# Patient Record
Sex: FEMALE | Race: WHITE | NOT HISPANIC OR LATINO | Employment: FULL TIME | URBAN - METROPOLITAN AREA
[De-identification: names, ages, dates, MRNs, and addresses within clinical notes are randomized per-mention and may not be internally consistent; named-entity substitution may affect disease eponyms.]

---

## 2017-01-20 RX ORDER — TOPIRAMATE 25 MG/1
TABLET ORAL
Qty: 90 TABLET | Refills: 0 | Status: SHIPPED | OUTPATIENT
Start: 2017-01-20 | End: 2017-02-16 | Stop reason: SDUPTHER

## 2017-02-03 RX ORDER — SERTRALINE HYDROCHLORIDE 100 MG/1
TABLET, FILM COATED ORAL
Qty: 30 TABLET | Refills: 0 | Status: SHIPPED | OUTPATIENT
Start: 2017-02-03 | End: 2017-04-13 | Stop reason: SDUPTHER

## 2017-02-10 ENCOUNTER — OFFICE VISIT (OUTPATIENT)
Dept: FAMILY MEDICINE CLINIC | Facility: CLINIC | Age: 32
End: 2017-02-10

## 2017-02-10 VITALS
RESPIRATION RATE: 16 BRPM | SYSTOLIC BLOOD PRESSURE: 120 MMHG | OXYGEN SATURATION: 98 % | WEIGHT: 160 LBS | TEMPERATURE: 97.7 F | HEART RATE: 66 BPM | HEIGHT: 65 IN | BODY MASS INDEX: 26.66 KG/M2 | DIASTOLIC BLOOD PRESSURE: 68 MMHG

## 2017-02-10 DIAGNOSIS — N92.6 IRREGULAR MENSES: Primary | ICD-10-CM

## 2017-02-10 PROCEDURE — 99213 OFFICE O/P EST LOW 20 MIN: CPT | Performed by: PHYSICIAN ASSISTANT

## 2017-02-10 NOTE — PROGRESS NOTES
Subjective   JANN Wharton is a 31 y.o. female.     History of Present Illness   JANN Wharton 31 y.o. female who presents today for irreg menses  she has a history of   Patient Active Problem List   Diagnosis   • Migraine   • Asthma   • Anxiety   • Insomnia   .    I have her on continuous OCP and after irreg spotting 2  Mos, she knew to stop OCP cycle and restart.  This would usually fix it.  Still spotting brown blood every day for last 3 weeks.  She is taking Rx.  I will plan to do labs with thyroid and prolactin levels.    If normal labs, plan to try changing to different OCP like Lo Ovral and cycle her.  I know this can effect her migraines.    If I change her OCP and still spotting with few cycles, will refer to GYN to work up and ultrasound     Lumpy area right breast and no pain  No change in size and there few months.  No change with cycle;  Mom had breast cancer age 59.    The following portions of the patient's history were reviewed and updated as appropriate: allergies, current medications, past family history, past medical history, past social history, past surgical history and problem list.    Review of Systems   Constitutional: Negative for activity change, appetite change and unexpected weight change.   HENT: Negative for nosebleeds and trouble swallowing.    Eyes: Negative for pain and visual disturbance.   Respiratory: Negative for chest tightness, shortness of breath and wheezing.    Cardiovascular: Negative for chest pain and palpitations.   Gastrointestinal: Negative for abdominal pain and blood in stool.   Endocrine: Negative.    Genitourinary: Negative for difficulty urinating and hematuria.   Musculoskeletal: Negative for joint swelling.   Skin: Negative for color change and rash.   Allergic/Immunologic: Negative.    Neurological: Negative for syncope and speech difficulty.   Hematological: Negative for adenopathy.   Psychiatric/Behavioral: Negative for agitation and confusion.   All  other systems reviewed and are negative.      Objective   Physical Exam   Constitutional: She is oriented to person, place, and time. She appears well-developed and well-nourished. No distress.   HENT:   Head: Normocephalic and atraumatic.   Eyes: Conjunctivae and EOM are normal. Pupils are equal, round, and reactive to light. Right eye exhibits no discharge. Left eye exhibits no discharge. No scleral icterus.   Neck: Normal range of motion. Neck supple. No tracheal deviation present. No thyromegaly present.   Cardiovascular: Normal rate, regular rhythm, normal heart sounds, intact distal pulses and normal pulses.  Exam reveals no gallop.    No murmur heard.  Pulmonary/Chest: Effort normal and breath sounds normal. No respiratory distress. She has no wheezes. She has no rales. Right breast exhibits skin change. Right breast exhibits no inverted nipple, no mass and no nipple discharge. Left breast exhibits no inverted nipple, no mass, no nipple discharge and no skin change.   RUQ is fleshy mole;  About 1mm; see on skin and just under skin; not fixed; not on breast tissue   Musculoskeletal: Normal range of motion.   Lymphadenopathy:     She has no cervical adenopathy.     She has no axillary adenopathy.   Neurological: She is alert and oriented to person, place, and time. She exhibits normal muscle tone. Coordination normal.   Skin: Skin is warm. No rash noted. No erythema. No pallor.   Psychiatric: She has a normal mood and affect. Her behavior is normal. Judgment and thought content normal.   Nursing note and vitals reviewed.      Assessment/Plan   Problems Addressed this Visit     None      Visit Diagnoses     Irregular menses    -  Primary    Relevant Orders    T4, Free    TSH    Prolactin    Comprehensive Metabolic Panel    CBC & Differential

## 2017-02-10 NOTE — PATIENT INSTRUCTIONS
I will order labs to check thyroid and pituitary     If labs normal and continue spotting, will need to change pills to different kind and do planned monthly periods    If I do this and continue spotting, will refer GYN for work up and probable ultrasound

## 2017-02-16 RX ORDER — TOPIRAMATE 25 MG/1
TABLET ORAL
Qty: 90 TABLET | Refills: 1 | Status: SHIPPED | OUTPATIENT
Start: 2017-02-16 | End: 2017-04-13 | Stop reason: SDUPTHER

## 2017-02-17 LAB
ALBUMIN SERPL-MCNC: 4.3 G/DL (ref 3.5–5.2)
ALBUMIN/GLOB SERPL: 1.8 G/DL
ALP SERPL-CCNC: 69 U/L (ref 39–117)
ALT SERPL-CCNC: 9 U/L (ref 1–33)
AST SERPL-CCNC: 9 U/L (ref 1–32)
BASOPHILS # BLD AUTO: 0.01 10*3/MM3 (ref 0–0.2)
BASOPHILS NFR BLD AUTO: 0.1 % (ref 0–1.5)
BILIRUB SERPL-MCNC: 0.5 MG/DL (ref 0.1–1.2)
BUN SERPL-MCNC: 9 MG/DL (ref 6–20)
BUN/CREAT SERPL: 11.1 (ref 7–25)
CALCIUM SERPL-MCNC: 9.6 MG/DL (ref 8.6–10.5)
CHLORIDE SERPL-SCNC: 103 MMOL/L (ref 98–107)
CO2 SERPL-SCNC: 22 MMOL/L (ref 22–29)
CREAT SERPL-MCNC: 0.81 MG/DL (ref 0.57–1)
EOSINOPHIL # BLD AUTO: 0.04 10*3/MM3 (ref 0–0.7)
EOSINOPHIL NFR BLD AUTO: 0.4 % (ref 0.3–6.2)
ERYTHROCYTE [DISTWIDTH] IN BLOOD BY AUTOMATED COUNT: 13.6 % (ref 11.7–13)
GLOBULIN SER CALC-MCNC: 2.4 GM/DL
GLUCOSE SERPL-MCNC: 79 MG/DL (ref 65–99)
HCT VFR BLD AUTO: 40.2 % (ref 35.6–45.5)
HGB BLD-MCNC: 13 G/DL (ref 11.9–15.5)
IMM GRANULOCYTES # BLD: 0 10*3/MM3 (ref 0–0.03)
IMM GRANULOCYTES NFR BLD: 0 % (ref 0–0.5)
LYMPHOCYTES # BLD AUTO: 3.49 10*3/MM3 (ref 0.9–4.8)
LYMPHOCYTES NFR BLD AUTO: 38.4 % (ref 19.6–45.3)
MCH RBC QN AUTO: 30 PG (ref 26.9–32)
MCHC RBC AUTO-ENTMCNC: 32.3 G/DL (ref 32.4–36.3)
MCV RBC AUTO: 92.8 FL (ref 80.5–98.2)
MONOCYTES # BLD AUTO: 0.45 10*3/MM3 (ref 0.2–1.2)
MONOCYTES NFR BLD AUTO: 4.9 % (ref 5–12)
NEUTROPHILS # BLD AUTO: 5.11 10*3/MM3 (ref 1.9–8.1)
NEUTROPHILS NFR BLD AUTO: 56.2 % (ref 42.7–76)
PLATELET # BLD AUTO: 275 10*3/MM3 (ref 140–500)
POTASSIUM SERPL-SCNC: 4 MMOL/L (ref 3.5–5.2)
PROLACTIN SERPL-MCNC: 15.3 NG/ML (ref 4.8–23.3)
PROT SERPL-MCNC: 6.7 G/DL (ref 6–8.5)
RBC # BLD AUTO: 4.33 10*6/MM3 (ref 3.9–5.2)
SODIUM SERPL-SCNC: 142 MMOL/L (ref 136–145)
T4 FREE SERPL-MCNC: 1.08 NG/DL (ref 0.93–1.7)
TSH SERPL DL<=0.005 MIU/L-ACNC: 0.92 MIU/ML (ref 0.27–4.2)
WBC # BLD AUTO: 9.1 10*3/MM3 (ref 4.5–10.7)

## 2017-04-14 RX ORDER — SERTRALINE HYDROCHLORIDE 100 MG/1
TABLET, FILM COATED ORAL
Qty: 30 TABLET | Refills: 0 | Status: SHIPPED | OUTPATIENT
Start: 2017-04-14 | End: 2017-05-16 | Stop reason: SDUPTHER

## 2017-04-14 RX ORDER — TOPIRAMATE 25 MG/1
TABLET ORAL
Qty: 90 TABLET | Refills: 0 | Status: SHIPPED | OUTPATIENT
Start: 2017-04-14 | End: 2017-05-16 | Stop reason: SDUPTHER

## 2017-05-17 RX ORDER — SERTRALINE HYDROCHLORIDE 100 MG/1
TABLET, FILM COATED ORAL
Qty: 30 TABLET | Refills: 0 | Status: SHIPPED | OUTPATIENT
Start: 2017-05-17 | End: 2017-06-17 | Stop reason: SDUPTHER

## 2017-05-17 RX ORDER — TOPIRAMATE 25 MG/1
TABLET ORAL
Qty: 90 TABLET | Refills: 0 | Status: SHIPPED | OUTPATIENT
Start: 2017-05-17 | End: 2017-06-17 | Stop reason: SDUPTHER

## 2017-06-18 RX ORDER — RIZATRIPTAN BENZOATE 10 MG/1
TABLET ORAL
Qty: 12 TABLET | Refills: 0 | Status: SHIPPED | OUTPATIENT
Start: 2017-06-18 | End: 2019-04-05 | Stop reason: HOSPADM

## 2017-06-18 RX ORDER — TOPIRAMATE 25 MG/1
TABLET ORAL
Qty: 90 TABLET | Refills: 0 | Status: SHIPPED | OUTPATIENT
Start: 2017-06-18 | End: 2017-07-29 | Stop reason: SDUPTHER

## 2017-06-18 RX ORDER — SERTRALINE HYDROCHLORIDE 100 MG/1
TABLET, FILM COATED ORAL
Qty: 30 TABLET | Refills: 0 | Status: SHIPPED | OUTPATIENT
Start: 2017-06-18 | End: 2017-07-29 | Stop reason: SDUPTHER

## 2017-07-29 RX ORDER — TOPIRAMATE 25 MG/1
TABLET ORAL
Qty: 90 TABLET | Refills: 0 | Status: SHIPPED | OUTPATIENT
Start: 2017-07-29 | End: 2017-08-02

## 2017-07-29 RX ORDER — SERTRALINE HYDROCHLORIDE 100 MG/1
TABLET, FILM COATED ORAL
Qty: 30 TABLET | Refills: 0 | Status: SHIPPED | OUTPATIENT
Start: 2017-07-29 | End: 2017-08-02 | Stop reason: SDUPTHER

## 2017-08-02 ENCOUNTER — OFFICE VISIT (OUTPATIENT)
Dept: FAMILY MEDICINE CLINIC | Facility: CLINIC | Age: 32
End: 2017-08-02

## 2017-08-02 VITALS
SYSTOLIC BLOOD PRESSURE: 110 MMHG | OXYGEN SATURATION: 97 % | RESPIRATION RATE: 16 BRPM | WEIGHT: 165 LBS | BODY MASS INDEX: 27.49 KG/M2 | DIASTOLIC BLOOD PRESSURE: 70 MMHG | HEART RATE: 69 BPM | TEMPERATURE: 98.3 F | HEIGHT: 65 IN

## 2017-08-02 DIAGNOSIS — G43.109 MIGRAINE WITH AURA AND WITHOUT STATUS MIGRAINOSUS, NOT INTRACTABLE: ICD-10-CM

## 2017-08-02 DIAGNOSIS — R79.89 ELEVATED SERUM CREATININE: ICD-10-CM

## 2017-08-02 DIAGNOSIS — F41.9 ANXIETY: ICD-10-CM

## 2017-08-02 DIAGNOSIS — R00.2 HEART PALPITATIONS: Primary | ICD-10-CM

## 2017-08-02 LAB
ALBUMIN SERPL-MCNC: 4.5 G/DL (ref 3.5–5.2)
ALBUMIN/GLOB SERPL: 1.6 G/DL
ALP SERPL-CCNC: 80 U/L (ref 39–117)
ALT SERPL-CCNC: 11 U/L (ref 1–33)
AST SERPL-CCNC: 13 U/L (ref 1–32)
BASOPHILS # BLD AUTO: 0.01 10*3/MM3 (ref 0–0.2)
BASOPHILS NFR BLD AUTO: 0.2 % (ref 0–1.5)
BILIRUB SERPL-MCNC: 0.7 MG/DL (ref 0.1–1.2)
BUN SERPL-MCNC: 13 MG/DL (ref 6–20)
BUN/CREAT SERPL: 12.5 (ref 7–25)
CALCIUM SERPL-MCNC: 9.7 MG/DL (ref 8.6–10.5)
CHLORIDE SERPL-SCNC: 106 MMOL/L (ref 98–107)
CO2 SERPL-SCNC: 22.4 MMOL/L (ref 22–29)
CREAT SERPL-MCNC: 1.04 MG/DL (ref 0.57–1)
EOSINOPHIL # BLD AUTO: 0.08 10*3/MM3 (ref 0–0.7)
EOSINOPHIL NFR BLD AUTO: 1.2 % (ref 0.3–6.2)
ERYTHROCYTE [DISTWIDTH] IN BLOOD BY AUTOMATED COUNT: 13.3 % (ref 11.7–13)
GLOBULIN SER CALC-MCNC: 2.8 GM/DL
GLUCOSE SERPL-MCNC: 88 MG/DL (ref 65–99)
HCT VFR BLD AUTO: 46.5 % (ref 35.6–45.5)
HGB BLD-MCNC: 14.5 G/DL (ref 11.9–15.5)
IMM GRANULOCYTES # BLD: 0 10*3/MM3 (ref 0–0.03)
IMM GRANULOCYTES NFR BLD: 0 % (ref 0–0.5)
LYMPHOCYTES # BLD AUTO: 2.46 10*3/MM3 (ref 0.9–4.8)
LYMPHOCYTES NFR BLD AUTO: 37.8 % (ref 19.6–45.3)
MCH RBC QN AUTO: 29.7 PG (ref 26.9–32)
MCHC RBC AUTO-ENTMCNC: 31.2 G/DL (ref 32.4–36.3)
MCV RBC AUTO: 95.3 FL (ref 80.5–98.2)
MONOCYTES # BLD AUTO: 0.34 10*3/MM3 (ref 0.2–1.2)
MONOCYTES NFR BLD AUTO: 5.2 % (ref 5–12)
NEUTROPHILS # BLD AUTO: 3.62 10*3/MM3 (ref 1.9–8.1)
NEUTROPHILS NFR BLD AUTO: 55.6 % (ref 42.7–76)
PLATELET # BLD AUTO: 278 10*3/MM3 (ref 140–500)
POTASSIUM SERPL-SCNC: 4.2 MMOL/L (ref 3.5–5.2)
PROT SERPL-MCNC: 7.3 G/DL (ref 6–8.5)
RBC # BLD AUTO: 4.88 10*6/MM3 (ref 3.9–5.2)
SODIUM SERPL-SCNC: 140 MMOL/L (ref 136–145)
T4 FREE SERPL-MCNC: 1.17 NG/DL (ref 0.93–1.7)
TSH SERPL DL<=0.005 MIU/L-ACNC: 1.27 MIU/ML (ref 0.27–4.2)
WBC # BLD AUTO: 6.51 10*3/MM3 (ref 4.5–10.7)

## 2017-08-02 PROCEDURE — 99214 OFFICE O/P EST MOD 30 MIN: CPT | Performed by: PHYSICIAN ASSISTANT

## 2017-08-02 RX ORDER — SERTRALINE HYDROCHLORIDE 100 MG/1
100 TABLET, FILM COATED ORAL DAILY
Qty: 30 TABLET | Refills: 5 | Status: SHIPPED | OUTPATIENT
Start: 2017-08-02 | End: 2018-10-01 | Stop reason: SDUPTHER

## 2017-08-02 RX ORDER — TOPIRAMATE 50 MG/1
50 TABLET, FILM COATED ORAL 2 TIMES DAILY
Qty: 60 TABLET | Refills: 5 | Status: SHIPPED | OUTPATIENT
Start: 2017-08-02 | End: 2018-02-09 | Stop reason: SDUPTHER

## 2017-08-02 NOTE — PROGRESS NOTES
Subjective   JANN Wharton is a 31 y.o. female.     History of Present Illness   Patient complains of a fluttering sensation, palpitations, shortness of breath, a skipping sensation and lightheadedness. The symptoms are mild, occur most days for last few months more at night and last seconds per episode. They tend to occur while at rest and at HS and happen every few minutes and is one skip then normal and repeats about 15 times.  Does not wake her up. NO snoring.  Rare to happen during day;  No episode during exertion noted.. Cardiac risk factors include: none. Aggravating factors are none:  Alleviating factors: not noted and just goes away. Associated symptoms: palpitations and slow heart beat. Patient denies: syncope.      Dad had HTN  DM GM (M)      JANN Wharton female 31 y.o. who presents today for follow up of Anxiety.  She reports medication is working well, patient desires to continue on Rx, and needs refill. Onset of symptoms was approximately several years ago.  She denies current suicidal and homicidal ideation. Risk factors are family history of anxiety and or depression and lifestyle of multiple roles.  Previous treatment includes current Rx.  She complains of the following medication side effects: none.  The patient has had no previous counseling..    JANN Wharton 31 y.o. female who presents today for migraines with aura and is on suppressive therapy.  She just started having break through migraines.   Will raise dose of topamax. Has Maxalt.  she has a history of   Patient Active Problem List   Diagnosis   • Migraine   • Asthma   • Anxiety   • Insomnia   .      I will order Holter and echo and get appt cardio  Avoid caffeine       The following portions of the patient's history were reviewed and updated as appropriate: allergies, current medications, past family history, past medical history, past social history, past surgical history and problem list.    Review of Systems    Constitutional: Negative for activity change, appetite change and unexpected weight change.   HENT: Negative for nosebleeds and trouble swallowing.    Eyes: Negative for pain and visual disturbance.   Respiratory: Positive for shortness of breath. Negative for chest tightness and wheezing.    Cardiovascular: Positive for palpitations. Negative for chest pain.   Gastrointestinal: Negative for abdominal pain and blood in stool.   Endocrine: Negative.    Genitourinary: Negative for difficulty urinating and hematuria.   Musculoskeletal: Negative for joint swelling.   Skin: Negative for color change and rash.   Allergic/Immunologic: Negative.    Neurological: Positive for dizziness and light-headedness. Negative for syncope and speech difficulty.   Hematological: Negative for adenopathy.   Psychiatric/Behavioral: Negative for agitation and confusion.   All other systems reviewed and are negative.      Objective   Physical Exam   Constitutional: She is oriented to person, place, and time. She appears well-developed and well-nourished. No distress.   HENT:   Head: Normocephalic and atraumatic.   Eyes: Conjunctivae and EOM are normal. Pupils are equal, round, and reactive to light. Right eye exhibits no discharge. Left eye exhibits no discharge. No scleral icterus.   Neck: Normal range of motion. Neck supple. No tracheal deviation present. No thyromegaly present.   Cardiovascular: Normal rate, regular rhythm, normal heart sounds, intact distal pulses and normal pulses.  Exam reveals no gallop.    No murmur heard.  Pulmonary/Chest: Effort normal and breath sounds normal. No respiratory distress. She has no wheezes. She has no rales.   Musculoskeletal: Normal range of motion.   Neurological: She is alert and oriented to person, place, and time. She exhibits normal muscle tone. Coordination normal.   Skin: Skin is warm. No rash noted. No erythema. No pallor.   Psychiatric: She has a normal mood and affect. Her behavior is  normal. Judgment and thought content normal.   Nursing note and vitals reviewed.      Assessment/Plan   Problems Addressed this Visit        Cardiovascular and Mediastinum    Migraine    Relevant Medications    topiramate (TOPAMAX) 50 MG tablet    sertraline (ZOLOFT) 100 MG tablet    Other Relevant Orders    Comprehensive Metabolic Panel    T4, Free    TSH    CBC & Differential    Adult Transthoracic Echo Complete    Holter Monitor - 24 Hour       Other    Anxiety    Relevant Orders    Comprehensive Metabolic Panel    T4, Free    TSH    CBC & Differential    Adult Transthoracic Echo Complete    Holter Monitor - 24 Hour      Other Visit Diagnoses     Heart palpitations    -  Primary    Relevant Orders    Comprehensive Metabolic Panel    T4, Free    TSH    CBC & Differential    Adult Transthoracic Echo Complete    Holter Monitor - 24 Hour

## 2017-08-02 NOTE — PATIENT INSTRUCTIONS
Getting labs  Cardiac testing and consult  Raise dose Topamax and email if intolerant;  Watch renal stones  ER if worse  Avoid caffeine

## 2017-08-10 ENCOUNTER — HOSPITAL ENCOUNTER (OUTPATIENT)
Dept: CARDIOLOGY | Facility: HOSPITAL | Age: 32
Discharge: HOME | End: 2017-08-10
Admitting: PHYSICIAN ASSISTANT

## 2017-08-10 VITALS
BODY MASS INDEX: 27.49 KG/M2 | DIASTOLIC BLOOD PRESSURE: 60 MMHG | WEIGHT: 165 LBS | HEIGHT: 65 IN | SYSTOLIC BLOOD PRESSURE: 106 MMHG | HEART RATE: 72 BPM

## 2017-08-10 DIAGNOSIS — G43.109 MIGRAINE WITH AURA AND WITHOUT STATUS MIGRAINOSUS, NOT INTRACTABLE: ICD-10-CM

## 2017-08-10 DIAGNOSIS — R00.2 HEART PALPITATIONS: ICD-10-CM

## 2017-08-10 DIAGNOSIS — F41.9 ANXIETY: ICD-10-CM

## 2017-08-10 LAB
ASCENDING AORTA: 2.3 CM
BH CV ECHO MEAS - ACS: 1.9 CM
BH CV ECHO MEAS - AO MAX PG (FULL): 2 MMHG
BH CV ECHO MEAS - AO MAX PG: 4.5 MMHG
BH CV ECHO MEAS - AO MEAN PG (FULL): 1.4 MMHG
BH CV ECHO MEAS - AO MEAN PG: 2.9 MMHG
BH CV ECHO MEAS - AO ROOT AREA (BSA CORRECTED): 1.7
BH CV ECHO MEAS - AO ROOT AREA: 7.9 CM^2
BH CV ECHO MEAS - AO ROOT DIAM: 3.2 CM
BH CV ECHO MEAS - AO V2 MAX: 106.2 CM/SEC
BH CV ECHO MEAS - AO V2 MEAN: 81.9 CM/SEC
BH CV ECHO MEAS - AO V2 VTI: 21.9 CM
BH CV ECHO MEAS - AVA(I,A): 2.2 CM^2
BH CV ECHO MEAS - AVA(I,D): 2.2 CM^2
BH CV ECHO MEAS - AVA(V,A): 2.3 CM^2
BH CV ECHO MEAS - AVA(V,D): 2.3 CM^2
BH CV ECHO MEAS - BSA(HAYCOCK): 1.9 M^2
BH CV ECHO MEAS - BSA: 1.8 M^2
BH CV ECHO MEAS - BZI_BMI: 27.5 KILOGRAMS/M^2
BH CV ECHO MEAS - BZI_METRIC_HEIGHT: 165.1 CM
BH CV ECHO MEAS - BZI_METRIC_WEIGHT: 74.8 KG
BH CV ECHO MEAS - CONTRAST EF (2CH): 64.8 ML/M^2
BH CV ECHO MEAS - CONTRAST EF 4CH: 61.9 ML/M^2
BH CV ECHO MEAS - EDV(MOD-SP2): 88 ML
BH CV ECHO MEAS - EDV(MOD-SP4): 84 ML
BH CV ECHO MEAS - EDV(TEICH): 101.9 ML
BH CV ECHO MEAS - EF(CUBED): 66.4 %
BH CV ECHO MEAS - EF(MOD-SP2): 64.8 %
BH CV ECHO MEAS - EF(MOD-SP4): 61.9 %
BH CV ECHO MEAS - EF(TEICH): 57.9 %
BH CV ECHO MEAS - ESV(MOD-SP2): 31 ML
BH CV ECHO MEAS - ESV(MOD-SP4): 32 ML
BH CV ECHO MEAS - ESV(TEICH): 42.9 ML
BH CV ECHO MEAS - FS: 30.5 %
BH CV ECHO MEAS - IVS/LVPW: 1
BH CV ECHO MEAS - IVSD: 0.9 CM
BH CV ECHO MEAS - LAT PEAK E' VEL: 5 CM/SEC
BH CV ECHO MEAS - LV DIASTOLIC VOL/BSA (35-75): 46.1 ML/M^2
BH CV ECHO MEAS - LV MASS(C)D: 138.5 GRAMS
BH CV ECHO MEAS - LV MASS(C)DI: 76 GRAMS/M^2
BH CV ECHO MEAS - LV MAX PG: 2.5 MMHG
BH CV ECHO MEAS - LV MEAN PG: 1.5 MMHG
BH CV ECHO MEAS - LV SYSTOLIC VOL/BSA (12-30): 17.6 ML/M^2
BH CV ECHO MEAS - LV V1 MAX: 78.6 CM/SEC
BH CV ECHO MEAS - LV V1 MEAN: 57.9 CM/SEC
BH CV ECHO MEAS - LV V1 VTI: 15.7 CM
BH CV ECHO MEAS - LVIDD: 4.7 CM
BH CV ECHO MEAS - LVIDS: 3.3 CM
BH CV ECHO MEAS - LVLD AP2: 7 CM
BH CV ECHO MEAS - LVLD AP4: 7.3 CM
BH CV ECHO MEAS - LVLS AP2: 6.1 CM
BH CV ECHO MEAS - LVLS AP4: 6.4 CM
BH CV ECHO MEAS - LVOT AREA (M): 3.1 CM^2
BH CV ECHO MEAS - LVOT AREA: 3.1 CM^2
BH CV ECHO MEAS - LVOT DIAM: 2 CM
BH CV ECHO MEAS - LVPWD: 0.87 CM
BH CV ECHO MEAS - MED PEAK E' VEL: 6 CM/SEC
BH CV ECHO MEAS - MV A DUR: 0.1 SEC
BH CV ECHO MEAS - MV A MAX VEL: 56.3 CM/SEC
BH CV ECHO MEAS - MV DEC SLOPE: 367.4 CM/SEC^2
BH CV ECHO MEAS - MV DEC TIME: 0.17 SEC
BH CV ECHO MEAS - MV E MAX VEL: 61.6 CM/SEC
BH CV ECHO MEAS - MV E/A: 1.1
BH CV ECHO MEAS - MV MAX PG: 2.3 MMHG
BH CV ECHO MEAS - MV MEAN PG: 0.92 MMHG
BH CV ECHO MEAS - MV P1/2T MAX VEL: 62.1 CM/SEC
BH CV ECHO MEAS - MV P1/2T: 49.5 MSEC
BH CV ECHO MEAS - MV V2 MAX: 76.6 CM/SEC
BH CV ECHO MEAS - MV V2 MEAN: 44.1 CM/SEC
BH CV ECHO MEAS - MV V2 VTI: 24.1 CM
BH CV ECHO MEAS - MVA P1/2T LCG: 3.5 CM^2
BH CV ECHO MEAS - MVA(P1/2T): 4.4 CM^2
BH CV ECHO MEAS - MVA(VTI): 2 CM^2
BH CV ECHO MEAS - PA ACC TIME: 0.18 SEC
BH CV ECHO MEAS - PA MAX PG (FULL): 0.8 MMHG
BH CV ECHO MEAS - PA MAX PG: 2.1 MMHG
BH CV ECHO MEAS - PA PR(ACCEL): -1.8 MMHG
BH CV ECHO MEAS - PA V2 MAX: 71.9 CM/SEC
BH CV ECHO MEAS - PULM A REVS DUR: 0.1 SEC
BH CV ECHO MEAS - PULM A REVS VEL: 16 CM/SEC
BH CV ECHO MEAS - PULM DIAS VEL: 38.3 CM/SEC
BH CV ECHO MEAS - PULM S/D: 1.3
BH CV ECHO MEAS - PULM SYS VEL: 48.5 CM/SEC
BH CV ECHO MEAS - PVA(V,A): 2.4 CM^2
BH CV ECHO MEAS - PVA(V,D): 2.4 CM^2
BH CV ECHO MEAS - QP/QS: 0.82
BH CV ECHO MEAS - RAP SYSTOLE: 3 MMHG
BH CV ECHO MEAS - RV MAX PG: 1.3 MMHG
BH CV ECHO MEAS - RV MEAN PG: 0.67 MMHG
BH CV ECHO MEAS - RV V1 MAX: 56.3 CM/SEC
BH CV ECHO MEAS - RV V1 MEAN: 38.3 CM/SEC
BH CV ECHO MEAS - RV V1 VTI: 12.8 CM
BH CV ECHO MEAS - RVOT AREA: 3.1 CM^2
BH CV ECHO MEAS - RVOT DIAM: 2 CM
BH CV ECHO MEAS - RVSP: 21.9 MMHG
BH CV ECHO MEAS - SI(AO): 95.4 ML/M^2
BH CV ECHO MEAS - SI(CUBED): 37.6 ML/M^2
BH CV ECHO MEAS - SI(LVOT): 26.6 ML/M^2
BH CV ECHO MEAS - SI(MOD-SP2): 31.3 ML/M^2
BH CV ECHO MEAS - SI(MOD-SP4): 28.5 ML/M^2
BH CV ECHO MEAS - SI(TEICH): 32.4 ML/M^2
BH CV ECHO MEAS - SUP REN AO DIAM: 1.7 CM
BH CV ECHO MEAS - SV(AO): 174 ML
BH CV ECHO MEAS - SV(CUBED): 68.6 ML
BH CV ECHO MEAS - SV(LVOT): 48.5 ML
BH CV ECHO MEAS - SV(MOD-SP2): 57 ML
BH CV ECHO MEAS - SV(MOD-SP4): 52 ML
BH CV ECHO MEAS - SV(RVOT): 39.7 ML
BH CV ECHO MEAS - SV(TEICH): 59.1 ML
BH CV ECHO MEAS - TAPSE (>1.6): 2.1 CM2
BH CV ECHO MEAS - TR MAX VEL: 217.2 CM/SEC
BH CV XLRA - RV BASE: 2.6 CM
BH CV XLRA - TDI S': 13 CM/SEC
E/E' RATIO: 5.5
LEFT ATRIUM VOLUME INDEX: 23 ML/M2
LV EF 2D ECHO EST: 62 %
SINUS: 2.7 CM
STJ: 2.4 CM

## 2017-08-10 PROCEDURE — 93306 TTE W/DOPPLER COMPLETE: CPT | Performed by: INTERNAL MEDICINE

## 2017-08-10 PROCEDURE — 93306 TTE W/DOPPLER COMPLETE: CPT

## 2017-08-25 RX ORDER — LEVONORGESTREL AND ETHINYL ESTRADIOL 0.15-0.03
KIT ORAL
Qty: 28 TABLET | Refills: 12 | Status: SHIPPED | OUTPATIENT
Start: 2017-08-25 | End: 2018-10-08

## 2017-09-07 ENCOUNTER — OFFICE VISIT (OUTPATIENT)
Dept: CARDIOLOGY | Facility: CLINIC | Age: 32
End: 2017-09-07

## 2017-09-07 VITALS
SYSTOLIC BLOOD PRESSURE: 100 MMHG | WEIGHT: 170 LBS | BODY MASS INDEX: 28.32 KG/M2 | HEIGHT: 65 IN | HEART RATE: 61 BPM | DIASTOLIC BLOOD PRESSURE: 64 MMHG

## 2017-09-07 DIAGNOSIS — R00.2 PALPITATION: Primary | ICD-10-CM

## 2017-09-07 PROCEDURE — 93000 ELECTROCARDIOGRAM COMPLETE: CPT | Performed by: INTERNAL MEDICINE

## 2017-09-07 PROCEDURE — 99203 OFFICE O/P NEW LOW 30 MIN: CPT | Performed by: INTERNAL MEDICINE

## 2017-09-07 NOTE — PROGRESS NOTES
Subjective:     Encounter Date:09/07/2017      Patient ID: JANN Wharton is a 32 y.o. female.    Chief Complaint:  Palpitations    This is a recurrent problem. The current episode started more than 1 month ago. The problem occurs intermittently. The problem has been waxing and waning. Associated symptoms include malaise/fatigue. Pertinent negatives include no anxiety, chest pain, coughing, nausea, near-syncope, numbness, shortness of breath or syncope.       32-year-old female who presents today for evaluation of her palpitations.  She actually describes classically PVCs.  Usually occurs when she rests refer she goes to sleep.  Patient started worked up including an echocardiogram as well as a monitor.  Her monitor showed occasional sinus tachycardia and some rare PVCs.  She was seen today for discussion of her studies.  Her biggest complaint however is fatigue.  She said she can premature sleep at all times she doesn't generally she gets enough sleep.    Review of Systems   Constitution: Positive for malaise/fatigue.   Cardiovascular: Positive for palpitations. Negative for chest pain, near-syncope and syncope.   Respiratory: Negative for cough and shortness of breath.    Gastrointestinal: Negative for nausea.   Neurological: Negative for numbness.   Psychiatric/Behavioral: The patient is not nervous/anxious.          ECG 12 Lead  Date/Time: 9/7/2017 4:07 PM  Performed by: LEONILA CLARKE  Authorized by: LEONILA CLARKE   Comparison: compared with previous ECG from 5/7/2007  Similar to previous ECG  Rhythm: sinus rhythm  Clinical impression: normal ECG               Objective:     Physical Exam   Constitutional: She is oriented to person, place, and time. She appears well-developed.   HENT:   Head: Normocephalic.   Eyes: Conjunctivae are normal.   Neck: Normal range of motion.   Cardiovascular: Normal rate, regular rhythm and normal heart sounds.    Pulmonary/Chest: Breath sounds normal.   Abdominal:  Soft. Bowel sounds are normal.   Musculoskeletal: Normal range of motion. She exhibits no edema.   Neurological: She is alert and oriented to person, place, and time.   Skin: Skin is warm and dry.   Psychiatric: She has a normal mood and affect. Her behavior is normal.   Vitals reviewed.      Lab Review:       Assessment:         No diagnosis found.       Plan:       1.  Palpitations.  Pretty classic PVCs.  I did review things to anticipatein the future.  Again tolerability is most appropriate approach.  I told her that sometimes and put people on beta blockers however it does give people tendency to gain weight which she did not want to do.  However this point clearly it is not appropriate but I was talking about future issues should they pick back up.  2.  Fatigue one concern would be the PVCs associated with sleep apnea.  I did ask her quite a bit questions and she really did not have symptoms consistent with sleep apnea.  Not all patients with sleep apnea snore and it may be something to consider because it sounds like she doesn't sleep very well.  I will defer that to your judgment.  I would see her on an as-needed basis

## 2017-12-29 LAB
BUN SERPL-MCNC: 11 MG/DL (ref 6–20)
BUN/CREAT SERPL: 11 (ref 9–23)
CALCIUM SERPL-MCNC: 9.6 MG/DL (ref 8.7–10.2)
CHLORIDE SERPL-SCNC: 104 MMOL/L (ref 96–106)
CO2 SERPL-SCNC: 24 MMOL/L (ref 18–29)
CREAT SERPL-MCNC: 0.99 MG/DL (ref 0.57–1)
GLUCOSE SERPL-MCNC: 65 MG/DL (ref 65–99)
POTASSIUM SERPL-SCNC: 4.1 MMOL/L (ref 3.5–5.2)
SODIUM SERPL-SCNC: 142 MMOL/L (ref 134–144)

## 2018-02-09 RX ORDER — TOPIRAMATE 50 MG/1
TABLET, FILM COATED ORAL
Qty: 60 TABLET | Refills: 0 | Status: SHIPPED | OUTPATIENT
Start: 2018-02-09 | End: 2018-10-08

## 2018-10-01 RX ORDER — SERTRALINE HYDROCHLORIDE 100 MG/1
100 TABLET, FILM COATED ORAL DAILY
Qty: 30 TABLET | Refills: 0 | Status: SHIPPED | OUTPATIENT
Start: 2018-10-01 | End: 2018-10-08 | Stop reason: SDUPTHER

## 2018-10-08 ENCOUNTER — OFFICE VISIT (OUTPATIENT)
Dept: FAMILY MEDICINE CLINIC | Facility: CLINIC | Age: 33
End: 2018-10-08

## 2018-10-08 VITALS
HEART RATE: 81 BPM | SYSTOLIC BLOOD PRESSURE: 110 MMHG | DIASTOLIC BLOOD PRESSURE: 72 MMHG | RESPIRATION RATE: 16 BRPM | WEIGHT: 180 LBS | TEMPERATURE: 98.1 F | BODY MASS INDEX: 29.99 KG/M2 | OXYGEN SATURATION: 98 % | HEIGHT: 65 IN

## 2018-10-08 DIAGNOSIS — M79.672 LEFT FOOT PAIN: ICD-10-CM

## 2018-10-08 DIAGNOSIS — S93.402S SPRAIN OF LEFT ANKLE, UNSPECIFIED LIGAMENT, SEQUELA: Primary | ICD-10-CM

## 2018-10-08 DIAGNOSIS — S93.602S SPRAIN OF LEFT FOOT, SEQUELA: ICD-10-CM

## 2018-10-08 DIAGNOSIS — F41.9 ANXIETY: ICD-10-CM

## 2018-10-08 PROCEDURE — 99213 OFFICE O/P EST LOW 20 MIN: CPT | Performed by: PHYSICIAN ASSISTANT

## 2018-10-08 RX ORDER — SERTRALINE HYDROCHLORIDE 100 MG/1
100 TABLET, FILM COATED ORAL DAILY
Qty: 90 TABLET | Refills: 3 | Status: SHIPPED | OUTPATIENT
Start: 2018-10-08 | End: 2019-04-05 | Stop reason: HOSPADM

## 2018-10-08 NOTE — PROGRESS NOTES
Subjective   Sara Hoang is a 33 y.o. female.     History of Present Illness   Sara Hoang 33 y.o. female presents today for Emergency Room follow up.  she was treated ankle sprain 9-26-18.  She stepped down on edge brian and rolled ankle  .  I reviewed all of the labs and diagnostic testing and noted:  Went to [facility] and see Xray is neg and she is in boot.  This is still edema and bruising; pain is improving.  Had past hx fx right foot and did not show right away.  I will need her to see ortho!!!!  The patient's medications were not changed:  Current outpatient and discharge medications have been reconciled for the patient.  Reviewed by: Lashae Hercules PA-C    she does not have a follow up appointment with a specialist:     Sara Hoang female 33 y.o. who presents today for follow up of Anxiety.  She reports medication is working well, patient desires to continue on Rx, and needs refill. Onset of symptoms was approximately several years ago.  She denies current suicidal and homicidal ideation. Risk factors are lifestyle of multiple roles.  Previous treatment includes current Rx.  She complains of the following medication side effects: none.  The patient has had no previous counseling..    She is off Maxalt and Topamax and trying to get pregnant; may need to lower dose Zoloft if +    The following portions of the patient's history were reviewed and updated as appropriate: allergies, current medications, past family history, past medical history, past social history, past surgical history and problem list.    Review of Systems   Constitutional: Negative for activity change, appetite change and unexpected weight change.   HENT: Negative for nosebleeds and trouble swallowing.    Eyes: Negative for pain and visual disturbance.   Respiratory: Negative for chest tightness, shortness of breath and wheezing.    Cardiovascular: Negative for chest pain and palpitations.   Gastrointestinal: Negative for abdominal pain  and blood in stool.   Endocrine: Negative.    Genitourinary: Negative for difficulty urinating and hematuria.   Musculoskeletal: Positive for arthralgias and gait problem. Negative for joint swelling.   Skin: Negative for color change and rash.   Allergic/Immunologic: Negative.    Neurological: Negative for syncope and speech difficulty.   Hematological: Negative for adenopathy.   Psychiatric/Behavioral: Negative for agitation and confusion.   All other systems reviewed and are negative.      Objective   Physical Exam   Constitutional: She is oriented to person, place, and time. She appears well-developed and well-nourished. No distress.   HENT:   Head: Normocephalic and atraumatic.   Eyes: Pupils are equal, round, and reactive to light. EOM are normal. Right eye exhibits no discharge. Left eye exhibits no discharge. No scleral icterus.   Neck: Normal range of motion. Neck supple.   Pulmonary/Chest: Effort normal.   Musculoskeletal: Normal range of motion. She exhibits edema and tenderness.   Bruise and edema left foot anterior medial ankle and lateral malleolus not tender on bone; ROM pain and limited; metatarsals bruised and edema ;sore to touch over dorsal 2-3 ;  Pain with ROM and pain to bear weight    Neurological: She is alert and oriented to person, place, and time. She exhibits normal muscle tone.   Skin: Skin is warm and dry. She is not diaphoretic.   Psychiatric: She has a normal mood and affect. Her behavior is normal. Judgment and thought content normal.   Nursing note and vitals reviewed.      Assessment/Plan   Sara PENN was seen today for ankle injury.    Diagnoses and all orders for this visit:    Sprain of left ankle, unspecified ligament, sequela  -     Ambulatory Referral to Orthopedic Surgery    Anxiety    Sprain of left foot, sequela  -     Ambulatory Referral to Orthopedic Surgery    Left foot pain  Comments:  concern about stress fx  Orders:  -     Ambulatory Referral to Orthopedic  Surgery    Other orders  -     sertraline (ZOLOFT) 100 MG tablet; Take 1 tablet by mouth Daily. For anxiety               Answers for HPI/ROS submitted by the patient on 10/6/2018   Lower extremity pain  Incident occurred: more than 1 week ago  Incident location: other  Injury mechanism: an eversion injury  Pain location: left ankle, left foot  Pain quality: stabbing  Pain - numeric: 3/10  Pain course: improving  tingling: No  inability to bear weight: Yes  loss of motion: Yes  loss of sensation: No  muscle weakness: Yes  Foreign body present: no foreign bodies

## 2019-03-05 LAB
EXTERNAL HEPATITIS B SURFACE ANTIGEN: NEGATIVE
EXTERNAL HEPATITIS C AB: NEGATIVE
EXTERNAL RUBELLA QUALITATIVE: NORMAL
EXTERNAL SYPHILIS RPR SCREEN: NORMAL
HIV1 P24 AG SERPL QL IA: NORMAL

## 2019-03-17 ENCOUNTER — APPOINTMENT (OUTPATIENT)
Dept: ULTRASOUND IMAGING | Facility: HOSPITAL | Age: 34
End: 2019-03-17

## 2019-03-17 ENCOUNTER — HOSPITAL ENCOUNTER (OUTPATIENT)
Facility: HOSPITAL | Age: 34
Setting detail: OBSERVATION
Discharge: HOME | End: 2019-03-18
Attending: EMERGENCY MEDICINE | Admitting: OBSTETRICS & GYNECOLOGY

## 2019-03-17 DIAGNOSIS — O46.8X1 SUBCHORIONIC HEMORRHAGE OF PLACENTA IN FIRST TRIMESTER, SINGLE OR UNSPECIFIED FETUS: Primary | ICD-10-CM

## 2019-03-17 DIAGNOSIS — O21.0 HYPEREMESIS ARISING DURING PREGNANCY: ICD-10-CM

## 2019-03-17 DIAGNOSIS — Z3A.09 9 WEEKS GESTATION OF PREGNANCY: ICD-10-CM

## 2019-03-17 DIAGNOSIS — O41.8X10 SUBCHORIONIC HEMORRHAGE OF PLACENTA IN FIRST TRIMESTER, SINGLE OR UNSPECIFIED FETUS: Primary | ICD-10-CM

## 2019-03-17 DIAGNOSIS — E86.0 DEHYDRATION: ICD-10-CM

## 2019-03-17 LAB
ABO GROUP BLD: NORMAL
ALBUMIN SERPL-MCNC: 4.3 G/DL (ref 3.5–5.2)
ALBUMIN/GLOB SERPL: 1.5 G/DL
ALP SERPL-CCNC: 91 U/L (ref 39–117)
ALT SERPL W P-5'-P-CCNC: 11 U/L (ref 1–33)
ANION GAP SERPL CALCULATED.3IONS-SCNC: 14.2 MMOL/L
AST SERPL-CCNC: 14 U/L (ref 1–32)
BACTERIA UR QL AUTO: ABNORMAL /HPF
BASOPHILS # BLD AUTO: 0.02 10*3/MM3 (ref 0–0.2)
BASOPHILS NFR BLD AUTO: 0.1 % (ref 0–1.5)
BILIRUB SERPL-MCNC: 0.6 MG/DL (ref 0.1–1.2)
BILIRUB UR QL STRIP: NEGATIVE
BLD GP AB SCN SERPL QL: POSITIVE
BUN BLD-MCNC: 6 MG/DL (ref 6–20)
BUN/CREAT SERPL: 9.2 (ref 7–25)
CALCIUM SPEC-SCNC: 9.6 MG/DL (ref 8.6–10.5)
CHLORIDE SERPL-SCNC: 102 MMOL/L (ref 98–107)
CLARITY UR: ABNORMAL
CO2 SERPL-SCNC: 22.8 MMOL/L (ref 22–29)
COLOR UR: YELLOW
CREAT BLD-MCNC: 0.65 MG/DL (ref 0.57–1)
DEPRECATED RDW RBC AUTO: 42.5 FL (ref 37–54)
EOSINOPHIL # BLD AUTO: 0.02 10*3/MM3 (ref 0–0.4)
EOSINOPHIL NFR BLD AUTO: 0.1 % (ref 0.3–6.2)
ERYTHROCYTE [DISTWIDTH] IN BLOOD BY AUTOMATED COUNT: 13 % (ref 12.3–15.4)
GFR SERPL CREATININE-BSD FRML MDRD: 106 ML/MIN/1.73
GLOBULIN UR ELPH-MCNC: 2.9 GM/DL
GLUCOSE BLD-MCNC: 97 MG/DL (ref 65–99)
GLUCOSE UR STRIP-MCNC: NEGATIVE MG/DL
HCG INTACT+B SERPL-ACNC: NORMAL MIU/ML
HCT VFR BLD AUTO: 39.6 % (ref 34–46.6)
HGB BLD-MCNC: 13.2 G/DL (ref 12–15.9)
HGB UR QL STRIP.AUTO: ABNORMAL
HYALINE CASTS UR QL AUTO: ABNORMAL /LPF
IMM GRANULOCYTES # BLD AUTO: 0.06 10*3/MM3 (ref 0–0.05)
IMM GRANULOCYTES NFR BLD AUTO: 0.4 % (ref 0–0.5)
KETONES UR QL STRIP: ABNORMAL
LEUKOCYTE ESTERASE UR QL STRIP.AUTO: ABNORMAL
LYMPHOCYTES # BLD AUTO: 1.94 10*3/MM3 (ref 0.7–3.1)
LYMPHOCYTES NFR BLD AUTO: 14.4 % (ref 19.6–45.3)
MCH RBC QN AUTO: 29.8 PG (ref 26.6–33)
MCHC RBC AUTO-ENTMCNC: 33.3 G/DL (ref 31.5–35.7)
MCV RBC AUTO: 89.4 FL (ref 79–97)
MONOCYTES # BLD AUTO: 0.63 10*3/MM3 (ref 0.1–0.9)
MONOCYTES NFR BLD AUTO: 4.7 % (ref 5–12)
NEUTROPHILS # BLD AUTO: 10.83 10*3/MM3 (ref 1.4–7)
NEUTROPHILS NFR BLD AUTO: 80.3 % (ref 42.7–76)
NITRITE UR QL STRIP: NEGATIVE
NRBC BLD AUTO-RTO: 0 /100 WBC (ref 0–0)
PH UR STRIP.AUTO: 6.5 [PH] (ref 5–8)
PLATELET # BLD AUTO: 267 10*3/MM3 (ref 140–450)
PMV BLD AUTO: 9.9 FL (ref 6–12)
POTASSIUM BLD-SCNC: 3.5 MMOL/L (ref 3.5–5.2)
PROT SERPL-MCNC: 7.2 G/DL (ref 6–8.5)
PROT UR QL STRIP: ABNORMAL
RBC # BLD AUTO: 4.43 10*6/MM3 (ref 3.77–5.28)
RBC # UR: ABNORMAL /HPF
REF LAB TEST METHOD: ABNORMAL
RH BLD: NEGATIVE
SODIUM BLD-SCNC: 139 MMOL/L (ref 136–145)
SP GR UR STRIP: 1.03 (ref 1–1.03)
SQUAMOUS #/AREA URNS HPF: ABNORMAL /HPF
T&S EXPIRATION DATE: NORMAL
UROBILINOGEN UR QL STRIP: ABNORMAL
WBC NRBC COR # BLD: 13.5 10*3/MM3 (ref 3.4–10.8)
WBC UR QL AUTO: ABNORMAL /HPF

## 2019-03-17 PROCEDURE — 96365 THER/PROPH/DIAG IV INF INIT: CPT

## 2019-03-17 PROCEDURE — 81001 URINALYSIS AUTO W/SCOPE: CPT | Performed by: PHYSICIAN ASSISTANT

## 2019-03-17 PROCEDURE — 86900 BLOOD TYPING SEROLOGIC ABO: CPT | Performed by: PHYSICIAN ASSISTANT

## 2019-03-17 PROCEDURE — 93976 VASCULAR STUDY: CPT

## 2019-03-17 PROCEDURE — 25010000002 PROMETHAZINE PER 50 MG: Performed by: PHYSICIAN ASSISTANT

## 2019-03-17 PROCEDURE — 86901 BLOOD TYPING SEROLOGIC RH(D): CPT | Performed by: PHYSICIAN ASSISTANT

## 2019-03-17 PROCEDURE — 84702 CHORIONIC GONADOTROPIN TEST: CPT | Performed by: PHYSICIAN ASSISTANT

## 2019-03-17 PROCEDURE — 85025 COMPLETE CBC W/AUTO DIFF WBC: CPT | Performed by: PHYSICIAN ASSISTANT

## 2019-03-17 PROCEDURE — 86850 RBC ANTIBODY SCREEN: CPT | Performed by: PHYSICIAN ASSISTANT

## 2019-03-17 PROCEDURE — 86870 RBC ANTIBODY IDENTIFICATION: CPT | Performed by: PHYSICIAN ASSISTANT

## 2019-03-17 PROCEDURE — 76817 TRANSVAGINAL US OBSTETRIC: CPT

## 2019-03-17 PROCEDURE — 96375 TX/PRO/DX INJ NEW DRUG ADDON: CPT

## 2019-03-17 PROCEDURE — 25010000002 CEFTRIAXONE PER 250 MG: Performed by: PHYSICIAN ASSISTANT

## 2019-03-17 PROCEDURE — G0378 HOSPITAL OBSERVATION PER HR: HCPCS

## 2019-03-17 PROCEDURE — 99284 EMERGENCY DEPT VISIT MOD MDM: CPT

## 2019-03-17 PROCEDURE — 80053 COMPREHEN METABOLIC PANEL: CPT | Performed by: PHYSICIAN ASSISTANT

## 2019-03-17 PROCEDURE — 76815 OB US LIMITED FETUS(S): CPT

## 2019-03-17 RX ORDER — NITROFURANTOIN 25; 75 MG/1; MG/1
100 CAPSULE ORAL 2 TIMES DAILY
COMMUNITY
End: 2019-04-05 | Stop reason: HOSPADM

## 2019-03-17 RX ORDER — CEFTRIAXONE SODIUM 1 G/50ML
1 INJECTION, SOLUTION INTRAVENOUS ONCE
Status: COMPLETED | OUTPATIENT
Start: 2019-03-17 | End: 2019-03-18

## 2019-03-17 RX ORDER — PROMETHAZINE HYDROCHLORIDE 25 MG/ML
12.5 INJECTION, SOLUTION INTRAMUSCULAR; INTRAVENOUS ONCE
Status: COMPLETED | OUTPATIENT
Start: 2019-03-17 | End: 2019-03-17

## 2019-03-17 RX ORDER — SODIUM CHLORIDE 0.9 % (FLUSH) 0.9 %
10 SYRINGE (ML) INJECTION AS NEEDED
Status: DISCONTINUED | OUTPATIENT
Start: 2019-03-17 | End: 2019-03-19 | Stop reason: HOSPADM

## 2019-03-17 RX ORDER — PRENATAL VIT NO.126/IRON/FOLIC 28MG-0.8MG
TABLET ORAL DAILY
COMMUNITY
End: 2020-01-20

## 2019-03-17 RX ADMIN — CEFTRIAXONE SODIUM 1 G: 1 INJECTION, SOLUTION INTRAVENOUS at 23:54

## 2019-03-17 RX ADMIN — PROMETHAZINE HYDROCHLORIDE 12.5 MG: 25 INJECTION INTRAMUSCULAR; INTRAVENOUS at 23:40

## 2019-03-17 RX ADMIN — SODIUM CHLORIDE 1000 ML: 9 INJECTION, SOLUTION INTRAVENOUS at 23:30

## 2019-03-18 VITALS
RESPIRATION RATE: 18 BRPM | TEMPERATURE: 98.7 F | HEIGHT: 65 IN | HEART RATE: 87 BPM | SYSTOLIC BLOOD PRESSURE: 111 MMHG | BODY MASS INDEX: 30.82 KG/M2 | OXYGEN SATURATION: 99 % | DIASTOLIC BLOOD PRESSURE: 76 MMHG | WEIGHT: 185 LBS

## 2019-03-18 PROBLEM — O21.0 HYPEREMESIS GRAVIDARUM: Status: ACTIVE | Noted: 2019-03-18

## 2019-03-18 PROBLEM — O26.891 RH NEGATIVE STATUS DURING PREGNANCY IN FIRST TRIMESTER: Status: ACTIVE | Noted: 2019-03-18

## 2019-03-18 PROBLEM — Z67.91 RH NEGATIVE STATUS DURING PREGNANCY IN FIRST TRIMESTER: Status: ACTIVE | Noted: 2019-03-18

## 2019-03-18 PROBLEM — Z34.01 ENCOUNTER FOR SUPERVISION OF LOW-RISK FIRST PREGNANCY IN FIRST TRIMESTER: Status: ACTIVE | Noted: 2019-03-18

## 2019-03-18 PROBLEM — Z79.811 USE OF LETROZOLE (FEMARA): Status: ACTIVE | Noted: 2019-03-18

## 2019-03-18 LAB
BACTERIA UR QL AUTO: NORMAL /HPF
BILIRUB UR QL STRIP: NEGATIVE
CLARITY UR: CLEAR
COLOR UR: YELLOW
GLUCOSE UR STRIP-MCNC: NEGATIVE MG/DL
HGB UR QL STRIP.AUTO: ABNORMAL
HYALINE CASTS UR QL AUTO: NORMAL /LPF
KETONES UR QL STRIP: NEGATIVE
LEUKOCYTE ESTERASE UR QL STRIP.AUTO: ABNORMAL
NITRITE UR QL STRIP: NEGATIVE
PH UR STRIP.AUTO: 7 [PH] (ref 5–8)
PROT UR QL STRIP: NEGATIVE
RBC # UR: NORMAL /HPF
REF LAB TEST METHOD: NORMAL
RESIDUAL RHIG DETECTED: NORMAL
SP GR UR STRIP: 1.01 (ref 1–1.03)
SQUAMOUS #/AREA URNS HPF: NORMAL /HPF
UROBILINOGEN UR QL STRIP: ABNORMAL
WBC UR QL AUTO: NORMAL /HPF

## 2019-03-18 PROCEDURE — 99221 1ST HOSP IP/OBS SF/LOW 40: CPT | Performed by: OBSTETRICS & GYNECOLOGY

## 2019-03-18 PROCEDURE — 25010000002 PROMETHAZINE PER 50 MG: Performed by: OBSTETRICS & GYNECOLOGY

## 2019-03-18 PROCEDURE — G0378 HOSPITAL OBSERVATION PER HR: HCPCS

## 2019-03-18 PROCEDURE — 25010000002 ONDANSETRON PER 1 MG: Performed by: PHYSICIAN ASSISTANT

## 2019-03-18 PROCEDURE — 25010000002 ONDANSETRON PER 1 MG: Performed by: OBSTETRICS & GYNECOLOGY

## 2019-03-18 PROCEDURE — 96375 TX/PRO/DX INJ NEW DRUG ADDON: CPT

## 2019-03-18 PROCEDURE — 25010000002 METOCLOPRAMIDE PER 10 MG: Performed by: PHYSICIAN ASSISTANT

## 2019-03-18 PROCEDURE — 96376 TX/PRO/DX INJ SAME DRUG ADON: CPT

## 2019-03-18 PROCEDURE — 81001 URINALYSIS AUTO W/SCOPE: CPT | Performed by: OBSTETRICS & GYNECOLOGY

## 2019-03-18 PROCEDURE — 96361 HYDRATE IV INFUSION ADD-ON: CPT

## 2019-03-18 RX ORDER — ONDANSETRON 2 MG/ML
4 INJECTION INTRAMUSCULAR; INTRAVENOUS ONCE
Status: COMPLETED | OUTPATIENT
Start: 2019-03-18 | End: 2019-03-18

## 2019-03-18 RX ORDER — PROMETHAZINE HYDROCHLORIDE 25 MG/1
25 TABLET ORAL EVERY 6 HOURS PRN
Qty: 20 TABLET | Refills: 0 | Status: ON HOLD | OUTPATIENT
Start: 2019-03-18 | End: 2019-06-02 | Stop reason: SDUPTHER

## 2019-03-18 RX ORDER — FAMOTIDINE 20 MG/1
20 TABLET, FILM COATED ORAL 2 TIMES DAILY
Status: DISCONTINUED | OUTPATIENT
Start: 2019-03-18 | End: 2019-03-19 | Stop reason: HOSPADM

## 2019-03-18 RX ORDER — ONDANSETRON 2 MG/ML
4 INJECTION INTRAMUSCULAR; INTRAVENOUS EVERY 6 HOURS PRN
Status: DISCONTINUED | OUTPATIENT
Start: 2019-03-18 | End: 2019-03-18

## 2019-03-18 RX ORDER — ACETAMINOPHEN 500 MG
1000 TABLET ORAL ONCE
Status: COMPLETED | OUTPATIENT
Start: 2019-03-18 | End: 2019-03-18

## 2019-03-18 RX ORDER — DEXTROSE AND SODIUM CHLORIDE 5; .9 G/100ML; G/100ML
1000 INJECTION, SOLUTION INTRAVENOUS CONTINUOUS
Status: DISCONTINUED | OUTPATIENT
Start: 2019-03-18 | End: 2019-03-18

## 2019-03-18 RX ORDER — METOCLOPRAMIDE 10 MG/1
10 TABLET ORAL
Qty: 40 TABLET | Refills: 0 | Status: SHIPPED | OUTPATIENT
Start: 2019-03-19 | End: 2019-04-05 | Stop reason: HOSPADM

## 2019-03-18 RX ORDER — FAMOTIDINE 20 MG/1
20 TABLET, FILM COATED ORAL 2 TIMES DAILY
Qty: 60 TABLET | Refills: 1 | Status: SHIPPED | OUTPATIENT
Start: 2019-03-18 | End: 2019-04-24 | Stop reason: HOSPADM

## 2019-03-18 RX ORDER — SODIUM CHLORIDE, SODIUM LACTATE, POTASSIUM CHLORIDE, CALCIUM CHLORIDE 600; 310; 30; 20 MG/100ML; MG/100ML; MG/100ML; MG/100ML
125 INJECTION, SOLUTION INTRAVENOUS CONTINUOUS
Status: DISCONTINUED | OUTPATIENT
Start: 2019-03-18 | End: 2019-03-18

## 2019-03-18 RX ORDER — METOCLOPRAMIDE 10 MG/1
10 TABLET ORAL
Status: DISCONTINUED | OUTPATIENT
Start: 2019-03-18 | End: 2019-03-19 | Stop reason: HOSPADM

## 2019-03-18 RX ORDER — METOCLOPRAMIDE HYDROCHLORIDE 5 MG/ML
10 INJECTION INTRAMUSCULAR; INTRAVENOUS ONCE
Status: COMPLETED | OUTPATIENT
Start: 2019-03-18 | End: 2019-03-18

## 2019-03-18 RX ORDER — DOXYLAMINE SUCCINATE AND PYRIDOXINE HYDROCHLORIDE, DELAYED RELEASE TABLETS 10 MG/10 MG 10; 10 MG/1; MG/1
1 TABLET, DELAYED RELEASE ORAL 4 TIMES DAILY
COMMUNITY
End: 2019-04-18

## 2019-03-18 RX ORDER — PROMETHAZINE HYDROCHLORIDE 25 MG/ML
12.5 INJECTION, SOLUTION INTRAMUSCULAR; INTRAVENOUS EVERY 6 HOURS PRN
Status: DISCONTINUED | OUTPATIENT
Start: 2019-03-18 | End: 2019-03-18

## 2019-03-18 RX ADMIN — METOCLOPRAMIDE 10 MG: 10 TABLET ORAL at 17:13

## 2019-03-18 RX ADMIN — ONDANSETRON HYDROCHLORIDE 4 MG: 2 SOLUTION INTRAMUSCULAR; INTRAVENOUS at 09:19

## 2019-03-18 RX ADMIN — PROMETHAZINE HYDROCHLORIDE 12.5 MG: 25 INJECTION INTRAMUSCULAR; INTRAVENOUS at 05:04

## 2019-03-18 RX ADMIN — SODIUM CHLORIDE, POTASSIUM CHLORIDE, SODIUM LACTATE AND CALCIUM CHLORIDE 125 ML/HR: 600; 310; 30; 20 INJECTION, SOLUTION INTRAVENOUS at 05:01

## 2019-03-18 RX ADMIN — FAMOTIDINE 20 MG: 20 TABLET, FILM COATED ORAL at 20:22

## 2019-03-18 RX ADMIN — DEXTROSE AND SODIUM CHLORIDE 1000 ML/HR: 5; 900 INJECTION, SOLUTION INTRAVENOUS at 01:41

## 2019-03-18 RX ADMIN — FAMOTIDINE 20 MG: 20 TABLET, FILM COATED ORAL at 13:22

## 2019-03-18 RX ADMIN — ACETAMINOPHEN 1000 MG: 500 TABLET, FILM COATED ORAL at 20:22

## 2019-03-18 RX ADMIN — METOCLOPRAMIDE 10 MG: 5 INJECTION, SOLUTION INTRAMUSCULAR; INTRAVENOUS at 01:41

## 2019-03-18 RX ADMIN — ONDANSETRON HYDROCHLORIDE 4 MG: 2 SOLUTION INTRAMUSCULAR; INTRAVENOUS at 02:32

## 2019-03-18 NOTE — PLAN OF CARE
Problem: Patient Care Overview  Goal: Plan of Care Review  Outcome: Ongoing (interventions implemented as appropriate)   03/18/19 0523   Coping/Psychosocial   Plan of Care Reviewed With patient   Plan of Care Review   Progress no change   OTHER   Outcome Summary Pt admitted & oriented to antepartum unit. Medications as ordered.      Goal: Individualization and Mutuality  Outcome: Ongoing (interventions implemented as appropriate)   03/18/19 0523   Individualization   Patient Specific Goals (Include Timeframe) reduced n/v   Patient Specific Interventions PRN medications given as ordered. diet as freddy.      Goal: Discharge Needs Assessment  Outcome: Ongoing (interventions implemented as appropriate)   03/18/19 0523   Discharge Needs Assessment   Readmission Within the Last 30 Days previous discharge plan unsuccessful;other (see comments)  (previous visit to alternate hospital)   Concerns to be Addressed no discharge needs identified   Patient/Family Anticipates Transition to home with family   Patient/Family Anticipated Services at Transition none   Transportation Concerns car, none   Transportation Anticipated family or friend will provide   Anticipated Changes Related to Illness none   Equipment Needed After Discharge none   Offered/Gave Vendor List no   Disability   Equipment Currently Used at Home none     Goal: Interprofessional Rounds/Family Conf  Outcome: Ongoing (interventions implemented as appropriate)   03/18/19 0523   Interdisciplinary Rounds/Family Conf   Participants family;nursing;patient;pharmacy;physician       Problem: Prenatal Patient, Hospitalized (Adult,Obstetrics,Pediatric)  Goal: Signs and Symptoms of Listed Potential Problems Will be Absent, Minimized or Managed (Prenatal Patient, Hospitalized)  Outcome: Ongoing (interventions implemented as appropriate)   03/18/19 0523   Goal/Outcome Evaluation   Problems Assessed (Prenatal Patient) all   Problems Present (Prenatal Patient) nausea and vomiting        Problem: Hyperemesis Gravidarum (Adult,Obstetrics,Pediatric)  Goal: Signs and Symptoms of Listed Potential Problems Will be Absent, Minimized or Managed (Hyperemesis Gravidarum)  Outcome: Ongoing (interventions implemented as appropriate)   03/18/19 1399   Goal/Outcome Evaluation   Problems Assessed (Nausea/Vomiting/Hyperemesis in Pregnancy) all   Problems Present (Hyperemesis) nutritional deficiency/inadequate oral intake

## 2019-03-18 NOTE — ED PROVIDER NOTES
Pt is a 33 y.o. female who presents to the ED complaining of intermittent vaginal bleeding that started 4 days ago.  Pt stats she is 9 weeks pregnant ().  Recently started on Macrobid for UTI.  She has been vomiting and unable to keep down medications and fluids.  Seen at [place] [facility] two days ago for same.        On exam,  Constitutional: NAD  Cardiovascular: tachycardic  Pulmonary: CTAB  Abdomen: soft, nontender, good bowel sounds      Labs and imaging reviewed. Small MARCO on US, but otherwise normal IUP.    Plan: I agree with plan to consult OBGYN.       MD ATTESTATION NOTE    The ROOSEVELT and I have discussed this patient's history, physical exam, and treatment plan.  I have reviewed the documentation and personally had a face to face interaction with the patient. I affirm the documentation and agree with the treatment and plan.  The attached note describes my personal findings.      Documentation assistance provided by beto Pérez for Dr. Horowitz. Information recorded by the scribe was done at my direction and has been verified and validated by me.             Amanda Pérez  [id number]       Deniz Horowitz MD  [id number]

## 2019-03-18 NOTE — PLAN OF CARE
Problem: Patient Care Overview  Goal: Plan of Care Review  Outcome: Ongoing (interventions implemented as appropriate)   03/18/19 1749   Coping/Psychosocial   Plan of Care Reviewed With patient   Plan of Care Review   Progress improving     Goal: Individualization and Mutuality  Outcome: Ongoing (interventions implemented as appropriate)   03/18/19 0590   Individualization   Patient Specific Goals (Include Timeframe) reduced n/v   Patient Specific Interventions PRN medications given as ordered. diet as freddy.        Problem: Prenatal Patient, Hospitalized (Adult,Obstetrics,Pediatric)  Goal: Signs and Symptoms of Listed Potential Problems Will be Absent, Minimized or Managed (Prenatal Patient, Hospitalized)  Outcome: Ongoing (interventions implemented as appropriate)   03/18/19 1749   Goal/Outcome Evaluation   Problems Assessed (Prenatal Patient) all   Problems Present (Prenatal Patient) other (see comments)  (nausea- no vomiting today)       Problem: Hyperemesis Gravidarum (Adult,Obstetrics,Pediatric)  Goal: Signs and Symptoms of Listed Potential Problems Will be Absent, Minimized or Managed (Hyperemesis Gravidarum)  Outcome: Ongoing (interventions implemented as appropriate)   03/18/19 2779   Goal/Outcome Evaluation   Problems Assessed (Nausea/Vomiting/Hyperemesis in Pregnancy) all   Problems Present (Hyperemesis) nutritional deficiency/inadequate oral intake

## 2019-03-18 NOTE — H&P
[facility]  Obstetric History and Physical    Chief Complaint   Patient presents with   • Nausea   • Vomiting   • Vaginal Bleeding - Pregnant       Subjective     Patient is a 33 y.o. female  currently at 8w5d, who was admitted overnight from the ER with the complaint of persistent vomiting. For the past two weeks she has had some nausea but on Friday it became much worse and prior to presentation had at least 24 hours of recurrent vomiting and inability to hold anything down. She was started on diclegis when had initial OB appt but was not helping.    She also has been having small amount of painless vaginal bleeding. Was noted to have subchorionic hemorrhage on prior sonogram. Denies any cramping. She received rhogam at the ED on Friday.    She has had one prenatal visit. She had issues with anovulation and the pregnancy was conceived with letrozole. She also has a hx of endometriosis and had dx lapx with bx of endometrial lesions and bilateral chromopertubation. Was seen on Friday at [facility] and diagnosed with UTI and subchorionic hemorrhage.       Prenatal Information:           External Prenatal Results     Pregnancy Outside Results - Transcribed From Office Records - See Scanned Records For Details     Test Value Date Time    Hgb 13.2 g/dL 19    Hct 39.6 % 19    ABO B  19    Rh Negative  19    Antibody Screen Positive  19    Glucose Fasting GTT       Glucose Tolerance Test 1 hour       Glucose Tolerance Test 3 hour       Gonorrhea (discrete)       Chlamydia (discrete)       RPR       VDRL       Syphilis Antibody       Rubella       HBsAg       Herpes Simplex Virus PCR       Herpes Simplex VIrus Culture       HIV       Hep C RNA Quant PCR       Hep C Antibody       AFP       Group B Strep       GBS Susceptibility to Clindamycin       GBS Susceptibility to Erythromycin       Fetal Fibronectin       Genetic Testing, Maternal Blood              Drug Screening     Test Value Date Time    Urine Drug Screen       Amphetamine Screen       Barbiturate Screen       Benzodiazepine Screen       Methadone Screen       Phencyclidine Screen       Opiates Screen       THC Screen       Cocaine Screen       Propoxyphene Screen       Buprenorphine Screen       Methamphetamine Screen       Oxycodone Screen       Tricyclic Antidepressants Screen                     Past OB History:       Obstetric History       T0      L0     SAB0   TAB0   Ectopic0   Molar0   Multiple0   Live Births0       # Outcome Date GA Lbr Sarkis/2nd Weight Sex Delivery Anes PTL Lv   1 Current                   Past Medical History: Past Medical History:   Diagnosis Date   • Anxiety    • Asthma    • Benign hematuria     neg work up   • Dysmenorrhea    • Endometriosis    • Foot fracture    • Fracture of spine, lumbar, without spinal cord injury, closed (CMS/[facility]) 2014    L2 25% compression fx   • Infertility associated with anovulation    • Insomnia    • Migraine    • Palpitations    • Pelvic pain    • PVC (premature ventricular contraction)       Past Surgical History Past Surgical History:   Procedure Laterality Date   • COLPOSCOPY      neg   • CYSTOSCOPY     • DIAGNOSTIC LAPAROSCOPY  2018    and chromopertubation    • ENDOSCOPY  2012    DR Hills:  rings in lower esophagus. erosions in stomach   • TONSILLECTOMY        Family History: Family History   Problem Relation Age of Onset   • Breast cancer Mother    • Prostate cancer Father    • Migraines Father    • Hypertension Father    • Seizures Sister    • Heart disease Maternal Grandmother    • Diabetes Maternal Grandmother    • Colon cancer Maternal Grandfather    • Hypertension Paternal Grandfather       Social History:  reports that  has never smoked. she has never used smokeless tobacco.   reports that she does not drink alcohol.   reports that she does not use drugs.        General ROS: denies chest pain, shortness  of breath, denies lightheadedness, dizziness, does have current nausea but no vomiting. No constipation and had bowel movement that was normal yesterday. Denies headache, visual changes.     Objective       Vital Signs Range for the last 24 hours  Temperature: Temp:  [98.6 °F (37 °C)-99.4 °F (37.4 °C)] 98.6 °F (37 °C)   Temp Source: Temp src: Oral   BP: BP: (101-118)/(56-78) 102/56   Pulse: Heart Rate:  [] 76   Respirations: Resp:  [18] 18   SPO2: SpO2:  [97 %-99 %] 99 %   O2 Amount (l/min):     O2 Devices Device (Oxygen Therapy): room air   Weight: Weight:  [83.9 kg (185 lb)] 83.9 kg (185 lb)     Physical Examination: General appearance - alert, well appearing, and in no distress  Neck - supple, no significant adenopathy  Chest - clear to auscultation, no wheezes, rales or rhonchi, symmetric air entry  Heart - normal rate, regular rhythm, normal S1, S2, no murmurs, rubs, clicks or gallops  Abdomen - soft, nontender, nondistended, no masses or organomegaly  Neurological - alert, oriented, normal speech, no focal findings or movement disorder noted  Musculoskeletal - no joint tenderness, deformity or swelling  Extremities - no pedal edema noted  Skin - normal coloration and turgor, no rashes, no suspicious skin lesions noted    Assessment/Plan       Hyperemesis gravidarum    Encounter for supervision of low-risk first pregnancy in first trimester    Use of letrozole (Femara)    Rh negative status during pregnancy in first trimester        Assessment:  1.  Intrauterine pregnancy at 8w5d weeks gestation   2.  Hyperemesis gravidarum  3.  Pregnancy with ovulation induction with letrozole    Plan:  1. Switched to oral medications, will avoid zofran for now due to possible cardiac defect or cleft palate risk. Desires something nondrowsy during the day, will start reglan. Discussed possibility of extrapyramidal A/E. In the evening can take phenergan if needed. Will have her follow up with her primary OB  in  -2 weeks. Outside records reviewed, has had normal prenatal labs with primary OB.  2. Subchorionic hemorrhage on sonogram, has had some vaginal bleeding but no cramping. Discussed that could increase possibility of miscarriage but does not necessarily mean will miscarry. Is Rh negative and has residual Rh D on screen. Will not obtain KB or readminister rhogam as early gestation.    If tolerating PO today will plan to discharge with antiemetics, follow up with primary OB      Kendra Lofton MD  03/17/2019  6:03 PM

## 2019-03-18 NOTE — ED PROVIDER NOTES
" EMERGENCY DEPARTMENT ENCOUNTER    CHIEF COMPLAINT  Chief Complaint: Vaginal Bleeding   History given by: Patient   History limited by: none   Room Number: 3305/1  PMD: Lashae Hercules PA-C      HPI:  Pt is a 33 y.o. female at 9 weeks pregnant who presents complaining of intermittent vaginal bleeding for the past 4 days, with moderate \"spotting\" bleeding. Pt confirms decreased PO intake, nausea, and vomiting, but denies abd pain or vaginal discharge. Per pt, she was seen at [facility] ED 3 days ago for same, was dx with subchronic hemorrhage and UTI. Pt affirms she was diagnosed with UTI while at [place], and has not been able to take Macrobid today due to emesis.  Per pt, she has seen her OB Dr. Soria who affirms viable IUP. Pt states she has been taking Diclegis for management of symptoms, but has not been taking antiemetics.    Duration:  4 days   Onset: gradual   Timing: intermittent   Location: vagina   Radiation: none   Quality: bleeding   Intensity/Severity: mild   Progression: unchanged   Associated Symptoms: nausea, vomiting, decreased PO intake   Aggravating Factors: pregnancy   Alleviating Factors: none   Previous Episodes: Pt was seen at [facility] for same 3 days ago.   Treatment before arrival: Pt has been taking Macrobid and Diclegis, without improvement.     PAST MEDICAL HISTORY  Active Ambulatory Problems     Diagnosis Date Noted   • Migraine 11/27/2015   • Asthma 11/27/2015   • Anxiety 11/27/2015   • Insomnia 11/27/2015     Resolved Ambulatory Problems     Diagnosis Date Noted   • No Resolved Ambulatory Problems     Past Medical History:   Diagnosis Date   • Anxiety    • Asthma    • Benign hematuria 2010   • Fluttering sensation of heart    • Foot fracture    • Fracture of spine, lumbar, without spinal cord injury, closed (CMS/[facility]) 01/02/2014   • Insomnia    • Lightheadedness    • Migraine    • Palpitations    • SOB (shortness of breath)        PAST SURGICAL HISTORY  Past Surgical History: "   Procedure Laterality Date   • COLPOSCOPY  2009    neg   • CYSTOSCOPY     • ENDOSCOPY  12/17/2012    DR Hills:  rings in lower esophagus. erosions in stomach   • TONSILLECTOMY         FAMILY HISTORY  Family History   Problem Relation Age of Onset   • Breast cancer Mother    • Prostate cancer Father    • Migraines Father    • Hypertension Father    • Seizures Sister    • Heart disease Maternal Grandmother    • Diabetes Maternal Grandmother    • Colon cancer Maternal Grandfather    • Hypertension Paternal Grandfather        SOCIAL HISTORY  Social History     Socioeconomic History   • Marital status:      Spouse name: Not on file   • Number of children: Not on file   • Years of education: Not on file   • Highest education level: Not on file   Social Needs   • Financial resource strain: Not on file   • Food insecurity - worry: Not on file   • Food insecurity - inability: Not on file   • Transportation needs - medical: Not on file   • Transportation needs - non-medical: Not on file   Occupational History   • Not on file   Tobacco Use   • Smoking status: Never Smoker   • Smokeless tobacco: Never Used   Substance and Sexual Activity   • Alcohol use: No     Comment: caffeine use 1 12 oz can daily   • Drug use: No   • Sexual activity: Defer   Other Topics Concern   • Not on file   Social History Narrative   • Not on file       ALLERGIES  Biaxin [clarithromycin]; Ceclor [cefaclor]; Penicillins; Sulfa antibiotics; and Macrolides and ketolides    REVIEW OF SYSTEMS  Review of Systems   Constitutional: Positive for appetite change (decreased PO intake). Negative for fever.   HENT: Negative for sore throat.    Eyes: Negative.    Respiratory: Negative for cough and shortness of breath.    Cardiovascular: Negative for chest pain.   Gastrointestinal: Positive for nausea and vomiting. Negative for abdominal pain and diarrhea.   Genitourinary: Positive for vaginal bleeding. Negative for dysuria.   Musculoskeletal: Negative for  neck pain.   Skin: Negative for rash.   Neurological: Negative for weakness, numbness and headaches.   Hematological: Negative.    Psychiatric/Behavioral: Negative.    All other systems reviewed and are negative.      PHYSICAL EXAM  ED Triage Vitals   Temp Heart Rate Resp BP SpO2   03/17/19 2154 03/17/19 2154 03/17/19 2154 03/17/19 2218 03/17/19 2154   99.4 °F (37.4 °C) (!) 128 18 118/78 98 %      Temp src Heart Rate Source Patient Position BP Location FiO2 (%)   03/17/19 2154 -- -- -- --   Tympanic           Physical Exam   Constitutional: She is oriented to person, place, and time. No distress.   HENT:   Head: Normocephalic and atraumatic.   Eyes: EOM are normal. Pupils are equal, round, and reactive to light.   Neck: Normal range of motion. Neck supple.   Cardiovascular: Normal rate, regular rhythm and normal heart sounds.   Pulmonary/Chest: Effort normal and breath sounds normal. No respiratory distress.   Abdominal: Soft. There is no tenderness. There is no rebound, no guarding and no CVA tenderness.   Musculoskeletal: Normal range of motion. She exhibits no edema (no pedal).   Neurological: She is alert and oriented to person, place, and time. She has normal sensation and normal strength.   Skin: Skin is warm and dry. No rash noted.   Psychiatric: Mood and affect normal.   Nursing note and vitals reviewed.      LAB RESULTS  Lab Results (last 24 hours)     Procedure Component Value Units Date/Time    CBC & Differential [[id number]] Collected:  03/17/19 2222    Specimen:  Blood Updated:  03/17/19 2252    Narrative:       The following orders were created for panel order CBC & Differential.  Procedure                               Abnormality         Status                     ---------                               -----------         ------                     CBC Auto Differential[[id number]]        Abnormal            Final result                 Please view results for these tests on the individual orders.     Comprehensive Metabolic Panel [[id number]] Collected:  03/17/19 2222    Specimen:  Blood Updated:  03/17/19 2301     Glucose 97 mg/dL      BUN 6 mg/dL      Creatinine 0.65 mg/dL      Sodium 139 mmol/L      Potassium 3.5 mmol/L      Chloride 102 mmol/L      CO2 22.8 mmol/L      Calcium 9.6 mg/dL      Total Protein 7.2 g/dL      Albumin 4.30 g/dL      ALT (SGPT) 11 U/L      AST (SGOT) 14 U/L      Alkaline Phosphatase 91 U/L      Total Bilirubin 0.6 mg/dL      eGFR Non African Amer 106 mL/min/1.73      Globulin 2.9 gm/dL      A/G Ratio 1.5 g/dL      BUN/Creatinine Ratio 9.2     Anion Gap 14.2 mmol/L     Narrative:       GFR Normal >60  Chronic Kidney Disease <60  Kidney Failure <15    hCG, Quantitative, Pregnancy [[id number]] Collected:  03/17/19 2222    Specimen:  Blood Updated:  03/17/19 2346     HCG Quantitative 96,000.00 mIU/mL     Narrative:       HCG Ranges by Gestational Age    3 Weeks         5.4 -      72 mIU/mL  4 Weeks        10.2 -     708 mIU/mL  5 Weeks       217   -   8,245 mIU/mL  6 Weeks       152   -  32,177 mIU/mL  7 Weeks     4,059   - 153,767 mIU/mL  8 Weeks    31,366   - 149,094 mIU/mL  9 Weeks    59,109   - 135,901 mIU/mL  10 Weeks   44,186   - 170,409 mIU/mL  12 Weeks   27,107   - 201,615 mIU/mL  14 Weeks   24,302   -  93,646 mIU/mL  15 Weeks   12,540   -  69,747 mIU/mL  16 Weeks    8,904   -  55,332 mIU/mL  17 Weeks    8,240   -  51,793 mIU/mL  18 Weeks    9,649   -  55,271 mIU/mL    CBC Auto Differential [[id number]]  (Abnormal) Collected:  03/17/19 2222    Specimen:  Blood Updated:  03/17/19 2252     WBC 13.50 10*3/mm3      RBC 4.43 10*6/mm3      Hemoglobin 13.2 g/dL      Hematocrit 39.6 %      MCV 89.4 fL      MCH 29.8 pg      MCHC 33.3 g/dL      RDW 13.0 %      RDW-SD 42.5 fl      MPV 9.9 fL      Platelets 267 10*3/mm3      Neutrophil % 80.3 %      Lymphocyte % 14.4 %      Monocyte % 4.7 %      Eosinophil % 0.1 %      Basophil % 0.1 %      Immature Grans % 0.4 %      Neutrophils, Absolute  10.83 10*3/mm3      Lymphocytes, Absolute 1.94 10*3/mm3      Monocytes, Absolute 0.63 10*3/mm3      Eosinophils, Absolute 0.02 10*3/mm3      Basophils, Absolute 0.02 10*3/mm3      Immature Grans, Absolute 0.06 10*3/mm3      nRBC 0.0 /100 WBC     Urinalysis With Microscopic If Indicated (No Culture) - Urine, Clean Catch [[id number]]  (Abnormal) Collected:  03/17/19 2227    Specimen:  Urine, Clean Catch Updated:  03/17/19 2255     Color, UA Yellow     Appearance, UA Cloudy     pH, UA 6.5     Specific Gravity, UA 1.026     Glucose, UA Negative     Ketones, UA 40 mg/dL (2+)     Bilirubin, UA Negative     Blood, UA Large (3+)     Protein, UA 30 mg/dL (1+)     Leuk Esterase, UA Small (1+)     Nitrite, UA Negative     Urobilinogen, UA 1.0 E.U./dL    Urinalysis, Microscopic Only - Urine, Clean Catch [[id number]]  (Abnormal) Collected:  03/17/19 2227    Specimen:  Urine, Clean Catch Updated:  03/17/19 2306     RBC, UA 3-5 /HPF      WBC, UA 0-2 /HPF      Bacteria, UA Trace /HPF      Squamous Epithelial Cells, UA 7-12 /HPF      Hyaline Casts, UA None Seen /LPF      Methodology Manual Light Microscopy          I ordered the above labs and reviewed the results    RADIOLOGY  US Ob Limited 1 + Fetuses   Final Result   1. Single living IUP estimated at 8 weeks, 5 days gestation.   2. Small subchorionic hemorrhage       This report was finalized on 3/17/2019 11:10 PM by Nahun Naylor M.D.          US Ob Transvaginal    (Results Pending)   US Testicular or Ovarian Vascular Limited    (Results Pending)        I ordered the above noted radiological studies. Interpreted by radiologist. Reviewed by me in PACS.       PROCEDURES  Procedures      PROGRESS AND CONSULTS     2204: Labs and US Ob ordered for further evaluation.     2332: Discussed with pt the presence of subchronic hemorrhage and possible UTI seen on labs. Informed pt of plan for pelvic rest for outpatient OB follow up upon disposition. Discussed plan for antiemetic, abx, and  fluid treatment in ED prior to disposition..    2334: Phenergan ordered for antiemetics.     0057: Discussed pt's case with Dr. Horowitz, who, after bedside evaluation, agreed with plan for care. Dr. Horowitz states pt has not felt improved. Discussed plan for further medication in ED with discussion of pt's case with OB for probable plan for admission due to pt's second ED visit and lack of improvement.    0105: Reglan and dextrose 5% ordered for further medication of pt.     0106: Call placed to OB.     0133: Discussed pt's case with Dr. Soria (OB) who states their group does not come to [facility], recommended calling [facility] OB or transfer to [place].     0212: Call placed to  OB. Pt rechecked and updated on call with OB and plan for possible admission to our OB. If not, discussed plan for possible discharge with medications or possible transfer to [place].     0226: Discussed pt's case with Dr. Miguel (OB) who agreed to admit pt to observation for further treatment and overnight monitoring.     0238: Pt rechecked and resting comfortably. Discussed call with Dr. Miguel and plan for admission as prior discussed. Pt understands and agrees with plan, all questions addressed.       MEDICAL DECISION MAKING  Results were reviewed/discussed with the patient and they were also made aware of online access. Pt also made aware that some labs, such as cultures, will not be resulted during ER visit and follow up with PMD is necessary.     MDM  Number of Diagnoses or Management Options     Amount and/or Complexity of Data Reviewed  Clinical lab tests: reviewed and ordered (UA: bacteria - trace, leuk esterase - small, blood - 3+)  Tests in the radiology section of CPT®: ordered and reviewed (US  - single living IUP estimated at 8 weeks, 5 days, small subchorionic hemorrhage)  Discuss the patient with other providers: yes (Dr. Soria (OB)  Dr. Miguel (OB))           DIAGNOSIS  Final diagnoses:   Subchorionic hemorrhage of placenta in  first trimester, single or unspecified fetus   Hyperemesis arising during pregnancy   9 weeks gestation of pregnancy   Dehydration       DISPOSITION  ADMISSION    Discussed treatment plan and reason for admission with pt/family and admitting physician.  Pt/family voiced understanding of the plan for admission for further testing/treatment as needed.         Latest Documented Vital Signs:  As of 6:37 AM  BP- 104/65 HR- 66 Temp- 98.9 °F (37.2 °C) (Axillary) O2 sat- 99%    --  Documentation assistance provided by beto Webster for Luis Alfredo Moncada PA-C.  Information recorded by the scribe was done at my direction and has been verified and validated by me.            Radha Webster  03/18/19 4439       Luis Alfredo Moncada III, PA  03/18/19 4075

## 2019-03-18 NOTE — ED TRIAGE NOTES
Pt c/o n/v and is 9 weeks pregnant. Denies abd pain. Pt also c/o vaginal bleeding started a few days ago- was dx with subchorionic hemorrhage. OB-Dr. Soria. Pt is currently on Macrobid for UTI, has been unable to take it

## 2019-03-19 PROCEDURE — 96375 TX/PRO/DX INJ NEW DRUG ADDON: CPT

## 2019-03-19 NOTE — NURSING NOTE
Dr. Lofton notified per telephone of patient stating she does not feel nauseated after dinner. Patient feels comfortable to be discharged tonight. Patient did state that she felt left sided chest pain that radiated up to the back of her shoulder. Patient stated it lasted for 15 minutes and was non-tender. Patient states it hurt when she breathed in and out. Patient ambulated in room and sat up in chair. Patient states she feels no pain now for the past 30 minutes. Dr. Lofton states she will be discharging patient home from hospital soon.

## 2019-03-22 NOTE — DISCHARGE SUMMARY
Date of Discharge:  3/22/2019    Discharge Diagnosis:   Hyperemesis in first trimester    Problem List:  Active Hospital Problems    Diagnosis  POA   • Hyperemesis gravidarum [O21.0]  Yes   • Encounter for supervision of low-risk first pregnancy in first trimester [Z34.01]  Not Applicable   • Use of letrozole (Femara) [Z79.811]  Not Applicable   • Rh negative status during pregnancy in first trimester [O09.891, Z67.91]  Not Applicable      Resolved Hospital Problems   No resolved problems to display.       Presenting Problem/History of Present Illness  Dehydration [E86.0]  Hyperemesis arising during pregnancy [O21.0]  Hyperemesis gravidarum [O21.0]  9 weeks gestation of pregnancy [Z3A.09]  Subchorionic hemorrhage of placenta in first trimester, single or unspecified fetus [O41.8X10, O46.8X1]  Hyperemesis gravidarum [O21.0]    Hospital Course  Patient is a 33 y.o. female presented with persistent nausea and vomiting in early pregnancy.  She had been started on diclegis previously but it was not controlling her symptoms.  She been unable to keep down any food or drink over the weekend.  She received antiemetics in the ER and was admitted for further fluids and medication.  She had significant ketonuria on admission.  On the first day of admission, she was able to tolerate oral intake and also her ketones cleared.  She was discharged home on hospital day 1 with antiemetic regimen      Procedures Performed         Consults:   Consults     Date and Time Order Name Status Description    3/18/2019 0212 OB/GYN (on-call MD unless specified) Completed     3/18/2019 0108 OB/GYN (on-call MD unless specified) Completed           Pertinent Test Results:   Results from last 7 days   Lab Units 03/17/19  2222   SODIUM mmol/L 139   POTASSIUM mmol/L 3.5   CHLORIDE mmol/L 102   CO2 mmol/L 22.8   BUN mg/dL 6   CREATININE mg/dL 0.65   CALCIUM mg/dL 9.6   BILIRUBIN mg/dL 0.6   ALK PHOS U/L 91   ALT (SGPT) U/L 11   AST (SGOT) U/L 14  "  GLUCOSE mg/dL 97       Condition on Discharge:  stable    Vital Signs     /76 (BP Location: Right arm, Patient Position: Sitting)   Pulse 87   Temp 98.7 °F (37.1 °C) (Oral)   Resp 18   Ht 165.1 cm (65\")   Wt 83.9 kg (185 lb)   LMP 01/05/2019   SpO2 99%   BMI 30.79 kg/m²   Discharge Disposition  Home or Self Care    Discharge Medications     Discharge Medications      New Medications      Instructions Start Date   famotidine 20 MG tablet  Commonly known as:  PEPCID   20 mg, Oral, 2 Times Daily      metoclopramide 10 MG tablet  Commonly known as:  REGLAN   10 mg, Oral, 3 Times Daily Before Meals      promethazine 25 MG tablet  Commonly known as:  PHENERGAN   25 mg, Oral, Every 6 Hours PRN, Do not use at same time as reglan.         Continue These Medications      Instructions Start Date   doxylamine-pyridoxine 10-10 MG tablet delayed-release EC tablet  Commonly known as:  DICLEGIS   1 tablet, Oral, 4 Times Daily, 10mg in AM 10mg in afternoon 20mg at bedtime       MULTI VITAMIN DAILY PO   Oral      nitrofurantoin (macrocrystal-monohydrate) 100 MG capsule  Commonly known as:  MACROBID   100 mg, Oral, 2 Times Daily      prenatal (CLASSIC) vitamin 28-0.8 MG tablet tablet   Oral, Daily      PROBIOTIC DAILY PO   Oral      rizatriptan 10 MG tablet  Commonly known as:  MAXALT   TAKE 1 TABLET BY MOUTH AS NEEDED FOR HEADACHE, MAY REPEAT IN 2 HOURS AS NEEDED      sertraline 100 MG tablet  Commonly known as:  ZOLOFT   100 mg, Oral, Daily, For anxiety             Discharge Diet   Diet Instructions     Diet: Regular      Discharge Diet:  Regular    Try to follow a bland diet and eat smaller more frequent meals          Activity at Discharge  Activity Instructions     Activity as Tolerated            Follow-up Appointments  No future appointments.  Additional Instructions for the Follow-ups that You Need to Schedule     Call MD With Problems / Concerns   As directed      Return of persistent vomiting and unable to " tolerate oral intake  Heavy vaginal bleeding, passage of clots with uterine cramping    Order Comments:  Return of persistent vomiting and unable to tolerate oral intake Heavy vaginal bleeding, passage of clots with uterine cramping          Discharge Follow-up with Specialty: follow up with Dr. Soria in 1-2 weeks   As directed      Specialty:  follow up with Dr. Soria in 1-2 weeks               Test Results Pending at Discharge      Time: Discharge 10 min

## 2019-03-28 ENCOUNTER — HOSPITAL ENCOUNTER (INPATIENT)
Facility: HOSPITAL | Age: 34
LOS: 8 days | Discharge: HOME | End: 2019-04-05
Attending: EMERGENCY MEDICINE | Admitting: OBSTETRICS & GYNECOLOGY

## 2019-03-28 DIAGNOSIS — E87.6 HYPOKALEMIA: ICD-10-CM

## 2019-03-28 DIAGNOSIS — O21.0 HYPEREMESIS GRAVIDARUM: Primary | ICD-10-CM

## 2019-03-28 LAB
ALBUMIN SERPL-MCNC: 3.7 G/DL (ref 3.5–5.2)
ALBUMIN/GLOB SERPL: 1.3 G/DL
ALP SERPL-CCNC: 73 U/L (ref 39–117)
ALT SERPL W P-5'-P-CCNC: 11 U/L (ref 1–33)
ANION GAP SERPL CALCULATED.3IONS-SCNC: 13.6 MMOL/L
AST SERPL-CCNC: 18 U/L (ref 1–32)
BASOPHILS # BLD AUTO: 0.04 10*3/MM3 (ref 0–0.2)
BASOPHILS NFR BLD AUTO: 0.3 % (ref 0–1.5)
BILIRUB SERPL-MCNC: 0.5 MG/DL (ref 0.2–1.2)
BILIRUB UR QL STRIP: NEGATIVE
BUN BLD-MCNC: 3 MG/DL (ref 6–20)
BUN/CREAT SERPL: 6.4 (ref 7–25)
CALCIUM SPEC-SCNC: 9.2 MG/DL (ref 8.6–10.5)
CHLORIDE SERPL-SCNC: 103 MMOL/L (ref 98–107)
CLARITY UR: ABNORMAL
CO2 SERPL-SCNC: 20.4 MMOL/L (ref 22–29)
COLOR UR: YELLOW
CREAT BLD-MCNC: 0.47 MG/DL (ref 0.57–1)
DEPRECATED RDW RBC AUTO: 43.7 FL (ref 37–54)
EOSINOPHIL # BLD AUTO: 0 10*3/MM3 (ref 0–0.4)
EOSINOPHIL NFR BLD AUTO: 0 % (ref 0.3–6.2)
ERYTHROCYTE [DISTWIDTH] IN BLOOD BY AUTOMATED COUNT: 13.1 % (ref 12.3–15.4)
GFR SERPL CREATININE-BSD FRML MDRD: >150 ML/MIN/1.73
GLOBULIN UR ELPH-MCNC: 2.8 GM/DL
GLUCOSE BLD-MCNC: 107 MG/DL (ref 65–99)
GLUCOSE UR STRIP-MCNC: NEGATIVE MG/DL
HCT VFR BLD AUTO: 35.5 % (ref 34–46.6)
HGB BLD-MCNC: 11.4 G/DL (ref 12–15.9)
HGB UR QL STRIP.AUTO: NEGATIVE
IMM GRANULOCYTES # BLD AUTO: 0.09 10*3/MM3 (ref 0–0.05)
IMM GRANULOCYTES NFR BLD AUTO: 0.6 % (ref 0–0.5)
KETONES UR QL STRIP: ABNORMAL
LEUKOCYTE ESTERASE UR QL STRIP.AUTO: NEGATIVE
LYMPHOCYTES # BLD AUTO: 1.15 10*3/MM3 (ref 0.7–3.1)
LYMPHOCYTES NFR BLD AUTO: 7.8 % (ref 19.6–45.3)
MCH RBC QN AUTO: 29.5 PG (ref 26.6–33)
MCHC RBC AUTO-ENTMCNC: 32.1 G/DL (ref 31.5–35.7)
MCV RBC AUTO: 92 FL (ref 79–97)
MONOCYTES # BLD AUTO: 0.86 10*3/MM3 (ref 0.1–0.9)
MONOCYTES NFR BLD AUTO: 5.9 % (ref 5–12)
NEUTROPHILS # BLD AUTO: 12.56 10*3/MM3 (ref 1.4–7)
NEUTROPHILS NFR BLD AUTO: 85.4 % (ref 42.7–76)
NITRITE UR QL STRIP: NEGATIVE
NRBC BLD AUTO-RTO: 0 /100 WBC (ref 0–0)
PH UR STRIP.AUTO: >=9 [PH] (ref 5–8)
PLATELET # BLD AUTO: 243 10*3/MM3 (ref 140–450)
PMV BLD AUTO: 9.9 FL (ref 6–12)
POTASSIUM BLD-SCNC: 3.2 MMOL/L (ref 3.5–5.2)
PROT SERPL-MCNC: 6.5 G/DL (ref 6–8.5)
PROT UR QL STRIP: NEGATIVE
RBC # BLD AUTO: 3.86 10*6/MM3 (ref 3.77–5.28)
SODIUM BLD-SCNC: 137 MMOL/L (ref 136–145)
SP GR UR STRIP: 1.01 (ref 1–1.03)
UROBILINOGEN UR QL STRIP: ABNORMAL
WBC NRBC COR # BLD: 14.7 10*3/MM3 (ref 3.4–10.8)

## 2019-03-28 PROCEDURE — 25010000002 PROMETHAZINE PER 50 MG: Performed by: NURSE PRACTITIONER

## 2019-03-28 PROCEDURE — 25010000002 PROMETHAZINE PER 50 MG: Performed by: OBSTETRICS & GYNECOLOGY

## 2019-03-28 PROCEDURE — 25010000002 THIAMINE PER 100 MG: Performed by: OBSTETRICS & GYNECOLOGY

## 2019-03-28 PROCEDURE — G0378 HOSPITAL OBSERVATION PER HR: HCPCS

## 2019-03-28 PROCEDURE — 85025 COMPLETE CBC W/AUTO DIFF WBC: CPT | Performed by: NURSE PRACTITIONER

## 2019-03-28 PROCEDURE — 25010000002 POTASSIUM CHLORIDE PER 2 MEQ OF POTASSIUM: Performed by: OBSTETRICS & GYNECOLOGY

## 2019-03-28 PROCEDURE — 80053 COMPREHEN METABOLIC PANEL: CPT | Performed by: NURSE PRACTITIONER

## 2019-03-28 PROCEDURE — 25010000002 METOCLOPRAMIDE PER 10 MG: Performed by: OBSTETRICS & GYNECOLOGY

## 2019-03-28 PROCEDURE — 99284 EMERGENCY DEPT VISIT MOD MDM: CPT

## 2019-03-28 PROCEDURE — 25010000002 DIPHENHYDRAMINE PER 50 MG: Performed by: EMERGENCY MEDICINE

## 2019-03-28 PROCEDURE — 25010000002 METOCLOPRAMIDE PER 10 MG: Performed by: NURSE PRACTITIONER

## 2019-03-28 PROCEDURE — 25010000002 DIPHENHYDRAMINE PER 50 MG: Performed by: OBSTETRICS & GYNECOLOGY

## 2019-03-28 PROCEDURE — 81003 URINALYSIS AUTO W/O SCOPE: CPT | Performed by: NURSE PRACTITIONER

## 2019-03-28 RX ORDER — DIPHENHYDRAMINE HYDROCHLORIDE 50 MG/ML
25 INJECTION INTRAMUSCULAR; INTRAVENOUS ONCE
Status: COMPLETED | OUTPATIENT
Start: 2019-03-28 | End: 2019-03-28

## 2019-03-28 RX ORDER — METOCLOPRAMIDE HYDROCHLORIDE 5 MG/ML
10 INJECTION INTRAMUSCULAR; INTRAVENOUS
Status: DISCONTINUED | OUTPATIENT
Start: 2019-03-28 | End: 2019-04-05

## 2019-03-28 RX ORDER — DEXTROSE AND SODIUM CHLORIDE 5; .9 G/100ML; G/100ML
125 INJECTION, SOLUTION INTRAVENOUS ONCE
Status: COMPLETED | OUTPATIENT
Start: 2019-03-28 | End: 2019-03-28

## 2019-03-28 RX ORDER — PROMETHAZINE HYDROCHLORIDE 25 MG/ML
25 INJECTION, SOLUTION INTRAMUSCULAR; INTRAVENOUS EVERY 6 HOURS PRN
Status: DISCONTINUED | OUTPATIENT
Start: 2019-03-28 | End: 2019-04-05 | Stop reason: HOSPADM

## 2019-03-28 RX ORDER — SODIUM CHLORIDE 0.9 % (FLUSH) 0.9 %
10 SYRINGE (ML) INJECTION AS NEEDED
Status: DISCONTINUED | OUTPATIENT
Start: 2019-03-28 | End: 2019-04-05 | Stop reason: HOSPADM

## 2019-03-28 RX ORDER — SUCRALFATE ORAL 1 G/10ML
1 SUSPENSION ORAL
Status: DISCONTINUED | OUTPATIENT
Start: 2019-03-28 | End: 2019-04-05 | Stop reason: HOSPADM

## 2019-03-28 RX ORDER — DIPHENHYDRAMINE HYDROCHLORIDE 50 MG/ML
25 INJECTION INTRAMUSCULAR; INTRAVENOUS
Status: DISCONTINUED | OUTPATIENT
Start: 2019-03-28 | End: 2019-04-05

## 2019-03-28 RX ORDER — FAMOTIDINE 10 MG/ML
20 INJECTION, SOLUTION INTRAVENOUS EVERY 12 HOURS
Status: DISCONTINUED | OUTPATIENT
Start: 2019-03-28 | End: 2019-04-05

## 2019-03-28 RX ORDER — ACETAMINOPHEN 500 MG
1000 TABLET ORAL EVERY 6 HOURS PRN
COMMUNITY
End: 2019-04-24 | Stop reason: HOSPADM

## 2019-03-28 RX ORDER — METOCLOPRAMIDE HYDROCHLORIDE 5 MG/ML
10 INJECTION INTRAMUSCULAR; INTRAVENOUS ONCE
Status: COMPLETED | OUTPATIENT
Start: 2019-03-28 | End: 2019-03-28

## 2019-03-28 RX ORDER — PROMETHAZINE HYDROCHLORIDE 25 MG/ML
12.5 INJECTION, SOLUTION INTRAMUSCULAR; INTRAVENOUS ONCE
Status: COMPLETED | OUTPATIENT
Start: 2019-03-28 | End: 2019-03-28

## 2019-03-28 RX ADMIN — SODIUM CHLORIDE 1000 ML: 9 INJECTION, SOLUTION INTRAVENOUS at 11:39

## 2019-03-28 RX ADMIN — PROMETHAZINE HYDROCHLORIDE 12.5 MG: 25 INJECTION INTRAMUSCULAR; INTRAVENOUS at 11:40

## 2019-03-28 RX ADMIN — METOCLOPRAMIDE 10 MG: 5 INJECTION, SOLUTION INTRAMUSCULAR; INTRAVENOUS at 17:56

## 2019-03-28 RX ADMIN — THIAMINE HYDROCHLORIDE 125 ML/HR: 100 INJECTION, SOLUTION INTRAMUSCULAR; INTRAVENOUS at 21:00

## 2019-03-28 RX ADMIN — DIPHENHYDRAMINE HYDROCHLORIDE 25 MG: 50 INJECTION, SOLUTION INTRAMUSCULAR; INTRAVENOUS at 18:21

## 2019-03-28 RX ADMIN — PROMETHAZINE HYDROCHLORIDE 25 MG: 25 INJECTION INTRAMUSCULAR; INTRAVENOUS at 23:51

## 2019-03-28 RX ADMIN — FAMOTIDINE 20 MG: 10 INJECTION INTRAVENOUS at 15:21

## 2019-03-28 RX ADMIN — METOCLOPRAMIDE 10 MG: 5 INJECTION, SOLUTION INTRAMUSCULAR; INTRAVENOUS at 13:58

## 2019-03-28 RX ADMIN — DIPHENHYDRAMINE HYDROCHLORIDE 25 MG: 50 INJECTION, SOLUTION INTRAMUSCULAR; INTRAVENOUS at 21:51

## 2019-03-28 RX ADMIN — DIPHENHYDRAMINE HYDROCHLORIDE 25 MG: 50 INJECTION, SOLUTION INTRAMUSCULAR; INTRAVENOUS at 13:56

## 2019-03-28 RX ADMIN — DEXTROSE AND SODIUM CHLORIDE 125 ML/HR: 5; 900 INJECTION, SOLUTION INTRAVENOUS at 14:07

## 2019-03-28 RX ADMIN — METOCLOPRAMIDE 10 MG: 5 INJECTION, SOLUTION INTRAMUSCULAR; INTRAVENOUS at 21:50

## 2019-03-28 RX ADMIN — SUCRALFATE 1 G: 1 SUSPENSION ORAL at 21:50

## 2019-03-28 RX ADMIN — PROMETHAZINE HYDROCHLORIDE 25 MG: 25 INJECTION INTRAMUSCULAR; INTRAVENOUS at 17:56

## 2019-03-28 RX ADMIN — SUCRALFATE 1 G: 1 SUSPENSION ORAL at 17:56

## 2019-03-29 ENCOUNTER — APPOINTMENT (OUTPATIENT)
Dept: ULTRASOUND IMAGING | Facility: HOSPITAL | Age: 34
End: 2019-03-29

## 2019-03-29 LAB — TSH SERPL DL<=0.05 MIU/L-ACNC: 0.26 MIU/ML (ref 0.27–4.2)

## 2019-03-29 PROCEDURE — 99244 OFF/OP CNSLTJ NEW/EST MOD 40: CPT | Performed by: INTERNAL MEDICINE

## 2019-03-29 PROCEDURE — 25010000002 METOCLOPRAMIDE PER 10 MG: Performed by: OBSTETRICS & GYNECOLOGY

## 2019-03-29 PROCEDURE — 25010000002 ONDANSETRON PER 1 MG: Performed by: OBSTETRICS & GYNECOLOGY

## 2019-03-29 PROCEDURE — 25010000002 METHYLPREDNISOLONE PER 40 MG: Performed by: INTERNAL MEDICINE

## 2019-03-29 PROCEDURE — G0378 HOSPITAL OBSERVATION PER HR: HCPCS

## 2019-03-29 PROCEDURE — 84443 ASSAY THYROID STIM HORMONE: CPT | Performed by: OBSTETRICS & GYNECOLOGY

## 2019-03-29 PROCEDURE — 25010000002 PROMETHAZINE PER 50 MG: Performed by: OBSTETRICS & GYNECOLOGY

## 2019-03-29 PROCEDURE — 25010000002 DIPHENHYDRAMINE PER 50 MG: Performed by: OBSTETRICS & GYNECOLOGY

## 2019-03-29 PROCEDURE — 76705 ECHO EXAM OF ABDOMEN: CPT

## 2019-03-29 RX ORDER — ONDANSETRON 2 MG/ML
8 INJECTION INTRAMUSCULAR; INTRAVENOUS EVERY 6 HOURS PRN
Status: DISCONTINUED | OUTPATIENT
Start: 2019-03-29 | End: 2019-03-31

## 2019-03-29 RX ORDER — DEXTROSE AND SODIUM CHLORIDE 5; .9 G/100ML; G/100ML
125 INJECTION, SOLUTION INTRAVENOUS CONTINUOUS
Status: ACTIVE | OUTPATIENT
Start: 2019-03-29 | End: 2019-04-01

## 2019-03-29 RX ORDER — DEXTROSE AND SODIUM CHLORIDE 5; .9 G/100ML; G/100ML
125 INJECTION, SOLUTION INTRAVENOUS ONCE
Status: COMPLETED | OUTPATIENT
Start: 2019-03-29 | End: 2019-03-29

## 2019-03-29 RX ORDER — METHYLPREDNISOLONE SODIUM SUCCINATE 40 MG/ML
16 INJECTION, POWDER, LYOPHILIZED, FOR SOLUTION INTRAMUSCULAR; INTRAVENOUS EVERY 8 HOURS
Status: DISCONTINUED | OUTPATIENT
Start: 2019-03-29 | End: 2019-04-04

## 2019-03-29 RX ADMIN — METOCLOPRAMIDE 10 MG: 5 INJECTION, SOLUTION INTRAMUSCULAR; INTRAVENOUS at 09:57

## 2019-03-29 RX ADMIN — SERTRALINE 50 MG: 50 TABLET, FILM COATED ORAL at 02:18

## 2019-03-29 RX ADMIN — METHYLPREDNISOLONE SODIUM SUCCINATE 16 MG: 40 INJECTION, POWDER, LYOPHILIZED, FOR SOLUTION INTRAMUSCULAR; INTRAVENOUS at 18:41

## 2019-03-29 RX ADMIN — SODIUM CHLORIDE, PRESERVATIVE FREE 10 ML: 5 INJECTION INTRAVENOUS at 18:41

## 2019-03-29 RX ADMIN — FAMOTIDINE 20 MG: 10 INJECTION INTRAVENOUS at 02:18

## 2019-03-29 RX ADMIN — SODIUM CHLORIDE, PRESERVATIVE FREE 10 ML: 5 INJECTION INTRAVENOUS at 17:30

## 2019-03-29 RX ADMIN — SERTRALINE 50 MG: 50 TABLET, FILM COATED ORAL at 22:26

## 2019-03-29 RX ADMIN — PROMETHAZINE HYDROCHLORIDE 25 MG: 25 INJECTION INTRAMUSCULAR; INTRAVENOUS at 13:34

## 2019-03-29 RX ADMIN — FAMOTIDINE 20 MG: 10 INJECTION INTRAVENOUS at 22:27

## 2019-03-29 RX ADMIN — DIPHENHYDRAMINE HYDROCHLORIDE 25 MG: 50 INJECTION, SOLUTION INTRAMUSCULAR; INTRAVENOUS at 22:26

## 2019-03-29 RX ADMIN — DIPHENHYDRAMINE HYDROCHLORIDE 25 MG: 50 INJECTION, SOLUTION INTRAMUSCULAR; INTRAVENOUS at 17:27

## 2019-03-29 RX ADMIN — DEXTROSE AND SODIUM CHLORIDE 125 ML/HR: 5; 900 INJECTION, SOLUTION INTRAVENOUS at 05:01

## 2019-03-29 RX ADMIN — PROMETHAZINE HYDROCHLORIDE 25 MG: 25 INJECTION INTRAMUSCULAR; INTRAVENOUS at 06:16

## 2019-03-29 RX ADMIN — SUCRALFATE 1 G: 1 SUSPENSION ORAL at 16:20

## 2019-03-29 RX ADMIN — SODIUM CHLORIDE, PRESERVATIVE FREE 10 ML: 5 INJECTION INTRAVENOUS at 17:26

## 2019-03-29 RX ADMIN — METOCLOPRAMIDE 10 MG: 5 INJECTION, SOLUTION INTRAMUSCULAR; INTRAVENOUS at 22:27

## 2019-03-29 RX ADMIN — SUCRALFATE 1 G: 1 SUSPENSION ORAL at 22:27

## 2019-03-29 RX ADMIN — DIPHENHYDRAMINE HYDROCHLORIDE 25 MG: 50 INJECTION, SOLUTION INTRAMUSCULAR; INTRAVENOUS at 09:28

## 2019-03-29 RX ADMIN — METOCLOPRAMIDE 10 MG: 5 INJECTION, SOLUTION INTRAMUSCULAR; INTRAVENOUS at 17:30

## 2019-03-29 RX ADMIN — ONDANSETRON 8 MG: 2 INJECTION INTRAMUSCULAR; INTRAVENOUS at 15:59

## 2019-03-29 RX ADMIN — DEXTROSE AND SODIUM CHLORIDE 125 ML/HR: 5; 900 INJECTION, SOLUTION INTRAVENOUS at 13:47

## 2019-03-30 PROBLEM — Z34.90 PREGNANCY: Status: ACTIVE | Noted: 2019-03-30

## 2019-03-30 PROCEDURE — 02HV33Z INSERTION OF INFUSION DEVICE INTO SUPERIOR VENA CAVA, PERCUTANEOUS APPROACH: ICD-10-PCS | Performed by: OBSTETRICS & GYNECOLOGY

## 2019-03-30 PROCEDURE — G0378 HOSPITAL OBSERVATION PER HR: HCPCS

## 2019-03-30 PROCEDURE — 25010000002 PROMETHAZINE PER 50 MG: Performed by: OBSTETRICS & GYNECOLOGY

## 2019-03-30 PROCEDURE — 25010000002 ONDANSETRON PER 1 MG: Performed by: OBSTETRICS & GYNECOLOGY

## 2019-03-30 PROCEDURE — 25010000002 METOCLOPRAMIDE PER 10 MG: Performed by: OBSTETRICS & GYNECOLOGY

## 2019-03-30 PROCEDURE — C1751 CATH, INF, PER/CENT/MIDLINE: HCPCS

## 2019-03-30 PROCEDURE — 99232 SBSQ HOSP IP/OBS MODERATE 35: CPT | Performed by: INTERNAL MEDICINE

## 2019-03-30 PROCEDURE — 25010000002 DIPHENHYDRAMINE PER 50 MG: Performed by: OBSTETRICS & GYNECOLOGY

## 2019-03-30 PROCEDURE — 25010000002 METHYLPREDNISOLONE PER 40 MG: Performed by: INTERNAL MEDICINE

## 2019-03-30 RX ORDER — PROMETHAZINE HYDROCHLORIDE 25 MG/ML
25 INJECTION, SOLUTION INTRAMUSCULAR; INTRAVENOUS EVERY 6 HOURS PRN
Status: DISCONTINUED | OUTPATIENT
Start: 2019-03-30 | End: 2019-04-05 | Stop reason: HOSPADM

## 2019-03-30 RX ADMIN — ASCORBIC ACID, VITAMIN A PALMITATE, CHOLECALCIFEROL, THIAMINE HYDROCHLORIDE, RIBOFLAVIN-5 PHOSPHATE SODIUM, PYRIDOXINE HYDROCHLORIDE, NIACINAMIDE, DEXPANTHENOL, ALPHA-TOCOPHEROL ACETATE, VITAMIN K1, FOLIC ACID, BIOTIN, CYANOCOBALAMIN 125 ML/HR: 200; 3300; 200; 6; 3.6; 6; 40; 15; 10; 150; 600; 60; 5 INJECTION, SOLUTION INTRAVENOUS at 10:50

## 2019-03-30 RX ADMIN — FAMOTIDINE 20 MG: 10 INJECTION INTRAVENOUS at 20:59

## 2019-03-30 RX ADMIN — SUCRALFATE 1 G: 1 SUSPENSION ORAL at 06:49

## 2019-03-30 RX ADMIN — METHYLPREDNISOLONE SODIUM SUCCINATE 16 MG: 40 INJECTION, POWDER, LYOPHILIZED, FOR SOLUTION INTRAMUSCULAR; INTRAVENOUS at 02:58

## 2019-03-30 RX ADMIN — METOCLOPRAMIDE 10 MG: 5 INJECTION, SOLUTION INTRAMUSCULAR; INTRAVENOUS at 18:00

## 2019-03-30 RX ADMIN — PROMETHAZINE HYDROCHLORIDE 25 MG: 25 INJECTION INTRAMUSCULAR; INTRAVENOUS at 16:26

## 2019-03-30 RX ADMIN — ONDANSETRON 8 MG: 2 INJECTION INTRAMUSCULAR; INTRAVENOUS at 15:14

## 2019-03-30 RX ADMIN — DIPHENHYDRAMINE HYDROCHLORIDE 25 MG: 50 INJECTION, SOLUTION INTRAMUSCULAR; INTRAVENOUS at 11:38

## 2019-03-30 RX ADMIN — METOCLOPRAMIDE 10 MG: 5 INJECTION, SOLUTION INTRAMUSCULAR; INTRAVENOUS at 11:48

## 2019-03-30 RX ADMIN — SERTRALINE 50 MG: 50 TABLET, FILM COATED ORAL at 21:00

## 2019-03-30 RX ADMIN — METHYLPREDNISOLONE SODIUM SUCCINATE 16 MG: 40 INJECTION, POWDER, LYOPHILIZED, FOR SOLUTION INTRAMUSCULAR; INTRAVENOUS at 17:49

## 2019-03-30 RX ADMIN — METHYLPREDNISOLONE SODIUM SUCCINATE 16 MG: 40 INJECTION, POWDER, LYOPHILIZED, FOR SOLUTION INTRAMUSCULAR; INTRAVENOUS at 10:13

## 2019-03-30 RX ADMIN — DIPHENHYDRAMINE HYDROCHLORIDE 25 MG: 50 INJECTION, SOLUTION INTRAMUSCULAR; INTRAVENOUS at 06:50

## 2019-03-30 RX ADMIN — ONDANSETRON 8 MG: 2 INJECTION INTRAMUSCULAR; INTRAVENOUS at 09:00

## 2019-03-30 RX ADMIN — DEXTROSE AND SODIUM CHLORIDE 125 ML/HR: 5; 900 INJECTION, SOLUTION INTRAVENOUS at 17:42

## 2019-03-30 RX ADMIN — DEXTROSE AND SODIUM CHLORIDE 125 ML/HR: 5; 900 INJECTION, SOLUTION INTRAVENOUS at 02:14

## 2019-03-30 RX ADMIN — DIPHENHYDRAMINE HYDROCHLORIDE 25 MG: 50 INJECTION, SOLUTION INTRAMUSCULAR; INTRAVENOUS at 17:53

## 2019-03-30 RX ADMIN — FAMOTIDINE 20 MG: 10 INJECTION INTRAVENOUS at 08:00

## 2019-03-30 RX ADMIN — DIPHENHYDRAMINE HYDROCHLORIDE 25 MG: 50 INJECTION, SOLUTION INTRAMUSCULAR; INTRAVENOUS at 20:59

## 2019-03-30 RX ADMIN — PROMETHAZINE HYDROCHLORIDE 25 MG: 25 INJECTION INTRAMUSCULAR; INTRAVENOUS at 07:57

## 2019-03-30 RX ADMIN — SUCRALFATE 1 G: 1 SUSPENSION ORAL at 11:38

## 2019-03-30 RX ADMIN — METOCLOPRAMIDE 10 MG: 5 INJECTION, SOLUTION INTRAMUSCULAR; INTRAVENOUS at 06:50

## 2019-03-30 RX ADMIN — METOCLOPRAMIDE 10 MG: 5 INJECTION, SOLUTION INTRAMUSCULAR; INTRAVENOUS at 21:00

## 2019-03-31 PROCEDURE — 25010000002 DIPHENHYDRAMINE PER 50 MG: Performed by: OBSTETRICS & GYNECOLOGY

## 2019-03-31 PROCEDURE — 93010 ELECTROCARDIOGRAM REPORT: CPT | Performed by: INTERNAL MEDICINE

## 2019-03-31 PROCEDURE — 25010000002 METOCLOPRAMIDE PER 10 MG: Performed by: OBSTETRICS & GYNECOLOGY

## 2019-03-31 PROCEDURE — 25010000002 ONDANSETRON PER 1 MG: Performed by: OBSTETRICS & GYNECOLOGY

## 2019-03-31 PROCEDURE — 25010000002 METHYLPREDNISOLONE PER 40 MG: Performed by: INTERNAL MEDICINE

## 2019-03-31 PROCEDURE — 93005 ELECTROCARDIOGRAM TRACING: CPT | Performed by: OBSTETRICS & GYNECOLOGY

## 2019-03-31 PROCEDURE — 99232 SBSQ HOSP IP/OBS MODERATE 35: CPT | Performed by: INTERNAL MEDICINE

## 2019-03-31 PROCEDURE — 25010000002 PROMETHAZINE PER 50 MG: Performed by: OBSTETRICS & GYNECOLOGY

## 2019-03-31 PROCEDURE — G0378 HOSPITAL OBSERVATION PER HR: HCPCS

## 2019-03-31 RX ORDER — ONDANSETRON 2 MG/ML
8 INJECTION INTRAMUSCULAR; INTRAVENOUS EVERY 8 HOURS
Status: DISCONTINUED | OUTPATIENT
Start: 2019-03-31 | End: 2019-03-31 | Stop reason: ALTCHOICE

## 2019-03-31 RX ADMIN — DIPHENHYDRAMINE HYDROCHLORIDE 25 MG: 50 INJECTION, SOLUTION INTRAMUSCULAR; INTRAVENOUS at 21:00

## 2019-03-31 RX ADMIN — FAMOTIDINE 20 MG: 10 INJECTION INTRAVENOUS at 19:50

## 2019-03-31 RX ADMIN — SERTRALINE 50 MG: 50 TABLET, FILM COATED ORAL at 21:22

## 2019-03-31 RX ADMIN — SODIUM CHLORIDE, PRESERVATIVE FREE 10 ML: 5 INJECTION INTRAVENOUS at 11:24

## 2019-03-31 RX ADMIN — SODIUM CHLORIDE, PRESERVATIVE FREE 10 ML: 5 INJECTION INTRAVENOUS at 19:50

## 2019-03-31 RX ADMIN — SODIUM CHLORIDE, PRESERVATIVE FREE 10 ML: 5 INJECTION INTRAVENOUS at 11:29

## 2019-03-31 RX ADMIN — DIPHENHYDRAMINE HYDROCHLORIDE 25 MG: 50 INJECTION, SOLUTION INTRAMUSCULAR; INTRAVENOUS at 06:45

## 2019-03-31 RX ADMIN — SODIUM CHLORIDE, PRESERVATIVE FREE 10 ML: 5 INJECTION INTRAVENOUS at 10:06

## 2019-03-31 RX ADMIN — SODIUM CHLORIDE, PRESERVATIVE FREE 10 ML: 5 INJECTION INTRAVENOUS at 11:21

## 2019-03-31 RX ADMIN — SODIUM CHLORIDE, PRESERVATIVE FREE 10 ML: 5 INJECTION INTRAVENOUS at 17:26

## 2019-03-31 RX ADMIN — METOCLOPRAMIDE 10 MG: 5 INJECTION, SOLUTION INTRAMUSCULAR; INTRAVENOUS at 06:45

## 2019-03-31 RX ADMIN — SODIUM CHLORIDE, PRESERVATIVE FREE 10 ML: 5 INJECTION INTRAVENOUS at 12:53

## 2019-03-31 RX ADMIN — SODIUM CHLORIDE, PRESERVATIVE FREE 10 ML: 5 INJECTION INTRAVENOUS at 17:39

## 2019-03-31 RX ADMIN — DIPHENHYDRAMINE HYDROCHLORIDE 25 MG: 50 INJECTION, SOLUTION INTRAMUSCULAR; INTRAVENOUS at 11:22

## 2019-03-31 RX ADMIN — METHYLPREDNISOLONE SODIUM SUCCINATE 16 MG: 40 INJECTION, POWDER, LYOPHILIZED, FOR SOLUTION INTRAMUSCULAR; INTRAVENOUS at 10:13

## 2019-03-31 RX ADMIN — FAMOTIDINE 20 MG: 10 INJECTION INTRAVENOUS at 08:10

## 2019-03-31 RX ADMIN — METHYLPREDNISOLONE SODIUM SUCCINATE 16 MG: 40 INJECTION, POWDER, LYOPHILIZED, FOR SOLUTION INTRAMUSCULAR; INTRAVENOUS at 17:33

## 2019-03-31 RX ADMIN — SODIUM CHLORIDE, PRESERVATIVE FREE 10 ML: 5 INJECTION INTRAVENOUS at 10:17

## 2019-03-31 RX ADMIN — SODIUM CHLORIDE, PRESERVATIVE FREE 10 ML: 5 INJECTION INTRAVENOUS at 08:13

## 2019-03-31 RX ADMIN — SODIUM CHLORIDE, PRESERVATIVE FREE 10 ML: 5 INJECTION INTRAVENOUS at 08:09

## 2019-03-31 RX ADMIN — DIPHENHYDRAMINE HYDROCHLORIDE 25 MG: 50 INJECTION, SOLUTION INTRAMUSCULAR; INTRAVENOUS at 17:27

## 2019-03-31 RX ADMIN — PROMETHAZINE HYDROCHLORIDE 25 MG: 25 INJECTION INTRAMUSCULAR; INTRAVENOUS at 10:07

## 2019-03-31 RX ADMIN — SODIUM CHLORIDE, PRESERVATIVE FREE 10 ML: 5 INJECTION INTRAVENOUS at 17:31

## 2019-03-31 RX ADMIN — METOCLOPRAMIDE 10 MG: 5 INJECTION, SOLUTION INTRAMUSCULAR; INTRAVENOUS at 11:24

## 2019-03-31 RX ADMIN — SODIUM CHLORIDE, PRESERVATIVE FREE 10 ML: 5 INJECTION INTRAVENOUS at 19:53

## 2019-03-31 RX ADMIN — SODIUM CHLORIDE, PRESERVATIVE FREE 10 ML: 5 INJECTION INTRAVENOUS at 09:05

## 2019-03-31 RX ADMIN — SODIUM CHLORIDE, PRESERVATIVE FREE 10 ML: 5 INJECTION INTRAVENOUS at 17:35

## 2019-03-31 RX ADMIN — METOCLOPRAMIDE 10 MG: 5 INJECTION, SOLUTION INTRAMUSCULAR; INTRAVENOUS at 17:36

## 2019-03-31 RX ADMIN — SODIUM CHLORIDE, PRESERVATIVE FREE 10 ML: 5 INJECTION INTRAVENOUS at 12:27

## 2019-03-31 RX ADMIN — ASCORBIC ACID, VITAMIN A PALMITATE, CHOLECALCIFEROL, THIAMINE HYDROCHLORIDE, RIBOFLAVIN-5 PHOSPHATE SODIUM, PYRIDOXINE HYDROCHLORIDE, NIACINAMIDE, DEXPANTHENOL, ALPHA-TOCOPHEROL ACETATE, VITAMIN K1, FOLIC ACID, BIOTIN, CYANOCOBALAMIN 125 ML/HR: 200; 3300; 200; 6; 3.6; 6; 40; 15; 10; 150; 600; 60; 5 INJECTION, SOLUTION INTRAVENOUS at 09:08

## 2019-03-31 RX ADMIN — DEXTROSE AND SODIUM CHLORIDE 125 ML/HR: 5; 900 INJECTION, SOLUTION INTRAVENOUS at 01:58

## 2019-03-31 RX ADMIN — METOCLOPRAMIDE 10 MG: 5 INJECTION, SOLUTION INTRAMUSCULAR; INTRAVENOUS at 21:03

## 2019-03-31 RX ADMIN — SODIUM CHLORIDE, PRESERVATIVE FREE 10 ML: 5 INJECTION INTRAVENOUS at 10:11

## 2019-03-31 RX ADMIN — DEXTROSE AND SODIUM CHLORIDE 125 ML/HR: 5; 900 INJECTION, SOLUTION INTRAVENOUS at 17:17

## 2019-03-31 RX ADMIN — ONDANSETRON HYDROCHLORIDE 8 MG: 2 SOLUTION INTRAMUSCULAR; INTRAVENOUS at 20:11

## 2019-03-31 RX ADMIN — METHYLPREDNISOLONE SODIUM SUCCINATE 16 MG: 40 INJECTION, POWDER, LYOPHILIZED, FOR SOLUTION INTRAMUSCULAR; INTRAVENOUS at 01:59

## 2019-03-31 RX ADMIN — ONDANSETRON HYDROCHLORIDE 8 MG: 2 SOLUTION INTRAMUSCULAR; INTRAVENOUS at 12:28

## 2019-03-31 RX ADMIN — SODIUM CHLORIDE, PRESERVATIVE FREE 10 ML: 5 INJECTION INTRAVENOUS at 17:16

## 2019-04-01 ENCOUNTER — APPOINTMENT (OUTPATIENT)
Dept: ULTRASOUND IMAGING | Facility: HOSPITAL | Age: 34
End: 2019-04-01

## 2019-04-01 LAB
ALBUMIN SERPL-MCNC: 3.5 G/DL (ref 3.5–5.2)
ALBUMIN SERPL-MCNC: 3.8 G/DL (ref 3.5–5.2)
ALBUMIN/GLOB SERPL: 1.4 G/DL
ALBUMIN/GLOB SERPL: 1.4 G/DL
ALP SERPL-CCNC: 77 U/L (ref 39–117)
ALP SERPL-CCNC: 81 U/L (ref 39–117)
ALT SERPL W P-5'-P-CCNC: 19 U/L (ref 1–33)
ALT SERPL W P-5'-P-CCNC: 21 U/L (ref 1–33)
ANION GAP SERPL CALCULATED.3IONS-SCNC: 15 MMOL/L
ANION GAP SERPL CALCULATED.3IONS-SCNC: 9.8 MMOL/L
AST SERPL-CCNC: 15 U/L (ref 1–32)
AST SERPL-CCNC: 16 U/L (ref 1–32)
BASOPHILS # BLD AUTO: 0.02 10*3/MM3 (ref 0–0.2)
BASOPHILS NFR BLD AUTO: 0.2 % (ref 0–1.5)
BILIRUB SERPL-MCNC: 0.5 MG/DL (ref 0.2–1.2)
BILIRUB SERPL-MCNC: 0.7 MG/DL (ref 0.2–1.2)
BUN BLD-MCNC: 5 MG/DL (ref 6–20)
BUN BLD-MCNC: 5 MG/DL (ref 6–20)
BUN/CREAT SERPL: 10.9 (ref 7–25)
BUN/CREAT SERPL: 9.8 (ref 7–25)
CA-I BLD-MCNC: 5.1 MG/DL (ref 4.6–5.4)
CA-I SERPL ISE-MCNC: 1.27 MMOL/L (ref 1.15–1.35)
CALCIUM SPEC-SCNC: 8.5 MG/DL (ref 8.6–10.5)
CALCIUM SPEC-SCNC: 9.1 MG/DL (ref 8.6–10.5)
CHLORIDE SERPL-SCNC: 105 MMOL/L (ref 98–107)
CHLORIDE SERPL-SCNC: 99 MMOL/L (ref 98–107)
CHOLEST SERPL-MCNC: 149 MG/DL (ref 0–200)
CO2 SERPL-SCNC: 23 MMOL/L (ref 22–29)
CO2 SERPL-SCNC: 26.2 MMOL/L (ref 22–29)
CREAT BLD-MCNC: 0.46 MG/DL (ref 0.57–1)
CREAT BLD-MCNC: 0.51 MG/DL (ref 0.57–1)
CRP SERPL-MCNC: 0.47 MG/DL (ref 0–0.5)
DEPRECATED RDW RBC AUTO: 43.3 FL (ref 37–54)
EOSINOPHIL # BLD AUTO: 0 10*3/MM3 (ref 0–0.4)
EOSINOPHIL NFR BLD AUTO: 0 % (ref 0.3–6.2)
ERYTHROCYTE [DISTWIDTH] IN BLOOD BY AUTOMATED COUNT: 13.1 % (ref 12.3–15.4)
GFR SERPL CREATININE-BSD FRML MDRD: 140 ML/MIN/1.73
GFR SERPL CREATININE-BSD FRML MDRD: >150 ML/MIN/1.73
GLOBULIN UR ELPH-MCNC: 2.5 GM/DL
GLOBULIN UR ELPH-MCNC: 2.8 GM/DL
GLUCOSE BLD-MCNC: 107 MG/DL (ref 65–99)
GLUCOSE BLD-MCNC: 108 MG/DL (ref 65–99)
GLUCOSE BLDC GLUCOMTR-MCNC: 101 MG/DL (ref 70–130)
HCT VFR BLD AUTO: 34.1 % (ref 34–46.6)
HGB BLD-MCNC: 11.1 G/DL (ref 12–15.9)
IMM GRANULOCYTES # BLD AUTO: 0.04 10*3/MM3 (ref 0–0.05)
IMM GRANULOCYTES NFR BLD AUTO: 0.3 % (ref 0–0.5)
LYMPHOCYTES # BLD AUTO: 1.2 10*3/MM3 (ref 0.7–3.1)
LYMPHOCYTES NFR BLD AUTO: 10.3 % (ref 19.6–45.3)
MAGNESIUM SERPL-MCNC: 2.1 MG/DL (ref 1.6–2.6)
MCH RBC QN AUTO: 29.5 PG (ref 26.6–33)
MCHC RBC AUTO-ENTMCNC: 32.6 G/DL (ref 31.5–35.7)
MCV RBC AUTO: 90.7 FL (ref 79–97)
MONOCYTES # BLD AUTO: 0.48 10*3/MM3 (ref 0.1–0.9)
MONOCYTES NFR BLD AUTO: 4.1 % (ref 5–12)
NEUTROPHILS # BLD AUTO: 9.92 10*3/MM3 (ref 1.4–7)
NEUTROPHILS NFR BLD AUTO: 85.1 % (ref 42.7–76)
NRBC BLD AUTO-RTO: 0 /100 WBC (ref 0–0)
PHOSPHATE SERPL-MCNC: 2.5 MG/DL (ref 2.5–4.5)
PLATELET # BLD AUTO: 229 10*3/MM3 (ref 140–450)
PMV BLD AUTO: 10.3 FL (ref 6–12)
POTASSIUM BLD-SCNC: 2.6 MMOL/L (ref 3.5–5.2)
POTASSIUM BLD-SCNC: 3 MMOL/L (ref 3.5–5.2)
PREALB SERPL-MCNC: 19.9 MG/DL (ref 20–40)
PROT SERPL-MCNC: 6 G/DL (ref 6–8.5)
PROT SERPL-MCNC: 6.6 G/DL (ref 6–8.5)
RBC # BLD AUTO: 3.76 10*6/MM3 (ref 3.77–5.28)
SODIUM BLD-SCNC: 137 MMOL/L (ref 136–145)
SODIUM BLD-SCNC: 141 MMOL/L (ref 136–145)
TRIGL SERPL-MCNC: 82 MG/DL (ref 0–150)
WBC NRBC COR # BLD: 11.66 10*3/MM3 (ref 3.4–10.8)

## 2019-04-01 PROCEDURE — 25010000002 CALCIUM GLUCONATE PER 10 ML: Performed by: INTERNAL MEDICINE

## 2019-04-01 PROCEDURE — 25010000002 METOCLOPRAMIDE PER 10 MG: Performed by: OBSTETRICS & GYNECOLOGY

## 2019-04-01 PROCEDURE — 76801 OB US < 14 WKS SINGLE FETUS: CPT

## 2019-04-01 PROCEDURE — 84100 ASSAY OF PHOSPHORUS: CPT | Performed by: INTERNAL MEDICINE

## 2019-04-01 PROCEDURE — 25010000002 POTASSIUM CHLORIDE PER 2 MEQ OF POTASSIUM: Performed by: INTERNAL MEDICINE

## 2019-04-01 PROCEDURE — 25010000002 ONDANSETRON PER 1 MG: Performed by: OBSTETRICS & GYNECOLOGY

## 2019-04-01 PROCEDURE — 82962 GLUCOSE BLOOD TEST: CPT

## 2019-04-01 PROCEDURE — 84478 ASSAY OF TRIGLYCERIDES: CPT | Performed by: INTERNAL MEDICINE

## 2019-04-01 PROCEDURE — 82330 ASSAY OF CALCIUM: CPT | Performed by: INTERNAL MEDICINE

## 2019-04-01 PROCEDURE — 99232 SBSQ HOSP IP/OBS MODERATE 35: CPT | Performed by: INTERNAL MEDICINE

## 2019-04-01 PROCEDURE — 25010000002 PROMETHAZINE PER 50 MG: Performed by: OBSTETRICS & GYNECOLOGY

## 2019-04-01 PROCEDURE — 86140 C-REACTIVE PROTEIN: CPT | Performed by: INTERNAL MEDICINE

## 2019-04-01 PROCEDURE — 82465 ASSAY BLD/SERUM CHOLESTEROL: CPT | Performed by: INTERNAL MEDICINE

## 2019-04-01 PROCEDURE — 80053 COMPREHEN METABOLIC PANEL: CPT | Performed by: OBSTETRICS & GYNECOLOGY

## 2019-04-01 PROCEDURE — 25010000002 METHYLPREDNISOLONE PER 40 MG: Performed by: INTERNAL MEDICINE

## 2019-04-01 PROCEDURE — 25010000002 MAGNESIUM SULFATE PER 500 MG OF MAGNESIUM: Performed by: INTERNAL MEDICINE

## 2019-04-01 PROCEDURE — 85027 COMPLETE CBC AUTOMATED: CPT | Performed by: OBSTETRICS & GYNECOLOGY

## 2019-04-01 PROCEDURE — 83735 ASSAY OF MAGNESIUM: CPT | Performed by: INTERNAL MEDICINE

## 2019-04-01 PROCEDURE — 25010000002 ALTEPLASE: Performed by: OBSTETRICS & GYNECOLOGY

## 2019-04-01 PROCEDURE — 25010000002 DIPHENHYDRAMINE PER 50 MG: Performed by: OBSTETRICS & GYNECOLOGY

## 2019-04-01 PROCEDURE — 80053 COMPREHEN METABOLIC PANEL: CPT | Performed by: INTERNAL MEDICINE

## 2019-04-01 PROCEDURE — 3E0436Z INTRODUCTION OF NUTRITIONAL SUBSTANCE INTO CENTRAL VEIN, PERCUTANEOUS APPROACH: ICD-10-PCS | Performed by: INTERNAL MEDICINE

## 2019-04-01 PROCEDURE — 84134 ASSAY OF PREALBUMIN: CPT | Performed by: INTERNAL MEDICINE

## 2019-04-01 RX ORDER — DEXTROSE AND SODIUM CHLORIDE 5; .9 G/100ML; G/100ML
40 INJECTION, SOLUTION INTRAVENOUS CONTINUOUS
Status: ACTIVE | OUTPATIENT
Start: 2019-04-01 | End: 2019-04-02

## 2019-04-01 RX ADMIN — METHYLPREDNISOLONE SODIUM SUCCINATE 16 MG: 40 INJECTION, POWDER, LYOPHILIZED, FOR SOLUTION INTRAMUSCULAR; INTRAVENOUS at 10:19

## 2019-04-01 RX ADMIN — DIPHENHYDRAMINE HYDROCHLORIDE 25 MG: 50 INJECTION, SOLUTION INTRAMUSCULAR; INTRAVENOUS at 20:45

## 2019-04-01 RX ADMIN — ASCORBIC ACID, VITAMIN A PALMITATE, CHOLECALCIFEROL, THIAMINE HYDROCHLORIDE, RIBOFLAVIN-5 PHOSPHATE SODIUM, PYRIDOXINE HYDROCHLORIDE, NIACINAMIDE, DEXPANTHENOL, ALPHA-TOCOPHEROL ACETATE, VITAMIN K1, FOLIC ACID, BIOTIN, CYANOCOBALAMIN 125 ML/HR: 200; 3300; 200; 6; 3.6; 6; 40; 15; 10; 150; 600; 60; 5 INJECTION, SOLUTION INTRAVENOUS at 08:52

## 2019-04-01 RX ADMIN — METOCLOPRAMIDE 10 MG: 5 INJECTION, SOLUTION INTRAMUSCULAR; INTRAVENOUS at 20:45

## 2019-04-01 RX ADMIN — METOCLOPRAMIDE 10 MG: 5 INJECTION, SOLUTION INTRAMUSCULAR; INTRAVENOUS at 11:32

## 2019-04-01 RX ADMIN — METOCLOPRAMIDE 10 MG: 5 INJECTION, SOLUTION INTRAMUSCULAR; INTRAVENOUS at 18:22

## 2019-04-01 RX ADMIN — ONDANSETRON HYDROCHLORIDE 8 MG: 2 SOLUTION INTRAMUSCULAR; INTRAVENOUS at 05:07

## 2019-04-01 RX ADMIN — DIPHENHYDRAMINE HYDROCHLORIDE 25 MG: 50 INJECTION, SOLUTION INTRAMUSCULAR; INTRAVENOUS at 11:30

## 2019-04-01 RX ADMIN — DEXTROSE AND SODIUM CHLORIDE 125 ML/HR: 5; 900 INJECTION, SOLUTION INTRAVENOUS at 01:57

## 2019-04-01 RX ADMIN — ONDANSETRON HYDROCHLORIDE 8 MG: 2 SOLUTION INTRAMUSCULAR; INTRAVENOUS at 13:28

## 2019-04-01 RX ADMIN — METHYLPREDNISOLONE SODIUM SUCCINATE 16 MG: 40 INJECTION, POWDER, LYOPHILIZED, FOR SOLUTION INTRAMUSCULAR; INTRAVENOUS at 01:56

## 2019-04-01 RX ADMIN — SODIUM CHLORIDE, PRESERVATIVE FREE 10 ML: 5 INJECTION INTRAVENOUS at 11:32

## 2019-04-01 RX ADMIN — METOCLOPRAMIDE 10 MG: 5 INJECTION, SOLUTION INTRAMUSCULAR; INTRAVENOUS at 07:02

## 2019-04-01 RX ADMIN — METHYLPREDNISOLONE SODIUM SUCCINATE 16 MG: 40 INJECTION, POWDER, LYOPHILIZED, FOR SOLUTION INTRAMUSCULAR; INTRAVENOUS at 18:18

## 2019-04-01 RX ADMIN — FAMOTIDINE 20 MG: 10 INJECTION INTRAVENOUS at 20:11

## 2019-04-01 RX ADMIN — SERTRALINE 50 MG: 50 TABLET, FILM COATED ORAL at 21:54

## 2019-04-01 RX ADMIN — SODIUM CHLORIDE, PRESERVATIVE FREE 10 ML: 5 INJECTION INTRAVENOUS at 05:07

## 2019-04-01 RX ADMIN — POTASSIUM CHLORIDE: 2 INJECTION, SOLUTION, CONCENTRATE INTRAVENOUS at 18:17

## 2019-04-01 RX ADMIN — ONDANSETRON HYDROCHLORIDE 8 MG: 2 SOLUTION INTRAMUSCULAR; INTRAVENOUS at 20:11

## 2019-04-01 RX ADMIN — SODIUM CHLORIDE, PRESERVATIVE FREE 10 ML: 5 INJECTION INTRAVENOUS at 16:01

## 2019-04-01 RX ADMIN — DEXTROSE AND SODIUM CHLORIDE 125 ML/HR: 5; 900 INJECTION, SOLUTION INTRAVENOUS at 16:03

## 2019-04-01 RX ADMIN — DEXTROSE AND SODIUM CHLORIDE 40 ML/HR: 5; 900 INJECTION, SOLUTION INTRAVENOUS at 18:18

## 2019-04-01 RX ADMIN — ALTEPLASE: 2.2 INJECTION, POWDER, LYOPHILIZED, FOR SOLUTION INTRAVENOUS at 14:24

## 2019-04-01 RX ADMIN — PROMETHAZINE HYDROCHLORIDE 25 MG: 25 INJECTION INTRAMUSCULAR; INTRAVENOUS at 16:07

## 2019-04-01 RX ADMIN — DIPHENHYDRAMINE HYDROCHLORIDE 25 MG: 50 INJECTION, SOLUTION INTRAMUSCULAR; INTRAVENOUS at 18:02

## 2019-04-01 RX ADMIN — SODIUM CHLORIDE, PRESERVATIVE FREE 10 ML: 5 INJECTION INTRAVENOUS at 21:48

## 2019-04-01 RX ADMIN — DIPHENHYDRAMINE HYDROCHLORIDE 25 MG: 50 INJECTION, SOLUTION INTRAMUSCULAR; INTRAVENOUS at 07:02

## 2019-04-01 RX ADMIN — SUCRALFATE 1 G: 1 SUSPENSION ORAL at 08:45

## 2019-04-01 RX ADMIN — ALTEPLASE: 2.2 INJECTION, POWDER, LYOPHILIZED, FOR SOLUTION INTRAVENOUS at 14:25

## 2019-04-01 RX ADMIN — PROMETHAZINE HYDROCHLORIDE 25 MG: 25 INJECTION INTRAMUSCULAR; INTRAVENOUS at 02:02

## 2019-04-01 RX ADMIN — SODIUM CHLORIDE, PRESERVATIVE FREE 10 ML: 5 INJECTION INTRAVENOUS at 11:30

## 2019-04-01 RX ADMIN — FAMOTIDINE 20 MG: 10 INJECTION INTRAVENOUS at 08:49

## 2019-04-01 NOTE — CONSULTS
"Adult Nutrition  Assessment/PES    Patient Name:  Sara Hoang  YOB: 1985  MRN: [id number]  Admit Date:  3/28/2019    Assessment Date:  4/1/2019    Comments:  Consult for TPN assessment.  Patient with hyperemesis.  10 weeks, 2 days.  Tried solid foods today, but vomited after eating.  Currently NPO and TPN initiated.    Recommend TPN goals of 1200 kcal dextrose, 90 grams AA, 250 ml 20% lipids.  This will meet 104% estimated kcal needs and 100% estimated protein needs.    RD to continue to follow.    Reason for Assessment     Row Name 04/01/19 1500          Reason for Assessment    Reason For Assessment  TF/PN;physician consult     Diagnosis  psychosocial;pulmonary disease;other (see comments);pregnancy complications;gastrointestinal disease anxiety, asthma, endometriosis; adm with hyperemesis         Nutrition/Diet History     Row Name 04/01/19 1501          Nutrition/Diet History    Typical Food/Fluid Intake  tried solid foods today, but vomited after eating         Anthropometrics     Row Name 04/01/19 1501 04/01/19 1457       Anthropometrics    Weight  --  81.9 kg (180 lb 8 oz)       Admit Weight    Admit Weight  -- 180# 4/1  --       Body Mass Index (BMI)    BMI Assessment  BMI 30-34.9: obesity grade I  --    Row Name 04/01/19 1120          Anthropometrics    Height  165.1 cm (65\")     Weight  81.9 kg (180 lb 8 oz)        Ideal Body Weight (IBW)    Ideal Body Weight (IBW) (kg)  57.29     % Ideal Body Weight  142.91        Body Mass Index (BMI)    BMI (kg/m2)  30.1        IBW Adjustment, Para/Tetraplegia    5% Adjustment, Para (IBW)  54.43     10% Adjustment, Para (IBW)  51.56     10% Adjustment, Tetra (IBW)  51.56     15% Adjustment, Tetra (IBW)  48.7         Labs/Tests/Procedures/Meds     Row Name 04/01/19 1502          Labs/Procedures/Meds    Lab Results Reviewed  reviewed, pertinent     Lab Results Comments  Gluc, K+, Creat, BUN        Diagnostic Tests/Procedures    Diagnostic Test/Procedure " "Reviewed  reviewed, pertinent        Medications    Pertinent Medications Reviewed  reviewed, pertinent     Pertinent Medications Comments  pepcid, solu-medrol, reglan, carafate; TPN began today at 60 ml/hr         Physical Findings     Row Name 04/01/19 1502          Physical Findings    Gastrointestinal  nausea;vomiting     Skin  -- B=20, intact         Estimated/Assessed Needs     Row Name 04/01/19 1503 04/01/19 1120       Calculation Measurements    Weight Used For Calculations  81.6 kg (180 lb)  --    Height  --  165.1 cm (65\")       Estimated/Assessed Needs    Additional Documentation  [place]-St. Jeor Equation (Group);Protein Requirements (Group);Calorie Requirements (Group)  --       Calorie Requirements    Estimated Calorie Requirement Comment  1985  --       KCAL/KG    14 Kcal/Kg (kcal)  1143.06  --    15 Kcal/Kg (kcal)  1224.71  --    18 Kcal/Kg (kcal)  1469.65  --    20 Kcal/Kg (kcal)  1632.94  --    25 Kcal/Kg (kcal)  2041.18  --    30 Kcal/Kg (kcal)  2449.41  --    35 Kcal/Kg (kcal)  2857.65  --    40 Kcal/Kg (kcal)  3265.88  --    45 Kcal/Kg (kcal)  3674.12  --    50 Kcal/Kg (kcal)  4082.35  --       [place]-St. Jeor Equation    RMR ([place]-St. Jeor Equation)  1527.35  --       Protein Requirements    Est Protein Requirement Amount (gms/kg)  1.1 gm protein  --    Estimated Protein Requirements (gms/day)  89.81  --       Fluid Requirements    Don-Kyler Method (over 20 kg)  3132.94  --        Nutrition Prescription Ordered     Row Name 04/01/19 1512          Nutrition Prescription PO    Current PO Diet  NPO        Nutrition Prescription PN    PN Route  PICC     PN Current Rate (mL/hr)  60 mL/hr     Dextrose (Kcal)  600     Amino Acid (gm)  50         Evaluation of Received Nutrient/Fluid Intake     Row Name 04/01/19 1518 04/01/19 1503       Calculation Measurements    Weight Used For Calculations  --  81.6 kg (180 lb)       Nutrient/Fluid Evaluation    Additional Documentation  Calories Evaluation " "(Group);Protein Evaluation (Group)  --       Calories Evaluation    Parenteral Calories (kcal)  800  --    % of Kcal Needs  40  --       Protein Evaluation    Parenteral Protein (gm)  50  --    % of Protein Needs  56  --       Intake Assessment    Energy/Calorie Requirement Assessment  not meeting needs  --    Protein Requirement Assessment  not meeting needs  --    Row Name 04/01/19 1120          Calculation Measurements    Height  165.1 cm (65\")         Evaluation of Prescribed Nutrient/Fluid Intake     Row Name 04/01/19 1503 04/01/19 1120       Calculation Measurements    Weight Used For Calculations  81.6 kg (180 lb)  --    Height  --  165.1 cm (65\")            Problem/Interventions:  Problem 1     Row Name 04/01/19 1519          Nutrition Diagnoses Problem 1    Problem 1  Needs Alternate Route     Etiology (related to)  Medical Diagnosis     Gastrointestinal  Hyperemesis     Signs/Symptoms (evidenced by)  Report/Observation;PO diet not tolerated                 Intervention Goal     Row Name 04/01/19 1519          Intervention Goal    General  Maintain nutrition;Nutrition support treatment;Reduce/improve symptoms;Disease management/therapy;Meet nutritional needs for age/condition     PO  Initiate feeding     TF/PN  Adjust TF/PN;Deliver (%) goal     Deliver % of Goal  100 %     Transition  PN to PO     Weight  Support appropriate growth         Nutrition Intervention     Row Name 04/01/19 1520          Nutrition Intervention    RD/Tech Action  Follow Tx progress;Care plan reviewd;Recommend/ordered     Recommended/Ordered  PN         Nutrition Prescription     Row Name 04/01/19 1520          Nutrition Prescription PN    Parenteral Prescription  PN begin/change;Parenteral to supply     PN Route  PICC     Dextrose (Kcal)  1200     Amino Acid (gm)  90     Lipid Concentration (%)  20%     Lipid Volume (mL)  250 mL     Lipid Frequency  Daily     New PN Prescription Ordered?  No, recommended        PN to Supply    " Kcal/Day  2060 Kcal/day     Kcal/Kg  25 Kcal/kg     Kcal/Kg Weight Method  Actual weight     Protein (gm/day)  90 gm/day     Meet Estimated Kcal Need (%)  104 %     Meet Estimated Protein Need (%)  100 %         Education/Evaluation     Row Name 04/01/19 1521          Education    Education  Will Instruct as appropriate        Monitor/Evaluation    Monitor  Per protocol;I&O;Pertinent labs;PN delivery/tolerance;Weight;Symptoms     Education Follow-up  Reinforce PRN           Electronically signed by:  Rajni Lechuga RD  04/01/19 3:23 PM

## 2019-04-01 NOTE — NURSING NOTE
IV therapist gives ok to use purple lumen of PICC line.  States ok to restart banana bag.  Upon going in room to restart, pt states that she no longer wants a banana bag.  She states that she now thinks the banana bag is causing her anxiety to increase.  She stated that her anxiety had resolved, but then restarted about 30 minutes after banana bag was started.  This timing coincides with when pt started to feel nauseous again and produced emesis.  Pt states that the anxiety has decreased during time frame pt was not connected to any fluids while trying to improve PICC line connectors with cathflo.  Resumed D5NS and will monitor pt for anxiety.

## 2019-04-01 NOTE — PROGRESS NOTES
"Pharmacy to dose TPN - Daily Progress Note  Patient: Sara Hoang  MRN#: [id number]  Attending: No name on file.  Admission Date: 032819    Sara Hoang is a 33 y.o. female receiving TPN for hyperemesis gravidarum.     TPN # 1 per Dr. Lima's request.     Active Problems:    Hyperemesis gravidarum    Pregnancy    Subjective/Objective  165.1 cm (65\")  83.9 kg (185 lb)  Body mass index is 30.79 kg/m².   Results from last 7 days   Lab Units 04/01/19  0702   SODIUM mmol/L 141   POTASSIUM mmol/L 3.0*   CHLORIDE mmol/L 105   CO2 mmol/L 26.2   BUN mg/dL 5*   CREATININE mg/dL 0.46*   CALCIUM mg/dL 8.5*   ALBUMIN g/dL 3.50   BILIRUBIN mg/dL 0.5   ALK PHOS U/L 77   ALT (SGPT) U/L 19   AST (SGOT) U/L 15   GLUCOSE mg/dL 108*        I/O last 3 completed shifts:  In: 4530.2 [I.V.:4394.4; IV Piggyback:135.8]  Out: 4225 [Urine:4125; Emesis/NG output:100]  Diet Orders (active) (From admission, onward)    Start     Ordered    04/01/19 1043  NPO Diet  Diet Effective Now      04/01/19 1044    03/30/19 1200  Dietary Nutrition Supplements Boost Breeze (Ensure Clear)  Daily With Breakfast, Lunch & Dinner     Comments:  Please bring now.  Thank you!    03/30/19 0828        Additional insulin administration past 24 hours: none (currently on methylprednisolone 16 mg IV q8h)     Additional electrolyte/IVF administration past 24 hours:   1) MVI in D5 1000 mL at 125 mL/h (over 8 hours) x3 doses, last this AM  2) D5NS at 125 mL/h (since 3/29)    Acid suppression: Pepcid 20 mg IV q12h    Goal (pending RD recs):  Protein: 80 grams/day  Dextrose: 1200 Kcal/day   Lipids: 250 mL of 20% infusion 7 days per week  Fluid rate: 100 mL/hr (2400 mL daily)     Plan    Begin TPN with the following macronutrients:  Protein: 50 grams/day  Dextrose: 600 Kcal/day   Lipids: none  Fluid rate: 60 mL/hr     Plan to advance macronutrients as tolerated. Based on the above labs, will add the following electrolytes/additives to the TPN.     Sodium chloride: 70 " mEq  Potassium chloride: 50 mEq  Potassium phosphate: 22 mEq   Calcium gluconate: 9 mEq   Magnesium sulfate: 10 mEq   Multivitamin: 10 mL   Trace Elements: 1 mL     Labs: CMP, mag, phos, triglycerides with AM labs tomorrow.     Pharmacy will continue to follow and monitor daily while patient on TPN. Thank you for this consult.      Levi Barnes, PharmD, SUSIE, BCPS

## 2019-04-01 NOTE — PROGRESS NOTES
[facility]  Obstetric Progress Note    Patient Name: Sara Hoang  :  1985  MRN:  [id number]  Hospital Day: 4  EGA: 10w2d      Subjective   Tried some solids today but vomited after eating.  Planning TPN.      Objective     VS:  Vital Signs Range for the last 24 hours  Temperature: Temp:  [98.4 °F (36.9 °C)-99.6 °F (37.6 °C)] 98.4 °F (36.9 °C)   Temp Source: Temp src: Oral   BP: BP: (120-132)/(69-89) 127/89   Pulse: Heart Rate:  [70-76] 70   Respirations: Resp:  [18] 18                   Physical Examination:     General :  Alert in NAD  Abdomen: Soft NT       Lab results reviewed:  CBC:   Lab Results   Component Value Date    WBC 11.66 (H) 2019    HGB 11.1 (L) 2019    HCT 34.1 2019            A/P:      Hyperemesis gravidarum    Pregnancy    Pt feeling miserable. Had scan in [facility] this morning.  Reportedly normal. No written report yet.  Continue as per GI recommendations.          Charleen Lu MD  2019  12:04 PM

## 2019-04-01 NOTE — PLAN OF CARE
Problem: Nutrition, Parenteral (Adult)  Intervention: Monitor/Manage Parenteral Nutrition Support   04/01/19 1522   Nutrition Interventions   Nutrition Support Management parenteral nutrition composition adjusted; TPN initiated; goals - 1200 kcal dextrose, 90 grams AA, 250 ml 20% lipids

## 2019-04-01 NOTE — PLAN OF CARE
Problem: Patient Care Overview  Goal: Plan of Care Review  Outcome: Ongoing (interventions implemented as appropriate)   04/01/19 1216   Coping/Psychosocial   Plan of Care Reviewed With patient   Plan of Care Review   Progress no change   OTHER   Outcome Summary Pt was feeling better this am. Reported no nausea since yesterday. Then ate 1/2 a muffin and sipped some cranberry juice, which started nausea with emesis again. TPN started today. Pt now NPO. Had ultrasound today. Meds given as ordered. Labs drawn as ordered.     Goal: Individualization and Mutuality  Outcome: Ongoing (interventions implemented as appropriate)   03/31/19 1412   Individualization   Patient Specific Preferences Be able to tolerate diet with no N/V, healthy baby and mon   Patient Specific Goals (Include Timeframe) decrease N/V and be able to tolerate diet   Patient Specific Interventions antiemetics, IVF, banana bag daily, boost breeze shakes    Mutuality/Individual Preferences   What Anxieties, Fears, Concerns, or Questions Do You Have About Your Care? N/V will not resolve   What Information Would Help Us Give You More Personalized Care? hx depression and anxiety   How Would You and/or Your Support Person Like to Participate in Your Care? explanations   Mutuality/Individual Preferences   How to Address Anxieties/Fears keep informed of POC     Goal: Discharge Needs Assessment  Outcome: Ongoing (interventions implemented as appropriate)   03/30/19 0518 03/31/19 1412   Discharge Needs Assessment   Readmission Within the Last 30 Days --  no previous admission in last 30 days   Concerns to be Addressed --  no discharge needs identified   Patient/Family Anticipates Transition to --  home with family   Patient/Family Anticipated Services at Transition none --    Transportation Concerns car, none --    Transportation Anticipated --  car, drives self   Anticipated Changes Related to Illness --  none   Equipment Needed After Discharge --  none    Offered/Gave Vendor List --  no   Disability   Equipment Currently Used at Home --  none     Goal: Interprofessional Rounds/Family Conf  Outcome: Ongoing (interventions implemented as appropriate)   04/01/19 1735   Interdisciplinary Rounds/Family Conf   Participants nursing;patient;physician;pharmacy;family;dietitian/nutrition services       Problem: Hyperemesis Gravidarum (Adult,Obstetrics,Pediatric)  Goal: Signs and Symptoms of Listed Potential Problems Will be Absent, Minimized or Managed (Hyperemesis Gravidarum)  Outcome: Ongoing (interventions implemented as appropriate)   03/31/19 1412   Goal/Outcome Evaluation   Problems Assessed (Nausea/Vomiting/Hyperemesis in Pregnancy) all   Problems Present (Hyperemesis) nutritional deficiency/inadequate oral intake       Problem: Prenatal Patient, Hospitalized (Adult,Obstetrics,Pediatric)  Goal: Signs and Symptoms of Listed Potential Problems Will be Absent, Minimized or Managed (Prenatal Patient, Hospitalized)  Outcome: Ongoing (interventions implemented as appropriate)   03/31/19 1412   Goal/Outcome Evaluation   Problems Assessed (Prenatal Patient) all   Problems Present (Prenatal Patient) nausea and vomiting       Problem: Nutrition, Parenteral (Adult)  Goal: Signs and Symptoms of Listed Potential Problems Will be Absent, Minimized or Managed (Nutrition, Parenteral)  Outcome: Ongoing (interventions implemented as appropriate)   04/01/19 1735   Goal/Outcome Evaluation   Problems Assessed (Parenteral Nutrition) all   Problems Present (Parenteral Nutrition) malnutrition;fluid/electrolyte imbalance

## 2019-04-01 NOTE — PROGRESS NOTES
[facility]  Inpatient Progress Note    Reason for Follow Up:  Hyperemesis          Subjective     Interval History:   Patient is vomiting as I entered the room.  No improvement.  She continues to be unable to tolerate any p.o.  She is given some thought to the options for feeding overnight and she does not think she can tolerate a core tract tube.    Current Facility-Administered Medications:   •  dextrose 5 % and sodium chloride 0.9 % infusion, 125 mL/hr, Intravenous, Continuous, Kacie Tello, SAIDA, Last Rate: 125 mL/hr at 04/01/19 0157, 125 mL/hr at 04/01/19 0157  •  diphenhydrAMINE (BENADRYL) injection 25 mg, 25 mg, Intravenous, 4x Daily AC & at Bedtime, Franci Winter MD, 25 mg at 04/01/19 0702  •  famotidine (PEPCID) injection 20 mg, 20 mg, Intravenous, Q12H, Franci Winter MD, 20 mg at 04/01/19 0849  •  methylPREDNISolone sodium succinate (SOLU-Medrol) injection 16 mg, 16 mg, Intravenous, Q8H, John Geller MD, 16 mg at 04/01/19 1019  •  metoclopramide (REGLAN) injection 10 mg, 10 mg, Intravenous, 4x Daily AC & at Bedtime, Franci Winter MD, 10 mg at 04/01/19 0702  •  ondansetron (ZOFRAN) 8 mg in sodium chloride 0.9 % 50 mL IVPB, 8 mg, Intravenous, Q8H, Yoly Delgado MD, Stopped at 04/01/19 0524  •  promethazine (PHENERGAN) injection 25 mg, 25 mg, Intramuscular, Q6H PRN, Franci Winter MD, 25 mg at 03/30/19 0757  •  promethazine (PHENERGAN) injection 25 mg, 25 mg, Intravenous, Q6H PRN, Kacy Parsons MD, 25 mg at 04/01/19 0202  •  sertraline (ZOLOFT) tablet 50 mg, 50 mg, Oral, Daily, Franci Winter MD, 50 mg at 03/31/19 2122  •  [COMPLETED] Insert peripheral IV, , , Once **AND** sodium chloride 0.9 % flush 10 mL, 10 mL, Intravenous, PRN, Radha Webb APRN, 10 mL at 04/01/19 050  •  sucralfate (CARAFATE) 1 GM/10ML suspension 1 g, 1 g, Oral, 4x Daily AC & at Bedtime, Franci Winter MD, 1 g at 04/01/19 0863  Review of Systems:    All systems  were reviewed and negative except for:  Constitution:  positive for fatigue  Gastrointestinal: positive for  nausea and vomiting  Behavioral/Psych: positive for  depression    Objective     Vital Signs  Temp:  [98.4 °F (36.9 °C)-99.6 °F (37.6 °C)] 98.4 °F (36.9 °C)  Heart Rate:  [70-76] 70  Resp:  [18] 18  BP: (120-132)/(69-89) 127/89  Body mass index is 30.79 kg/m².    Intake/Output Summary (Last 24 hours) at 4/1/2019 1040  Last data filed at 4/1/2019 1000  Gross per 24 hour   Intake 2896.61 ml   Output 2925 ml   Net -28.39 ml     I/O this shift:  In: 231.4 [I.V.:231.4]  Out: 400 [Urine:350; Emesis/NG output:50]     Physical Exam:   General: patient awake, alert and cooperative   Eyes: Normal lids and lashes, no scleral icterus   Neck: supple, normal ROM   Skin: warm and dry, not jaundiced   Abdomen: soft, nontender, nondistended    Psychiatric: Normal mood and behavior; memory intact     Results Review:     I reviewed the patient's new clinical results.    Results from last 7 days   Lab Units 04/01/19  0702 03/28/19  1129   WBC 10*3/mm3 11.66* 14.70*   HEMOGLOBIN g/dL 11.1* 11.4*   HEMATOCRIT % 34.1 35.5   PLATELETS 10*3/mm3 229 243     Results from last 7 days   Lab Units 04/01/19  0702 03/28/19  1129   SODIUM mmol/L 141 137   POTASSIUM mmol/L 3.0* 3.2*   CHLORIDE mmol/L 105 103   CO2 mmol/L 26.2 20.4*   BUN mg/dL 5* 3*   CREATININE mg/dL 0.46* 0.47*   CALCIUM mg/dL 8.5* 9.2   BILIRUBIN mg/dL 0.5 0.5   ALK PHOS U/L 77 73   ALT (SGPT) U/L 19 11   AST (SGOT) U/L 15 18   GLUCOSE mg/dL 108* 107*         No results found for: LIPASE    Radiology:  US Gallbladder   Final Result       Minimal sludge in the gallbladder. No evidence for acute cholecystitis.   Follow up/further evaluation can be obtained as indicated.       This report was finalized on 3/29/2019 6:47 PM by Dr. Tonio Mueller M.D.          US Ob < 14 Weeks Single or First Gestation    (Results Pending)       Assessment/Plan     Patient Active Problem  List   Diagnosis   • Migraine   • Asthma   • Anxiety   • Insomnia   • Hyperemesis gravidarum   • Encounter for supervision of low-risk first pregnancy in first trimester   • Use of letrozole (Femara)   • Rh negative status during pregnancy in first trimester   • Pregnancy     Assessment:   1.  Pregnancy state - 10w  2 . Hyperemesis gravidarum  3.  GERD     Plan:   Continue pepcid Iv and carafate suspension.       Continue glucocorticoids - solumedrol 16mg Q8hrs.       GB u/s with sludge - no stones     Not improving -needs TPN versus core track for enteral feeds.  We discussed the pros and cons of both means of nutrition.      Patient has considered options for nutrition and does not think she can tolerate a core track enteral feeding tube.  We will start TPN today.        I discussed the patients findings and my recommendations with patient.    Franci Lima MD

## 2019-04-02 LAB
ALBUMIN SERPL-MCNC: 3.9 G/DL (ref 3.5–5.2)
ALBUMIN/GLOB SERPL: 1.8 G/DL
ALP SERPL-CCNC: 82 U/L (ref 39–117)
ALT SERPL W P-5'-P-CCNC: 20 U/L (ref 1–33)
ANION GAP SERPL CALCULATED.3IONS-SCNC: 12.8 MMOL/L
AST SERPL-CCNC: 13 U/L (ref 1–32)
BILIRUB SERPL-MCNC: 0.7 MG/DL (ref 0.2–1.2)
BUN BLD-MCNC: 7 MG/DL (ref 6–20)
BUN/CREAT SERPL: 17.5 (ref 7–25)
CA-I BLD-MCNC: 5.1 MG/DL (ref 4.6–5.4)
CA-I SERPL ISE-MCNC: 1.28 MMOL/L (ref 1.15–1.35)
CALCIUM SPEC-SCNC: 8.6 MG/DL (ref 8.6–10.5)
CHLORIDE SERPL-SCNC: 100 MMOL/L (ref 98–107)
CO2 SERPL-SCNC: 25.2 MMOL/L (ref 22–29)
CREAT BLD-MCNC: 0.4 MG/DL (ref 0.57–1)
GFR SERPL CREATININE-BSD FRML MDRD: >150 ML/MIN/1.73
GLOBULIN UR ELPH-MCNC: 2.2 GM/DL
GLUCOSE BLD-MCNC: 112 MG/DL (ref 65–99)
GLUCOSE BLDC GLUCOMTR-MCNC: 100 MG/DL (ref 70–130)
GLUCOSE BLDC GLUCOMTR-MCNC: 101 MG/DL (ref 70–130)
GLUCOSE BLDC GLUCOMTR-MCNC: 113 MG/DL (ref 70–130)
GLUCOSE BLDC GLUCOMTR-MCNC: 121 MG/DL (ref 70–130)
MAGNESIUM SERPL-MCNC: 2.2 MG/DL (ref 1.6–2.6)
PHOSPHATE SERPL-MCNC: 3.2 MG/DL (ref 2.5–4.5)
POTASSIUM BLD-SCNC: 3.1 MMOL/L (ref 3.5–5.2)
PROT SERPL-MCNC: 6.1 G/DL (ref 6–8.5)
SODIUM BLD-SCNC: 138 MMOL/L (ref 136–145)

## 2019-04-02 PROCEDURE — 99232 SBSQ HOSP IP/OBS MODERATE 35: CPT | Performed by: INTERNAL MEDICINE

## 2019-04-02 PROCEDURE — 82962 GLUCOSE BLOOD TEST: CPT

## 2019-04-02 PROCEDURE — 25010000002 METHYLPREDNISOLONE PER 40 MG: Performed by: INTERNAL MEDICINE

## 2019-04-02 PROCEDURE — 25010000002 POTASSIUM CHLORIDE PER 2 MEQ OF POTASSIUM: Performed by: INTERNAL MEDICINE

## 2019-04-02 PROCEDURE — 82330 ASSAY OF CALCIUM: CPT | Performed by: INTERNAL MEDICINE

## 2019-04-02 PROCEDURE — 84100 ASSAY OF PHOSPHORUS: CPT | Performed by: INTERNAL MEDICINE

## 2019-04-02 PROCEDURE — 25010000002 ONDANSETRON PER 1 MG: Performed by: OBSTETRICS & GYNECOLOGY

## 2019-04-02 PROCEDURE — 25010000002 CALCIUM GLUCONATE PER 10 ML: Performed by: INTERNAL MEDICINE

## 2019-04-02 PROCEDURE — 25010000002 METOCLOPRAMIDE PER 10 MG: Performed by: OBSTETRICS & GYNECOLOGY

## 2019-04-02 PROCEDURE — 25010000002 MAGNESIUM SULFATE PER 500 MG OF MAGNESIUM: Performed by: INTERNAL MEDICINE

## 2019-04-02 PROCEDURE — 80053 COMPREHEN METABOLIC PANEL: CPT | Performed by: INTERNAL MEDICINE

## 2019-04-02 PROCEDURE — 25010000002 DIPHENHYDRAMINE PER 50 MG: Performed by: OBSTETRICS & GYNECOLOGY

## 2019-04-02 PROCEDURE — 83735 ASSAY OF MAGNESIUM: CPT | Performed by: INTERNAL MEDICINE

## 2019-04-02 RX ORDER — DEXTROSE AND SODIUM CHLORIDE 5; .9 G/100ML; G/100ML
20 INJECTION, SOLUTION INTRAVENOUS CONTINUOUS
Status: ACTIVE | OUTPATIENT
Start: 2019-04-02 | End: 2019-04-03

## 2019-04-02 RX ADMIN — FAMOTIDINE 20 MG: 10 INJECTION INTRAVENOUS at 08:07

## 2019-04-02 RX ADMIN — METOCLOPRAMIDE 10 MG: 5 INJECTION, SOLUTION INTRAMUSCULAR; INTRAVENOUS at 11:56

## 2019-04-02 RX ADMIN — DIPHENHYDRAMINE HYDROCHLORIDE 25 MG: 50 INJECTION, SOLUTION INTRAMUSCULAR; INTRAVENOUS at 06:46

## 2019-04-02 RX ADMIN — FAMOTIDINE 20 MG: 10 INJECTION INTRAVENOUS at 20:58

## 2019-04-02 RX ADMIN — METOCLOPRAMIDE 10 MG: 5 INJECTION, SOLUTION INTRAMUSCULAR; INTRAVENOUS at 06:47

## 2019-04-02 RX ADMIN — METHYLPREDNISOLONE SODIUM SUCCINATE 16 MG: 40 INJECTION, POWDER, LYOPHILIZED, FOR SOLUTION INTRAMUSCULAR; INTRAVENOUS at 10:25

## 2019-04-02 RX ADMIN — ONDANSETRON HYDROCHLORIDE 8 MG: 2 SOLUTION INTRAMUSCULAR; INTRAVENOUS at 13:25

## 2019-04-02 RX ADMIN — METHYLPREDNISOLONE SODIUM SUCCINATE 16 MG: 40 INJECTION, POWDER, LYOPHILIZED, FOR SOLUTION INTRAMUSCULAR; INTRAVENOUS at 19:45

## 2019-04-02 RX ADMIN — ONDANSETRON HYDROCHLORIDE 8 MG: 2 SOLUTION INTRAMUSCULAR; INTRAVENOUS at 21:15

## 2019-04-02 RX ADMIN — DEXTROSE AND SODIUM CHLORIDE 20 ML/HR: 5; 900 INJECTION, SOLUTION INTRAVENOUS at 17:40

## 2019-04-02 RX ADMIN — DIPHENHYDRAMINE HYDROCHLORIDE 25 MG: 50 INJECTION, SOLUTION INTRAMUSCULAR; INTRAVENOUS at 11:57

## 2019-04-02 RX ADMIN — ONDANSETRON HYDROCHLORIDE 8 MG: 2 SOLUTION INTRAMUSCULAR; INTRAVENOUS at 05:08

## 2019-04-02 RX ADMIN — METHYLPREDNISOLONE SODIUM SUCCINATE 16 MG: 40 INJECTION, POWDER, LYOPHILIZED, FOR SOLUTION INTRAMUSCULAR; INTRAVENOUS at 02:34

## 2019-04-02 RX ADMIN — POTASSIUM CHLORIDE: 2 INJECTION, SOLUTION, CONCENTRATE INTRAVENOUS at 17:51

## 2019-04-02 RX ADMIN — DIPHENHYDRAMINE HYDROCHLORIDE 25 MG: 50 INJECTION, SOLUTION INTRAMUSCULAR; INTRAVENOUS at 19:44

## 2019-04-02 RX ADMIN — METOCLOPRAMIDE 10 MG: 5 INJECTION, SOLUTION INTRAMUSCULAR; INTRAVENOUS at 19:44

## 2019-04-02 RX ADMIN — SERTRALINE 50 MG: 50 TABLET, FILM COATED ORAL at 22:56

## 2019-04-02 NOTE — PROGRESS NOTES
[facility]  Inpatient Progress Note    Reason for Follow Up: Hyperemesis    Subjective     Interval History:   Discussed with RN, discussed with patient and family at bedside.  No reported nausea or vomiting since yesterday afternoon.  Tolerating TPN.  Continues to use scheduled antibiotic, steroid, Reglan, Pepcid, holding on Carafate for the present.    Current Facility-Administered Medications:   •  Adult Central 2-in-1 TPN, , Intravenous, Continuous, Franci Lima MD, Last Rate: 60 mL/hr at 04/01/19 2151  •  Adult Central 2-in-1 TPN, , Intravenous, Continuous, Franci Lima MD  •  dextrose 5 % and sodium chloride 0.9 % infusion, 40 mL/hr, Intravenous, Continuous, Franci Lima MD, Last Rate: 40 mL/hr at 04/01/19 2151, 40 mL/hr at 04/01/19 2151  •  dextrose 5 % and sodium chloride 0.9 % infusion, 20 mL/hr, Intravenous, Continuous, Franci Lima MD  •  diphenhydrAMINE (BENADRYL) injection 25 mg, 25 mg, Intravenous, 4x Daily AC & at Bedtime, Franci Winter MD, 25 mg at 04/02/19 0646  •  famotidine (PEPCID) injection 20 mg, 20 mg, Intravenous, Q12H, Franci Winter MD, 20 mg at 04/02/19 0807  •  methylPREDNISolone sodium succinate (SOLU-Medrol) injection 16 mg, 16 mg, Intravenous, Q8H, John Geller MD, 16 mg at 04/02/19 0234  •  metoclopramide (REGLAN) injection 10 mg, 10 mg, Intravenous, 4x Daily AC & at Bedtime, Franci Winter MD, 10 mg at 04/02/19 0647  •  ondansetron (ZOFRAN) 8 mg in sodium chloride 0.9 % 50 mL IVPB, 8 mg, Intravenous, Q8H, Yoly Delgado MD, Stopped at 04/02/19 0524  •  Pharmacy to Dose TPN, , Does not apply, Continuous PRN, Franci Lima MD  •  promethazine (PHENERGAN) injection 25 mg, 25 mg, Intramuscular, Q6H PRN, Franci Winter MD, 25 mg at 03/30/19 0757  •  promethazine (PHENERGAN) injection 25 mg, 25 mg, Intravenous, Q6H PRN, Kcay Parsons MD, 25 mg at 04/01/19 1607  •  sertraline (ZOLOFT) tablet 50 mg, 50 mg, Oral,  Daily, Franci Winter MD, 50 mg at 04/01/19 2154  •  [COMPLETED] Insert peripheral IV, , , Once **AND** sodium chloride 0.9 % flush 10 mL, 10 mL, Intravenous, PRN, Radha Webb APRN, 10 mL at 04/01/19 2143  •  sucralfate (CARAFATE) 1 GM/10ML suspension 1 g, 1 g, Oral, 4x Daily AC & at Bedtime, Franci Winter MD, 1 g at 04/01/19 0830  Review of Systems:    All systems were reviewed and negative except for:  Gastrointestinal: positive for  See HPI    Objective     Vital Signs  Temp:  [98.5 °F (36.9 °C)-99.7 °F (37.6 °C)] 98.5 °F (36.9 °C)  Heart Rate:  [76-80] 80  Resp:  [18] 18  BP: (103-118)/(61-75) 114/71  Body mass index is 30.04 kg/m².    Intake/Output Summary (Last 24 hours) at 4/2/2019 0931  Last data filed at 4/2/2019 0605  Gross per 24 hour   Intake 2108.6 ml   Output 1510 ml   Net 598.6 ml     No intake/output data recorded.     Physical Exam:   General: patient awake, alert and cooperative   Eyes: Normal lids and lashes, no scleral icterus   Neck: supple, normal ROM   Skin: warm and dry, not jaundiced   Cardiovascular: regular rhythm and rate, no murmurs auscultated   Pulm: clear to auscultation bilaterally, regular and unlabored   Abdomen: soft, nontender, nondistended; normal bowel sounds   Rectal: deferred   Extremities: no rash or edema   Psychiatric: Normal mood and behavior; memory intact     Results Review:     I reviewed the patient's new clinical results.    Results from last 7 days   Lab Units 04/01/19  0702 03/28/19  1129   WBC 10*3/mm3 11.66* 14.70*   HEMOGLOBIN g/dL 11.1* 11.4*   HEMATOCRIT % 34.1 35.5   PLATELETS 10*3/mm3 229 243     Results from last 7 days   Lab Units 04/02/19  0604 04/01/19  1145 04/01/19  0702   SODIUM mmol/L 138 137 141   POTASSIUM mmol/L 3.1* 2.6* 3.0*   CHLORIDE mmol/L 100 99 105   CO2 mmol/L 25.2 23.0 26.2   BUN mg/dL 7 5* 5*   CREATININE mg/dL 0.40* 0.51* 0.46*   CALCIUM mg/dL 8.6 9.1 8.5*   BILIRUBIN mg/dL 0.7 0.7 0.5   ALK PHOS U/L 82 81 77   ALT  (SGPT) U/L 20 21 19   AST (SGOT) U/L 13 16 15   GLUCOSE mg/dL 112* 107* 108*         No results found for: LIPASE    Radiology:  US Ob < 14 Weeks Single or First Gestation   Final Result      US Gallbladder   Final Result       Minimal sludge in the gallbladder. No evidence for acute cholecystitis.   Follow up/further evaluation can be obtained as indicated.       This report was finalized on 3/29/2019 6:47 PM by Dr. Tonio Mueller M.D.              Assessment/Plan     Patient Active Problem List   Diagnosis   • Migraine   • Asthma   • Anxiety   • Insomnia   • Hyperemesis gravidarum   • Encounter for supervision of low-risk first pregnancy in first trimester   • Use of letrozole (Femara)   • Rh negative status during pregnancy in first trimester   • Pregnancy     Impression  #1 hyperemesis gravidarum: No vomiting for the past 18 hours.  #2 GERD  #3 10 weeks pregnant      Recommendation  Continue current medical regimen  Continue TPN  I discussed the patients findings and my recommendations with patient, family and nursing staff.    Levi Andrew MD

## 2019-04-02 NOTE — PROGRESS NOTES
[facility]  Obstetric Progress Note    Patient Name: Sara Hoang  :  1985  MRN:  [id number]  Hospital Day: 5  EGA: 10w3d      Subjective   Pt sleeping soundly-- not disturbed.  D/w briefly w/ pt  and RN and states she is feeling much better.          Objective     VS:  Vital Signs Range for the last 24 hours  Temperature: Temp:  [98.5 °F (36.9 °C)-99.7 °F (37.6 °C)] 98.5 °F (36.9 °C)   Temp Source: Temp src: Oral   BP: BP: (103-118)/(61-75) 114/71   Pulse: Heart Rate:  [76-80] 80   Respirations: Resp:  [18] 18                   Physical Examination:     General :  Sleeping soundly-- not disturbed       Lab results reviewed:  CBC:   Lab Results   Component Value Date    WBC 11.66 (H) 2019    HGB 11.1 (L) 2019    HCT 34.1 2019            A/P: [id number]-- receiving TPN currently   Plan per GI and appreciate consult/ care         Hyperemesis gravidarum    Pregnancy              Kacie Tejal Tello, APRN  2019  9:31 AM   Patient seen and examined. Agree with above assessment.   Patient much improved over the past 3 days! Still NPO. Receiving TPN.     Kacy Parsons MD  2019  3:00 PM

## 2019-04-02 NOTE — PLAN OF CARE
Problem: Patient Care Overview  Goal: Plan of Care Review  Outcome: Ongoing (interventions implemented as appropriate)   04/02/19 0653   Coping/Psychosocial   Plan of Care Reviewed With patient   Plan of Care Review   Progress no change   OTHER   Outcome Summary TPN started; meds given as ordered. Pt able to tolerate Zoloft last night. Labs drawn this am.       Problem: Hyperemesis Gravidarum (Adult,Obstetrics,Pediatric)  Goal: Signs and Symptoms of Listed Potential Problems Will be Absent, Minimized or Managed (Hyperemesis Gravidarum)  Outcome: Ongoing (interventions implemented as appropriate)   04/02/19 0653   Goal/Outcome Evaluation   Problems Assessed (Nausea/Vomiting/Hyperemesis in Pregnancy) all   Problems Present (Hyperemesis) nutritional deficiency/inadequate oral intake;fluid/electrolyte imbalance       Problem: Prenatal Patient, Hospitalized (Adult,Obstetrics,Pediatric)  Goal: Signs and Symptoms of Listed Potential Problems Will be Absent, Minimized or Managed (Prenatal Patient, Hospitalized)  Outcome: Ongoing (interventions implemented as appropriate)   04/02/19 0653   Goal/Outcome Evaluation   Problems Assessed (Prenatal Patient) all   Problems Present (Prenatal Patient) nausea and vomiting       Problem: Nutrition, Parenteral (Adult)  Goal: Signs and Symptoms of Listed Potential Problems Will be Absent, Minimized or Managed (Nutrition, Parenteral)  Outcome: Ongoing (interventions implemented as appropriate)   04/02/19 0653   Goal/Outcome Evaluation   Problems Assessed (Parenteral Nutrition) all   Problems Present (Parenteral Nutrition) fluid/electrolyte imbalance;malnutrition

## 2019-04-02 NOTE — PROGRESS NOTES
"Pharmacy to dose TPN - Daily Progress Note  Patient: Sara Hoang  MRN#: [id number]  Attending: No name on file.  Admission Date: 032819    Sara Hoang is a 33 y.o. female receiving TPN for hyperemesis gravidarum.      TPN # 2 per Dr. Lima's request.     Active Problems:    Hyperemesis gravidarum    Pregnancy    Subjective/Objective  165.1 cm (65\")  81.9 kg (180 lb 8 oz)  Body mass index is 30.04 kg/m².   Results from last 7 days   Lab Units 04/02/19  0604 04/01/19  1145   SODIUM mmol/L 138 137   POTASSIUM mmol/L 3.1* 2.6*   CHLORIDE mmol/L 100 99   CO2 mmol/L 25.2 23.0   BUN mg/dL 7 5*   CREATININE mg/dL 0.40* 0.51*   CALCIUM mg/dL 8.6 9.1   ALBUMIN g/dL 3.90 3.80   BILIRUBIN mg/dL 0.7 0.7   ALK PHOS U/L 82 81   ALT (SGPT) U/L 20 21   AST (SGOT) U/L 13 16   GLUCOSE mg/dL 112* 107*   MAGNESIUM mg/dL 2.2 2.1   PHOSPHORUS mg/dL 3.2 2.5   TRIGLYCERIDES mg/dL  --  82   PREALBUMIN mg/dL  --  19.9*        I/O last 3 completed shifts:  In: 3900.2 [I.V.:3124.2; IV Piggyback:135.5]  Out: 2660 [Urine:2450; Emesis/NG output:210]  Diet Orders (active) (From admission, onward)    Start     Ordered    04/01/19 1043  NPO Diet  Diet Effective Now      04/01/19 1044    03/30/19 1200  Dietary Nutrition Supplements Boost Breeze (Ensure Clear)  Daily With Breakfast, Lunch & Dinner     Comments:  Please bring now.  Thank you!    03/30/19 0828        Additional insulin administration past 24 hours: none (currently on methylprednisolone 16 mg IV q8h)      Additional electrolyte/IVF administration past 24 hours:   1) MVI in D5 1000 mL at 125 mL/h (over 8 hours) x3 doses (last banana bag yesterday AM)  2) D5NS at 40 mL/hr (to equal total IVF of 100 mL/h)     Acid suppression: Pepcid 20 mg IV q12h    Goal:   Protein: 90 grams/day (1 gm/kg AWB; 1.6 gm/kg IBW)  Dextrose: 1200 Kcal/day   Lipids: 250 mL of 20% infusion 7 days per week  Fluid rate: 100 mL/hr (2400 mL daily)     Plan    Increase TPN macronutrients to the following " amounts:  Protein: increase to goal amount of 90 grams/day  Dextrose: increase to 900 Kcal/day   Lipids: none  Fluid rate: increase to 80 mL/hr (decrease D5NS to 20 mL/hr)     Plan to increase dextrose & fluid rate to goal & start lipids tomorrow if patient tolerates. Based on the above labs, will add the following electrolytes/additives to the TPN.     Sodium chloride: 70 mEq  Potassium chloride: 70 mEq (increased from 50 mEq)  Potassium phosphate: 22 mEq   Calcium gluconate: 11 mEq (increased from 9 mEq)  Magnesium sulfate: 10 mEq   Multivitamin: 10 mL   Trace Elements: 1 mL   Folic acid: 400 mcg (+ MVI content = total 1 mg of folic acid)     Labs: CMP, Mag, Phos with AM labs     Pharmacy will continue to follow and monitor daily while patient on TPN. Thank you for this consult.      Levi Barnes, PharmD, SUSIE, BCPS

## 2019-04-02 NOTE — PLAN OF CARE
Problem: Patient Care Overview  Goal: Plan of Care Review  Outcome: Ongoing (interventions implemented as appropriate)   04/02/19 1829   Coping/Psychosocial   Plan of Care Reviewed With patient   Plan of Care Review   Progress improving   OTHER   Outcome Summary TPN     Goal: Individualization and Mutuality  Outcome: Ongoing (interventions implemented as appropriate)    Goal: Discharge Needs Assessment  Outcome: Ongoing (interventions implemented as appropriate)    Goal: Interprofessional Rounds/Family Conf  Outcome: Ongoing (interventions implemented as appropriate)      Problem: Hyperemesis Gravidarum (Adult,Obstetrics,Pediatric)  Goal: Signs and Symptoms of Listed Potential Problems Will be Absent, Minimized or Managed (Hyperemesis Gravidarum)  Outcome: Ongoing (interventions implemented as appropriate)      Problem: Prenatal Patient, Hospitalized (Adult,Obstetrics,Pediatric)  Goal: Signs and Symptoms of Listed Potential Problems Will be Absent, Minimized or Managed (Prenatal Patient, Hospitalized)  Outcome: Ongoing (interventions implemented as appropriate)      Problem: Nutrition, Parenteral (Adult)  Goal: Signs and Symptoms of Listed Potential Problems Will be Absent, Minimized or Managed (Nutrition, Parenteral)  Outcome: Ongoing (interventions implemented as appropriate)

## 2019-04-03 LAB
ALBUMIN SERPL-MCNC: 3.5 G/DL (ref 3.5–5.2)
ALBUMIN/GLOB SERPL: 1.5 G/DL
ALP SERPL-CCNC: 80 U/L (ref 39–117)
ALT SERPL W P-5'-P-CCNC: 28 U/L (ref 1–33)
ANION GAP SERPL CALCULATED.3IONS-SCNC: 12 MMOL/L
AST SERPL-CCNC: 16 U/L (ref 1–32)
BILIRUB SERPL-MCNC: 0.6 MG/DL (ref 0.2–1.2)
BUN BLD-MCNC: 8 MG/DL (ref 6–20)
BUN/CREAT SERPL: 23.5 (ref 7–25)
CA-I BLD-MCNC: 5 MG/DL (ref 4.6–5.4)
CA-I SERPL ISE-MCNC: 1.25 MMOL/L (ref 1.15–1.35)
CALCIUM SPEC-SCNC: 8.1 MG/DL (ref 8.6–10.5)
CHLORIDE SERPL-SCNC: 104 MMOL/L (ref 98–107)
CO2 SERPL-SCNC: 23 MMOL/L (ref 22–29)
CREAT BLD-MCNC: 0.34 MG/DL (ref 0.57–1)
GFR SERPL CREATININE-BSD FRML MDRD: >150 ML/MIN/1.73
GLOBULIN UR ELPH-MCNC: 2.4 GM/DL
GLUCOSE BLD-MCNC: 104 MG/DL (ref 65–99)
GLUCOSE BLDC GLUCOMTR-MCNC: 100 MG/DL (ref 70–130)
GLUCOSE BLDC GLUCOMTR-MCNC: 112 MG/DL (ref 70–130)
MAGNESIUM SERPL-MCNC: 2.3 MG/DL (ref 1.6–2.6)
PHOSPHATE SERPL-MCNC: 3 MG/DL (ref 2.5–4.5)
POTASSIUM BLD-SCNC: 3.6 MMOL/L (ref 3.5–5.2)
PROT SERPL-MCNC: 5.9 G/DL (ref 6–8.5)
SODIUM BLD-SCNC: 139 MMOL/L (ref 136–145)

## 2019-04-03 PROCEDURE — 25010000002 CALCIUM GLUCONATE PER 10 ML: Performed by: INTERNAL MEDICINE

## 2019-04-03 PROCEDURE — 25010000002 ONDANSETRON PER 1 MG: Performed by: OBSTETRICS & GYNECOLOGY

## 2019-04-03 PROCEDURE — 25010000002 METOCLOPRAMIDE PER 10 MG: Performed by: OBSTETRICS & GYNECOLOGY

## 2019-04-03 PROCEDURE — 25010000002 METHYLPREDNISOLONE PER 40 MG: Performed by: INTERNAL MEDICINE

## 2019-04-03 PROCEDURE — 82962 GLUCOSE BLOOD TEST: CPT

## 2019-04-03 PROCEDURE — 80053 COMPREHEN METABOLIC PANEL: CPT | Performed by: INTERNAL MEDICINE

## 2019-04-03 PROCEDURE — 25010000002 POTASSIUM CHLORIDE PER 2 MEQ OF POTASSIUM: Performed by: INTERNAL MEDICINE

## 2019-04-03 PROCEDURE — 25010000002 DIPHENHYDRAMINE PER 50 MG: Performed by: OBSTETRICS & GYNECOLOGY

## 2019-04-03 PROCEDURE — 25010000002 PROMETHAZINE PER 50 MG: Performed by: OBSTETRICS & GYNECOLOGY

## 2019-04-03 PROCEDURE — 25010000002 MAGNESIUM SULFATE PER 500 MG OF MAGNESIUM: Performed by: INTERNAL MEDICINE

## 2019-04-03 PROCEDURE — 99232 SBSQ HOSP IP/OBS MODERATE 35: CPT | Performed by: INTERNAL MEDICINE

## 2019-04-03 PROCEDURE — 83735 ASSAY OF MAGNESIUM: CPT | Performed by: INTERNAL MEDICINE

## 2019-04-03 PROCEDURE — 82330 ASSAY OF CALCIUM: CPT | Performed by: INTERNAL MEDICINE

## 2019-04-03 PROCEDURE — 84100 ASSAY OF PHOSPHORUS: CPT | Performed by: INTERNAL MEDICINE

## 2019-04-03 RX ADMIN — METHYLPREDNISOLONE SODIUM SUCCINATE 16 MG: 40 INJECTION, POWDER, LYOPHILIZED, FOR SOLUTION INTRAMUSCULAR; INTRAVENOUS at 02:42

## 2019-04-03 RX ADMIN — METOCLOPRAMIDE 10 MG: 5 INJECTION, SOLUTION INTRAMUSCULAR; INTRAVENOUS at 20:44

## 2019-04-03 RX ADMIN — SERTRALINE 50 MG: 50 TABLET, FILM COATED ORAL at 20:37

## 2019-04-03 RX ADMIN — FAMOTIDINE 20 MG: 10 INJECTION INTRAVENOUS at 08:58

## 2019-04-03 RX ADMIN — FAMOTIDINE 20 MG: 10 INJECTION INTRAVENOUS at 20:49

## 2019-04-03 RX ADMIN — SODIUM CHLORIDE, PRESERVATIVE FREE 10 ML: 5 INJECTION INTRAVENOUS at 02:56

## 2019-04-03 RX ADMIN — METOCLOPRAMIDE 10 MG: 5 INJECTION, SOLUTION INTRAMUSCULAR; INTRAVENOUS at 11:33

## 2019-04-03 RX ADMIN — SODIUM CHLORIDE, PRESERVATIVE FREE 10 ML: 5 INJECTION INTRAVENOUS at 18:36

## 2019-04-03 RX ADMIN — SODIUM CHLORIDE, PRESERVATIVE FREE 10 ML: 5 INJECTION INTRAVENOUS at 18:37

## 2019-04-03 RX ADMIN — METOCLOPRAMIDE 10 MG: 5 INJECTION, SOLUTION INTRAMUSCULAR; INTRAVENOUS at 06:57

## 2019-04-03 RX ADMIN — DIPHENHYDRAMINE HYDROCHLORIDE 25 MG: 50 INJECTION, SOLUTION INTRAMUSCULAR; INTRAVENOUS at 11:30

## 2019-04-03 RX ADMIN — SODIUM CHLORIDE, PRESERVATIVE FREE 10 ML: 5 INJECTION INTRAVENOUS at 19:01

## 2019-04-03 RX ADMIN — PROMETHAZINE HYDROCHLORIDE 25 MG: 25 INJECTION INTRAMUSCULAR; INTRAVENOUS at 02:55

## 2019-04-03 RX ADMIN — ONDANSETRON HYDROCHLORIDE 8 MG: 2 SOLUTION INTRAMUSCULAR; INTRAVENOUS at 23:58

## 2019-04-03 RX ADMIN — POTASSIUM CHLORIDE: 2 INJECTION, SOLUTION, CONCENTRATE INTRAVENOUS at 18:58

## 2019-04-03 RX ADMIN — DIPHENHYDRAMINE HYDROCHLORIDE 25 MG: 50 INJECTION, SOLUTION INTRAMUSCULAR; INTRAVENOUS at 06:53

## 2019-04-03 RX ADMIN — METHYLPREDNISOLONE SODIUM SUCCINATE 16 MG: 40 INJECTION, POWDER, LYOPHILIZED, FOR SOLUTION INTRAMUSCULAR; INTRAVENOUS at 09:01

## 2019-04-03 RX ADMIN — SODIUM CHLORIDE, PRESERVATIVE FREE 10 ML: 5 INJECTION INTRAVENOUS at 02:42

## 2019-04-03 RX ADMIN — DIPHENHYDRAMINE HYDROCHLORIDE 25 MG: 50 INJECTION, SOLUTION INTRAMUSCULAR; INTRAVENOUS at 20:39

## 2019-04-03 RX ADMIN — SODIUM CHLORIDE, PRESERVATIVE FREE 10 ML: 5 INJECTION INTRAVENOUS at 09:03

## 2019-04-03 RX ADMIN — I.V. FAT EMULSION 50 G: 20 EMULSION INTRAVENOUS at 18:58

## 2019-04-03 RX ADMIN — METOCLOPRAMIDE 10 MG: 5 INJECTION, SOLUTION INTRAMUSCULAR; INTRAVENOUS at 17:59

## 2019-04-03 RX ADMIN — ONDANSETRON HYDROCHLORIDE 8 MG: 2 SOLUTION INTRAMUSCULAR; INTRAVENOUS at 15:14

## 2019-04-03 RX ADMIN — SODIUM CHLORIDE, PRESERVATIVE FREE 10 ML: 5 INJECTION INTRAVENOUS at 20:34

## 2019-04-03 RX ADMIN — DIPHENHYDRAMINE HYDROCHLORIDE 25 MG: 50 INJECTION, SOLUTION INTRAMUSCULAR; INTRAVENOUS at 17:51

## 2019-04-03 RX ADMIN — SODIUM CHLORIDE, PRESERVATIVE FREE 10 ML: 5 INJECTION INTRAVENOUS at 20:53

## 2019-04-03 RX ADMIN — ONDANSETRON HYDROCHLORIDE 8 MG: 2 SOLUTION INTRAMUSCULAR; INTRAVENOUS at 04:54

## 2019-04-03 RX ADMIN — SODIUM CHLORIDE, PRESERVATIVE FREE 10 ML: 5 INJECTION INTRAVENOUS at 20:42

## 2019-04-03 RX ADMIN — SODIUM CHLORIDE, PRESERVATIVE FREE 10 ML: 5 INJECTION INTRAVENOUS at 02:55

## 2019-04-03 RX ADMIN — METHYLPREDNISOLONE SODIUM SUCCINATE 16 MG: 40 INJECTION, POWDER, LYOPHILIZED, FOR SOLUTION INTRAMUSCULAR; INTRAVENOUS at 16:00

## 2019-04-03 NOTE — NURSING NOTE
Spoke with Muna in pharmacy and notified that we are trying to cluster pt medications closer together, so that pt may get rest throughout the night.  Asked if it is possible to give Solu-Medrol dose now and then reschedule for 0001 and 0800 so that pt may have uninterrupted sleep after midnight.  Muna confirmed that it is ok to give dose now and adjust scheduling.

## 2019-04-03 NOTE — PROGRESS NOTES
[facility]  Obstetric Progress Note    Patient Name: Sara Hoang  :  1985  MRN:  [id number]  Hospital Day: 6  EGA: 10w4d      Subjective     Is doing so much better! No emesis in 48hrs. Now tolerating broth and ginger ale. No vb. Had fetal sono on Monday and nl. Labs done Monday.     Objective     VS:  Vital Signs Range for the last 24 hours  Temperature: Temp:  [98.7 °F (37.1 °C)-98.8 °F (37.1 °C)] 98.8 °F (37.1 °C)   Temp Source: Temp src: Oral   BP: BP: (104-119)/(62-70) 105/67   Pulse: Heart Rate:  [62-83] 75   Respirations: Resp:  [18] 18                   Physical Examination:     General :  Alert in NAD  Abdomen: Gravid, Fundus -        Lab results reviewed:  CBC:   Lab Results   Component Value Date    WBC 11.66 (H) 2019    HGB 11.1 (L) 2019    HCT 34.1 2019            A/P:      Hyperemesis gravidarum    Pregnancy    TPN/lipids per GI.  Has PICC in place.  On scheduled zofran, reglan, solumedrol.  Hopefully will continue to increase po and can go home in next couple of days.            Argentina Singh MD  4/3/2019  3:10 PM

## 2019-04-03 NOTE — PLAN OF CARE
Problem: Patient Care Overview  Goal: Plan of Care Review  Outcome: Ongoing (interventions implemented as appropriate)   04/02/19 1829 04/02/19 2104 04/03/19 0548   Coping/Psychosocial   Plan of Care Reviewed With --  patient;mother --    Plan of Care Review   Progress improving --  --    OTHER   Outcome Summary --  --  TPN, been greater than 24 hours since pt has had emesis occurence.pt. showered last night,asked for phenergan once for nausea and helped to alleviate it. pt. stayed NPO last night. pt. slept well      Goal: Individualization and Mutuality  Outcome: Ongoing (interventions implemented as appropriate)   03/31/19 1412   Individualization   Patient Specific Preferences Be able to tolerate diet with no N/V, healthy baby and mon   Patient Specific Goals (Include Timeframe) decrease N/V and be able to tolerate diet   Patient Specific Interventions antiemetics, IVF, banana bag daily, boost breeze shakes    Mutuality/Individual Preferences   What Anxieties, Fears, Concerns, or Questions Do You Have About Your Care? N/V will not resolve   What Information Would Help Us Give You More Personalized Care? hx depression and anxiety   How Would You and/or Your Support Person Like to Participate in Your Care? explanations   Mutuality/Individual Preferences   How to Address Anxieties/Fears keep informed of POC     Goal: Discharge Needs Assessment  Outcome: Ongoing (interventions implemented as appropriate)   03/31/19 1412 04/02/19 1829   Discharge Needs Assessment   Readmission Within the Last 30 Days --  no previous admission in last 30 days   Concerns to be Addressed --  no discharge needs identified   Patient/Family Anticipates Transition to --  home with family   Patient/Family Anticipated Services at Transition --  none   Transportation Concerns --  car, none   Transportation Anticipated --  family or friend will provide;car, drives self   Anticipated Changes Related to Illness --  none   Equipment Needed After  Discharge --  none   Offered/Gave Vendor List no --    Disability   Equipment Currently Used at Home --  none     Goal: Interprofessional Rounds/Family Conf  Outcome: Ongoing (interventions implemented as appropriate)   04/03/19 0503   Interdisciplinary Rounds/Family Conf   Participants nursing;patient;physician

## 2019-04-03 NOTE — PROGRESS NOTES
"Pharmacy to dose TPN - Daily Progress Note  Patient: Sara Hoang  MRN#: [id number]  Attending: No name on file.  Admission Date: 032819    Sara Hoang is a 33 y.o. female receiving TPN for hyperemesis gravidarum.      TPN # 3 per Dr. Lima's request.     Active Problems:    Hyperemesis gravidarum    Pregnancy    Subjective/Objective  165.1 cm (65\")  81.9 kg (180 lb 8 oz)  Body mass index is 30.04 kg/m².   Results from last 7 days   Lab Units 04/03/19  0638  04/01/19  1145   SODIUM mmol/L 139   < > 137   POTASSIUM mmol/L 3.6   < > 2.6*   CHLORIDE mmol/L 104   < > 99   CO2 mmol/L 23.0   < > 23.0   BUN mg/dL 8   < > 5*   CREATININE mg/dL 0.34*   < > 0.51*   CALCIUM mg/dL 8.1*   < > 9.1   ALBUMIN g/dL 3.50   < > 3.80   BILIRUBIN mg/dL 0.6   < > 0.7   ALK PHOS U/L 80   < > 81   ALT (SGPT) U/L 28   < > 21   AST (SGOT) U/L 16   < > 16   GLUCOSE mg/dL 104*   < > 107*   MAGNESIUM mg/dL 2.3   < > 2.1   PHOSPHORUS mg/dL 3.0   < > 2.5   TRIGLYCERIDES mg/dL  --   --  82   PREALBUMIN mg/dL  --   --  19.9*    < > = values in this interval not displayed.        I/O last 3 completed shifts:  In: 3474.2 [I.V.:2089.9; IV Piggyback:50]  Out: 2650 [Urine:2650]  Diet Orders (active) (From admission, onward)    Start     Ordered    04/01/19 1043  NPO Diet  Diet Effective Now      04/01/19 1044    03/30/19 1200  Dietary Nutrition Supplements Boost Breeze (Ensure Clear)  Daily With Breakfast, Lunch & Dinner     Comments:  Please bring now.  Thank you!    03/30/19 0856        Additional insulin administration past 24 hours: none (currently on methylprednisolone 16 mg IV q8h)   Additional electrolyte/IVF administration past 24 hours: D5NS at 20 mL/hr (to equal total IVF of 100 mL/h)  Acid suppression: Pepcid 20 mg IV q12h     Goal:   Protein: 90 grams/day (1 gm/kg AWB; 1.6 gm/kg IBW)  Dextrose: 1200 Kcal/day   Lipids: 250 mL of 20% infusion 7 days per week  Fluid rate: 100 mL/hr (2400 mL daily)     Plan    Increase TPN macronutrients " to the following amounts:    Protein: continue at goal amount of 90 grams/day  Dextrose: increase to goal amount of 1200 Kcal/day   Lipids: begin 250 mL of 20% infusion 7 days per week  Fluid rate: increase to 100 mL/hr (D5NS scheduled to stop once TPN #3 starts)     Based on the above labs, will add the following electrolytes/additives to the TPN.     Sodium chloride: 70 mEq  Potassium chloride:  70 mEq  Potassium phosphate: 22 mEq   Calcium gluconate: 12 mEq (increased from 11 mEq)   Magnesium sulfate: 10 mEq   Multivitamin: 10 mL   Trace Elements: 1 mL   Folic acid: 400 mcg (+ MVI content = total 1 mg of folic acid)    Labs: CMP, Mag, Phos with AM labs     Pharmacy will continue to follow and monitor daily while patient on TPN. Thank you for this consult.      Levi Barnes, PharmD, SUSIE, BCPS

## 2019-04-03 NOTE — PROGRESS NOTES
Adult Nutrition  Assessment/PES    Patient Name:  Sara Hoang  YOB: 1985  MRN: [id number]  Admit Date:  3/28/2019    Assessment Date:  4/3/2019    Comments:  TPN follow up.  TPN up to 90 grams AA, 1200 kcal dextrose, 20% 250 ml lipids.  This is goal composition.    Per chart, > 24 hours since last episode of emesis.  Still with nausea at times, but overall doing better.  Tolerated gingerale last pm.    RD to continue to follow.    Reason for Assessment     Row Name 04/03/19 1438          Reason for Assessment    Reason For Assessment  TF/PN;follow-up protocol         Nutrition/Diet History     Row Name 04/03/19 1438          Nutrition/Diet History    Typical Food/Fluid Intake  remains NPO, but tolerated gingerale last pm; TPN continues         Anthropometrics     Row Name 04/03/19 1438 04/03/19 1128       Anthropometrics    Weight  --  81.9 kg (180 lb 8 oz)       Admit Weight    Admit Weight  -- 180# 4/3  --       Body Mass Index (BMI)    BMI Assessment  BMI 30-34.9: obesity grade I  --        Labs/Tests/Procedures/Meds     Row Name 04/03/19 1438          Labs/Procedures/Meds    Lab Results Reviewed  reviewed, pertinent     Lab Results Comments  Gluc, Creat, Ca        Diagnostic Tests/Procedures    Diagnostic Test/Procedure Reviewed  reviewed, pertinent        Medications    Pertinent Medications Reviewed  reviewed, pertinent     Pertinent Medications Comments  pepcid, lipids, solu-medrol, reglan, zofran, carafate; TPN at 100 ml/hr         Physical Findings     Row Name 04/03/19 1439          Physical Findings    Gastrointestinal  nausea no emesis in > 24 hours     Skin  -- B=23, intact           Nutrition Prescription Ordered     Row Name 04/03/19 1439          Nutrition Prescription PO    Current PO Diet  NPO        Nutrition Prescription PN    PN Route  PICC     PN Current Rate (mL/hr)  100 mL/hr     Dextrose (Kcal)  1200     Amino Acid (gm)  90     Lipid Concentration (%)  20%     Lipid Volume  (mL)  250 mL     Lipid Frequency  Daily         Evaluation of Received Nutrient/Fluid Intake     Row Name 04/03/19 1440          Nutrient/Fluid Evaluation    Additional Documentation  Calories Evaluation (Group);Protein Evaluation (Group)        Calories Evaluation    Parenteral Calories (kcal)  2060     % of Kcal Needs  104        Protein Evaluation    Parenteral Protein (gm)  90     % of Protein Needs  100        Intake Assessment    Energy/Calorie Requirement Assessment  meeting needs     Protein Requirement Assessment  meeting needs               Problem/Interventions:            Intervention Goal     Row Name 04/03/19 1441          Intervention Goal    General  Maintain nutrition;Nutrition support treatment;Disease management/therapy;Reduce/improve symptoms     PO  Initiate feeding     TF/PN  Maintain TF/PN     Weight  Support appropriate growth         Nutrition Intervention     Row Name 04/03/19 1441          Nutrition Intervention    RD/Tech Action  Follow Tx progress;Care plan reviewd     Recommended/Ordered  PN         Nutrition Prescription     Row Name 04/03/19 1441          Nutrition Prescription PN    Parenteral Prescription  Continue same protocal         Education/Evaluation     Row Name 04/03/19 1441          Education    Education  Will Instruct as appropriate        Monitor/Evaluation    Monitor  Per protocol;I&O;Pertinent labs;PN delivery/tolerance;Weight;Symptoms           Electronically signed by:  Rajni Lechuga RD  04/03/19 2:42 PM

## 2019-04-03 NOTE — PROGRESS NOTES
[facility]  Inpatient Progress Note    Reason for Follow Up: Hyperemesis    Subjective     Interval History:   Discussed with patient and  at bedside.  She reports overall improvement, tolerated ginger ale last p.m.  Advised we would consider advancement of diet slowly as tolerated.  Would continue current medical regimen at present.  TPN being monitored by pharmacy service.  H&H and electrolytes are stable.    Current Facility-Administered Medications:   •  Adult Central 2-in-1 TPN, , Intravenous, Continuous, Franci Lima MD, Last Rate: 80 mL/hr at 04/02/19 1751  •  Adult Central 2-in-1 TPN, , Intravenous, Continuous, Franci Lima MD  •  dextrose 5 % and sodium chloride 0.9 % infusion, 20 mL/hr, Intravenous, Continuous, Franci Lima MD, Last Rate: 20 mL/hr at 04/02/19 1740, 20 mL/hr at 04/02/19 1740  •  diphenhydrAMINE (BENADRYL) injection 25 mg, 25 mg, Intravenous, 4x Daily AC & at Bedtime, Franci Winter MD, 25 mg at 04/03/19 0653  •  famotidine (PEPCID) injection 20 mg, 20 mg, Intravenous, Q12H, Franci Winter MD, 20 mg at 04/03/19 0858  •  fat emulsion (INTRALIPID,LIPOSYN) 20 % infusion 50 g, 250 mL, Intravenous, Q24H (TPN), Franci Lima MD  •  methylPREDNISolone sodium succinate (SOLU-Medrol) injection 16 mg, 16 mg, Intravenous, Q8H, John Geller MD, 16 mg at 04/03/19 0901  •  metoclopramide (REGLAN) injection 10 mg, 10 mg, Intravenous, 4x Daily AC & at Bedtime, Franci Winter MD, 10 mg at 04/03/19 0657  •  ondansetron (ZOFRAN) 8 mg in sodium chloride 0.9 % 50 mL IVPB, 8 mg, Intravenous, Q8H, Yoly Delgado MD, Last Rate: 200 mL/hr at 04/03/19 0454, 8 mg at 04/03/19 0454  •  Pharmacy to Dose TPN, , Does not apply, Continuous PRN, Franci Lima MD  •  promethazine (PHENERGAN) injection 25 mg, 25 mg, Intramuscular, Q6H PRN, Franci Winter MD, 25 mg at 03/30/19 0757  •  promethazine (PHENERGAN) injection 25 mg, 25 mg, Intravenous, Q6H  PRN, Kacy Parsons MD, 25 mg at 04/03/19 0255  •  sertraline (ZOLOFT) tablet 50 mg, 50 mg, Oral, Daily, Franci Winter MD, 50 mg at 04/02/19 2256  •  [COMPLETED] Insert peripheral IV, , , Once **AND** sodium chloride 0.9 % flush 10 mL, 10 mL, Intravenous, PRN, Radha Webb, APRN, 10 mL at 04/03/19 0903  •  sucralfate (CARAFATE) 1 GM/10ML suspension 1 g, 1 g, Oral, 4x Daily AC & at Bedtime, Franci Winter MD, 1 g at 04/01/19 0845  Review of Systems:    All systems were reviewed and negative except for:  Gastrointestinal: positive for  See HPI    Objective     Vital Signs  Temp:  [98.7 °F (37.1 °C)-98.8 °F (37.1 °C)] 98.7 °F (37.1 °C)  Heart Rate:  [62-83] 83  Resp:  [18] 18  BP: (104-119)/(62-70) 119/70  Body mass index is 30.04 kg/m².    Intake/Output Summary (Last 24 hours) at 4/3/2019 1109  Last data filed at 4/3/2019 0658  Gross per 24 hour   Intake 2340.1 ml   Output 1750 ml   Net 590.1 ml     No intake/output data recorded.     Physical Exam:   General: patient awake, alert and cooperative   Eyes: Normal lids and lashes, no scleral icterus   Neck: supple, normal ROM   Skin: warm and dry, not jaundiced   Cardiovascular: regular rhythm and rate, no murmurs auscultated   Pulm: clear to auscultation bilaterally, regular and unlabored   Abdomen: soft, nontender, nondistended; normal bowel sounds   Rectal: deferred   Extremities: no rash or edema   Psychiatric: Normal mood and behavior; memory intact     Results Review:     I reviewed the patient's new clinical results.    Results from last 7 days   Lab Units 04/01/19  0702 03/28/19  1129   WBC 10*3/mm3 11.66* 14.70*   HEMOGLOBIN g/dL 11.1* 11.4*   HEMATOCRIT % 34.1 35.5   PLATELETS 10*3/mm3 229 243     Results from last 7 days   Lab Units 04/03/19  0638 04/02/19  0604 04/01/19  1145   SODIUM mmol/L 139 138 137   POTASSIUM mmol/L 3.6 3.1* 2.6*   CHLORIDE mmol/L 104 100 99   CO2 mmol/L 23.0 25.2 23.0   BUN mg/dL 8 7 5*   CREATININE mg/dL 0.34* 0.40*  0.51*   CALCIUM mg/dL 8.1* 8.6 9.1   BILIRUBIN mg/dL 0.6 0.7 0.7   ALK PHOS U/L 80 82 81   ALT (SGPT) U/L 28 20 21   AST (SGOT) U/L 16 13 16   GLUCOSE mg/dL 104* 112* 107*         No results found for: LIPASE    Radiology:  US Ob < 14 Weeks Single or First Gestation   Final Result      US Gallbladder   Final Result       Minimal sludge in the gallbladder. No evidence for acute cholecystitis.   Follow up/further evaluation can be obtained as indicated.       This report was finalized on 3/29/2019 6:47 PM by Dr. Tonio Mueller M.D.              Assessment/Plan     Patient Active Problem List   Diagnosis   • Migraine   • Asthma   • Anxiety   • Insomnia   • Hyperemesis gravidarum   • Encounter for supervision of low-risk first pregnancy in first trimester   • Use of letrozole (Femara)   • Rh negative status during pregnancy in first trimester   • Pregnancy     Impression  #1 hyperemesis gravidarum: Currently responding to medical regimen  #2 nutrition: On TPN  #3 GERD  #4 pregnancy at 10 weeks      Recommendation  Continue current medications  Continue TPN  Advance diet slowly as tolerated  I discussed the patients findings and my recommendations with patient and family.    Levi Andrew MD

## 2019-04-03 NOTE — PLAN OF CARE
Problem: Patient Care Overview  Goal: Interprofessional Rounds/Family Conf  Outcome: Ongoing (interventions implemented as appropriate)   03/30/19 0518 04/02/19 1829   Interdisciplinary Rounds/Family Conf   Summary Discussed medications and anticipated POC --    Participants --  nursing;patient;physician;dietitian/nutrition services       Problem: Hyperemesis Gravidarum (Adult,Obstetrics,Pediatric)  Goal: Signs and Symptoms of Listed Potential Problems Will be Absent, Minimized or Managed (Hyperemesis Gravidarum)  Outcome: Ongoing (interventions implemented as appropriate)   04/02/19 1829   Goal/Outcome Evaluation   Problems Assessed (Nausea/Vomiting/Hyperemesis in Pregnancy) all   Problems Present (Hyperemesis) nutritional deficiency/inadequate oral intake;fluid/electrolyte imbalance       Problem: Prenatal Patient, Hospitalized (Adult,Obstetrics,Pediatric)  Goal: Signs and Symptoms of Listed Potential Problems Will be Absent, Minimized or Managed (Prenatal Patient, Hospitalized)  Outcome: Ongoing (interventions implemented as appropriate)   04/02/19 1829   Goal/Outcome Evaluation   Problems Assessed (Prenatal Patient) all   Problems Present (Prenatal Patient) nausea and vomiting       Problem: Nutrition, Parenteral (Adult)  Goal: Signs and Symptoms of Listed Potential Problems Will be Absent, Minimized or Managed (Nutrition, Parenteral)  Outcome: Ongoing (interventions implemented as appropriate)   04/02/19 1829   Goal/Outcome Evaluation   Problems Assessed (Parenteral Nutrition) all   Problems Present (Parenteral Nutrition) fluid/electrolyte imbalance;malnutrition

## 2019-04-03 NOTE — PLAN OF CARE
Problem: Patient Care Overview  Goal: Plan of Care Review  Outcome: Ongoing (interventions implemented as appropriate)   04/02/19 1829 04/03/19 1722   Coping/Psychosocial   Plan of Care Reviewed With --  patient   Plan of Care Review   Progress improving --    OTHER   Outcome Summary --  No emesis since 4/1/19. Pt not feeling rested. Meds adjusted to [facility] care to allow pt period of uninterrupted sleep. Advancing diet today. Tolerated po ginger ale, chicken broth, and goldfish crackers. Pt to attempt chicken broth, 1/2 baked potato, crackers, ginger ale, at dinner. Meds given as ordered.     Goal: Individualization and Mutuality  Outcome: Ongoing (interventions implemented as appropriate)   03/31/19 1412   Individualization   Patient Specific Preferences Be able to tolerate diet with no N/V, healthy baby and mon   Patient Specific Goals (Include Timeframe) decrease N/V and be able to tolerate diet   Patient Specific Interventions antiemetics, IVF, banana bag daily, boost breeze shakes    Mutuality/Individual Preferences   What Anxieties, Fears, Concerns, or Questions Do You Have About Your Care? N/V will not resolve   What Information Would Help Us Give You More Personalized Care? hx depression and anxiety   How Would You and/or Your Support Person Like to Participate in Your Care? explanations   Mutuality/Individual Preferences   How to Address Anxieties/Fears keep informed of POC     Goal: Discharge Needs Assessment  Outcome: Ongoing (interventions implemented as appropriate)   03/31/19 1412 04/02/19 1829   Discharge Needs Assessment   Readmission Within the Last 30 Days --  no previous admission in last 30 days   Concerns to be Addressed --  no discharge needs identified   Patient/Family Anticipates Transition to --  home with family   Patient/Family Anticipated Services at Transition --  none   Transportation Concerns --  car, none   Transportation Anticipated --  family or friend will provide;car, drives self    Anticipated Changes Related to Illness --  none   Equipment Needed After Discharge --  none   Offered/Gave Vendor List no --    Disability   Equipment Currently Used at Home --  none     Goal: Interprofessional Rounds/Family Conf  Outcome: Ongoing (interventions implemented as appropriate)   04/03/19 1722   Interdisciplinary Rounds/Family Conf   Participants nursing;patient;physician;pharmacy       Problem: Hyperemesis Gravidarum (Adult,Obstetrics,Pediatric)  Goal: Signs and Symptoms of Listed Potential Problems Will be Absent, Minimized or Managed (Hyperemesis Gravidarum)  Outcome: Ongoing (interventions implemented as appropriate)   04/02/19 1829   Goal/Outcome Evaluation   Problems Assessed (Nausea/Vomiting/Hyperemesis in Pregnancy) all   Problems Present (Hyperemesis) nutritional deficiency/inadequate oral intake;fluid/electrolyte imbalance       Problem: Prenatal Patient, Hospitalized (Adult,Obstetrics,Pediatric)  Goal: Signs and Symptoms of Listed Potential Problems Will be Absent, Minimized or Managed (Prenatal Patient, Hospitalized)  Outcome: Ongoing (interventions implemented as appropriate)   04/02/19 1829   Goal/Outcome Evaluation   Problems Assessed (Prenatal Patient) all   Problems Present (Prenatal Patient) nausea and vomiting       Problem: Nutrition, Parenteral (Adult)  Goal: Signs and Symptoms of Listed Potential Problems Will be Absent, Minimized or Managed (Nutrition, Parenteral)  Outcome: Ongoing (interventions implemented as appropriate)   04/02/19 1829   Goal/Outcome Evaluation   Problems Assessed (Parenteral Nutrition) all   Problems Present (Parenteral Nutrition) fluid/electrolyte imbalance;malnutrition

## 2019-04-04 LAB
ALBUMIN SERPL-MCNC: 3.4 G/DL (ref 3.5–5.2)
ALBUMIN/GLOB SERPL: 1.5 G/DL
ALP SERPL-CCNC: 84 U/L (ref 39–117)
ALT SERPL W P-5'-P-CCNC: 41 U/L (ref 1–33)
ANION GAP SERPL CALCULATED.3IONS-SCNC: 10.4 MMOL/L
AST SERPL-CCNC: 16 U/L (ref 1–32)
BILIRUB SERPL-MCNC: 0.3 MG/DL (ref 0.2–1.2)
BUN BLD-MCNC: 7 MG/DL (ref 6–20)
BUN/CREAT SERPL: 16.7 (ref 7–25)
CALCIUM SPEC-SCNC: 8.1 MG/DL (ref 8.6–10.5)
CHLORIDE SERPL-SCNC: 102 MMOL/L (ref 98–107)
CO2 SERPL-SCNC: 23.6 MMOL/L (ref 22–29)
CREAT BLD-MCNC: 0.42 MG/DL (ref 0.57–1)
GFR SERPL CREATININE-BSD FRML MDRD: >150 ML/MIN/1.73
GLOBULIN UR ELPH-MCNC: 2.3 GM/DL
GLUCOSE BLD-MCNC: 126 MG/DL (ref 65–99)
GLUCOSE BLDC GLUCOMTR-MCNC: 113 MG/DL (ref 70–130)
GLUCOSE BLDC GLUCOMTR-MCNC: 133 MG/DL (ref 70–130)
MAGNESIUM SERPL-MCNC: 2 MG/DL (ref 1.6–2.6)
PHOSPHATE SERPL-MCNC: 2.7 MG/DL (ref 2.5–4.5)
POTASSIUM BLD-SCNC: 3.7 MMOL/L (ref 3.5–5.2)
PROT SERPL-MCNC: 5.7 G/DL (ref 6–8.5)
SODIUM BLD-SCNC: 136 MMOL/L (ref 136–145)

## 2019-04-04 PROCEDURE — 25010000002 METHYLPREDNISOLONE PER 40 MG: Performed by: INTERNAL MEDICINE

## 2019-04-04 PROCEDURE — 82962 GLUCOSE BLOOD TEST: CPT

## 2019-04-04 PROCEDURE — 25010000002 ONDANSETRON PER 1 MG: Performed by: OBSTETRICS & GYNECOLOGY

## 2019-04-04 PROCEDURE — 83735 ASSAY OF MAGNESIUM: CPT | Performed by: INTERNAL MEDICINE

## 2019-04-04 PROCEDURE — 99232 SBSQ HOSP IP/OBS MODERATE 35: CPT | Performed by: INTERNAL MEDICINE

## 2019-04-04 PROCEDURE — 80053 COMPREHEN METABOLIC PANEL: CPT | Performed by: INTERNAL MEDICINE

## 2019-04-04 PROCEDURE — 84100 ASSAY OF PHOSPHORUS: CPT | Performed by: INTERNAL MEDICINE

## 2019-04-04 PROCEDURE — 25010000002 DIPHENHYDRAMINE PER 50 MG: Performed by: OBSTETRICS & GYNECOLOGY

## 2019-04-04 PROCEDURE — 25010000002 METOCLOPRAMIDE PER 10 MG: Performed by: OBSTETRICS & GYNECOLOGY

## 2019-04-04 RX ORDER — METHYLPREDNISOLONE SODIUM SUCCINATE 40 MG/ML
16 INJECTION, POWDER, LYOPHILIZED, FOR SOLUTION INTRAMUSCULAR; INTRAVENOUS DAILY
Status: DISCONTINUED | OUTPATIENT
Start: 2019-04-05 | End: 2019-04-05

## 2019-04-04 RX ADMIN — SODIUM CHLORIDE, PRESERVATIVE FREE 10 ML: 5 INJECTION INTRAVENOUS at 18:23

## 2019-04-04 RX ADMIN — FAMOTIDINE 20 MG: 10 INJECTION INTRAVENOUS at 07:10

## 2019-04-04 RX ADMIN — DIPHENHYDRAMINE HYDROCHLORIDE 25 MG: 50 INJECTION, SOLUTION INTRAMUSCULAR; INTRAVENOUS at 07:01

## 2019-04-04 RX ADMIN — SODIUM CHLORIDE, PRESERVATIVE FREE 10 ML: 5 INJECTION INTRAVENOUS at 23:25

## 2019-04-04 RX ADMIN — DIPHENHYDRAMINE HYDROCHLORIDE 25 MG: 50 INJECTION, SOLUTION INTRAMUSCULAR; INTRAVENOUS at 12:01

## 2019-04-04 RX ADMIN — SODIUM CHLORIDE, PRESERVATIVE FREE 10 ML: 5 INJECTION INTRAVENOUS at 06:49

## 2019-04-04 RX ADMIN — SODIUM CHLORIDE, PRESERVATIVE FREE 10 ML: 5 INJECTION INTRAVENOUS at 15:57

## 2019-04-04 RX ADMIN — SODIUM CHLORIDE, PRESERVATIVE FREE 10 ML: 5 INJECTION INTRAVENOUS at 00:12

## 2019-04-04 RX ADMIN — DIPHENHYDRAMINE HYDROCHLORIDE 25 MG: 50 INJECTION, SOLUTION INTRAMUSCULAR; INTRAVENOUS at 20:58

## 2019-04-04 RX ADMIN — SODIUM CHLORIDE, PRESERVATIVE FREE 10 ML: 5 INJECTION INTRAVENOUS at 07:14

## 2019-04-04 RX ADMIN — METHYLPREDNISOLONE SODIUM SUCCINATE 16 MG: 40 INJECTION, POWDER, LYOPHILIZED, FOR SOLUTION INTRAMUSCULAR; INTRAVENOUS at 09:00

## 2019-04-04 RX ADMIN — SERTRALINE 50 MG: 50 TABLET, FILM COATED ORAL at 22:51

## 2019-04-04 RX ADMIN — METOCLOPRAMIDE 10 MG: 5 INJECTION, SOLUTION INTRAMUSCULAR; INTRAVENOUS at 18:25

## 2019-04-04 RX ADMIN — SODIUM CHLORIDE, PRESERVATIVE FREE 10 ML: 5 INJECTION INTRAVENOUS at 07:39

## 2019-04-04 RX ADMIN — SODIUM CHLORIDE, PRESERVATIVE FREE 10 ML: 5 INJECTION INTRAVENOUS at 00:40

## 2019-04-04 RX ADMIN — SODIUM CHLORIDE, PRESERVATIVE FREE 10 ML: 5 INJECTION INTRAVENOUS at 12:05

## 2019-04-04 RX ADMIN — ONDANSETRON HYDROCHLORIDE 8 MG: 2 SOLUTION INTRAMUSCULAR; INTRAVENOUS at 16:11

## 2019-04-04 RX ADMIN — METOCLOPRAMIDE 10 MG: 5 INJECTION, SOLUTION INTRAMUSCULAR; INTRAVENOUS at 20:58

## 2019-04-04 RX ADMIN — METOCLOPRAMIDE 10 MG: 5 INJECTION, SOLUTION INTRAMUSCULAR; INTRAVENOUS at 07:05

## 2019-04-04 RX ADMIN — DIPHENHYDRAMINE HYDROCHLORIDE 25 MG: 50 INJECTION, SOLUTION INTRAMUSCULAR; INTRAVENOUS at 18:34

## 2019-04-04 RX ADMIN — METHYLPREDNISOLONE SODIUM SUCCINATE 16 MG: 40 INJECTION, POWDER, LYOPHILIZED, FOR SOLUTION INTRAMUSCULAR; INTRAVENOUS at 00:13

## 2019-04-04 RX ADMIN — SODIUM CHLORIDE, PRESERVATIVE FREE 10 ML: 5 INJECTION INTRAVENOUS at 00:13

## 2019-04-04 RX ADMIN — METHYLPREDNISOLONE SODIUM SUCCINATE 16 MG: 40 INJECTION, POWDER, LYOPHILIZED, FOR SOLUTION INTRAMUSCULAR; INTRAVENOUS at 15:59

## 2019-04-04 RX ADMIN — SODIUM CHLORIDE, PRESERVATIVE FREE 10 ML: 5 INJECTION INTRAVENOUS at 16:31

## 2019-04-04 RX ADMIN — ONDANSETRON HYDROCHLORIDE 8 MG: 2 SOLUTION INTRAMUSCULAR; INTRAVENOUS at 22:55

## 2019-04-04 RX ADMIN — SODIUM CHLORIDE, PRESERVATIVE FREE 10 ML: 5 INJECTION INTRAVENOUS at 07:08

## 2019-04-04 RX ADMIN — SODIUM CHLORIDE, PRESERVATIVE FREE 10 ML: 5 INJECTION INTRAVENOUS at 07:00

## 2019-04-04 RX ADMIN — SODIUM CHLORIDE, PRESERVATIVE FREE 10 ML: 5 INJECTION INTRAVENOUS at 07:03

## 2019-04-04 RX ADMIN — METOCLOPRAMIDE 10 MG: 5 INJECTION, SOLUTION INTRAMUSCULAR; INTRAVENOUS at 12:03

## 2019-04-04 RX ADMIN — SODIUM CHLORIDE, PRESERVATIVE FREE 10 ML: 5 INJECTION INTRAVENOUS at 16:11

## 2019-04-04 RX ADMIN — SODIUM CHLORIDE, PRESERVATIVE FREE 10 ML: 5 INJECTION INTRAVENOUS at 18:37

## 2019-04-04 RX ADMIN — SODIUM CHLORIDE, PRESERVATIVE FREE 10 ML: 5 INJECTION INTRAVENOUS at 12:02

## 2019-04-04 RX ADMIN — SODIUM CHLORIDE, PRESERVATIVE FREE 10 ML: 5 INJECTION INTRAVENOUS at 20:58

## 2019-04-04 RX ADMIN — SODIUM CHLORIDE, PRESERVATIVE FREE 10 ML: 5 INJECTION INTRAVENOUS at 16:01

## 2019-04-04 RX ADMIN — FAMOTIDINE 20 MG: 10 INJECTION INTRAVENOUS at 20:58

## 2019-04-04 RX ADMIN — SODIUM CHLORIDE, PRESERVATIVE FREE 10 ML: 5 INJECTION INTRAVENOUS at 12:00

## 2019-04-04 RX ADMIN — ONDANSETRON HYDROCHLORIDE 8 MG: 2 SOLUTION INTRAMUSCULAR; INTRAVENOUS at 07:17

## 2019-04-04 RX ADMIN — SODIUM CHLORIDE, PRESERVATIVE FREE 10 ML: 5 INJECTION INTRAVENOUS at 18:26

## 2019-04-04 NOTE — PLAN OF CARE
Problem: Patient Care Overview  Goal: Plan of Care Review  Outcome: Ongoing (interventions implemented as appropriate)   04/02/19 1829 04/03/19 1722   Coping/Psychosocial   Plan of Care Reviewed With --  patient   Plan of Care Review   Progress improving --    OTHER   Outcome Summary --  No emesis since 4/1/19. Pt not feeling rested. Meds adjusted to [facility] care to allow pt period of uninterrupted sleep. Advancing diet today. Tolerated po ginger ale, chicken broth, and goldfish crackers. Pt to attempt chicken broth, 1/2 baked potato, crackers, ginger ale, at dinner. Meds given as ordered.     Goal: Individualization and Mutuality  Outcome: Ongoing (interventions implemented as appropriate)   03/31/19 1412   Individualization   Patient Specific Preferences Be able to tolerate diet with no N/V, healthy baby and mon   Patient Specific Goals (Include Timeframe) decrease N/V and be able to tolerate diet   Patient Specific Interventions antiemetics, IVF, banana bag daily, boost breeze shakes    Mutuality/Individual Preferences   What Anxieties, Fears, Concerns, or Questions Do You Have About Your Care? N/V will not resolve   What Information Would Help Us Give You More Personalized Care? hx depression and anxiety   How Would You and/or Your Support Person Like to Participate in Your Care? explanations   Mutuality/Individual Preferences   How to Address Anxieties/Fears keep informed of POC     Goal: Discharge Needs Assessment  Outcome: Ongoing (interventions implemented as appropriate)   03/31/19 1412 04/02/19 1829 04/04/19 0514   Discharge Needs Assessment   Readmission Within the Last 30 Days --  no previous admission in last 30 days --    Concerns to be Addressed --  no discharge needs identified --    Patient/Family Anticipates Transition to --  home with family --    Patient/Family Anticipated Services at Transition --  none --    Transportation Concerns --  car, none --    Transportation Anticipated --  family or  friend will provide;car, drives self --    Anticipated Changes Related to Illness --  none --    Equipment Needed After Discharge --  none --    Discharge Facility/Level of Care Needs --  --  home with home health   Offered/Gave Vendor List no --  --    Disability   Equipment Currently Used at Home --  none --      Goal: Interprofessional Rounds/Family Conf  Outcome: Ongoing (interventions implemented as appropriate)   03/30/19 0518 04/03/19 1722   Interdisciplinary Rounds/Family Conf   Summary Discussed medications and anticipated POC --    Participants --  nursing;patient;physician;pharmacy       Problem: Hyperemesis Gravidarum (Adult,Obstetrics,Pediatric)  Goal: Signs and Symptoms of Listed Potential Problems Will be Absent, Minimized or Managed (Hyperemesis Gravidarum)  Outcome: Ongoing (interventions implemented as appropriate)   04/02/19 1829   Goal/Outcome Evaluation   Problems Assessed (Nausea/Vomiting/Hyperemesis in Pregnancy) all   Problems Present (Hyperemesis) nutritional deficiency/inadequate oral intake;fluid/electrolyte imbalance       Problem: Prenatal Patient, Hospitalized (Adult,Obstetrics,Pediatric)  Goal: Signs and Symptoms of Listed Potential Problems Will be Absent, Minimized or Managed (Prenatal Patient, Hospitalized)  Outcome: Ongoing (interventions implemented as appropriate)   04/02/19 1829   Goal/Outcome Evaluation   Problems Assessed (Prenatal Patient) all   Problems Present (Prenatal Patient) nausea and vomiting       Problem: Nutrition, Parenteral (Adult)  Goal: Signs and Symptoms of Listed Potential Problems Will be Absent, Minimized or Managed (Nutrition, Parenteral)  Outcome: Ongoing (interventions implemented as appropriate)   04/02/19 1829   Goal/Outcome Evaluation   Problems Assessed (Parenteral Nutrition) all   Problems Present (Parenteral Nutrition) fluid/electrolyte imbalance;malnutrition

## 2019-04-04 NOTE — PROGRESS NOTES
[facility]  Inpatient Progress Note    Reason for Follow Up: Hyperemesis    Subjective     Interval History:   She is much improved.  She has not had any further vomiting since the first.  She is eating the majority of the past 3 meals.  No abdominal pain.  No BM since admission but not feeling constipated currently.      Current Facility-Administered Medications:   •  Adult Central 2-in-1 TPN, , Intravenous, Continuous, Franci Lima MD, Last Rate: 100 mL/hr at 04/03/19 1858  •  Adult Central 2-in-1 TPN, , Intravenous, Continuous, Franci Lima MD  •  diphenhydrAMINE (BENADRYL) injection 25 mg, 25 mg, Intravenous, 4x Daily AC & at Bedtime, Franci Winter MD, 25 mg at 04/04/19 1201  •  famotidine (PEPCID) injection 20 mg, 20 mg, Intravenous, Q12H, Franci Winter MD, 20 mg at 04/04/19 0710  •  fat emulsion (INTRALIPID,LIPOSYN) 20 % infusion 50 g, 250 mL, Intravenous, Q24H (TPN), Franci Lima MD, Stopped at 04/04/19 0855  •  methylPREDNISolone sodium succinate (SOLU-Medrol) injection 16 mg, 16 mg, Intravenous, Q8H, John Geller MD, 16 mg at 04/04/19 0900  •  metoclopramide (REGLAN) injection 10 mg, 10 mg, Intravenous, 4x Daily AC & at Bedtime, Franci Winter MD, 10 mg at 04/04/19 1203  •  ondansetron (ZOFRAN) 8 mg in sodium chloride 0.9 % 50 mL IVPB, 8 mg, Intravenous, Q8H, Yoly Delgado MD, Stopped at 04/04/19 0738  •  Pharmacy to Dose TPN, , Does not apply, Continuous PRN, Franci Lima MD  •  promethazine (PHENERGAN) injection 25 mg, 25 mg, Intramuscular, Q6H PRN, Franci Winter MD, 25 mg at 03/30/19 0757  •  promethazine (PHENERGAN) injection 25 mg, 25 mg, Intravenous, Q6H PRN, Kacy Parsons MD, 25 mg at 04/03/19 0255  •  sertraline (ZOLOFT) tablet 50 mg, 50 mg, Oral, Daily, Franci Winter MD, 50 mg at 04/03/19 2037  •  [COMPLETED] Insert peripheral IV, , , Once **AND** sodium chloride 0.9 % flush 10 mL, 10 mL, Intravenous, PRN, Tracey,  SAIDA [place], 10 mL at 04/04/19 1202  •  sucralfate (CARAFATE) 1 GM/10ML suspension 1 g, 1 g, Oral, 4x Daily AC & at Bedtime, Franci Winter MD, 1 g at 04/01/19 0845  Review of Systems:    The following systems were reviewed and negative;  constitution, respiratory, cardiovascular and gastrointestinal    Objective     Vital Signs  Temp:  [98.5 °F (36.9 °C)-98.8 °F (37.1 °C)] 98.5 °F (36.9 °C)  Heart Rate:  [74-84] 74  Resp:  [18] 18  BP: ()/(58-65) 92/58  Body mass index is 30.37 kg/m².    Intake/Output Summary (Last 24 hours) at 4/4/2019 1426  Last data filed at 4/4/2019 0855  Gross per 24 hour   Intake 3758.16 ml   Output 3075 ml   Net 683.16 ml     I/O this shift:  In: 315.3 [P.O.:240; IV Piggyback:51.2]  Out: 1500 [Urine:1500]     Physical Exam:   General: patient awake, alert and cooperative   Eyes: Normal lids and lashes, no scleral icterus   Neck: supple, normal ROM   Skin: warm and dry, not jaundiced   Cardiovascular: regular rhythm and rate, no murmurs auscultated   Pulm: clear to auscultation bilaterally, regular and unlabored   Abdomen: soft, nontender, nondistended; normal bowel sounds   Rectal: deferred   Extremities: no rash or edema   Psychiatric: Normal mood and behavior; memory intact     Results Review:     I reviewed the patient's new clinical results.    Results from last 7 days   Lab Units 04/01/19  0702   WBC 10*3/mm3 11.66*   HEMOGLOBIN g/dL 11.1*   HEMATOCRIT % 34.1   PLATELETS 10*3/mm3 229     Results from last 7 days   Lab Units 04/04/19  0657 04/03/19  0638 04/02/19  0604   SODIUM mmol/L 136 139 138   POTASSIUM mmol/L 3.7 3.6 3.1*   CHLORIDE mmol/L 102 104 100   CO2 mmol/L 23.6 23.0 25.2   BUN mg/dL 7 8 7   CREATININE mg/dL 0.42* 0.34* 0.40*   CALCIUM mg/dL 8.1* 8.1* 8.6   BILIRUBIN mg/dL 0.3 0.6 0.7   ALK PHOS U/L 84 80 82   ALT (SGPT) U/L 41* 28 20   AST (SGOT) U/L 16 16 13   GLUCOSE mg/dL 126* 104* 112*         No results found for: LIPASE    Radiology:  US Ob < 14 Weeks  Single or First Gestation   Final Result      US Gallbladder   Final Result       Minimal sludge in the gallbladder. No evidence for acute cholecystitis.   Follow up/further evaluation can be obtained as indicated.       This report was finalized on 3/29/2019 6:47 PM by Dr. Tonio Mueller M.D.              Assessment/Plan     Patient Active Problem List   Diagnosis   • Migraine   • Asthma   • Anxiety   • Insomnia   • Hyperemesis gravidarum   • Encounter for supervision of low-risk first pregnancy in first trimester   • Use of letrozole (Femara)   • Rh negative status during pregnancy in first trimester   • Pregnancy     Impression  1. hyperemesis gravidarum: Doing quite well.  2. Poor po intake: On TPN  3. GERD: stable  4. Pregnancy:10 weeks    Recommendation  She is eating like a champ.  Will stop her TPN  Wean off steroids  Continue Reglan for now but can likely wean off of this shortly  Continue antiemetics as needed  Continue Pepcid for GERD symptoms    I discussed the patients findings and my recommendations with patient and family.    Josselin Ch MD

## 2019-04-04 NOTE — PLAN OF CARE
Problem: Patient Care Overview  Goal: Plan of Care Review  Outcome: Ongoing (interventions implemented as appropriate)   04/02/19 1829 04/04/19 0855 04/04/19 1743   Coping/Psychosocial   Plan of Care Reviewed With --  patient;spouse --    Plan of Care Review   Progress improving --  --    OTHER   Outcome Summary --  --  Pt advancing diet and tolerating oral food well. No n/v. TPN discontinued. Pt making good food choices for meals that will minimize re-occurence of hyperemesis. Meds given as ordered.     Goal: Individualization and Mutuality  Outcome: Ongoing (interventions implemented as appropriate)   03/31/19 1412   Individualization   Patient Specific Preferences Be able to tolerate diet with no N/V, healthy baby and mon   Patient Specific Goals (Include Timeframe) decrease N/V and be able to tolerate diet   Patient Specific Interventions antiemetics, IVF, banana bag daily, boost breeze shakes    Mutuality/Individual Preferences   What Anxieties, Fears, Concerns, or Questions Do You Have About Your Care? N/V will not resolve   What Information Would Help Us Give You More Personalized Care? hx depression and anxiety   How Would You and/or Your Support Person Like to Participate in Your Care? explanations   Mutuality/Individual Preferences   How to Address Anxieties/Fears keep informed of POC      03/31/19 1412 04/04/19 1743   Individualization   Patient Specific Preferences Be able to tolerate diet with no N/V, healthy baby and mon --    Patient Specific Goals (Include Timeframe) decrease N/V and be able to tolerate diet --    Patient Specific Interventions --  antiemetics, steroids, bland GI diet   Mutuality/Individual Preferences   What Anxieties, Fears, Concerns, or Questions Do You Have About Your Care? N/V will not resolve --    What Information Would Help Us Give You More Personalized Care? hx depression and anxiety --    How Would You and/or Your Support Person Like to Participate in Your Care?  explanations --    Mutuality/Individual Preferences   How to Address Anxieties/Fears keep informed of POC --      Goal: Discharge Needs Assessment  Outcome: Ongoing (interventions implemented as appropriate)   03/31/19 1412 04/02/19 1829 04/04/19 0514   Discharge Needs Assessment   Readmission Within the Last 30 Days --  no previous admission in last 30 days --    Concerns to be Addressed --  no discharge needs identified --    Patient/Family Anticipates Transition to --  home with family --    Patient/Family Anticipated Services at Transition --  none --    Transportation Concerns --  car, none --    Transportation Anticipated --  family or friend will provide;car, drives self --    Anticipated Changes Related to Illness --  none --    Equipment Needed After Discharge --  none --    Discharge Facility/Level of Care Needs --  --  home with home health   Offered/Gave Vendor List no --  --    Disability   Equipment Currently Used at Home --  none --      Goal: Interprofessional Rounds/Family Conf  Outcome: Ongoing (interventions implemented as appropriate)   03/30/19 0518 04/03/19 1722   Interdisciplinary Rounds/Family Conf   Summary Discussed medications and anticipated POC --    Participants --  nursing;patient;physician;pharmacy       Problem: Hyperemesis Gravidarum (Adult,Obstetrics,Pediatric)  Goal: Signs and Symptoms of Listed Potential Problems Will be Absent, Minimized or Managed (Hyperemesis Gravidarum)  Outcome: Ongoing (interventions implemented as appropriate)   04/04/19 1743   Goal/Outcome Evaluation   Problems Assessed (Nausea/Vomiting/Hyperemesis in Pregnancy) all   Problems Present (Hyperemesis) none       Problem: Prenatal Patient, Hospitalized (Adult,Obstetrics,Pediatric)  Goal: Signs and Symptoms of Listed Potential Problems Will be Absent, Minimized or Managed (Prenatal Patient, Hospitalized)  Outcome: Ongoing (interventions implemented as appropriate)   04/02/19 1829   Goal/Outcome Evaluation    Problems Assessed (Prenatal Patient) all   Problems Present (Prenatal Patient) nausea and vomiting

## 2019-04-04 NOTE — PROGRESS NOTES
[facility]  Obstetric Progress Note    Patient Name: Sara Hoang  :  1985  MRN:  [id number]  Hospital Day: 7  EGA: 10w5d      Subjective     Doing well this AM.  Had oatmeal and toast for breakfast. No emesis or nausea.    Objective     VS:  Vital Signs Range for the last 24 hours  Temperature: Temp:  [98.5 °F (36.9 °C)-98.8 °F (37.1 °C)] 98.5 °F (36.9 °C)   Temp Source: Temp src: Oral   BP: BP: ()/(58-67) 92/58   Pulse: Heart Rate:  [74-84] 74   Respirations: Resp:  [18] 18                   Physical Examination:     General :  Alert in NAD  Abdomen: Gravid, Fundus -        Lab results reviewed:  CBC:   Lab Results   Component Value Date    WBC 11.66 (H) 2019    HGB 11.1 (L) 2019    HCT 34.1 2019    MCV 90.7 2019     2019            A/P:      Hyperemesis gravidarum    Pregnancy    Appreciate GI care - discussed may start weaning TPN/steroids and transitioning to PO meds pending GI recs          Franci Winter MD  2019  11:18 AM

## 2019-04-04 NOTE — PROGRESS NOTES
"Pharmacy to dose TPN - Daily Progress Note  Patient: Sara Hoang  MRN#: [id number]  Attending: No name on file.  Admission Date: 032819    Sara Hoang is a 33 y.o. female receiving TPN for hyperemesis gravidarum.      TPN # 4 per Dr. Lima's request.     Active Problems:    Hyperemesis gravidarum    Pregnancy    Subjective/Objective  165.1 cm (65\")  81.9 kg (180 lb 8 oz)  Body mass index is 30.04 kg/m².   Results from last 7 days   Lab Units 04/04/19  0657  04/01/19  1145   SODIUM mmol/L 136   < > 137   POTASSIUM mmol/L 3.7   < > 2.6*   CHLORIDE mmol/L 102   < > 99   CO2 mmol/L 23.6   < > 23.0   BUN mg/dL 7   < > 5*   CREATININE mg/dL 0.42*   < > 0.51*   CALCIUM mg/dL 8.1*   < > 9.1   ALBUMIN g/dL 3.40*   < > 3.80   BILIRUBIN mg/dL 0.3   < > 0.7   ALK PHOS U/L 84   < > 81   ALT (SGPT) U/L 41*   < > 21   AST (SGOT) U/L 16   < > 16   GLUCOSE mg/dL 126*   < > 107*   MAGNESIUM mg/dL 2.0   < > 2.1   PHOSPHORUS mg/dL 2.7   < > 2.5   TRIGLYCERIDES mg/dL  --   --  82   PREALBUMIN mg/dL  --   --  19.9*    < > = values in this interval not displayed.        I/O last 3 completed shifts:  In: 4542.9 [P.O.:750; I.V.:1469.7]  Out: 3425 [Urine:3425]  Diet Orders (active) (From admission, onward)    Start     Ordered    04/04/19 0608  Diet Regular; GI Soft/[facility]  Diet Effective Now      04/04/19 0608    04/04/19 0000  DIET MESSAGE Please bring oatmeal, toast, peanut butter, cranberry juice, and grape juice for breakfast.  Thank you!  Once     Comments:  Please bring oatmeal, toast, peanut butter, cranberry juice, and grape juice for breakfast.  Thank you!    04/03/19 2016 04/03/19 1546  DIET MESSAGE Please bring 1/2 baked potato, chicken broth, ginger ale, and crackers for dinner.  Thank you!  Once     Comments:  Please bring 1/2 baked potato, chicken broth, ginger ale, and crackers for dinner.  Thank you!    04/03/19 1547    04/03/19 1522  DIET MESSAGE MD wants pt to have goldfish crackers.  Please bring now, not with " dinner.  Thank you!  Once     Comments:  MD wants pt to have goldfish crackers.  Please bring now, not with dinner.  Thank you!    19 1523    19 1200  Dietary Nutrition Supplements Boost Breeze (Ensure Clear)  Daily With Breakfast, Lunch & Dinner     Comments:  Please bring now.  Thank you!    19 0828        Additional insulin administration while previous TPN infusin units  Additional electrolyte administration while previous TPN infusing: none  Acid suppression: Pepcid 20 mg IV q12h      Goal:   Protein: 90 grams/day (1 gm/kg AWB; 1.6 gm/kg IBW)  Dextrose: 1200 Kcal/day   Lipids: 250 mL of 20% infusion 7 days per week  Fluid rate: 100 mL/hr (2400 mL daily)     Plan    Continue current macronutrients and fluid rate (at goals). Based on the above labs, will add the following electrolytes/additives to the TPN.     Sodium chloride: 90 mEq (increased from 70 mEq)   Potassium chloride: 60 mEq (decreased from 70 mEq)  Potassium phosphate: 30 mEq (increased from 22 mEq)   Calcium gluconate: 14 mEq (increased from 12 mEq)   Magnesium sulfate: 10 mEq   Multivitamin: 10 mL   Trace Elements: 1 mL   Folic acid: 400 mcg (+ MVI content = total 1 mg of folic acid)    Labs: CMP, Mag, Phos with AM labs tomorrow.     Pharmacy will continue to follow and monitor daily while patient on TPN. Thank you for this consult.      Levi Barnes, PharmD, SUSIE, BCPS

## 2019-04-05 VITALS
BODY MASS INDEX: 30.41 KG/M2 | OXYGEN SATURATION: 97 % | TEMPERATURE: 98 F | HEIGHT: 65 IN | DIASTOLIC BLOOD PRESSURE: 63 MMHG | HEART RATE: 82 BPM | WEIGHT: 182.5 LBS | SYSTOLIC BLOOD PRESSURE: 102 MMHG | RESPIRATION RATE: 16 BRPM

## 2019-04-05 PROCEDURE — 25010000002 METHYLPREDNISOLONE PER 40 MG: Performed by: INTERNAL MEDICINE

## 2019-04-05 PROCEDURE — 99232 SBSQ HOSP IP/OBS MODERATE 35: CPT | Performed by: INTERNAL MEDICINE

## 2019-04-05 PROCEDURE — 25010000002 DIPHENHYDRAMINE PER 50 MG: Performed by: OBSTETRICS & GYNECOLOGY

## 2019-04-05 PROCEDURE — 25010000002 METOCLOPRAMIDE PER 10 MG: Performed by: OBSTETRICS & GYNECOLOGY

## 2019-04-05 PROCEDURE — 25010000002 ONDANSETRON PER 1 MG: Performed by: OBSTETRICS & GYNECOLOGY

## 2019-04-05 RX ORDER — PROMETHAZINE HYDROCHLORIDE 25 MG/1
25 TABLET ORAL EVERY 4 HOURS PRN
Status: DISCONTINUED | OUTPATIENT
Start: 2019-04-05 | End: 2019-04-05 | Stop reason: HOSPADM

## 2019-04-05 RX ORDER — METOCLOPRAMIDE 10 MG/1
10 TABLET ORAL
Status: DISCONTINUED | OUTPATIENT
Start: 2019-04-05 | End: 2019-04-05

## 2019-04-05 RX ORDER — ONDANSETRON HYDROCHLORIDE 8 MG/1
8 TABLET, FILM COATED ORAL EVERY 8 HOURS PRN
Status: DISCONTINUED | OUTPATIENT
Start: 2019-04-05 | End: 2019-04-05 | Stop reason: HOSPADM

## 2019-04-05 RX ORDER — POLYETHYLENE GLYCOL 3350 17 G/17G
17 POWDER, FOR SOLUTION ORAL DAILY
Status: DISCONTINUED | OUTPATIENT
Start: 2019-04-05 | End: 2019-04-05 | Stop reason: HOSPADM

## 2019-04-05 RX ORDER — ONDANSETRON HYDROCHLORIDE 8 MG/1
8 TABLET, FILM COATED ORAL EVERY 8 HOURS PRN
Qty: 30 TABLET | Refills: 1 | Status: SHIPPED | OUTPATIENT
Start: 2019-04-05 | End: 2019-06-02 | Stop reason: HOSPADM

## 2019-04-05 RX ORDER — FAMOTIDINE 20 MG/1
20 TABLET, FILM COATED ORAL 2 TIMES DAILY
Status: DISCONTINUED | OUTPATIENT
Start: 2019-04-05 | End: 2019-04-05 | Stop reason: HOSPADM

## 2019-04-05 RX ORDER — POLYETHYLENE GLYCOL 3350 17 G/17G
17 POWDER, FOR SOLUTION ORAL DAILY
Start: 2019-04-06 | End: 2019-04-18

## 2019-04-05 RX ADMIN — SODIUM CHLORIDE, PRESERVATIVE FREE 10 ML: 5 INJECTION INTRAVENOUS at 06:52

## 2019-04-05 RX ADMIN — DIPHENHYDRAMINE HYDROCHLORIDE 25 MG: 50 INJECTION, SOLUTION INTRAMUSCULAR; INTRAVENOUS at 06:50

## 2019-04-05 RX ADMIN — ONDANSETRON HYDROCHLORIDE 8 MG: 2 SOLUTION INTRAMUSCULAR; INTRAVENOUS at 06:57

## 2019-04-05 RX ADMIN — FAMOTIDINE 20 MG: 20 TABLET, FILM COATED ORAL at 11:52

## 2019-04-05 RX ADMIN — METOCLOPRAMIDE 10 MG: 5 INJECTION, SOLUTION INTRAMUSCULAR; INTRAVENOUS at 06:53

## 2019-04-05 RX ADMIN — SODIUM CHLORIDE, PRESERVATIVE FREE 10 ML: 5 INJECTION INTRAVENOUS at 06:55

## 2019-04-05 RX ADMIN — METOCLOPRAMIDE 10 MG: 10 TABLET ORAL at 14:27

## 2019-04-05 RX ADMIN — POLYETHYLENE GLYCOL 3350 17 G: 17 POWDER, FOR SOLUTION ORAL at 11:52

## 2019-04-05 RX ADMIN — METHYLPREDNISOLONE SODIUM SUCCINATE 16 MG: 40 INJECTION, POWDER, FOR SOLUTION INTRAMUSCULAR; INTRAVENOUS at 09:53

## 2019-04-05 RX ADMIN — SODIUM CHLORIDE, PRESERVATIVE FREE 10 ML: 5 INJECTION INTRAVENOUS at 06:47

## 2019-04-05 NOTE — DISCHARGE INSTR - APPOINTMENTS
Schedule One for appointments at [facility] [phone].  ACU- Ambulatory Care Unit [phone].  [facility] [phone].

## 2019-04-05 NOTE — PLAN OF CARE
Problem: Patient Care Overview  Goal: Plan of Care Review  Outcome: Outcome(s) achieved Date Met: 04/05/19 04/05/19 1855   Coping/Psychosocial   Plan of Care Reviewed With patient   OTHER   Outcome Summary patient discharged home today     Goal: Individualization and Mutuality  Outcome: Outcome(s) achieved Date Met: 04/05/19 04/05/19 1855   Individualization   Patient Specific Interventions patient discharged home today       Problem: Hyperemesis Gravidarum (Adult,Obstetrics,Pediatric)  Goal: Signs and Symptoms of Listed Potential Problems Will be Absent, Minimized or Managed (Hyperemesis Gravidarum)  Outcome: Outcome(s) achieved Date Met: 04/05/19 04/05/19 1949   Goal/Outcome Evaluation   Problems Assessed (Nausea/Vomiting/Hyperemesis in Pregnancy) all   Problems Present (Hyperemesis) none       Problem: Prenatal Patient, Hospitalized (Adult,Obstetrics,Pediatric)  Goal: Signs and Symptoms of Listed Potential Problems Will be Absent, Minimized or Managed (Prenatal Patient, Hospitalized)  Outcome: Outcome(s) achieved Date Met: 04/05/19 04/05/19 1949   Goal/Outcome Evaluation   Problems Assessed (Prenatal Patient) all   Problems Present (Prenatal Patient) nausea and vomiting      04/05/19 1949   Goal/Outcome Evaluation   Problems Assessed (Prenatal Patient) all   Problems Present (Prenatal Patient) none

## 2019-04-05 NOTE — PROGRESS NOTES
Continued Stay Note  [facility]     Patient Name: Sara Hoang  MRN: [id number]  Today's Date: 4/5/2019    Admit Date: 3/28/2019    Discharge Plan     Row Name 04/05/19 1639       Plan    Plan  Home with [facility] and ACU vs. [facility] or [facility] if homebound.    Plan Comments  Spoke to the patient who stated that [phone] out of [place] as she resides in [place].  Spoke to Beth and Heike at [facility] who stated that the they are unable to provide only PICC line care for the patient without the patient not needing any IV medications or the Zofran pump which she was previously using.  Spoke to [facility] who stated that the patient needed to be homebound in order for them to assist and the patient informed [facility] that she was not going to be homebound.  The patient stated that she would be agreeable to coming to ACU at [facility] if needed for PICC line dressing changes and flushes.  [facility] contacted scheduled one [phone] and scheduled an appointment for the patient to go to ACU once a week for PICC line dressing changes and flushes.   The patient was scheduled Fri April 12 at 8:30am.  The patient was informed of the date and time and provided with the contact number for [facility] in case she needs to change her appointment.  The patient was also informed of directions to get to registration at [facility] for her first appointment.  The patient's RN Melissa stated that the patient will need saline for flushes multiple times per day and that she will work on teaching the patient how to flush her PICC line.  Spoke to Terri with [facility] [phone] who stated that [facility] could provide the patient's saline needed for her daily flushes.  The patient and her RN were both informed.  The patient's RN was informed to change the patient's dressing prior to her discharge from [facility] and the patient was informed to call [phone] if her dressing leaks and she needs it changed before her scheduled  appointment in order.  Terri with [facility] stated that if the patient ends up needing medication for her PICC line [facility] provide that as well and will follow in case that is needed.  [facility] also spoke to Heike with [facility]  who stated that if the patient needs medication for her PICC line and would prefer to use a HH company a re-referral can be made to [facility] and they will need a confirmation of the placement of the PICC (EKG or chest xray) can be faxed to the [facility] at fax number [phone]...continued..          Discharge Codes    No documentation.             KIP Villalobos

## 2019-04-05 NOTE — PROGRESS NOTES
Continued Stay Note  [facility]     Patient Name: Sara Hoang  MRN: [id number]  Today's Date: 4/5/2019    Admit Date: 3/28/2019    Discharge Plan     Row Name 04/05/19 1523       Plan    Plan  Home with [facility] and ACU.    Plan Comments  Spoke to the patient and her RN Melissa and confirmed that the plan is for the patient to d/c home with [facility] providing saline for the her PICC line flushes and for the patient to go to ACU at [facility] for dressing changes.  CCP spoke to Nehemias with [facility] at [phone] and informed her that the patient will not need Zofran afterall.  Nehemias stated that she can reached over the weekend if needed.  The patient was provided with the contact numbers for schedule one [phone], [phone] and [facility] [phone].  CCP will follow to assist with the patient's d/c home with [facility] and ACU.  KIP De    Row Name 04/05/19 2866       Plan    Plan Comments  ...continued....CCP will follow to assist with the patient's d/c home with [facility] for saline for PICC line flushes or any IV meds needed and ACU for a weekly PICC line dressing change and flush.  CCP will also follow to see if a [facility] if the [facility] needs IV medication and prefers to use a  agency or referral can be made to [facility] for assistance if the patient is homebound and needs IV medication.  KIP De    Row Name 04/05/19 8305       Plan    Plan  Home [facility] if homebound.    Plan Comments  Spoke to the patient who stated that she was using [facility]  [phone] out of [place] as she resides in [place].  Spoke to Beth [facility] Heike at [facility] who stated that the they are unable to provide only PICC line care for the patient without the patient not needing any IV medications or the Zofran pump which she was previously using.  Spoke to [facility] who stated that the patient needed to be homebound in order for them to  assist and the patient informed [facility] that she was not going to be homebound.  The patient stated that she would be agreeable to coming to ACU at [facility] if needed for PICC line dressing changes and flushes.  [facility] contacted scheduled one [phone] and scheduled an appointment for the patient to go to ACU once a week for PICC line dressing changes and flushes.   The patient was scheduled Fri April 12 at 8:30am.  The patient was informed of the date and time and provided with the contact number for [facility] in case she needs to change her appointment.  The patient was also informed of directions to get to registration at [facility] for her first appointment.  The patient's RN Melissa stated that the patient will need saline for flushes multiple times per day and that she will work on teaching the patient how to flush her PICC line.  Spoke to Terri with [facility] [phone] who stated that [facility] could provide the patient's saline needed for her daily flushes.  The patient and her RN were both informed.  The patient's RN was informed to change the patient's dressing prior to her discharge from [facility] and the patient was informed to call [phone] if her dressing leaks and she needs it changed before her scheduled appointment in order.  Terri with [facility] stated that if the patient ends up needing medication for her PICC line [facility] can provide that as well and will follow in case that is needed.  [facility] also spoke to Heike with [facility] who stated that if the patient needs medication for her PICC line and would prefer to use a HH company a re-referral can be made to [facility] and they will need a confirmation of the placement of the PICC (EKG or chest xray) can be faxed to the [facility] at fax number [phone]...continued..          Discharge Codes    No documentation.             KIP Villalobos

## 2019-04-05 NOTE — NURSING NOTE
Dr. Chandler notified per telephone of patient's blood pressure being 135/97 and 139/94. Patient denies any headache or visual disturbances and states she feels fine. Patient's heart rate is 125 to 128 palpated radial pulse and also on SPO2 monitor. Dr. Chandler states he will be by soon to see evaluate the patient.

## 2019-04-05 NOTE — PLAN OF CARE
Problem: Patient Care Overview  Goal: Plan of Care Review  Outcome: Ongoing (interventions implemented as appropriate)   04/02/19 1829 04/04/19 1743 04/04/19 1955   Coping/Psychosocial   Plan of Care Reviewed With --  --  patient   Plan of Care Review   Progress improving --  --    OTHER   Outcome Summary --  Pt advancing diet and tolerating oral food well. No n/v. TPN discontinued. Pt making good food choices for meals that will minimize re-occurence of hyperemesis. Meds given as ordered. --      Goal: Individualization and Mutuality   03/31/19 1412 04/04/19 1743   Individualization   Patient Specific Preferences Be able to tolerate diet with no N/V, healthy baby and mon --    Patient Specific Goals (Include Timeframe) decrease N/V and be able to tolerate diet --    Patient Specific Interventions --  antiemetics, steroids, bland GI diet   Mutuality/Individual Preferences   What Anxieties, Fears, Concerns, or Questions Do You Have About Your Care? N/V will not resolve --    What Information Would Help Us Give You More Personalized Care? hx depression and anxiety --    How Would You and/or Your Support Person Like to Participate in Your Care? explanations --    Mutuality/Individual Preferences   How to Address Anxieties/Fears keep informed of POC --      Goal: Discharge Needs Assessment  Outcome: Ongoing (interventions implemented as appropriate)   03/31/19 1412 04/02/19 1829 04/04/19 0514   Discharge Needs Assessment   Readmission Within the Last 30 Days --  no previous admission in last 30 days --    Concerns to be Addressed --  no discharge needs identified --    Patient/Family Anticipates Transition to --  home with family --    Patient/Family Anticipated Services at Transition --  none --    Transportation Concerns --  car, none --    Transportation Anticipated --  family or friend will provide;car, drives self --    Anticipated Changes Related to Illness --  none --    Equipment Needed After Discharge --  none  --    Discharge Facility/Level of Care Needs --  --  home with home health   Offered/Gave Vendor List no --  --    Disability   Equipment Currently Used at Home --  none --      Goal: Interprofessional Rounds/Family Conf  Outcome: Ongoing (interventions implemented as appropriate)   03/30/19 0518 04/03/19 1722   Interdisciplinary Rounds/Family Conf   Summary Discussed medications and anticipated POC --    Participants --  nursing;patient;physician;pharmacy       Problem: Hyperemesis Gravidarum (Adult,Obstetrics,Pediatric)  Goal: Signs and Symptoms of Listed Potential Problems Will be Absent, Minimized or Managed (Hyperemesis Gravidarum)  Outcome: Ongoing (interventions implemented as appropriate)      Problem: Prenatal Patient, Hospitalized (Adult,Obstetrics,Pediatric)  Goal: Signs and Symptoms of Listed Potential Problems Will be Absent, Minimized or Managed (Prenatal Patient, Hospitalized)  Outcome: Ongoing (interventions implemented as appropriate)   04/02/19 9543   Goal/Outcome Evaluation   Problems Assessed (Prenatal Patient) all   Problems Present (Prenatal Patient) nausea and vomiting

## 2019-04-05 NOTE — PROGRESS NOTES
[facility]  Obstetric Progress Note    Patient Name: Sara Hoang  :  1985  MRN:  [id number]  Hospital Day: 8  EGA: 10w6d      Subjective   Feels much better.  Is eating and tolerating po.  Really wants to go home        Objective     VS:  Vital Signs Range for the last 24 hours  Temperature: Temp:  [97.7 °F (36.5 °C)-98 °F (36.7 °C)] 98 °F (36.7 °C)   Temp Source: Temp src: Oral   BP: BP: (102-105)/(63) 102/63   Pulse: Heart Rate:  [82-83] 82   Respirations: Resp:  [16-18] 16                   Physical Examination:     General :  Alert in NAD  Abdomen: NT        Lab results reviewed:  CBC:   Lab Results   Component Value Date    WBC 11.66 (H) 2019    HGB 11.1 (L) 2019    HCT 34.1 2019            A/P: 10w6d hyperemesis-- improved significantly.  Still on IV meds--will transition to po today.  Ok for d/c if tolerates po meds ok pending GI recs.     Will plan to keep PICC line in at least another few days       Hyperemesis gravidarum    Pregnancy              Kacie Gloverhilton Tello, APRN  2019  4:07 PM      Pt seen by me and agree with above. Yoly Delgado MD    Tolerated po meds today. Still has picc line but not getting anything thru it now. We still want to keep until sure won't need it again.     S/p GI consult- stopped ton, zofran prn. Po prednisone taper, cont pepcid. Mirilax.  Follow up with ob this week.    Home health will not see her since not getting anything thru IV. All she really needs is picc dressing care.  Has outpt appt next wk. Will d/c home and if has problems will call and may need to come thru ER or however for dressing care to be addressed.

## 2019-04-05 NOTE — PROGRESS NOTES
Continued Stay Note  [facility]     Patient Name: Sara Hoang  MRN: [id number]  Today's Date: 4/5/2019    Admit Date: 3/28/2019    Discharge Plan     Row Name 04/05/19 1642       Plan    Plan Comments  ...continued....[facility] will follow to assist with the patient's d/c home with [facility] for saline for PICC line flushes or any IV meds needed and ACU for a weekly PICC line dressing change and flush.  CCP will also follow to see if a re-referral needs to be made to [facility] if the patient needs IV medication and prefers to use a  agency or referral can be made to [facility] for assistance if the patient is homebound and needs IV medication.  KIP De    Row Name 04/05/19 3380       Plan    Plan  Home with [facility] if homebound.    Plan Comments  Spoke to the patient who stated that [facility] [phone] out of [place] as she resides in [place].  Spoke to Beth and Heike at [facility] who stated that the they are unable to provide only PICC line care for the patient without the patient not needing any IV medications or the Zofran pump which she was previously using.  Spoke to [facility] who stated that the patient needed to be homebound in order for them to assist and the patient informed [facility] that she was not going to be homebound.  The patient stated that she would be agreeable to coming to ACU at [facility] if needed for PICC line dressing changes and flushes.  [facility] contacted scheduled [phone] and scheduled an appointment for the patient to go to ACU once a week for PICC line dressing changes and flushes.   The patient was scheduled Fri April 12 at 8:30am.  The patient was informed of the date and time and provided with the contact number for [facility] in case she needs to change her appointment.  The patient was also informed of directions to get to registration at [facility] for her first appointment.  The patient's RN Melissa stated that the patient will need  saline for flushes multiple times per day and that she will work on teaching the patient how to flush her PICC line.  Spoke to Terri with [facility] [phone] who stated that [facility] could provide the patient's saline needed for her daily flushes.  The patient and her RN were both informed.  The patient's RN was informed to change the patient's dressing prior to her discharge from [facility] and the patient was informed to call [phone] if her dressing leaks and she needs it changed before her scheduled appointment in order.  Terri with [facility] stated that if the patient ends up needing medication for her PICC line [facility] can provide that as well and will follow in case that is needed.  [facility] also spoke to Heike with [facility]  who stated that if the patient needs medication for her PICC line and would prefer to use a HH company a [facility] and they will need a confirmation of the placement of the PICC (EKG or chest xray) can be faxed to the [facility] at fax number [phone]...continued..          Discharge Codes    No documentation.             KIP Villalobos

## 2019-04-05 NOTE — DISCHARGE PLACEMENT REQUEST
"Jann Hoang (33 y.o. Female)     Date of Birth Social Security Number Address Home Phone MRN    1985  [address] [address] [phone] [id number]    Spiritism Marital Status          None        Admission Date Admission Type Admitting Provider Attending Provider Department, Room/Bed    3/28/19 Emergency Kacy Parsons MD Grider, Ann R., MD [facility] ANTEPARTUM UNIT, 3308/1    Discharge Date Discharge Disposition Discharge Destination                       Attending Provider:  Yoly Delgado MD    Allergies:  Biaxin [Clarithromycin], Ceclor [Cefaclor], Penicillins, Sulfa Antibiotics, Macrolides And Ketolides    Isolation:  None   Infection:  None   Code Status:  CPR    Ht:  165.1 cm (65\")   Wt:  82.8 kg (182 lb 8 oz)    Admission Cmt:  None   Principal Problem:  None                Active Insurance as of 3/28/2019     Primary Coverage     Payor Plan Insurance Group Employer/Plan Group    [employer] EMPLOYEE [id number]     Payor Plan Address Payor Plan Phone Number Payor Plan Fax Number Effective Dates    [address] [phone]  1/1/2015 - None Entered    [facility] [place] [id number]       Subscriber Name Subscriber Birth Date Member ID       JANN HOANG ISAMAR 1985 [id number]                 Emergency Contacts      (Rel.) Home Phone Work Phone Mobile Phone    ISIDRA HOANG (Spouse) [phone] -- --              "

## 2019-04-05 NOTE — PROGRESS NOTES
[facility]  Inpatient Progress Note    Reason for Follow Up: Hyperemesis    Subjective     Interval History: Continues to do well.  Further vomiting.  Still remains a little bit constipated.  Plans for home today.        Current Facility-Administered Medications:   •  famotidine (PEPCID) tablet 20 mg, 20 mg, Oral, BID, Kacie Tello APRN, 20 mg at 04/05/19 1152  •  metoclopramide (REGLAN) tablet 10 mg, 10 mg, Oral, 4x Daily AC & at Bedtime, Kacie Tello APRN, 10 mg at 04/05/19 1427  •  ondansetron (ZOFRAN) tablet 8 mg, 8 mg, Oral, Q8H PRN, Kacie Tello APRN  •  polyethylene glycol (MIRALAX) powder 17 g, 17 g, Oral, Daily, Kacie Tello APRN, 17 g at 04/05/19 1152  •  promethazine (PHENERGAN) injection 25 mg, 25 mg, Intramuscular, Q6H PRN, Franci Winter MD, 25 mg at 03/30/19 0757  •  promethazine (PHENERGAN) injection 25 mg, 25 mg, Intravenous, Q6H PRN, Kacy Parsons MD, 25 mg at 04/03/19 0255  •  promethazine (PHENERGAN) tablet 25 mg, 25 mg, Oral, Q4H PRN, Kacie Tello APRN  •  sertraline (ZOLOFT) tablet 50 mg, 50 mg, Oral, Daily, Franci Winter MD, 50 mg at 04/04/19 2251  •  [COMPLETED] Insert peripheral IV, , , Once **AND** sodium chloride 0.9 % flush 10 mL, 10 mL, Intravenous, PRN, Radha Webb APRN, 10 mL at 04/05/19 0655  •  sucralfate (CARAFATE) 1 GM/10ML suspension 1 g, 1 g, Oral, 4x Daily AC & at Bedtime, Franci Winter MD, 1 g at 04/01/19 0845  Review of Systems:    All systems were reviewed and negative except for:  Gastrointestinal: positive for  constipation    Objective     Vital Signs  Temp:  [97.7 °F (36.5 °C)-98 °F (36.7 °C)] 98 °F (36.7 °C)  Heart Rate:  [82-83] 82  Resp:  [16-18] 16  BP: (102-105)/(63) 102/63  Body mass index is 30.37 kg/m².    Intake/Output Summary (Last 24 hours) at 4/5/2019 1600  Last data filed at 4/5/2019 0720  Gross per 24 hour   Intake 50.72 ml   Output 1350 ml   Net -1299.28 ml      I/O this shift:  In: -   Out: 600 [Urine:600]     Physical Exam:   General: patient awake, alert and cooperative   Eyes: Normal lids and lashes, no scleral icterus   Neck: supple, normal ROM   Skin: warm and dry, not jaundiced   Cardiovascular: regular rhythm and rate, no murmurs auscultated   Pulm: clear to auscultation bilaterally, regular and unlabored   Abdomen: soft, nontender, nondistended; normal bowel sounds   Rectal: deferred   Extremities: no rash or edema   Psychiatric: Normal mood and behavior; memory intact     Results Review:     I reviewed the patient's new clinical results.    Results from last 7 days   Lab Units 04/01/19  0702   WBC 10*3/mm3 11.66*   HEMOGLOBIN g/dL 11.1*   HEMATOCRIT % 34.1   PLATELETS 10*3/mm3 229     Results from last 7 days   Lab Units 04/04/19  0657 04/03/19  0638 04/02/19  0604   SODIUM mmol/L 136 139 138   POTASSIUM mmol/L 3.7 3.6 3.1*   CHLORIDE mmol/L 102 104 100   CO2 mmol/L 23.6 23.0 25.2   BUN mg/dL 7 8 7   CREATININE mg/dL 0.42* 0.34* 0.40*   CALCIUM mg/dL 8.1* 8.1* 8.6   BILIRUBIN mg/dL 0.3 0.6 0.7   ALK PHOS U/L 84 80 82   ALT (SGPT) U/L 41* 28 20   AST (SGOT) U/L 16 16 13   GLUCOSE mg/dL 126* 104* 112*         No results found for: LIPASE    Radiology:  US Ob < 14 Weeks Single or First Gestation   Final Result      US Gallbladder   Final Result       Minimal sludge in the gallbladder. No evidence for acute cholecystitis.   Follow up/further evaluation can be obtained as indicated.       This report was finalized on 3/29/2019 6:47 PM by Dr. Tonio Mueller M.D.              Assessment/Plan     Patient Active Problem List   Diagnosis   • Migraine   • Asthma   • Anxiety   • Insomnia   • Hyperemesis gravidarum   • Encounter for supervision of low-risk first pregnancy in first trimester   • Use of letrozole (Femara)   • Rh negative status during pregnancy in first trimester   • Pregnancy     Impression  1. hyperemesis gravidarum: Doing quite well, steroids  2.  Poor po intake: Improved, TPN stopped  3. GERD: stable  4. Pregnancy:10 weeks    Recommendation  Stop Reglan  Continue Zofran as needed  Transition to oral prednisone with fairly quick taper over the next 2 weeks--20 mg p.o. daily for 5 days, then 10 mg p.o. daily for 5 days, then stop  Diet as tolerated  Continue Pepcid  Recommend MiraLAX for bowel regimen, plenty of fluids, anticipate she will be able to have a bowel movement when she gets home    I discussed the patients findings and my recommendations with patient and family.    Josselin Ch MD

## 2019-04-08 NOTE — PAYOR COMM NOTE
"  [facility]  [address] [place] [address]    Lydia Black  Utilization Review/Room Reservations  Phone: [phone], Mzqrz-[phone], & [phone]  Fax: [phone]  Email: [email or web address]  Please call, fax back, or email with authorization or any questions! Thanks!    D/C SUMMARY    This fax contains any of the following:  Face Sheet, H&P, progress notes, consults, orders, meds, lab results, labor record, vitals, delivery worksheet, op note, d/c summary.  Jann Hoang (33 y.o. Female)     Date of Birth Social Security Number Address Home Phone MRN    1985  [id number] [facility] [address] [facility] [address] [phone] [id number]    Adventism Marital Status          None        Admission Date Admission Type Admitting Provider Attending Provider Department, Room/Bed    3/28/19 Emergency Kacy Parsons MD  [facility] ANTEPARTUM UNIT, 3308/1    Discharge Date Discharge Disposition Discharge Destination        4/5/2019 Home or Self Care              Attending Provider:  (none)   Allergies:  Biaxin [Clarithromycin], Ceclor [Cefaclor], Penicillins, Sulfa Antibiotics, Macrolides And Ketolides    Isolation:  None   Infection:  None   Code Status:  Prior    Ht:  165.1 cm (65\")   Wt:  82.8 kg (182 lb 8 oz)    Admission Cmt:  None   Principal Problem:  None                Active Insurance as of 3/28/2019     Primary Coverage     Payor Plan Insurance Group Employer/Plan Group    [facility] [employer] EM[employer]EE [id number]     Payor Plan Address Payor Plan Phone Number Payor Plan Fax Number Effective Dates    [address] [phone]  1/1/2015 - None Entered    [facility] [id number]       Subscriber Name Subscriber Birth Date Member ID       JANN HOANG 1985 [id number]                 Emergency Contacts      (Rel.) Home Phone Work Phone Mobile Phone    ISIDRA HOANG (Spouse) [phone] -- --    "            Discharge Summary      Yoly Delgado MD at 4/5/2019  6:16 PM        See ob progress note from earlier today.     Electronically signed by Yoly Delgado MD at 4/5/2019  6:16 PM

## 2019-04-12 ENCOUNTER — TRANSCRIBE ORDERS (OUTPATIENT)
Dept: ADMINISTRATIVE | Facility: HOSPITAL | Age: 34
End: 2019-04-12

## 2019-04-12 ENCOUNTER — HOSPITAL ENCOUNTER (OUTPATIENT)
Dept: GENERAL RADIOLOGY | Facility: HOSPITAL | Age: 34
Discharge: HOME | End: 2019-04-12

## 2019-04-12 ENCOUNTER — HOSPITAL ENCOUNTER (OUTPATIENT)
Dept: INFUSION THERAPY | Facility: HOSPITAL | Age: 34
Discharge: HOME | End: 2019-04-12

## 2019-04-12 DIAGNOSIS — O21.9: Primary | ICD-10-CM

## 2019-04-12 DIAGNOSIS — O21.9: ICD-10-CM

## 2019-04-12 NOTE — NURSING NOTE
Patient here to have PICC line d/c'd. Patient placed in a reclining position and told to bear down as picc removed. Picc removed with tip intact and vaseline gauze applied over site with 2x2 and wrap. Informed to remain in reclining position for the next 30 minutes before discharge. No distress.

## 2019-04-12 NOTE — NURSING NOTE
Pt here for 30 post dc of PICC, no swelling/bleeding at site.  Pt instructed to keep drsg on for 24 hr, do not get it wet also.  Verbalized good understanding.  Pt dc'd home ambulatory

## 2019-04-18 ENCOUNTER — HOSPITAL ENCOUNTER (INPATIENT)
Facility: HOSPITAL | Age: 34
LOS: 6 days | Discharge: HOME | End: 2019-04-24
Attending: EMERGENCY MEDICINE | Admitting: OBSTETRICS & GYNECOLOGY

## 2019-04-18 DIAGNOSIS — D72.829 LEUKOCYTOSIS, UNSPECIFIED TYPE: ICD-10-CM

## 2019-04-18 DIAGNOSIS — O21.0 HYPEREMESIS GRAVIDARUM: Primary | ICD-10-CM

## 2019-04-18 DIAGNOSIS — N39.0 ACUTE UTI: ICD-10-CM

## 2019-04-18 DIAGNOSIS — E87.6 HYPOKALEMIA: ICD-10-CM

## 2019-04-18 LAB
ALBUMIN SERPL-MCNC: 3.4 G/DL (ref 3.5–5.2)
ALBUMIN SERPL-MCNC: 4.5 G/DL (ref 3.5–5.2)
ALBUMIN/GLOB SERPL: 1.2 G/DL
ALBUMIN/GLOB SERPL: 1.7 G/DL
ALP SERPL-CCNC: 110 U/L (ref 39–117)
ALP SERPL-CCNC: 88 U/L (ref 39–117)
ALT SERPL W P-5'-P-CCNC: 18 U/L (ref 1–33)
ALT SERPL W P-5'-P-CCNC: 21 U/L (ref 1–33)
AMYLASE SERPL-CCNC: 41 U/L (ref 28–100)
ANION GAP SERPL CALCULATED.3IONS-SCNC: 15.8 MMOL/L
ANION GAP SERPL CALCULATED.3IONS-SCNC: 18 MMOL/L
AST SERPL-CCNC: 16 U/L (ref 1–32)
AST SERPL-CCNC: 18 U/L (ref 1–32)
BACTERIA UR QL AUTO: ABNORMAL /HPF
BASOPHILS # BLD AUTO: 0.03 10*3/MM3 (ref 0–0.2)
BASOPHILS # BLD AUTO: 0.06 10*3/MM3 (ref 0–0.2)
BASOPHILS NFR BLD AUTO: 0.1 % (ref 0–1.5)
BASOPHILS NFR BLD AUTO: 0.2 % (ref 0–1.5)
BILIRUB SERPL-MCNC: 0.5 MG/DL (ref 0.2–1.2)
BILIRUB SERPL-MCNC: 0.6 MG/DL (ref 0.2–1.2)
BILIRUB UR QL STRIP: NEGATIVE
BUN BLD-MCNC: 3 MG/DL (ref 6–20)
BUN BLD-MCNC: 6 MG/DL (ref 6–20)
BUN/CREAT SERPL: 10.2 (ref 7–25)
BUN/CREAT SERPL: 6.5 (ref 7–25)
CALCIUM SPEC-SCNC: 8.8 MG/DL (ref 8.6–10.5)
CALCIUM SPEC-SCNC: 9.3 MG/DL (ref 8.6–10.5)
CHLORIDE SERPL-SCNC: 100 MMOL/L (ref 98–107)
CHLORIDE SERPL-SCNC: 105 MMOL/L (ref 98–107)
CLARITY UR: ABNORMAL
CO2 SERPL-SCNC: 18.2 MMOL/L (ref 22–29)
CO2 SERPL-SCNC: 22 MMOL/L (ref 22–29)
COLOR UR: YELLOW
CREAT BLD-MCNC: 0.46 MG/DL (ref 0.57–1)
CREAT BLD-MCNC: 0.59 MG/DL (ref 0.57–1)
DEPRECATED RDW RBC AUTO: 42.7 FL (ref 37–54)
DEPRECATED RDW RBC AUTO: 43.3 FL (ref 37–54)
EOSINOPHIL # BLD AUTO: 0 10*3/MM3 (ref 0–0.4)
EOSINOPHIL # BLD AUTO: 0.01 10*3/MM3 (ref 0–0.4)
EOSINOPHIL NFR BLD AUTO: 0 % (ref 0.3–6.2)
EOSINOPHIL NFR BLD AUTO: 0 % (ref 0.3–6.2)
ERYTHROCYTE [DISTWIDTH] IN BLOOD BY AUTOMATED COUNT: 13.1 % (ref 12.3–15.4)
ERYTHROCYTE [DISTWIDTH] IN BLOOD BY AUTOMATED COUNT: 13.2 % (ref 12.3–15.4)
GFR SERPL CREATININE-BSD FRML MDRD: 118 ML/MIN/1.73
GFR SERPL CREATININE-BSD FRML MDRD: >150 ML/MIN/1.73
GLOBULIN UR ELPH-MCNC: 2.7 GM/DL
GLOBULIN UR ELPH-MCNC: 2.8 GM/DL
GLUCOSE BLD-MCNC: 101 MG/DL (ref 65–99)
GLUCOSE BLD-MCNC: 160 MG/DL (ref 65–99)
GLUCOSE UR STRIP-MCNC: NEGATIVE MG/DL
HCT VFR BLD AUTO: 35.1 % (ref 34–46.6)
HCT VFR BLD AUTO: 38.7 % (ref 34–46.6)
HGB BLD-MCNC: 11.4 G/DL (ref 12–15.9)
HGB BLD-MCNC: 12.8 G/DL (ref 12–15.9)
HGB UR QL STRIP.AUTO: ABNORMAL
HOLD SPECIMEN: NORMAL
HOLD SPECIMEN: NORMAL
HYALINE CASTS UR QL AUTO: ABNORMAL /LPF
IMM GRANULOCYTES # BLD AUTO: 0.13 10*3/MM3 (ref 0–0.05)
IMM GRANULOCYTES # BLD AUTO: 0.23 10*3/MM3 (ref 0–0.05)
IMM GRANULOCYTES NFR BLD AUTO: 0.6 % (ref 0–0.5)
IMM GRANULOCYTES NFR BLD AUTO: 0.8 % (ref 0–0.5)
KETONES UR QL STRIP: ABNORMAL
LEUKOCYTE ESTERASE UR QL STRIP.AUTO: ABNORMAL
LIPASE SERPL-CCNC: 29 U/L (ref 13–60)
LYMPHOCYTES # BLD AUTO: 0.86 10*3/MM3 (ref 0.7–3.1)
LYMPHOCYTES # BLD AUTO: 1.52 10*3/MM3 (ref 0.7–3.1)
LYMPHOCYTES NFR BLD AUTO: 3.2 % (ref 19.6–45.3)
LYMPHOCYTES NFR BLD AUTO: 7.4 % (ref 19.6–45.3)
MCH RBC QN AUTO: 29.6 PG (ref 26.6–33)
MCH RBC QN AUTO: 29.8 PG (ref 26.6–33)
MCHC RBC AUTO-ENTMCNC: 32.5 G/DL (ref 31.5–35.7)
MCHC RBC AUTO-ENTMCNC: 33.1 G/DL (ref 31.5–35.7)
MCV RBC AUTO: 90 FL (ref 79–97)
MCV RBC AUTO: 91.2 FL (ref 79–97)
MONOCYTES # BLD AUTO: 0.71 10*3/MM3 (ref 0.1–0.9)
MONOCYTES # BLD AUTO: 1.14 10*3/MM3 (ref 0.1–0.9)
MONOCYTES NFR BLD AUTO: 3.5 % (ref 5–12)
MONOCYTES NFR BLD AUTO: 4.2 % (ref 5–12)
NEUTROPHILS # BLD AUTO: 18.02 10*3/MM3 (ref 1.4–7)
NEUTROPHILS # BLD AUTO: 24.94 10*3/MM3 (ref 1.4–7)
NEUTROPHILS NFR BLD AUTO: 88.4 % (ref 42.7–76)
NEUTROPHILS NFR BLD AUTO: 91.6 % (ref 42.7–76)
NITRITE UR QL STRIP: NEGATIVE
NRBC BLD AUTO-RTO: 0 /100 WBC (ref 0–0)
NRBC BLD AUTO-RTO: 0 /100 WBC (ref 0–0)
PH UR STRIP.AUTO: 8.5 [PH] (ref 5–8)
PLATELET # BLD AUTO: 234 10*3/MM3 (ref 140–450)
PLATELET # BLD AUTO: 240 10*3/MM3 (ref 140–450)
PMV BLD AUTO: 9.5 FL (ref 6–12)
PMV BLD AUTO: 9.8 FL (ref 6–12)
POTASSIUM BLD-SCNC: 3.3 MMOL/L (ref 3.5–5.2)
POTASSIUM BLD-SCNC: 3.5 MMOL/L (ref 3.5–5.2)
PROT SERPL-MCNC: 6.2 G/DL (ref 6–8.5)
PROT SERPL-MCNC: 7.2 G/DL (ref 6–8.5)
PROT UR QL STRIP: NEGATIVE
RBC # BLD AUTO: 3.85 10*6/MM3 (ref 3.77–5.28)
RBC # BLD AUTO: 4.3 10*6/MM3 (ref 3.77–5.28)
RBC # UR: ABNORMAL /HPF
REF LAB TEST METHOD: ABNORMAL
SODIUM BLD-SCNC: 139 MMOL/L (ref 136–145)
SODIUM BLD-SCNC: 140 MMOL/L (ref 136–145)
SP GR UR STRIP: 1.02 (ref 1–1.03)
SQUAMOUS #/AREA URNS HPF: ABNORMAL /HPF
UROBILINOGEN UR QL STRIP: ABNORMAL
WBC NRBC COR # BLD: 20.41 10*3/MM3 (ref 3.4–10.8)
WBC NRBC COR # BLD: 27.24 10*3/MM3 (ref 3.4–10.8)
WBC UR QL AUTO: ABNORMAL /HPF
WHOLE BLOOD HOLD SPECIMEN: NORMAL
WHOLE BLOOD HOLD SPECIMEN: NORMAL

## 2019-04-18 PROCEDURE — 99284 EMERGENCY DEPT VISIT MOD MDM: CPT

## 2019-04-18 PROCEDURE — 82150 ASSAY OF AMYLASE: CPT | Performed by: OBSTETRICS & GYNECOLOGY

## 2019-04-18 PROCEDURE — 25010000002 DIPHENHYDRAMINE PER 50 MG: Performed by: OBSTETRICS & GYNECOLOGY

## 2019-04-18 PROCEDURE — 25010000002 METOCLOPRAMIDE PER 10 MG: Performed by: OBSTETRICS & GYNECOLOGY

## 2019-04-18 PROCEDURE — 25010000002 CEFTRIAXONE PER 250 MG: Performed by: EMERGENCY MEDICINE

## 2019-04-18 PROCEDURE — 80053 COMPREHEN METABOLIC PANEL: CPT | Performed by: OBSTETRICS & GYNECOLOGY

## 2019-04-18 PROCEDURE — 85025 COMPLETE CBC W/AUTO DIFF WBC: CPT | Performed by: OBSTETRICS & GYNECOLOGY

## 2019-04-18 PROCEDURE — 81001 URINALYSIS AUTO W/SCOPE: CPT | Performed by: EMERGENCY MEDICINE

## 2019-04-18 PROCEDURE — 80053 COMPREHEN METABOLIC PANEL: CPT | Performed by: EMERGENCY MEDICINE

## 2019-04-18 PROCEDURE — 83690 ASSAY OF LIPASE: CPT | Performed by: OBSTETRICS & GYNECOLOGY

## 2019-04-18 PROCEDURE — 25010000002 ONDANSETRON PER 1 MG: Performed by: EMERGENCY MEDICINE

## 2019-04-18 PROCEDURE — G0378 HOSPITAL OBSERVATION PER HR: HCPCS

## 2019-04-18 PROCEDURE — 25010000002 PROMETHAZINE PER 50 MG: Performed by: NURSE PRACTITIONER

## 2019-04-18 PROCEDURE — 25010000002 PROMETHAZINE PER 50 MG: Performed by: EMERGENCY MEDICINE

## 2019-04-18 PROCEDURE — 87186 SC STD MICRODIL/AGAR DIL: CPT | Performed by: OBSTETRICS & GYNECOLOGY

## 2019-04-18 PROCEDURE — 87086 URINE CULTURE/COLONY COUNT: CPT | Performed by: OBSTETRICS & GYNECOLOGY

## 2019-04-18 PROCEDURE — 85025 COMPLETE CBC W/AUTO DIFF WBC: CPT | Performed by: EMERGENCY MEDICINE

## 2019-04-18 PROCEDURE — 25010000002 POTASSIUM CHLORIDE PER 2 MEQ OF POTASSIUM: Performed by: OBSTETRICS & GYNECOLOGY

## 2019-04-18 PROCEDURE — 25010000002 ONDANSETRON PER 1 MG: Performed by: NURSE PRACTITIONER

## 2019-04-18 RX ORDER — LOPERAMIDE HYDROCHLORIDE 2 MG/1
4 CAPSULE ORAL 4 TIMES DAILY PRN
Status: DISCONTINUED | OUTPATIENT
Start: 2019-04-18 | End: 2019-04-24 | Stop reason: HOSPADM

## 2019-04-18 RX ORDER — FAMOTIDINE 10 MG/ML
20 INJECTION, SOLUTION INTRAVENOUS 2 TIMES DAILY
Status: DISCONTINUED | OUTPATIENT
Start: 2019-04-18 | End: 2019-04-22

## 2019-04-18 RX ORDER — ONDANSETRON 2 MG/ML
4 INJECTION INTRAMUSCULAR; INTRAVENOUS ONCE
Status: COMPLETED | OUTPATIENT
Start: 2019-04-18 | End: 2019-04-18

## 2019-04-18 RX ORDER — METOCLOPRAMIDE HYDROCHLORIDE 5 MG/ML
5 INJECTION INTRAMUSCULAR; INTRAVENOUS EVERY 6 HOURS PRN
Status: DISCONTINUED | OUTPATIENT
Start: 2019-04-18 | End: 2019-04-24 | Stop reason: HOSPADM

## 2019-04-18 RX ORDER — PROMETHAZINE HYDROCHLORIDE 25 MG/ML
6.25 INJECTION, SOLUTION INTRAMUSCULAR; INTRAVENOUS ONCE
Status: COMPLETED | OUTPATIENT
Start: 2019-04-18 | End: 2019-04-18

## 2019-04-18 RX ORDER — SODIUM CHLORIDE, SODIUM LACTATE, POTASSIUM CHLORIDE, CALCIUM CHLORIDE 600; 310; 30; 20 MG/100ML; MG/100ML; MG/100ML; MG/100ML
150 INJECTION, SOLUTION INTRAVENOUS CONTINUOUS
Status: DISCONTINUED | OUTPATIENT
Start: 2019-04-18 | End: 2019-04-18

## 2019-04-18 RX ORDER — ONDANSETRON 2 MG/ML
8 INJECTION INTRAMUSCULAR; INTRAVENOUS EVERY 6 HOURS PRN
Status: DISCONTINUED | OUTPATIENT
Start: 2019-04-18 | End: 2019-04-23

## 2019-04-18 RX ORDER — CEFTRIAXONE SODIUM 1 G/50ML
1 INJECTION, SOLUTION INTRAVENOUS ONCE
Status: COMPLETED | OUTPATIENT
Start: 2019-04-18 | End: 2019-04-18

## 2019-04-18 RX ORDER — FAMOTIDINE 10 MG/ML
20 INJECTION, SOLUTION INTRAVENOUS 2 TIMES DAILY
Status: DISCONTINUED | OUTPATIENT
Start: 2019-04-18 | End: 2019-04-18

## 2019-04-18 RX ORDER — PROMETHAZINE HYDROCHLORIDE 25 MG/ML
12.5 INJECTION, SOLUTION INTRAMUSCULAR; INTRAVENOUS EVERY 4 HOURS PRN
Status: DISCONTINUED | OUTPATIENT
Start: 2019-04-18 | End: 2019-04-19

## 2019-04-18 RX ORDER — DIPHENHYDRAMINE HYDROCHLORIDE 50 MG/ML
25 INJECTION INTRAMUSCULAR; INTRAVENOUS EVERY 6 HOURS PRN
Status: DISCONTINUED | OUTPATIENT
Start: 2019-04-18 | End: 2019-04-24 | Stop reason: HOSPADM

## 2019-04-18 RX ORDER — ONDANSETRON 2 MG/ML
4 INJECTION INTRAMUSCULAR; INTRAVENOUS EVERY 6 HOURS PRN
Status: DISCONTINUED | OUTPATIENT
Start: 2019-04-18 | End: 2019-04-18

## 2019-04-18 RX ADMIN — PROMETHAZINE HYDROCHLORIDE 12.5 MG: 25 INJECTION INTRAMUSCULAR; INTRAVENOUS at 19:50

## 2019-04-18 RX ADMIN — DIPHENHYDRAMINE HYDROCHLORIDE 25 MG: 50 INJECTION, SOLUTION INTRAMUSCULAR; INTRAVENOUS at 21:01

## 2019-04-18 RX ADMIN — SODIUM CHLORIDE 1000 ML: 9 INJECTION, SOLUTION INTRAVENOUS at 06:55

## 2019-04-18 RX ADMIN — POTASSIUM CHLORIDE 150 ML/HR: 2 INJECTION, SOLUTION, CONCENTRATE INTRAVENOUS at 16:28

## 2019-04-18 RX ADMIN — SODIUM CHLORIDE, POTASSIUM CHLORIDE, SODIUM LACTATE AND CALCIUM CHLORIDE 1000 ML: 600; 310; 30; 20 INJECTION, SOLUTION INTRAVENOUS at 09:12

## 2019-04-18 RX ADMIN — PROMETHAZINE HYDROCHLORIDE 12.5 MG: 25 INJECTION INTRAMUSCULAR; INTRAVENOUS at 16:04

## 2019-04-18 RX ADMIN — ONDANSETRON 4 MG: 2 INJECTION INTRAMUSCULAR; INTRAVENOUS at 12:49

## 2019-04-18 RX ADMIN — PROMETHAZINE HYDROCHLORIDE 12.5 MG: 25 INJECTION INTRAMUSCULAR; INTRAVENOUS at 11:57

## 2019-04-18 RX ADMIN — METOCLOPRAMIDE 5 MG: 5 INJECTION, SOLUTION INTRAMUSCULAR; INTRAVENOUS at 21:02

## 2019-04-18 RX ADMIN — FAMOTIDINE 20 MG: 10 INJECTION INTRAVENOUS at 16:28

## 2019-04-18 RX ADMIN — ONDANSETRON 4 MG: 2 INJECTION INTRAMUSCULAR; INTRAVENOUS at 06:49

## 2019-04-18 RX ADMIN — ONDANSETRON 4 MG: 2 INJECTION INTRAMUSCULAR; INTRAVENOUS at 18:33

## 2019-04-18 RX ADMIN — CEFTRIAXONE SODIUM 1 G: 1 INJECTION, SOLUTION INTRAVENOUS at 09:13

## 2019-04-18 RX ADMIN — PROMETHAZINE HYDROCHLORIDE 6.25 MG: 25 INJECTION INTRAMUSCULAR; INTRAVENOUS at 07:25

## 2019-04-18 RX ADMIN — SODIUM CHLORIDE, POTASSIUM CHLORIDE, SODIUM LACTATE AND CALCIUM CHLORIDE 150 ML/HR: 600; 310; 30; 20 INJECTION, SOLUTION INTRAVENOUS at 11:49

## 2019-04-19 PROCEDURE — 25010000002 ONDANSETRON PER 1 MG: Performed by: OBSTETRICS & GYNECOLOGY

## 2019-04-19 PROCEDURE — 25010000002 DIPHENHYDRAMINE PER 50 MG: Performed by: OBSTETRICS & GYNECOLOGY

## 2019-04-19 PROCEDURE — 25010000002 POTASSIUM CHLORIDE PER 2 MEQ OF POTASSIUM: Performed by: OBSTETRICS & GYNECOLOGY

## 2019-04-19 PROCEDURE — 25010000002 PROMETHAZINE PER 50 MG: Performed by: NURSE PRACTITIONER

## 2019-04-19 PROCEDURE — 25010000002 METOCLOPRAMIDE PER 10 MG: Performed by: OBSTETRICS & GYNECOLOGY

## 2019-04-19 RX ORDER — PROMETHAZINE HYDROCHLORIDE 25 MG/ML
25 INJECTION, SOLUTION INTRAMUSCULAR; INTRAVENOUS EVERY 6 HOURS PRN
Status: DISCONTINUED | OUTPATIENT
Start: 2019-04-19 | End: 2019-04-23

## 2019-04-19 RX ORDER — ONDANSETRON 4 MG/1
8 TABLET, ORALLY DISINTEGRATING ORAL EVERY 6 HOURS PRN
Status: DISCONTINUED | OUTPATIENT
Start: 2019-04-19 | End: 2019-04-23

## 2019-04-19 RX ORDER — ACETAMINOPHEN 325 MG/1
650 TABLET ORAL ONCE
Status: COMPLETED | OUTPATIENT
Start: 2019-04-19 | End: 2019-04-19

## 2019-04-19 RX ADMIN — ONDANSETRON 8 MG: 4 TABLET, ORALLY DISINTEGRATING ORAL at 16:40

## 2019-04-19 RX ADMIN — ACETAMINOPHEN 650 MG: 325 TABLET, FILM COATED ORAL at 08:18

## 2019-04-19 RX ADMIN — DIPHENHYDRAMINE HYDROCHLORIDE 25 MG: 50 INJECTION, SOLUTION INTRAMUSCULAR; INTRAVENOUS at 13:31

## 2019-04-19 RX ADMIN — POTASSIUM CHLORIDE 150 ML/HR: 2 INJECTION, SOLUTION, CONCENTRATE INTRAVENOUS at 14:41

## 2019-04-19 RX ADMIN — METOCLOPRAMIDE 5 MG: 5 INJECTION, SOLUTION INTRAMUSCULAR; INTRAVENOUS at 13:31

## 2019-04-19 RX ADMIN — POTASSIUM CHLORIDE 150 ML/HR: 2 INJECTION, SOLUTION, CONCENTRATE INTRAVENOUS at 07:42

## 2019-04-19 RX ADMIN — DIPHENHYDRAMINE HYDROCHLORIDE 25 MG: 50 INJECTION, SOLUTION INTRAMUSCULAR; INTRAVENOUS at 22:00

## 2019-04-19 RX ADMIN — POTASSIUM CHLORIDE 150 ML/HR: 2 INJECTION, SOLUTION, CONCENTRATE INTRAVENOUS at 21:00

## 2019-04-19 RX ADMIN — PROMETHAZINE HYDROCHLORIDE 12.5 MG: 25 INJECTION INTRAMUSCULAR; INTRAVENOUS at 11:12

## 2019-04-19 RX ADMIN — PROMETHAZINE HYDROCHLORIDE 12.5 MG: 25 INJECTION INTRAMUSCULAR; INTRAVENOUS at 17:58

## 2019-04-19 RX ADMIN — POTASSIUM CHLORIDE 150 ML/HR: 2 INJECTION, SOLUTION, CONCENTRATE INTRAVENOUS at 00:46

## 2019-04-19 RX ADMIN — ONDANSETRON HYDROCHLORIDE 8 MG: 2 SOLUTION INTRAMUSCULAR; INTRAVENOUS at 21:06

## 2019-04-19 RX ADMIN — FAMOTIDINE 20 MG: 10 INJECTION INTRAVENOUS at 08:18

## 2019-04-19 RX ADMIN — FAMOTIDINE 20 MG: 10 INJECTION INTRAVENOUS at 20:39

## 2019-04-19 RX ADMIN — METOCLOPRAMIDE 5 MG: 5 INJECTION, SOLUTION INTRAMUSCULAR; INTRAVENOUS at 22:02

## 2019-04-20 PROCEDURE — 25010000002 ONDANSETRON PER 1 MG: Performed by: OBSTETRICS & GYNECOLOGY

## 2019-04-20 PROCEDURE — 25010000002 POTASSIUM CHLORIDE PER 2 MEQ OF POTASSIUM: Performed by: OBSTETRICS & GYNECOLOGY

## 2019-04-20 PROCEDURE — 25010000002 PROMETHAZINE PER 50 MG: Performed by: OBSTETRICS & GYNECOLOGY

## 2019-04-20 RX ORDER — ACETAMINOPHEN 500 MG
500 TABLET ORAL EVERY 6 HOURS PRN
Status: DISCONTINUED | OUTPATIENT
Start: 2019-04-20 | End: 2019-04-24 | Stop reason: HOSPADM

## 2019-04-20 RX ORDER — ACETAMINOPHEN 325 MG/1
650 TABLET ORAL EVERY 6 HOURS PRN
Status: DISCONTINUED | OUTPATIENT
Start: 2019-04-20 | End: 2019-04-20

## 2019-04-20 RX ADMIN — ONDANSETRON HYDROCHLORIDE 8 MG: 2 SOLUTION INTRAMUSCULAR; INTRAVENOUS at 09:07

## 2019-04-20 RX ADMIN — FAMOTIDINE 20 MG: 10 INJECTION INTRAVENOUS at 21:18

## 2019-04-20 RX ADMIN — PROMETHAZINE HYDROCHLORIDE 12.5 MG: 25 INJECTION INTRAMUSCULAR; INTRAVENOUS at 22:27

## 2019-04-20 RX ADMIN — FAMOTIDINE 20 MG: 10 INJECTION INTRAVENOUS at 08:52

## 2019-04-20 RX ADMIN — ACETAMINOPHEN 500 MG: 500 TABLET, FILM COATED ORAL at 18:36

## 2019-04-20 RX ADMIN — PROMETHAZINE HYDROCHLORIDE 12.5 MG: 25 INJECTION INTRAMUSCULAR; INTRAVENOUS at 01:16

## 2019-04-20 RX ADMIN — POTASSIUM CHLORIDE 150 ML/HR: 2 INJECTION, SOLUTION, CONCENTRATE INTRAVENOUS at 03:37

## 2019-04-20 RX ADMIN — POTASSIUM CHLORIDE 150 ML/HR: 2 INJECTION, SOLUTION, CONCENTRATE INTRAVENOUS at 18:29

## 2019-04-21 LAB
BACTERIA SPEC AEROBE CULT: ABNORMAL
BACTERIA SPEC AEROBE CULT: ABNORMAL

## 2019-04-21 PROCEDURE — 25010000002 ONDANSETRON PER 1 MG: Performed by: OBSTETRICS & GYNECOLOGY

## 2019-04-21 PROCEDURE — 25010000002 PROMETHAZINE PER 50 MG: Performed by: OBSTETRICS & GYNECOLOGY

## 2019-04-21 PROCEDURE — 25010000002 POTASSIUM CHLORIDE PER 2 MEQ OF POTASSIUM: Performed by: OBSTETRICS & GYNECOLOGY

## 2019-04-21 PROCEDURE — 25010000002 METHYLPREDNISOLONE PER 40 MG: Performed by: OBSTETRICS & GYNECOLOGY

## 2019-04-21 PROCEDURE — 25010000002 DIPHENHYDRAMINE PER 50 MG: Performed by: OBSTETRICS & GYNECOLOGY

## 2019-04-21 RX ORDER — METHYLPREDNISOLONE SODIUM SUCCINATE 40 MG/ML
16 INJECTION, POWDER, LYOPHILIZED, FOR SOLUTION INTRAMUSCULAR; INTRAVENOUS EVERY 8 HOURS
Status: DISCONTINUED | OUTPATIENT
Start: 2019-04-21 | End: 2019-04-23

## 2019-04-21 RX ORDER — SUCRALFATE 1 G/1
1 TABLET ORAL
Status: DISCONTINUED | OUTPATIENT
Start: 2019-04-21 | End: 2019-04-24 | Stop reason: HOSPADM

## 2019-04-21 RX ORDER — SODIUM CHLORIDE, SODIUM LACTATE, POTASSIUM CHLORIDE, CALCIUM CHLORIDE 600; 310; 30; 20 MG/100ML; MG/100ML; MG/100ML; MG/100ML
125 INJECTION, SOLUTION INTRAVENOUS CONTINUOUS
Status: DISCONTINUED | OUTPATIENT
Start: 2019-04-21 | End: 2019-04-23

## 2019-04-21 RX ADMIN — PROMETHAZINE HYDROCHLORIDE 25 MG: 25 INJECTION INTRAMUSCULAR; INTRAVENOUS at 22:07

## 2019-04-21 RX ADMIN — PROMETHAZINE HYDROCHLORIDE 25 MG: 25 INJECTION INTRAMUSCULAR; INTRAVENOUS at 16:11

## 2019-04-21 RX ADMIN — METHYLPREDNISOLONE SODIUM SUCCINATE 16 MG: 40 INJECTION, POWDER, FOR SOLUTION INTRAMUSCULAR; INTRAVENOUS at 23:10

## 2019-04-21 RX ADMIN — ONDANSETRON HYDROCHLORIDE 8 MG: 2 SOLUTION INTRAMUSCULAR; INTRAVENOUS at 13:49

## 2019-04-21 RX ADMIN — DIPHENHYDRAMINE HYDROCHLORIDE 25 MG: 50 INJECTION, SOLUTION INTRAMUSCULAR; INTRAVENOUS at 14:22

## 2019-04-21 RX ADMIN — ONDANSETRON HYDROCHLORIDE 8 MG: 2 SOLUTION INTRAMUSCULAR; INTRAVENOUS at 19:42

## 2019-04-21 RX ADMIN — FAMOTIDINE 20 MG: 10 INJECTION INTRAVENOUS at 09:12

## 2019-04-21 RX ADMIN — POTASSIUM CHLORIDE 150 ML/HR: 2 INJECTION, SOLUTION, CONCENTRATE INTRAVENOUS at 07:10

## 2019-04-21 RX ADMIN — SODIUM CHLORIDE, POTASSIUM CHLORIDE, SODIUM LACTATE AND CALCIUM CHLORIDE 125 ML/HR: 600; 310; 30; 20 INJECTION, SOLUTION INTRAVENOUS at 17:02

## 2019-04-21 RX ADMIN — PROMETHAZINE HYDROCHLORIDE 25 MG: 25 INJECTION INTRAMUSCULAR; INTRAVENOUS at 10:05

## 2019-04-21 RX ADMIN — METHYLPREDNISOLONE SODIUM SUCCINATE 16 MG: 40 INJECTION, POWDER, FOR SOLUTION INTRAMUSCULAR; INTRAVENOUS at 17:20

## 2019-04-21 RX ADMIN — SODIUM CHLORIDE, POTASSIUM CHLORIDE, SODIUM LACTATE AND CALCIUM CHLORIDE 125 ML/HR: 600; 310; 30; 20 INJECTION, SOLUTION INTRAVENOUS at 23:19

## 2019-04-21 RX ADMIN — ONDANSETRON HYDROCHLORIDE 8 MG: 2 SOLUTION INTRAMUSCULAR; INTRAVENOUS at 08:16

## 2019-04-21 RX ADMIN — POTASSIUM CHLORIDE 150 ML/HR: 2 INJECTION, SOLUTION, CONCENTRATE INTRAVENOUS at 01:13

## 2019-04-21 RX ADMIN — FAMOTIDINE 20 MG: 10 INJECTION INTRAVENOUS at 20:34

## 2019-04-22 LAB
ANION GAP SERPL CALCULATED.3IONS-SCNC: 17.3 MMOL/L
BASOPHILS # BLD AUTO: 0.02 10*3/MM3 (ref 0–0.2)
BASOPHILS NFR BLD AUTO: 0.1 % (ref 0–1.5)
BUN BLD-MCNC: 5 MG/DL (ref 6–20)
BUN/CREAT SERPL: 12.2 (ref 7–25)
CALCIUM SPEC-SCNC: 8.8 MG/DL (ref 8.6–10.5)
CHLORIDE SERPL-SCNC: 96 MMOL/L (ref 98–107)
CO2 SERPL-SCNC: 20.7 MMOL/L (ref 22–29)
CREAT BLD-MCNC: 0.41 MG/DL (ref 0.57–1)
DEPRECATED RDW RBC AUTO: 41.2 FL (ref 37–54)
EOSINOPHIL # BLD AUTO: 0 10*3/MM3 (ref 0–0.4)
EOSINOPHIL NFR BLD AUTO: 0 % (ref 0.3–6.2)
ERYTHROCYTE [DISTWIDTH] IN BLOOD BY AUTOMATED COUNT: 12.6 % (ref 12.3–15.4)
GFR SERPL CREATININE-BSD FRML MDRD: >150 ML/MIN/1.73
GLUCOSE BLD-MCNC: 90 MG/DL (ref 65–99)
HCT VFR BLD AUTO: 35.5 % (ref 34–46.6)
HGB BLD-MCNC: 11.6 G/DL (ref 12–15.9)
IMM GRANULOCYTES # BLD AUTO: 0.1 10*3/MM3 (ref 0–0.05)
IMM GRANULOCYTES NFR BLD AUTO: 0.6 % (ref 0–0.5)
LYMPHOCYTES # BLD AUTO: 1.03 10*3/MM3 (ref 0.7–3.1)
LYMPHOCYTES NFR BLD AUTO: 6 % (ref 19.6–45.3)
MCH RBC QN AUTO: 29.6 PG (ref 26.6–33)
MCHC RBC AUTO-ENTMCNC: 32.7 G/DL (ref 31.5–35.7)
MCV RBC AUTO: 90.6 FL (ref 79–97)
MONOCYTES # BLD AUTO: 0.33 10*3/MM3 (ref 0.1–0.9)
MONOCYTES NFR BLD AUTO: 1.9 % (ref 5–12)
NEUTROPHILS # BLD AUTO: 15.74 10*3/MM3 (ref 1.7–7)
NEUTROPHILS NFR BLD AUTO: 91.4 % (ref 42.7–76)
NRBC BLD AUTO-RTO: 0 /100 WBC (ref 0–0.2)
PLATELET # BLD AUTO: 242 10*3/MM3 (ref 140–450)
PMV BLD AUTO: 9.3 FL (ref 6–12)
POTASSIUM BLD-SCNC: 4.2 MMOL/L (ref 3.5–5.2)
RBC # BLD AUTO: 3.92 10*6/MM3 (ref 3.77–5.28)
SODIUM BLD-SCNC: 134 MMOL/L (ref 136–145)
WBC NRBC COR # BLD: 17.22 10*3/MM3 (ref 3.4–10.8)

## 2019-04-22 PROCEDURE — 25010000002 METHYLPREDNISOLONE PER 40 MG: Performed by: OBSTETRICS & GYNECOLOGY

## 2019-04-22 PROCEDURE — 80048 BASIC METABOLIC PNL TOTAL CA: CPT | Performed by: OBSTETRICS & GYNECOLOGY

## 2019-04-22 PROCEDURE — 25010000002 ONDANSETRON PER 1 MG: Performed by: OBSTETRICS & GYNECOLOGY

## 2019-04-22 PROCEDURE — 85025 COMPLETE CBC W/AUTO DIFF WBC: CPT | Performed by: OBSTETRICS & GYNECOLOGY

## 2019-04-22 PROCEDURE — 25010000002 PROMETHAZINE PER 50 MG: Performed by: OBSTETRICS & GYNECOLOGY

## 2019-04-22 PROCEDURE — 99253 IP/OBS CNSLTJ NEW/EST LOW 45: CPT | Performed by: INTERNAL MEDICINE

## 2019-04-22 PROCEDURE — 25010000002 CEFTRIAXONE PER 250 MG: Performed by: NURSE PRACTITIONER

## 2019-04-22 RX ORDER — CEFTRIAXONE SODIUM 1 G/50ML
1 INJECTION, SOLUTION INTRAVENOUS EVERY 24 HOURS
Status: COMPLETED | OUTPATIENT
Start: 2019-04-22 | End: 2019-04-23

## 2019-04-22 RX ORDER — PANTOPRAZOLE SODIUM 40 MG/10ML
40 INJECTION, POWDER, LYOPHILIZED, FOR SOLUTION INTRAVENOUS
Status: DISCONTINUED | OUTPATIENT
Start: 2019-04-22 | End: 2019-04-23

## 2019-04-22 RX ADMIN — METHYLPREDNISOLONE SODIUM SUCCINATE 16 MG: 40 INJECTION, POWDER, FOR SOLUTION INTRAMUSCULAR; INTRAVENOUS at 08:04

## 2019-04-22 RX ADMIN — METHYLPREDNISOLONE SODIUM SUCCINATE 16 MG: 40 INJECTION, POWDER, FOR SOLUTION INTRAMUSCULAR; INTRAVENOUS at 16:18

## 2019-04-22 RX ADMIN — ONDANSETRON HYDROCHLORIDE 8 MG: 2 SOLUTION INTRAMUSCULAR; INTRAVENOUS at 17:02

## 2019-04-22 RX ADMIN — SODIUM CHLORIDE, POTASSIUM CHLORIDE, SODIUM LACTATE AND CALCIUM CHLORIDE 125 ML/HR: 600; 310; 30; 20 INJECTION, SOLUTION INTRAVENOUS at 18:45

## 2019-04-22 RX ADMIN — SERTRALINE 50 MG: 50 TABLET, FILM COATED ORAL at 09:04

## 2019-04-22 RX ADMIN — PANTOPRAZOLE SODIUM 40 MG: 40 INJECTION, POWDER, FOR SOLUTION INTRAVENOUS at 11:44

## 2019-04-22 RX ADMIN — PROMETHAZINE HYDROCHLORIDE 25 MG: 25 INJECTION INTRAMUSCULAR; INTRAVENOUS at 08:15

## 2019-04-22 RX ADMIN — SODIUM CHLORIDE, POTASSIUM CHLORIDE, SODIUM LACTATE AND CALCIUM CHLORIDE 125 ML/HR: 600; 310; 30; 20 INJECTION, SOLUTION INTRAVENOUS at 08:05

## 2019-04-22 RX ADMIN — METHYLPREDNISOLONE SODIUM SUCCINATE 16 MG: 40 INJECTION, POWDER, FOR SOLUTION INTRAMUSCULAR; INTRAVENOUS at 23:24

## 2019-04-22 RX ADMIN — ONDANSETRON HYDROCHLORIDE 8 MG: 2 SOLUTION INTRAMUSCULAR; INTRAVENOUS at 01:55

## 2019-04-22 RX ADMIN — CEFTRIAXONE SODIUM 1 G: 1 INJECTION, SOLUTION INTRAVENOUS at 11:49

## 2019-04-23 PROCEDURE — 99232 SBSQ HOSP IP/OBS MODERATE 35: CPT | Performed by: INTERNAL MEDICINE

## 2019-04-23 PROCEDURE — 25010000002 ONDANSETRON PER 1 MG: Performed by: OBSTETRICS & GYNECOLOGY

## 2019-04-23 PROCEDURE — 25010000002 METHYLPREDNISOLONE PER 40 MG: Performed by: OBSTETRICS & GYNECOLOGY

## 2019-04-23 PROCEDURE — 63710000001 METHYLPREDNISOLONE PER 4 MG: Performed by: NURSE PRACTITIONER

## 2019-04-23 PROCEDURE — 25010000002 CEFTRIAXONE PER 250 MG: Performed by: NURSE PRACTITIONER

## 2019-04-23 RX ORDER — METHYLPREDNISOLONE 8 MG/1
16 TABLET ORAL 2 TIMES DAILY WITH MEALS
Status: DISCONTINUED | OUTPATIENT
Start: 2019-04-23 | End: 2019-04-24 | Stop reason: HOSPADM

## 2019-04-23 RX ORDER — SODIUM CHLORIDE 0.9 % (FLUSH) 0.9 %
5 SYRINGE (ML) INJECTION AS NEEDED
Status: DISCONTINUED | OUTPATIENT
Start: 2019-04-23 | End: 2019-04-24 | Stop reason: HOSPADM

## 2019-04-23 RX ORDER — PROMETHAZINE HYDROCHLORIDE 25 MG/1
25 TABLET ORAL EVERY 4 HOURS PRN
Status: DISCONTINUED | OUTPATIENT
Start: 2019-04-23 | End: 2019-04-24 | Stop reason: HOSPADM

## 2019-04-23 RX ORDER — SODIUM CHLORIDE 0.9 % (FLUSH) 0.9 %
3 SYRINGE (ML) INJECTION EVERY 12 HOURS
Status: DISCONTINUED | OUTPATIENT
Start: 2019-04-23 | End: 2019-04-24 | Stop reason: HOSPADM

## 2019-04-23 RX ORDER — ONDANSETRON HYDROCHLORIDE 8 MG/1
8 TABLET, FILM COATED ORAL 3 TIMES DAILY
Status: DISCONTINUED | OUTPATIENT
Start: 2019-04-23 | End: 2019-04-24 | Stop reason: HOSPADM

## 2019-04-23 RX ORDER — PANTOPRAZOLE SODIUM 40 MG/1
40 TABLET, DELAYED RELEASE ORAL
Status: DISCONTINUED | OUTPATIENT
Start: 2019-04-24 | End: 2019-04-24 | Stop reason: HOSPADM

## 2019-04-23 RX ORDER — METOCLOPRAMIDE 5 MG/1
5 TABLET ORAL
Status: DISCONTINUED | OUTPATIENT
Start: 2019-04-23 | End: 2019-04-24 | Stop reason: HOSPADM

## 2019-04-23 RX ADMIN — CEFTRIAXONE SODIUM 1 G: 1 INJECTION, SOLUTION INTRAVENOUS at 12:36

## 2019-04-23 RX ADMIN — SUCRALFATE 1 G: 1 TABLET ORAL at 11:57

## 2019-04-23 RX ADMIN — SODIUM CHLORIDE, POTASSIUM CHLORIDE, SODIUM LACTATE AND CALCIUM CHLORIDE 125 ML/HR: 600; 310; 30; 20 INJECTION, SOLUTION INTRAVENOUS at 02:20

## 2019-04-23 RX ADMIN — METHYLPREDNISOLONE 16 MG: 8 TABLET ORAL at 21:54

## 2019-04-23 RX ADMIN — SODIUM CHLORIDE, PRESERVATIVE FREE 3 ML: 5 INJECTION INTRAVENOUS at 13:10

## 2019-04-23 RX ADMIN — PANTOPRAZOLE SODIUM 40 MG: 40 INJECTION, POWDER, FOR SOLUTION INTRAVENOUS at 06:31

## 2019-04-23 RX ADMIN — SUCRALFATE 1 G: 1 TABLET ORAL at 09:15

## 2019-04-23 RX ADMIN — ONDANSETRON 8 MG: 8 TABLET, FILM COATED ORAL at 15:46

## 2019-04-23 RX ADMIN — ONDANSETRON 8 MG: 8 TABLET, FILM COATED ORAL at 21:59

## 2019-04-23 RX ADMIN — METHYLPREDNISOLONE SODIUM SUCCINATE 16 MG: 40 INJECTION, POWDER, FOR SOLUTION INTRAMUSCULAR; INTRAVENOUS at 09:01

## 2019-04-23 RX ADMIN — ONDANSETRON HYDROCHLORIDE 8 MG: 2 SOLUTION INTRAMUSCULAR; INTRAVENOUS at 09:14

## 2019-04-23 RX ADMIN — SUCRALFATE 1 G: 1 TABLET ORAL at 21:51

## 2019-04-23 RX ADMIN — SERTRALINE 50 MG: 50 TABLET, FILM COATED ORAL at 09:14

## 2019-04-24 VITALS
RESPIRATION RATE: 18 BRPM | HEIGHT: 65 IN | HEART RATE: 81 BPM | WEIGHT: 177.6 LBS | SYSTOLIC BLOOD PRESSURE: 103 MMHG | TEMPERATURE: 98 F | DIASTOLIC BLOOD PRESSURE: 66 MMHG | OXYGEN SATURATION: 97 % | BODY MASS INDEX: 29.59 KG/M2

## 2019-04-24 PROCEDURE — 25010000002 CEFTRIAXONE PER 250 MG: Performed by: NURSE PRACTITIONER

## 2019-04-24 PROCEDURE — 99231 SBSQ HOSP IP/OBS SF/LOW 25: CPT | Performed by: INTERNAL MEDICINE

## 2019-04-24 PROCEDURE — 63710000001 METHYLPREDNISOLONE PER 4 MG: Performed by: NURSE PRACTITIONER

## 2019-04-24 RX ORDER — METHYLPREDNISOLONE 16 MG/1
16 TABLET ORAL 2 TIMES DAILY WITH MEALS
Qty: 28 TABLET | Refills: 0 | Status: ON HOLD | OUTPATIENT
Start: 2019-04-24 | End: 2019-06-02 | Stop reason: SDUPTHER

## 2019-04-24 RX ORDER — CEFTRIAXONE SODIUM 1 G/50ML
1 INJECTION, SOLUTION INTRAVENOUS EVERY 24 HOURS
Status: COMPLETED | OUTPATIENT
Start: 2019-04-24 | End: 2019-04-24

## 2019-04-24 RX ORDER — PANTOPRAZOLE SODIUM 40 MG/1
40 TABLET, DELAYED RELEASE ORAL DAILY
Qty: 30 TABLET | Refills: 0 | Status: SHIPPED | OUTPATIENT
Start: 2019-04-24 | End: 2019-10-25 | Stop reason: HOSPADM

## 2019-04-24 RX ADMIN — ONDANSETRON 8 MG: 8 TABLET, FILM COATED ORAL at 08:37

## 2019-04-24 RX ADMIN — METHYLPREDNISOLONE 16 MG: 8 TABLET ORAL at 10:29

## 2019-04-24 RX ADMIN — CEFTRIAXONE SODIUM 1 G: 1 INJECTION, SOLUTION INTRAVENOUS at 12:12

## 2019-04-24 RX ADMIN — SUCRALFATE 1 G: 1 TABLET ORAL at 08:32

## 2019-04-24 RX ADMIN — METOCLOPRAMIDE 5 MG: 5 TABLET ORAL at 12:41

## 2019-04-24 RX ADMIN — SUCRALFATE 1 G: 1 TABLET ORAL at 12:38

## 2019-04-24 RX ADMIN — SODIUM CHLORIDE, PRESERVATIVE FREE 3 ML: 5 INJECTION INTRAVENOUS at 12:40

## 2019-04-24 RX ADMIN — PANTOPRAZOLE SODIUM 40 MG: 40 TABLET, DELAYED RELEASE ORAL at 06:44

## 2019-04-24 RX ADMIN — SODIUM CHLORIDE, PRESERVATIVE FREE 5 ML: 5 INJECTION INTRAVENOUS at 12:12

## 2019-04-24 RX ADMIN — SODIUM CHLORIDE, PRESERVATIVE FREE 10 ML: 5 INJECTION INTRAVENOUS at 02:30

## 2019-04-24 RX ADMIN — SERTRALINE 50 MG: 50 TABLET, FILM COATED ORAL at 09:09

## 2019-05-31 ENCOUNTER — HOSPITAL ENCOUNTER (OUTPATIENT)
Facility: HOSPITAL | Age: 34
Setting detail: OBSERVATION
Discharge: HOME | End: 2019-06-02
Attending: OBSTETRICS & GYNECOLOGY | Admitting: OBSTETRICS & GYNECOLOGY

## 2019-05-31 PROBLEM — O21.0 HYPEREMESIS AFFECTING PREGNANCY, ANTEPARTUM: Status: ACTIVE | Noted: 2019-05-31

## 2019-05-31 LAB
ALBUMIN SERPL-MCNC: 4.1 G/DL (ref 3.5–5.2)
ALBUMIN/GLOB SERPL: 1.6 G/DL
ALP SERPL-CCNC: 88 U/L (ref 39–117)
ALT SERPL W P-5'-P-CCNC: 9 U/L (ref 1–33)
AMORPH URATE CRY URNS QL MICRO: ABNORMAL /HPF
ANION GAP SERPL CALCULATED.3IONS-SCNC: 12.6 MMOL/L
AST SERPL-CCNC: 15 U/L (ref 1–32)
BACTERIA UR QL AUTO: ABNORMAL /HPF
BASOPHILS # BLD AUTO: 0.03 10*3/MM3 (ref 0–0.2)
BASOPHILS NFR BLD AUTO: 0.2 % (ref 0–1.5)
BILIRUB SERPL-MCNC: 0.5 MG/DL (ref 0.2–1.2)
BILIRUB UR QL STRIP: NEGATIVE
BUN BLD-MCNC: 7 MG/DL (ref 6–20)
BUN/CREAT SERPL: 12.3 (ref 7–25)
CALCIUM SPEC-SCNC: 9.2 MG/DL (ref 8.6–10.5)
CHLORIDE SERPL-SCNC: 102 MMOL/L (ref 98–107)
CLARITY UR: ABNORMAL
CO2 SERPL-SCNC: 21.4 MMOL/L (ref 22–29)
COLOR UR: YELLOW
CREAT BLD-MCNC: 0.57 MG/DL (ref 0.57–1)
DEPRECATED RDW RBC AUTO: 48.9 FL (ref 37–54)
EOSINOPHIL # BLD AUTO: 0.01 10*3/MM3 (ref 0–0.4)
EOSINOPHIL NFR BLD AUTO: 0.1 % (ref 0.3–6.2)
ERYTHROCYTE [DISTWIDTH] IN BLOOD BY AUTOMATED COUNT: 14.2 % (ref 12.3–15.4)
GFR SERPL CREATININE-BSD FRML MDRD: 123 ML/MIN/1.73
GLOBULIN UR ELPH-MCNC: 2.6 GM/DL
GLUCOSE BLD-MCNC: 121 MG/DL (ref 65–99)
GLUCOSE UR STRIP-MCNC: NEGATIVE MG/DL
HCT VFR BLD AUTO: 38.3 % (ref 34–46.6)
HGB BLD-MCNC: 12.3 G/DL (ref 12–15.9)
HGB UR QL STRIP.AUTO: ABNORMAL
HYALINE CASTS UR QL AUTO: ABNORMAL /LPF
IMM GRANULOCYTES # BLD AUTO: 0.09 10*3/MM3 (ref 0–0.05)
IMM GRANULOCYTES NFR BLD AUTO: 0.5 % (ref 0–0.5)
KETONES UR QL STRIP: ABNORMAL
LEUKOCYTE ESTERASE UR QL STRIP.AUTO: ABNORMAL
LYMPHOCYTES # BLD AUTO: 1.12 10*3/MM3 (ref 0.7–3.1)
LYMPHOCYTES NFR BLD AUTO: 5.8 % (ref 19.6–45.3)
MCH RBC QN AUTO: 30.1 PG (ref 26.6–33)
MCHC RBC AUTO-ENTMCNC: 32.1 G/DL (ref 31.5–35.7)
MCV RBC AUTO: 93.9 FL (ref 79–97)
MONOCYTES # BLD AUTO: 0.69 10*3/MM3 (ref 0.1–0.9)
MONOCYTES NFR BLD AUTO: 3.6 % (ref 5–12)
NEUTROPHILS # BLD AUTO: 17.46 10*3/MM3 (ref 1.7–7)
NEUTROPHILS NFR BLD AUTO: 89.8 % (ref 42.7–76)
NITRITE UR QL STRIP: NEGATIVE
NRBC BLD AUTO-RTO: 0 /100 WBC (ref 0–0.2)
PH UR STRIP.AUTO: 7.5 [PH] (ref 5–8)
PLATELET # BLD AUTO: 239 10*3/MM3 (ref 140–450)
PMV BLD AUTO: 9.6 FL (ref 6–12)
POTASSIUM BLD-SCNC: 3.7 MMOL/L (ref 3.5–5.2)
PROT SERPL-MCNC: 6.7 G/DL (ref 6–8.5)
PROT UR QL STRIP: ABNORMAL
RBC # BLD AUTO: 4.08 10*6/MM3 (ref 3.77–5.28)
RBC # UR: ABNORMAL /HPF
REF LAB TEST METHOD: ABNORMAL
SODIUM BLD-SCNC: 136 MMOL/L (ref 136–145)
SP GR UR STRIP: 1.02 (ref 1–1.03)
SQUAMOUS #/AREA URNS HPF: ABNORMAL /HPF
UROBILINOGEN UR QL STRIP: ABNORMAL
WBC NRBC COR # BLD: 19.4 10*3/MM3 (ref 3.4–10.8)
WBC UR QL AUTO: ABNORMAL /HPF

## 2019-05-31 PROCEDURE — 80053 COMPREHEN METABOLIC PANEL: CPT | Performed by: OBSTETRICS & GYNECOLOGY

## 2019-05-31 PROCEDURE — 85025 COMPLETE CBC W/AUTO DIFF WBC: CPT | Performed by: OBSTETRICS & GYNECOLOGY

## 2019-05-31 PROCEDURE — 25010000002 METHYLPREDNISOLONE PER 40 MG: Performed by: OBSTETRICS & GYNECOLOGY

## 2019-05-31 PROCEDURE — 25010000002 THIAMINE PER 100 MG: Performed by: OBSTETRICS & GYNECOLOGY

## 2019-05-31 PROCEDURE — 96366 THER/PROPH/DIAG IV INF ADDON: CPT

## 2019-05-31 PROCEDURE — 96361 HYDRATE IV INFUSION ADD-ON: CPT

## 2019-05-31 PROCEDURE — 81001 URINALYSIS AUTO W/SCOPE: CPT | Performed by: OBSTETRICS & GYNECOLOGY

## 2019-05-31 PROCEDURE — 25010000002 PROMETHAZINE PER 50 MG: Performed by: OBSTETRICS & GYNECOLOGY

## 2019-05-31 PROCEDURE — 25010000002 DIPHENHYDRAMINE PER 50 MG: Performed by: OBSTETRICS & GYNECOLOGY

## 2019-05-31 PROCEDURE — 96365 THER/PROPH/DIAG IV INF INIT: CPT

## 2019-05-31 PROCEDURE — 96376 TX/PRO/DX INJ SAME DRUG ADON: CPT

## 2019-05-31 PROCEDURE — G0378 HOSPITAL OBSERVATION PER HR: HCPCS

## 2019-05-31 PROCEDURE — 96375 TX/PRO/DX INJ NEW DRUG ADDON: CPT

## 2019-05-31 PROCEDURE — 25010000002 ONDANSETRON PER 1 MG: Performed by: OBSTETRICS & GYNECOLOGY

## 2019-05-31 PROCEDURE — 87086 URINE CULTURE/COLONY COUNT: CPT | Performed by: OBSTETRICS & GYNECOLOGY

## 2019-05-31 RX ORDER — SODIUM CHLORIDE 0.9 % (FLUSH) 0.9 %
3-10 SYRINGE (ML) INJECTION AS NEEDED
Status: DISCONTINUED | OUTPATIENT
Start: 2019-05-31 | End: 2019-06-01

## 2019-05-31 RX ORDER — METHYLPREDNISOLONE SODIUM SUCCINATE 40 MG/ML
16 INJECTION, POWDER, LYOPHILIZED, FOR SOLUTION INTRAMUSCULAR; INTRAVENOUS EVERY 8 HOURS
Status: DISCONTINUED | OUTPATIENT
Start: 2019-05-31 | End: 2019-06-01

## 2019-05-31 RX ORDER — PROMETHAZINE HYDROCHLORIDE 25 MG/ML
12.5 INJECTION, SOLUTION INTRAMUSCULAR; INTRAVENOUS EVERY 6 HOURS PRN
Status: DISCONTINUED | OUTPATIENT
Start: 2019-05-31 | End: 2019-06-02 | Stop reason: HOSPADM

## 2019-05-31 RX ORDER — ONDANSETRON 2 MG/ML
8 INJECTION INTRAMUSCULAR; INTRAVENOUS EVERY 8 HOURS PRN
Status: DISCONTINUED | OUTPATIENT
Start: 2019-05-31 | End: 2019-06-02 | Stop reason: HOSPADM

## 2019-05-31 RX ORDER — DEXTROSE, SODIUM CHLORIDE, SODIUM LACTATE, POTASSIUM CHLORIDE, AND CALCIUM CHLORIDE 5; .6; .31; .03; .02 G/100ML; G/100ML; G/100ML; G/100ML; G/100ML
125 INJECTION, SOLUTION INTRAVENOUS CONTINUOUS
Status: DISCONTINUED | OUTPATIENT
Start: 2019-05-31 | End: 2019-06-01

## 2019-05-31 RX ORDER — DIPHENHYDRAMINE HYDROCHLORIDE 50 MG/ML
12.5 INJECTION INTRAMUSCULAR; INTRAVENOUS EVERY 6 HOURS PRN
Status: DISCONTINUED | OUTPATIENT
Start: 2019-05-31 | End: 2019-06-02 | Stop reason: HOSPADM

## 2019-05-31 RX ORDER — SODIUM CHLORIDE 0.9 % (FLUSH) 0.9 %
3 SYRINGE (ML) INJECTION EVERY 12 HOURS SCHEDULED
Status: DISCONTINUED | OUTPATIENT
Start: 2019-05-31 | End: 2019-06-01

## 2019-05-31 RX ORDER — LIDOCAINE HYDROCHLORIDE 10 MG/ML
5 INJECTION, SOLUTION EPIDURAL; INFILTRATION; INTRACAUDAL; PERINEURAL AS NEEDED
Status: DISCONTINUED | OUTPATIENT
Start: 2019-05-31 | End: 2019-06-02 | Stop reason: HOSPADM

## 2019-05-31 RX ORDER — FAMOTIDINE 10 MG/ML
20 INJECTION, SOLUTION INTRAVENOUS 2 TIMES DAILY
Status: DISCONTINUED | OUTPATIENT
Start: 2019-05-31 | End: 2019-06-01

## 2019-05-31 RX ADMIN — PROMETHAZINE HYDROCHLORIDE 12.5 MG: 25 INJECTION INTRAMUSCULAR; INTRAVENOUS at 22:03

## 2019-05-31 RX ADMIN — PROMETHAZINE HYDROCHLORIDE 12.5 MG: 25 INJECTION INTRAMUSCULAR; INTRAVENOUS at 14:44

## 2019-05-31 RX ADMIN — FAMOTIDINE 20 MG: 10 INJECTION INTRAVENOUS at 19:24

## 2019-05-31 RX ADMIN — METHYLPREDNISOLONE SODIUM SUCCINATE 16 MG: 40 INJECTION, POWDER, FOR SOLUTION INTRAMUSCULAR; INTRAVENOUS at 23:13

## 2019-05-31 RX ADMIN — DIPHENHYDRAMINE HYDROCHLORIDE 12.5 MG: 50 INJECTION, SOLUTION INTRAMUSCULAR; INTRAVENOUS at 22:02

## 2019-05-31 RX ADMIN — METHYLPREDNISOLONE SODIUM SUCCINATE 16 MG: 40 INJECTION, POWDER, FOR SOLUTION INTRAMUSCULAR; INTRAVENOUS at 15:16

## 2019-05-31 RX ADMIN — ONDANSETRON HYDROCHLORIDE 8 MG: 2 SOLUTION INTRAMUSCULAR; INTRAVENOUS at 15:39

## 2019-05-31 RX ADMIN — SODIUM CHLORIDE, PRESERVATIVE FREE 3 ML: 5 INJECTION INTRAVENOUS at 23:18

## 2019-05-31 RX ADMIN — SODIUM CHLORIDE, POTASSIUM CHLORIDE, SODIUM LACTATE AND CALCIUM CHLORIDE 1000 ML: 600; 310; 30; 20 INJECTION, SOLUTION INTRAVENOUS at 14:30

## 2019-05-31 RX ADMIN — FOLIC ACID 125 ML/HR: 5 INJECTION, SOLUTION INTRAMUSCULAR; INTRAVENOUS; SUBCUTANEOUS at 15:43

## 2019-06-01 PROCEDURE — 96361 HYDRATE IV INFUSION ADD-ON: CPT

## 2019-06-01 PROCEDURE — 25010000002 ONDANSETRON PER 1 MG: Performed by: OBSTETRICS & GYNECOLOGY

## 2019-06-01 PROCEDURE — G0378 HOSPITAL OBSERVATION PER HR: HCPCS

## 2019-06-01 PROCEDURE — 96376 TX/PRO/DX INJ SAME DRUG ADON: CPT

## 2019-06-01 PROCEDURE — 63710000001 PREDNISONE PER 1 MG: Performed by: OBSTETRICS & GYNECOLOGY

## 2019-06-01 PROCEDURE — 25010000002 METHYLPREDNISOLONE PER 40 MG: Performed by: OBSTETRICS & GYNECOLOGY

## 2019-06-01 PROCEDURE — 25010000002 PROMETHAZINE PER 50 MG: Performed by: OBSTETRICS & GYNECOLOGY

## 2019-06-01 PROCEDURE — 25010000002 THIAMINE PER 100 MG: Performed by: OBSTETRICS & GYNECOLOGY

## 2019-06-01 PROCEDURE — 96366 THER/PROPH/DIAG IV INF ADDON: CPT

## 2019-06-01 PROCEDURE — 25010000002 DIPHENHYDRAMINE PER 50 MG: Performed by: OBSTETRICS & GYNECOLOGY

## 2019-06-01 RX ORDER — FAMOTIDINE 20 MG/1
20 TABLET, FILM COATED ORAL 2 TIMES DAILY
Status: DISCONTINUED | OUTPATIENT
Start: 2019-06-01 | End: 2019-06-02 | Stop reason: HOSPADM

## 2019-06-01 RX ORDER — PROMETHAZINE HYDROCHLORIDE 25 MG/1
12.5 TABLET ORAL EVERY 6 HOURS PRN
Status: DISCONTINUED | OUTPATIENT
Start: 2019-06-01 | End: 2019-06-02 | Stop reason: HOSPADM

## 2019-06-01 RX ORDER — ONDANSETRON 4 MG/1
8 TABLET, FILM COATED ORAL EVERY 8 HOURS PRN
Status: DISCONTINUED | OUTPATIENT
Start: 2019-06-01 | End: 2019-06-02 | Stop reason: HOSPADM

## 2019-06-01 RX ORDER — PREDNISONE 20 MG/1
20 TABLET ORAL EVERY 8 HOURS SCHEDULED
Status: DISCONTINUED | OUTPATIENT
Start: 2019-06-01 | End: 2019-06-02 | Stop reason: HOSPADM

## 2019-06-01 RX ADMIN — SODIUM CHLORIDE, PRESERVATIVE FREE 10 ML: 5 INJECTION INTRAVENOUS at 03:58

## 2019-06-01 RX ADMIN — FOLIC ACID 125 ML/HR: 5 INJECTION, SOLUTION INTRAMUSCULAR; INTRAVENOUS; SUBCUTANEOUS at 07:48

## 2019-06-01 RX ADMIN — SODIUM CHLORIDE, PRESERVATIVE FREE 10 ML: 5 INJECTION INTRAVENOUS at 03:53

## 2019-06-01 RX ADMIN — METHYLPREDNISOLONE SODIUM SUCCINATE 16 MG: 40 INJECTION, POWDER, FOR SOLUTION INTRAMUSCULAR; INTRAVENOUS at 15:35

## 2019-06-01 RX ADMIN — FAMOTIDINE 20 MG: 20 TABLET, FILM COATED ORAL at 20:53

## 2019-06-01 RX ADMIN — METHYLPREDNISOLONE SODIUM SUCCINATE 16 MG: 40 INJECTION, POWDER, FOR SOLUTION INTRAMUSCULAR; INTRAVENOUS at 07:02

## 2019-06-01 RX ADMIN — FAMOTIDINE 20 MG: 10 INJECTION INTRAVENOUS at 09:35

## 2019-06-01 RX ADMIN — SODIUM CHLORIDE, PRESERVATIVE FREE 10 ML: 5 INJECTION INTRAVENOUS at 07:04

## 2019-06-01 RX ADMIN — SODIUM CHLORIDE, SODIUM LACTATE, POTASSIUM CHLORIDE, CALCIUM CHLORIDE AND DEXTROSE MONOHYDRATE 125 ML/HR: 5; 600; 310; 30; 20 INJECTION, SOLUTION INTRAVENOUS at 15:38

## 2019-06-01 RX ADMIN — DIPHENHYDRAMINE HYDROCHLORIDE 12.5 MG: 50 INJECTION, SOLUTION INTRAMUSCULAR; INTRAVENOUS at 03:50

## 2019-06-01 RX ADMIN — SODIUM CHLORIDE, SODIUM LACTATE, POTASSIUM CHLORIDE, CALCIUM CHLORIDE AND DEXTROSE MONOHYDRATE 125 ML/HR: 5; 600; 310; 30; 20 INJECTION, SOLUTION INTRAVENOUS at 00:02

## 2019-06-01 RX ADMIN — ONDANSETRON HYDROCHLORIDE 8 MG: 2 SOLUTION INTRAMUSCULAR; INTRAVENOUS at 14:02

## 2019-06-01 RX ADMIN — PROMETHAZINE HYDROCHLORIDE 12.5 MG: 25 INJECTION INTRAMUSCULAR; INTRAVENOUS at 03:56

## 2019-06-01 RX ADMIN — PREDNISONE 20 MG: 20 TABLET ORAL at 22:32

## 2019-06-01 NOTE — NURSING NOTE
Pt IV infiltrated.  Pt reports feeling better and has not vomited today.  Pt requesting oral medication in place of IV medication.  Dr. Delgado notified.  MD states to d/c banana bag switch meds over to oral.  MD will address Solu-Medrol conversion to oral prednisone.   See orders.

## 2019-06-01 NOTE — PROGRESS NOTES
[facility]  Obstetric Progress Note    Patient Name: Sara Hoang  :  1985  MRN:  [id number]  Hospital Day: 1  EGA: 19w0d    hyperemesis    Subjective   Feels much better today than yesterday admission. Tolerating a little clears.... Feels a lot better on steroids and both times after stopped, was admitted to hosptial      Objective     VS:  Vital Signs Range for the last 24 hours  Temperature: Temp:  [98.2 °F (36.8 °C)-98.9 °F (37.2 °C)] 98.2 °F (36.8 °C)   Temp Source: Temp src: Oral   BP: BP: ()/(50-90) 90/50   Pulse: Heart Rate:  [84-92] 84   Respirations: Resp:  [18] 18                   Physical Examination:     General :  Alert in NAD  Abdomen: nontender        Lab results reviewed:  CBC:   Lab Results   Component Value Date    WBC 19.40 (H) 2019    HGB 12.3 2019    HCT 38.3 2019            A/P:      Hyperemesis affecting pregnancy, antepartum      17wk with hyperemesis -- feels a lot better on steroids. Adv diet prn.  Recently had IV to infiltrate. She wants to try oral meds.    Changed to prednisone 20mg po in place of 16mg iv solumetrol        Yoly Delgado MD  2019  6:04 PM

## 2019-06-01 NOTE — PLAN OF CARE
Problem: Patient Care Overview  Goal: Plan of Care Review  Outcome: Ongoing (interventions implemented as appropriate)   06/01/19 0435 06/01/19 1812   Coping/Psychosocial   Plan of Care Reviewed With patient;spouse --    Plan of Care Review   Progress improving --    OTHER   Outcome Summary --  Pt has had 0 episodes of emesis or diarrhea today. Tolerating clear liquids well. IVFs stopped. Advancing diet. VSS.     Goal: Individualization and Mutuality  Outcome: Ongoing (interventions implemented as appropriate)   05/31/19 1624 06/01/19 1812   Individualization   Patient Specific Preferences --  Wear hospital gown   Patient Specific Goals (Include Timeframe) to feel better and stop vomiting, to be moved to antepartum unit.  --    Patient Specific Interventions --  Anti-emetics as ordered; steroids as ordered; advance diet   Mutuality/Individual Preferences   What Anxieties, Fears, Concerns, or Questions Do You Have About Your Care? --  Re-occurence of n/v   What Information Would Help Us Give You More Personalized Care? --  As above   How Would You and/or Your Support Person Like to Participate in Your Care? --  Remain at bedside; be educated on progression   Mutuality/Individual Preferences   How to Address Anxieties/Fears --  Discuss r/b/a of long-term steroid use.     Goal: Discharge Needs Assessment  Outcome: Ongoing (interventions implemented as appropriate)   05/31/19 1959 06/01/19 1812   Discharge Needs Assessment   Readmission Within the Last 30 Days --  no previous admission in last 30 days   Concerns to be Addressed --  no discharge needs identified   Patient/Family Anticipates Transition to --  home;home with family   Patient/Family Anticipated Services at Transition --  none   Transportation Concerns --  car, none   Transportation Anticipated --  car, drives self;family or friend will provide   Anticipated Changes Related to Illness --  none   Disability   Equipment Currently Used at Home none --       Goal: Interprofessional Rounds/Family Conf  Outcome: Ongoing (interventions implemented as appropriate)   06/01/19 1812   Interdisciplinary Rounds/Family Conf   Summary POC discussed   Participants nursing;patient;physician       Problem: Hyperemesis Gravidarum (Adult,Obstetrics,Pediatric)  Goal: Signs and Symptoms of Listed Potential Problems Will be Absent, Minimized or Managed (Hyperemesis Gravidarum)  Outcome: Ongoing (interventions implemented as appropriate)   05/31/19 1624   Goal/Outcome Evaluation   Problems Assessed (Nausea/Vomiting/Hyperemesis in Pregnancy) all   Problems Present (Hyperemesis) nutritional deficiency/inadequate oral intake       Problem: Prenatal Patient, Hospitalized (Adult,Obstetrics,Pediatric)  Goal: Signs and Symptoms of Listed Potential Problems Will be Absent, Minimized or Managed (Prenatal Patient, Hospitalized)  Outcome: Ongoing (interventions implemented as appropriate)   06/01/19 1812   Goal/Outcome Evaluation   Problems Assessed (Prenatal Patient) all   Problems Present (Prenatal Patient) none

## 2019-06-01 NOTE — PLAN OF CARE
Problem: Patient Care Overview  Goal: Plan of Care Review  Outcome: Ongoing (interventions implemented as appropriate)   06/01/19 0435   Coping/Psychosocial   Plan of Care Reviewed With patient;spouse   Plan of Care Review   Progress improving     Goal: Individualization and Mutuality  Outcome: Ongoing (interventions implemented as appropriate)   05/31/19 1624   Individualization   Patient Specific Goals (Include Timeframe) to feel better and stop vomiting, to be moved to antepartum unit.    Patient Specific Interventions i.v. fluids d5 lr @125 cc/hr, banana bag, phenergan and zofran iv as needed for nausea, solu-medrol iv every 8 hours scheduled       Problem: Hyperemesis Gravidarum (Adult,Obstetrics,Pediatric)  Goal: Signs and Symptoms of Listed Potential Problems Will be Absent, Minimized or Managed (Hyperemesis Gravidarum)  Outcome: Ongoing (interventions implemented as appropriate)   05/31/19 7284   Goal/Outcome Evaluation   Problems Assessed (Nausea/Vomiting/Hyperemesis in Pregnancy) all   Problems Present (Hyperemesis) nutritional deficiency/inadequate oral intake

## 2019-06-02 VITALS
TEMPERATURE: 97.9 F | OXYGEN SATURATION: 98 % | BODY MASS INDEX: 29.55 KG/M2 | HEART RATE: 77 BPM | RESPIRATION RATE: 18 BRPM | SYSTOLIC BLOOD PRESSURE: 113 MMHG | HEIGHT: 65 IN | DIASTOLIC BLOOD PRESSURE: 56 MMHG

## 2019-06-02 LAB
BACTERIA SPEC AEROBE CULT: NORMAL
DEPRECATED RDW RBC AUTO: 49.7 FL (ref 37–54)
ERYTHROCYTE [DISTWIDTH] IN BLOOD BY AUTOMATED COUNT: 14.2 % (ref 12.3–15.4)
HCT VFR BLD AUTO: 33.5 % (ref 34–46.6)
HGB BLD-MCNC: 10.7 G/DL (ref 12–15.9)
MCH RBC QN AUTO: 30.2 PG (ref 26.6–33)
MCHC RBC AUTO-ENTMCNC: 31.9 G/DL (ref 31.5–35.7)
MCV RBC AUTO: 94.6 FL (ref 79–97)
PLATELET # BLD AUTO: 226 10*3/MM3 (ref 140–450)
PMV BLD AUTO: 9.8 FL (ref 6–12)
RBC # BLD AUTO: 3.54 10*6/MM3 (ref 3.77–5.28)
WBC NRBC COR # BLD: 17.22 10*3/MM3 (ref 3.4–10.8)

## 2019-06-02 PROCEDURE — 85027 COMPLETE CBC AUTOMATED: CPT | Performed by: OBSTETRICS & GYNECOLOGY

## 2019-06-02 PROCEDURE — 63710000001 PREDNISONE PER 1 MG: Performed by: OBSTETRICS & GYNECOLOGY

## 2019-06-02 PROCEDURE — G0378 HOSPITAL OBSERVATION PER HR: HCPCS

## 2019-06-02 RX ORDER — PROMETHAZINE HYDROCHLORIDE 25 MG/1
25 TABLET ORAL EVERY 6 HOURS PRN
Qty: 20 TABLET | Refills: 0 | Status: SHIPPED | OUTPATIENT
Start: 2019-06-02 | End: 2019-10-25 | Stop reason: HOSPADM

## 2019-06-02 RX ORDER — ONDANSETRON HYDROCHLORIDE 8 MG/1
8 TABLET, FILM COATED ORAL EVERY 8 HOURS PRN
Qty: 20 TABLET | Refills: 0 | Status: SHIPPED | OUTPATIENT
Start: 2019-06-02 | End: 2019-10-25 | Stop reason: HOSPADM

## 2019-06-02 RX ORDER — METHYLPREDNISOLONE 16 MG/1
16 TABLET ORAL 2 TIMES DAILY WITH MEALS
Qty: 28 TABLET | Refills: 0 | Status: SHIPPED | OUTPATIENT
Start: 2019-06-02 | End: 2019-10-25 | Stop reason: HOSPADM

## 2019-06-02 RX ADMIN — PREDNISONE 20 MG: 20 TABLET ORAL at 06:27

## 2019-06-02 RX ADMIN — FAMOTIDINE 20 MG: 20 TABLET, FILM COATED ORAL at 08:55

## 2019-06-02 NOTE — PLAN OF CARE
Problem: Patient Care Overview  Goal: Plan of Care Review  Outcome: Ongoing (interventions implemented as appropriate)   06/01/19 0435 06/01/19 1812 06/02/19 0343   Coping/Psychosocial   Plan of Care Reviewed With --  --  patient;spouse   Plan of Care Review   Progress improving --  --    OTHER   Outcome Summary --  Pt has had 0 episodes of emesis or diarrhea today. Tolerating clear liquids well. IVFs stopped. Advancing diet. VSS. --      Goal: Individualization and Mutuality   06/01/19 1812 06/02/19 0343   Individualization   Patient Specific Preferences Wear hospital gown --    Patient Specific Goals (Include Timeframe) --  To feel better, stop vomiting, hold down solid foods and be able to go home   Patient Specific Interventions Anti-emetics as ordered; steroids as ordered; advance diet --    Mutuality/Individual Preferences   What Anxieties, Fears, Concerns, or Questions Do You Have About Your Care? Re-occurence of n/v --    What Information Would Help Us Give You More Personalized Care? As above --    How Would You and/or Your Support Person Like to Participate in Your Care? Remain at bedside; be educated on progression --    Mutuality/Individual Preferences   How to Address Anxieties/Fears Discuss r/b/a of long-term steroid use. --      Goal: Discharge Needs Assessment  Outcome: Ongoing (interventions implemented as appropriate)   05/31/19 1959 06/01/19 1812   Discharge Needs Assessment   Readmission Within the Last 30 Days --  no previous admission in last 30 days   Concerns to be Addressed --  no discharge needs identified   Patient/Family Anticipates Transition to --  home;home with family   Patient/Family Anticipated Services at Transition --  none   Transportation Concerns --  car, none   Transportation Anticipated --  car, drives self;family or friend will provide   Anticipated Changes Related to Illness --  none   Disability   Equipment Currently Used at Home none --      Goal: Interprofessional  Rounds/Family Conf  Outcome: Ongoing (interventions implemented as appropriate)   06/01/19 1812   Interdisciplinary Rounds/Family Conf   Summary POC discussed   Participants nursing;patient;physician       Problem: Hyperemesis Gravidarum (Adult,Obstetrics,Pediatric)  Goal: Signs and Symptoms of Listed Potential Problems Will be Absent, Minimized or Managed (Hyperemesis Gravidarum)  Outcome: Ongoing (interventions implemented as appropriate)   05/31/19 1624   Goal/Outcome Evaluation   Problems Assessed (Nausea/Vomiting/Hyperemesis in Pregnancy) all   Problems Present (Hyperemesis) nutritional deficiency/inadequate oral intake       Problem: Prenatal Patient, Hospitalized (Adult,Obstetrics,Pediatric)  Goal: Signs and Symptoms of Listed Potential Problems Will be Absent, Minimized or Managed (Prenatal Patient, Hospitalized)  Outcome: Ongoing (interventions implemented as appropriate)   06/01/19 1812   Goal/Outcome Evaluation   Problems Assessed (Prenatal Patient) all   Problems Present (Prenatal Patient) none

## 2019-06-02 NOTE — DISCHARGE SUMMARY
Date of Discharge:  6/2/2019    Discharge Diagnosis: hyperemesis    Presenting Problem/History of Present Illness  Hyperemesis affecting pregnancy, antepartum [O21.0]  Pregnancy [Z34.90]     Hospital Course  Patient is a 33 y.o. female presented with return of severe hyperemesis after completing steroid taper.  She was restarted on steroids while inpatient and on HD3 is tolerating PO and ready for d/c home.      Procedures Performed         Consults:   Consults     No orders found from 5/2/2019 to 6/1/2019.              Condition on Discharge:  Feels well.  Reports good FM.  No vb.  Ready for discharge    Vital Signs  Temp:  [97.9 °F (36.6 °C)] 97.9 °F (36.6 °C)  Heart Rate:  [77-80] 77  Resp:  [18] 18  BP: (108-113)/(56-60) 113/56    Physical Exam:  General: Awake and alert  Abdomen: Gravid, soft,  non tender  Extremities:  Calves NT bilaterally        Discharge Disposition  Home or Self Care    Discharge Medications     Discharge Medications      Continue These Medications      Instructions Start Date   methylPREDNISolone 16 MG tablet  Commonly known as:  MEDROL   16 mg, Oral, 2 Times Daily With Meals, 2 times daily x 1week then decrease to daily x 1 week; then 1/2 tab daily x 2 week.      ondansetron 8 MG tablet  Commonly known as:  ZOFRAN   8 mg, Oral, Every 8 Hours PRN      pantoprazole 40 MG EC tablet  Commonly known as:  PROTONIX   40 mg, Oral, Daily      prenatal (CLASSIC) vitamin 28-0.8 MG tablet tablet   Oral, Daily      promethazine 25 MG tablet  Commonly known as:  PHENERGAN   25 mg, Oral, Every 6 Hours PRN, Do not use at same time as reglan.      sertraline 50 MG tablet  Commonly known as:  ZOLOFT   50 mg, Oral, Daily             Discharge Diet: regular    Activity at Discharge: regular    Follow-up Appointments  No future appointments.      Test Results Pending at Discharge       Franci Winter MD  06/02/19  10:16 AM

## 2019-06-29 ENCOUNTER — HOSPITAL ENCOUNTER (OUTPATIENT)
Facility: HOSPITAL | Age: 34
Setting detail: OBSERVATION
Discharge: HOME | End: 2019-06-29
Attending: OBSTETRICS & GYNECOLOGY | Admitting: OBSTETRICS & GYNECOLOGY

## 2019-06-29 VITALS
SYSTOLIC BLOOD PRESSURE: 122 MMHG | TEMPERATURE: 98.6 F | WEIGHT: 202.8 LBS | DIASTOLIC BLOOD PRESSURE: 61 MMHG | HEIGHT: 65 IN | HEART RATE: 96 BPM | BODY MASS INDEX: 33.79 KG/M2 | RESPIRATION RATE: 16 BRPM

## 2019-06-29 PROBLEM — O20.9 VAGINAL BLEEDING AFFECTING EARLY PREGNANCY: Status: ACTIVE | Noted: 2019-06-29

## 2019-06-29 PROCEDURE — G0378 HOSPITAL OBSERVATION PER HR: HCPCS

## 2019-06-29 PROCEDURE — 59025 FETAL NON-STRESS TEST: CPT

## 2019-06-30 PROCEDURE — 59025 FETAL NON-STRESS TEST: CPT

## 2019-09-04 ENCOUNTER — HOSPITAL ENCOUNTER (OUTPATIENT)
Facility: HOSPITAL | Age: 34
Setting detail: OBSERVATION
Discharge: HOME | End: 2019-09-04
Attending: OBSTETRICS & GYNECOLOGY | Admitting: OBSTETRICS & GYNECOLOGY

## 2019-09-04 VITALS
BODY MASS INDEX: 37.49 KG/M2 | SYSTOLIC BLOOD PRESSURE: 108 MMHG | HEIGHT: 65 IN | HEART RATE: 77 BPM | WEIGHT: 225 LBS | DIASTOLIC BLOOD PRESSURE: 63 MMHG

## 2019-09-04 PROCEDURE — G0378 HOSPITAL OBSERVATION PER HR: HCPCS

## 2019-09-04 PROCEDURE — 59025 FETAL NON-STRESS TEST: CPT

## 2019-09-04 PROCEDURE — 96365 THER/PROPH/DIAG IV INF INIT: CPT

## 2019-09-04 RX ORDER — SODIUM CHLORIDE 0.9 % (FLUSH) 0.9 %
10 SYRINGE (ML) INJECTION EVERY 12 HOURS SCHEDULED
Status: DISCONTINUED | OUTPATIENT
Start: 2019-09-04 | End: 2019-09-04 | Stop reason: HOSPADM

## 2019-09-04 RX ORDER — DEXTROSE, SODIUM CHLORIDE, SODIUM LACTATE, POTASSIUM CHLORIDE, AND CALCIUM CHLORIDE 5; .6; .31; .03; .02 G/100ML; G/100ML; G/100ML; G/100ML; G/100ML
999 INJECTION, SOLUTION INTRAVENOUS ONCE
Status: COMPLETED | OUTPATIENT
Start: 2019-09-04 | End: 2019-09-04

## 2019-09-04 RX ORDER — SODIUM CHLORIDE 0.9 % (FLUSH) 0.9 %
10 SYRINGE (ML) INJECTION AS NEEDED
Status: DISCONTINUED | OUTPATIENT
Start: 2019-09-04 | End: 2019-09-04 | Stop reason: HOSPADM

## 2019-09-04 RX ORDER — FERROUS SULFATE 325(65) MG
325 TABLET ORAL EVERY OTHER DAY
COMMUNITY
End: 2019-10-25 | Stop reason: HOSPADM

## 2019-09-04 RX ADMIN — SODIUM CHLORIDE, SODIUM LACTATE, POTASSIUM CHLORIDE, CALCIUM CHLORIDE AND DEXTROSE MONOHYDRATE 999 ML/HR: 5; 600; 310; 30; 20 INJECTION, SOLUTION INTRAVENOUS at 16:18

## 2019-09-04 NOTE — NON STRESS TEST
Sara Lenrama  at 32w4d with an MAT of 10/26/2019, by Patient Reported, was seen at [facility] LABOR DELIVERY for a nonstress test.    Chief Complaint   Patient presents with   • Contractions     Pt arrives from MD office for evaluation of ctxs noted on the NST        Patient Active Problem List   Diagnosis   • Migraine   • Asthma   • Anxiety   • Insomnia   • Hyperemesis gravidarum   • Encounter for supervision of low-risk first pregnancy in first trimester   • Use of letrozole (Femara)   • Rh negative status during pregnancy in first trimester   • Pregnancy   • Hyperemesis affecting pregnancy, antepartum   • Vaginal bleeding affecting early pregnancy       Start Time: 1513  Stop Time: 1720    Interpretation A  Nonstress Test Interpretation A: Reactive (19 1711 : Luis Carlos Iyer RN)

## 2019-09-23 ENCOUNTER — HOSPITAL ENCOUNTER (OUTPATIENT)
Facility: HOSPITAL | Age: 34
Setting detail: OBSERVATION
Discharge: HOME | End: 2019-09-23
Attending: OBSTETRICS & GYNECOLOGY | Admitting: OBSTETRICS & GYNECOLOGY

## 2019-09-23 VITALS
TEMPERATURE: 97.9 F | DIASTOLIC BLOOD PRESSURE: 60 MMHG | BODY MASS INDEX: 38.42 KG/M2 | HEIGHT: 65 IN | RESPIRATION RATE: 18 BRPM | HEART RATE: 72 BPM | SYSTOLIC BLOOD PRESSURE: 109 MMHG | WEIGHT: 230.6 LBS

## 2019-09-23 PROCEDURE — G0378 HOSPITAL OBSERVATION PER HR: HCPCS

## 2019-09-23 PROCEDURE — 59025 FETAL NON-STRESS TEST: CPT

## 2019-09-24 NOTE — DISCHARGE INSTRUCTIONS
Keep scheduled appointment.  Call MD if contractions return strong, rupture of membranes, decreased fetal movement, or concern for self or baby

## 2019-09-24 NOTE — NON STRESS TEST
Sara PENN Natalyarama  at 35w2d with an MAT of 10/26/2019, by Patient Reported, was seen at [facility] LABOR DELIVERY for a nonstress test.    Chief Complaint   Patient presents with   • Contractions     Started last night.  Worse now. Every 5 minutes. Denies bleeding or leakage.  +FM   • Cough     residual cough from cough last week.       Patient Active Problem List   Diagnosis   • Migraine   • Asthma   • Anxiety   • Insomnia   • Hyperemesis gravidarum   • Encounter for supervision of low-risk first pregnancy in first trimester   • Use of letrozole (Femara)   • Rh negative status during pregnancy in first trimester   • Pregnancy   • Hyperemesis affecting pregnancy, antepartum   • Vaginal bleeding affecting early pregnancy       Start Time:   Stop Time:     Interpretation A  Nonstress Test Interpretation A: Reactive (19 2100 : Julia Flores, RN)

## 2019-10-14 LAB — EXTERNAL GROUP B STREP ANTIGEN: NORMAL

## 2019-10-22 ENCOUNTER — HOSPITAL ENCOUNTER (INPATIENT)
Facility: HOSPITAL | Age: 34
LOS: 3 days | Discharge: HOME | End: 2019-10-25
Attending: OBSTETRICS & GYNECOLOGY | Admitting: OBSTETRICS & GYNECOLOGY

## 2019-10-22 ENCOUNTER — ANESTHESIA EVENT (OUTPATIENT)
Dept: LABOR AND DELIVERY | Facility: HOSPITAL | Age: 34
End: 2019-10-22

## 2019-10-22 ENCOUNTER — ANESTHESIA (OUTPATIENT)
Dept: LABOR AND DELIVERY | Facility: HOSPITAL | Age: 34
End: 2019-10-22

## 2019-10-22 ENCOUNTER — HOSPITAL ENCOUNTER (OUTPATIENT)
Dept: LABOR AND DELIVERY | Facility: HOSPITAL | Age: 34
Discharge: HOME | End: 2019-10-22

## 2019-10-22 DIAGNOSIS — Z34.90 PREGNANCY, UNSPECIFIED GESTATIONAL AGE: ICD-10-CM

## 2019-10-22 LAB
ABO GROUP BLD: NORMAL
BASOPHILS # BLD AUTO: 0.02 10*3/MM3 (ref 0–0.2)
BASOPHILS NFR BLD AUTO: 0.1 % (ref 0–1.5)
BLD GP AB SCN SERPL QL: NEGATIVE
DEPRECATED RDW RBC AUTO: 47.5 FL (ref 37–54)
EOSINOPHIL # BLD AUTO: 0.05 10*3/MM3 (ref 0–0.4)
EOSINOPHIL NFR BLD AUTO: 0.3 % (ref 0.3–6.2)
ERYTHROCYTE [DISTWIDTH] IN BLOOD BY AUTOMATED COUNT: 14.4 % (ref 12.3–15.4)
HCT VFR BLD AUTO: 34.9 % (ref 34–46.6)
HGB BLD-MCNC: 11.3 G/DL (ref 12–15.9)
IMM GRANULOCYTES # BLD AUTO: 0.11 10*3/MM3 (ref 0–0.05)
IMM GRANULOCYTES NFR BLD AUTO: 0.7 % (ref 0–0.5)
LYMPHOCYTES # BLD AUTO: 2.37 10*3/MM3 (ref 0.7–3.1)
LYMPHOCYTES NFR BLD AUTO: 15.2 % (ref 19.6–45.3)
MCH RBC QN AUTO: 29.1 PG (ref 26.6–33)
MCHC RBC AUTO-ENTMCNC: 32.4 G/DL (ref 31.5–35.7)
MCV RBC AUTO: 89.9 FL (ref 79–97)
MONOCYTES # BLD AUTO: 0.72 10*3/MM3 (ref 0.1–0.9)
MONOCYTES NFR BLD AUTO: 4.6 % (ref 5–12)
NEUTROPHILS # BLD AUTO: 12.3 10*3/MM3 (ref 1.7–7)
NEUTROPHILS NFR BLD AUTO: 79.1 % (ref 42.7–76)
NRBC BLD AUTO-RTO: 0 /100 WBC (ref 0–0.2)
PLATELET # BLD AUTO: 285 10*3/MM3 (ref 140–450)
PMV BLD AUTO: 10.6 FL (ref 6–12)
RBC # BLD AUTO: 3.88 10*6/MM3 (ref 3.77–5.28)
RH BLD: NEGATIVE
T&S EXPIRATION DATE: NORMAL
WBC NRBC COR # BLD: 15.57 10*3/MM3 (ref 3.4–10.8)

## 2019-10-22 PROCEDURE — 86901 BLOOD TYPING SEROLOGIC RH(D): CPT | Performed by: OBSTETRICS & GYNECOLOGY

## 2019-10-22 PROCEDURE — 86850 RBC ANTIBODY SCREEN: CPT | Performed by: OBSTETRICS & GYNECOLOGY

## 2019-10-22 PROCEDURE — 86900 BLOOD TYPING SEROLOGIC ABO: CPT | Performed by: OBSTETRICS & GYNECOLOGY

## 2019-10-22 PROCEDURE — 85025 COMPLETE CBC W/AUTO DIFF WBC: CPT | Performed by: OBSTETRICS & GYNECOLOGY

## 2019-10-22 RX ORDER — OXYCODONE HYDROCHLORIDE AND ACETAMINOPHEN 5; 325 MG/1; MG/1
1 TABLET ORAL EVERY 4 HOURS PRN
Status: DISCONTINUED | OUTPATIENT
Start: 2019-10-22 | End: 2019-10-23 | Stop reason: HOSPADM

## 2019-10-22 RX ORDER — BUTORPHANOL TARTRATE 1 MG/ML
1 INJECTION, SOLUTION INTRAMUSCULAR; INTRAVENOUS
Status: DISCONTINUED | OUTPATIENT
Start: 2019-10-22 | End: 2019-10-23 | Stop reason: HOSPADM

## 2019-10-22 RX ORDER — DIPHENHYDRAMINE HYDROCHLORIDE 50 MG/ML
25 INJECTION INTRAMUSCULAR; INTRAVENOUS NIGHTLY PRN
Status: DISCONTINUED | OUTPATIENT
Start: 2019-10-22 | End: 2019-10-23 | Stop reason: HOSPADM

## 2019-10-22 RX ORDER — SODIUM CHLORIDE, SODIUM LACTATE, POTASSIUM CHLORIDE, CALCIUM CHLORIDE 600; 310; 30; 20 MG/100ML; MG/100ML; MG/100ML; MG/100ML
125 INJECTION, SOLUTION INTRAVENOUS CONTINUOUS
Status: DISCONTINUED | OUTPATIENT
Start: 2019-10-22 | End: 2019-10-25 | Stop reason: HOSPADM

## 2019-10-22 RX ORDER — SODIUM CHLORIDE 0.9 % (FLUSH) 0.9 %
3-10 SYRINGE (ML) INJECTION AS NEEDED
Status: DISCONTINUED | OUTPATIENT
Start: 2019-10-22 | End: 2019-10-23 | Stop reason: HOSPADM

## 2019-10-22 RX ORDER — FAMOTIDINE 10 MG/ML
20 INJECTION, SOLUTION INTRAVENOUS ONCE AS NEEDED
Status: DISCONTINUED | OUTPATIENT
Start: 2019-10-22 | End: 2019-10-23 | Stop reason: HOSPADM

## 2019-10-22 RX ORDER — LIDOCAINE HYDROCHLORIDE 10 MG/ML
5 INJECTION, SOLUTION EPIDURAL; INFILTRATION; INTRACAUDAL; PERINEURAL AS NEEDED
Status: DISCONTINUED | OUTPATIENT
Start: 2019-10-22 | End: 2019-10-23 | Stop reason: HOSPADM

## 2019-10-22 RX ORDER — ONDANSETRON 2 MG/ML
4 INJECTION INTRAMUSCULAR; INTRAVENOUS EVERY 6 HOURS PRN
Status: DISCONTINUED | OUTPATIENT
Start: 2019-10-22 | End: 2019-10-23 | Stop reason: HOSPADM

## 2019-10-22 RX ORDER — EPHEDRINE SULFATE 50 MG/ML
5 INJECTION, SOLUTION INTRAVENOUS AS NEEDED
Status: DISCONTINUED | OUTPATIENT
Start: 2019-10-22 | End: 2019-10-23 | Stop reason: HOSPADM

## 2019-10-22 RX ORDER — SODIUM CHLORIDE 0.9 % (FLUSH) 0.9 %
3 SYRINGE (ML) INJECTION EVERY 12 HOURS SCHEDULED
Status: DISCONTINUED | OUTPATIENT
Start: 2019-10-22 | End: 2019-10-23 | Stop reason: HOSPADM

## 2019-10-22 RX ORDER — FAMOTIDINE 20 MG/1
20 TABLET, FILM COATED ORAL 2 TIMES DAILY PRN
Status: DISCONTINUED | OUTPATIENT
Start: 2019-10-22 | End: 2019-10-23 | Stop reason: HOSPADM

## 2019-10-22 RX ORDER — ONDANSETRON 2 MG/ML
4 INJECTION INTRAMUSCULAR; INTRAVENOUS ONCE AS NEEDED
Status: DISCONTINUED | OUTPATIENT
Start: 2019-10-22 | End: 2019-10-23 | Stop reason: HOSPADM

## 2019-10-22 RX ORDER — PROMETHAZINE HYDROCHLORIDE 25 MG/1
12.5 TABLET ORAL EVERY 6 HOURS PRN
Status: DISCONTINUED | OUTPATIENT
Start: 2019-10-22 | End: 2019-10-23 | Stop reason: HOSPADM

## 2019-10-22 RX ORDER — DEXTROSE, SODIUM CHLORIDE, SODIUM LACTATE, POTASSIUM CHLORIDE, AND CALCIUM CHLORIDE 5; .6; .31; .03; .02 G/100ML; G/100ML; G/100ML; G/100ML; G/100ML
125 INJECTION, SOLUTION INTRAVENOUS CONTINUOUS
Status: DISCONTINUED | OUTPATIENT
Start: 2019-10-22 | End: 2019-10-25 | Stop reason: HOSPADM

## 2019-10-22 RX ORDER — OXYCODONE HYDROCHLORIDE AND ACETAMINOPHEN 5; 325 MG/1; MG/1
2 TABLET ORAL EVERY 4 HOURS PRN
Status: DISCONTINUED | OUTPATIENT
Start: 2019-10-22 | End: 2019-10-23 | Stop reason: HOSPADM

## 2019-10-22 RX ORDER — MINERAL OIL
OIL (ML) MISCELLANEOUS ONCE
Status: DISCONTINUED | OUTPATIENT
Start: 2019-10-22 | End: 2019-10-23 | Stop reason: HOSPADM

## 2019-10-22 RX ORDER — ACETAMINOPHEN 500 MG
1000 TABLET ORAL EVERY 4 HOURS PRN
Status: DISCONTINUED | OUTPATIENT
Start: 2019-10-22 | End: 2019-10-23 | Stop reason: HOSPADM

## 2019-10-22 RX ORDER — PROMETHAZINE HYDROCHLORIDE 25 MG/ML
12.5 INJECTION, SOLUTION INTRAMUSCULAR; INTRAVENOUS EVERY 6 HOURS PRN
Status: DISCONTINUED | OUTPATIENT
Start: 2019-10-22 | End: 2019-10-23 | Stop reason: HOSPADM

## 2019-10-22 RX ORDER — DIPHENHYDRAMINE HYDROCHLORIDE 50 MG/ML
12.5 INJECTION INTRAMUSCULAR; INTRAVENOUS EVERY 8 HOURS PRN
Status: DISCONTINUED | OUTPATIENT
Start: 2019-10-22 | End: 2019-10-23 | Stop reason: HOSPADM

## 2019-10-22 RX ORDER — VALACYCLOVIR HYDROCHLORIDE 500 MG/1
500 TABLET, FILM COATED ORAL 2 TIMES DAILY
COMMUNITY
End: 2020-12-08

## 2019-10-22 RX ORDER — OXYTOCIN-SODIUM CHLORIDE 0.9% IV SOLN 30 UNIT/500ML 30-0.9/5 UT/ML-%
2 SOLUTION INTRAVENOUS
Status: DISCONTINUED | OUTPATIENT
Start: 2019-10-23 | End: 2019-10-25 | Stop reason: HOSPADM

## 2019-10-22 RX ORDER — FAMOTIDINE 10 MG/ML
20 INJECTION, SOLUTION INTRAVENOUS 2 TIMES DAILY PRN
Status: DISCONTINUED | OUTPATIENT
Start: 2019-10-22 | End: 2019-10-23 | Stop reason: HOSPADM

## 2019-10-22 RX ORDER — PROMETHAZINE HYDROCHLORIDE 12.5 MG/1
12.5 SUPPOSITORY RECTAL EVERY 6 HOURS PRN
Status: DISCONTINUED | OUTPATIENT
Start: 2019-10-22 | End: 2019-10-23 | Stop reason: HOSPADM

## 2019-10-22 RX ORDER — PROMETHAZINE HYDROCHLORIDE 25 MG/ML
25 INJECTION, SOLUTION INTRAMUSCULAR; INTRAVENOUS EVERY 6 HOURS PRN
Status: DISCONTINUED | OUTPATIENT
Start: 2019-10-22 | End: 2019-10-23 | Stop reason: HOSPADM

## 2019-10-22 RX ORDER — TERBUTALINE SULFATE 1 MG/ML
0.25 INJECTION, SOLUTION SUBCUTANEOUS AS NEEDED
Status: DISCONTINUED | OUTPATIENT
Start: 2019-10-22 | End: 2019-10-23 | Stop reason: HOSPADM

## 2019-10-22 RX ORDER — DIPHENHYDRAMINE HCL 25 MG
50 CAPSULE ORAL NIGHTLY PRN
Status: DISCONTINUED | OUTPATIENT
Start: 2019-10-22 | End: 2019-10-23 | Stop reason: HOSPADM

## 2019-10-22 RX ORDER — ONDANSETRON 4 MG/1
4 TABLET, FILM COATED ORAL EVERY 6 HOURS PRN
Status: DISCONTINUED | OUTPATIENT
Start: 2019-10-22 | End: 2019-10-23 | Stop reason: HOSPADM

## 2019-10-22 RX ADMIN — SODIUM CHLORIDE, POTASSIUM CHLORIDE, SODIUM LACTATE AND CALCIUM CHLORIDE 125 ML/HR: 600; 310; 30; 20 INJECTION, SOLUTION INTRAVENOUS at 21:20

## 2019-10-23 LAB
ABO GROUP BLD: NORMAL
ATMOSPHERIC PRESS: 754.8 MMHG
BASE EXCESS BLDCOA CALC-SCNC: -9.4 MMOL/L
BDY SITE: ABNORMAL
HCO3 BLDCOA-SCNC: 22 MMOL/L (ref 22–28)
MODALITY: ABNORMAL
NOTE: ABNORMAL
PCO2 BLDCOA: 69.2 MMHG
PH BLDCOA: 7.11 PH UNITS (ref 7.18–7.34)
PO2 BLDCOA: 14.3 MMHG (ref 12–26)
RH BLD: NEGATIVE
SAO2 % BLDCOA: 10.5 % (ref 92–99)

## 2019-10-23 PROCEDURE — C1755 CATHETER, INTRASPINAL: HCPCS

## 2019-10-23 PROCEDURE — 86900 BLOOD TYPING SEROLOGIC ABO: CPT | Performed by: OBSTETRICS & GYNECOLOGY

## 2019-10-23 PROCEDURE — 86901 BLOOD TYPING SEROLOGIC RH(D): CPT | Performed by: OBSTETRICS & GYNECOLOGY

## 2019-10-23 PROCEDURE — 25010000002 ONDANSETRON PER 1 MG: Performed by: OBSTETRICS & GYNECOLOGY

## 2019-10-23 PROCEDURE — 25010000002 FENTANYL CITRATE (PF) 2500 MCG/50ML SOLUTION: Performed by: ANESTHESIOLOGY

## 2019-10-23 PROCEDURE — 93010 ELECTROCARDIOGRAM REPORT: CPT | Performed by: INTERNAL MEDICINE

## 2019-10-23 PROCEDURE — 25010000002 ROPIVACAINE PER 1 MG: Performed by: ANESTHESIOLOGY

## 2019-10-23 PROCEDURE — 0KQM0ZZ REPAIR PERINEUM MUSCLE, OPEN APPROACH: ICD-10-PCS | Performed by: OBSTETRICS & GYNECOLOGY

## 2019-10-23 PROCEDURE — 88307 TISSUE EXAM BY PATHOLOGIST: CPT

## 2019-10-23 PROCEDURE — 25010000002 DIPHENHYDRAMINE PER 50 MG: Performed by: ANESTHESIOLOGY

## 2019-10-23 PROCEDURE — C1755 CATHETER, INTRASPINAL: HCPCS | Performed by: ANESTHESIOLOGY

## 2019-10-23 PROCEDURE — 93005 ELECTROCARDIOGRAM TRACING: CPT | Performed by: OBSTETRICS & GYNECOLOGY

## 2019-10-23 PROCEDURE — 82803 BLOOD GASES ANY COMBINATION: CPT

## 2019-10-23 RX ORDER — MINERAL OIL
30 OIL (ML) MISCELLANEOUS AS NEEDED
Status: DISCONTINUED | OUTPATIENT
Start: 2019-10-23 | End: 2019-10-23 | Stop reason: HOSPADM

## 2019-10-23 RX ORDER — PHYTONADIONE 1 MG/.5ML
INJECTION, EMULSION INTRAMUSCULAR; INTRAVENOUS; SUBCUTANEOUS
Status: DISPENSED
Start: 2019-10-23 | End: 2019-10-24

## 2019-10-23 RX ORDER — OXYTOCIN-SODIUM CHLORIDE 0.9% IV SOLN 30 UNIT/500ML 30-0.9/5 UT/ML-%
999 SOLUTION INTRAVENOUS ONCE
Status: COMPLETED | OUTPATIENT
Start: 2019-10-23 | End: 2019-10-23

## 2019-10-23 RX ORDER — OXYTOCIN-SODIUM CHLORIDE 0.9% IV SOLN 30 UNIT/500ML 30-0.9/5 UT/ML-%
125 SOLUTION INTRAVENOUS CONTINUOUS PRN
Status: COMPLETED | OUTPATIENT
Start: 2019-10-23 | End: 2019-10-23

## 2019-10-23 RX ORDER — PROMETHAZINE HYDROCHLORIDE 25 MG/ML
12.5 INJECTION, SOLUTION INTRAMUSCULAR; INTRAVENOUS EVERY 6 HOURS PRN
Status: DISCONTINUED | OUTPATIENT
Start: 2019-10-23 | End: 2019-10-25 | Stop reason: HOSPADM

## 2019-10-23 RX ORDER — OXYTOCIN-SODIUM CHLORIDE 0.9% IV SOLN 30 UNIT/500ML 30-0.9/5 UT/ML-%
250 SOLUTION INTRAVENOUS CONTINUOUS
Status: ACTIVE | OUTPATIENT
Start: 2019-10-23 | End: 2019-10-23

## 2019-10-23 RX ORDER — ONDANSETRON 2 MG/ML
4 INJECTION INTRAMUSCULAR; INTRAVENOUS EVERY 6 HOURS PRN
Status: DISCONTINUED | OUTPATIENT
Start: 2019-10-23 | End: 2019-10-25 | Stop reason: HOSPADM

## 2019-10-23 RX ORDER — ONDANSETRON 4 MG/1
4 TABLET, FILM COATED ORAL EVERY 8 HOURS PRN
Status: DISCONTINUED | OUTPATIENT
Start: 2019-10-23 | End: 2019-10-25 | Stop reason: HOSPADM

## 2019-10-23 RX ORDER — MISOPROSTOL 200 UG/1
600 TABLET ORAL ONCE AS NEEDED
Status: DISCONTINUED | OUTPATIENT
Start: 2019-10-23 | End: 2019-10-25 | Stop reason: HOSPADM

## 2019-10-23 RX ORDER — ERYTHROMYCIN 5 MG/G
OINTMENT OPHTHALMIC
Status: DISPENSED
Start: 2019-10-23 | End: 2019-10-24

## 2019-10-23 RX ORDER — CALCIUM CARBONATE 200(500)MG
2 TABLET,CHEWABLE ORAL 3 TIMES DAILY PRN
Status: DISCONTINUED | OUTPATIENT
Start: 2019-10-23 | End: 2019-10-25 | Stop reason: HOSPADM

## 2019-10-23 RX ORDER — OXYCODONE AND ACETAMINOPHEN 10; 325 MG/1; MG/1
1 TABLET ORAL EVERY 4 HOURS PRN
Status: DISCONTINUED | OUTPATIENT
Start: 2019-10-23 | End: 2019-10-25 | Stop reason: HOSPADM

## 2019-10-23 RX ORDER — LANOLIN
CREAM (ML) TOPICAL
Status: DISCONTINUED | OUTPATIENT
Start: 2019-10-23 | End: 2019-10-25 | Stop reason: HOSPADM

## 2019-10-23 RX ORDER — BUPIVACAINE HYDROCHLORIDE 5 MG/ML
INJECTION, SOLUTION EPIDURAL; INTRACAUDAL AS NEEDED
Status: DISCONTINUED | OUTPATIENT
Start: 2019-10-23 | End: 2019-10-23 | Stop reason: SURG

## 2019-10-23 RX ORDER — DOCUSATE SODIUM 100 MG/1
100 CAPSULE, LIQUID FILLED ORAL 2 TIMES DAILY
Status: DISCONTINUED | OUTPATIENT
Start: 2019-10-23 | End: 2019-10-25 | Stop reason: HOSPADM

## 2019-10-23 RX ORDER — PROMETHAZINE HYDROCHLORIDE 25 MG/1
12.5 SUPPOSITORY RECTAL EVERY 6 HOURS PRN
Status: DISCONTINUED | OUTPATIENT
Start: 2019-10-23 | End: 2019-10-25 | Stop reason: HOSPADM

## 2019-10-23 RX ORDER — IBUPROFEN 800 MG/1
800 TABLET ORAL EVERY 8 HOURS PRN
Status: DISCONTINUED | OUTPATIENT
Start: 2019-10-23 | End: 2019-10-25 | Stop reason: HOSPADM

## 2019-10-23 RX ORDER — HYDROXYZINE 50 MG/1
50 TABLET, FILM COATED ORAL NIGHTLY PRN
Status: DISCONTINUED | OUTPATIENT
Start: 2019-10-23 | End: 2019-10-25 | Stop reason: HOSPADM

## 2019-10-23 RX ORDER — CARBOPROST TROMETHAMINE 250 UG/ML
250 INJECTION, SOLUTION INTRAMUSCULAR AS NEEDED
Status: DISCONTINUED | OUTPATIENT
Start: 2019-10-23 | End: 2019-10-23 | Stop reason: HOSPADM

## 2019-10-23 RX ORDER — OXYCODONE HYDROCHLORIDE AND ACETAMINOPHEN 5; 325 MG/1; MG/1
1 TABLET ORAL EVERY 4 HOURS PRN
Status: DISCONTINUED | OUTPATIENT
Start: 2019-10-23 | End: 2019-10-25 | Stop reason: HOSPADM

## 2019-10-23 RX ORDER — METHYLERGONOVINE MALEATE 0.2 MG/ML
200 INJECTION INTRAVENOUS ONCE AS NEEDED
Status: DISCONTINUED | OUTPATIENT
Start: 2019-10-23 | End: 2019-10-23 | Stop reason: HOSPADM

## 2019-10-23 RX ORDER — MISOPROSTOL 200 UG/1
800 TABLET ORAL AS NEEDED
Status: DISCONTINUED | OUTPATIENT
Start: 2019-10-23 | End: 2019-10-23 | Stop reason: HOSPADM

## 2019-10-23 RX ORDER — PROMETHAZINE HYDROCHLORIDE 25 MG/1
25 TABLET ORAL EVERY 6 HOURS PRN
Status: DISCONTINUED | OUTPATIENT
Start: 2019-10-23 | End: 2019-10-25 | Stop reason: HOSPADM

## 2019-10-23 RX ADMIN — SODIUM CHLORIDE, POTASSIUM CHLORIDE, SODIUM LACTATE AND CALCIUM CHLORIDE 999 ML/HR: 600; 310; 30; 20 INJECTION, SOLUTION INTRAVENOUS at 00:47

## 2019-10-23 RX ADMIN — DIPHENHYDRAMINE HYDROCHLORIDE 12.5 MG: 50 INJECTION, SOLUTION INTRAMUSCULAR; INTRAVENOUS at 03:15

## 2019-10-23 RX ADMIN — ONDANSETRON 4 MG: 2 INJECTION INTRAMUSCULAR; INTRAVENOUS at 04:25

## 2019-10-23 RX ADMIN — OXYTOCIN 250 ML/HR: 10 INJECTION, SOLUTION INTRAMUSCULAR; INTRAVENOUS at 19:06

## 2019-10-23 RX ADMIN — OXYTOCIN 999 ML/HR: 10 INJECTION, SOLUTION INTRAMUSCULAR; INTRAVENOUS at 18:50

## 2019-10-23 RX ADMIN — ONDANSETRON 4 MG: 2 INJECTION INTRAMUSCULAR; INTRAVENOUS at 10:26

## 2019-10-23 RX ADMIN — IBUPROFEN 800 MG: 800 TABLET, FILM COATED ORAL at 22:34

## 2019-10-23 RX ADMIN — SODIUM CHLORIDE, POTASSIUM CHLORIDE, SODIUM LACTATE AND CALCIUM CHLORIDE 125 ML/HR: 600; 310; 30; 20 INJECTION, SOLUTION INTRAVENOUS at 11:45

## 2019-10-23 RX ADMIN — EPHEDRINE SULFATE 10 MG: 50 INJECTION, SOLUTION INTRAVENOUS at 06:26

## 2019-10-23 RX ADMIN — ROPIVACAINE HYDROCHLORIDE 10 ML/HR: 2 INJECTION, SOLUTION EPIDURAL; INFILTRATION at 07:16

## 2019-10-23 RX ADMIN — OXYTOCIN 125 ML/HR: 10 INJECTION INTRAVENOUS at 20:05

## 2019-10-23 RX ADMIN — Medication: at 23:27

## 2019-10-23 RX ADMIN — OXYTOCIN 2 MILLI-UNITS/MIN: 10 INJECTION, SOLUTION INTRAMUSCULAR; INTRAVENOUS at 01:36

## 2019-10-23 RX ADMIN — ROPIVACAINE HYDROCHLORIDE 10 ML/HR: 2 INJECTION, SOLUTION EPIDURAL; INFILTRATION at 00:44

## 2019-10-23 RX ADMIN — BUPIVACAINE HYDROCHLORIDE 10 ML: 5 INJECTION, SOLUTION EPIDURAL; INTRACAUDAL; PERINEURAL at 11:30

## 2019-10-23 RX ADMIN — ONDANSETRON 4 MG: 2 INJECTION INTRAMUSCULAR; INTRAVENOUS at 16:36

## 2019-10-23 RX ADMIN — ACETAMINOPHEN 1000 MG: 500 TABLET, FILM COATED ORAL at 16:43

## 2019-10-23 RX ADMIN — SODIUM CHLORIDE, POTASSIUM CHLORIDE, SODIUM LACTATE AND CALCIUM CHLORIDE 999 ML/HR: 600; 310; 30; 20 INJECTION, SOLUTION INTRAVENOUS at 06:23

## 2019-10-23 RX ADMIN — ROPIVACAINE HYDROCHLORIDE 10 ML/HR: 2 INJECTION, SOLUTION EPIDURAL; INFILTRATION at 13:17

## 2019-10-23 RX ADMIN — DOCUSATE SODIUM 100 MG: 100 CAPSULE, LIQUID FILLED ORAL at 22:33

## 2019-10-23 RX ADMIN — ACETAMINOPHEN 1000 MG: 500 TABLET, FILM COATED ORAL at 10:43

## 2019-10-23 RX ADMIN — SERTRALINE 50 MG: 50 TABLET, FILM COATED ORAL at 23:26

## 2019-10-23 NOTE — PLAN OF CARE
Problem: Patient Care Overview  Goal: Plan of Care Review  Outcome: Ongoing (interventions implemented as appropriate)   10/23/19 0734   Coping/Psychosocial   Plan of Care Reviewed With patient;spouse   Plan of Care Review   Progress improving   OTHER   Outcome Summary pt had SROM meconium fluid noted, has working epidural in place. Pt was turned to left isde and had lower B/P 's and was given ephedrine for treatment. pt has had nausea/vomiting andwas treated with zofran. pt has been comfortable and has no c/o at this time.      Goal: Individualization and Mutuality  Outcome: Ongoing (interventions implemented as appropriate)   10/22/19 2132 10/23/19 0734   Individualization   Patient Specific Preferences --  vaginal delivery, FIONA care, breastfeeding, pain management   Patient Specific Goals (Include Timeframe) --  epidural for pain management, FIONA care, breastfeeding,    Patient Specific Interventions --  frequent position changes, dos santos catheter, peanut ball, epidural, FIONA care, lactation consultant   Mutuality/Individual Preferences   How to Address Anxieties/Fears explaination/education --    Mutuality/Individual Preferences   What Anxieties, Fears, Concerns, or Questions Do You Have About Your Care? giving birth --    What Information Would Help Us Give You More Personalized Care? none --    How Would You and/or Your Support Person Like to Participate in Your Care? be present and cut the cord --      Goal: Discharge Needs Assessment  Outcome: Ongoing (interventions implemented as appropriate)   10/23/19 0734   Discharge Needs Assessment   Readmission Within the Last 30 Days no previous admission in last 30 days   Concerns to be Addressed no discharge needs identified   Patient/Family Anticipates Transition to home   Patient/Family Anticipated Services at Transition none   Transportation Concerns car, none   Transportation Anticipated car, drives self;family or friend will provide   Anticipated Changes Related to  Illness none   Equipment Needed After Discharge none   Disability   Equipment Currently Used at Home none       Problem: Labor (Cervical Ripen, Induct, Augment) (Adult,Obstetrics,Pediatric)  Goal: Signs and Symptoms of Listed Potential Problems Will be Absent, Minimized or Managed (Labor)  Outcome: Ongoing (interventions implemented as appropriate)   10/23/19 0734   Goal/Outcome Evaluation   Problems Assessed (Labor) all   Problems Present (Labor) none       Problem: Fall Risk,  (Adult,Obstetrics,Pediatric)  Goal: Identify Related Risk Factors and Signs and Symptoms  Outcome: Ongoing (interventions implemented as appropriate)   10/23/19 0734   Fall Risk,  (Adult,Obstetrics,Pediatric)   Related Risk Factors (Fall Risk, ) hypotension;medical devices;medication side effects;regional anesthesia   Signs and Symptoms (Fall Risk, ) presence of fall risk factors     Goal: Absence of Maternal Fall  Outcome: Ongoing (interventions implemented as appropriate)   10/23/19 0734   Fall Risk,  (Adult,Obstetrics,Pediatric)   Absence of Maternal Fall achieves outcome     Goal: Absence of  Fall/Drop  Outcome: Ongoing (interventions implemented as appropriate)   10/23/19 0734   Fall Risk,  (Adult,Obstetrics,Pediatric)   Absence of  Fall/Drop achieves outcome       Problem: Skin Injury Risk (Adult)  Goal: Identify Related Risk Factors and Signs and Symptoms  Outcome: Ongoing (interventions implemented as appropriate)   10/23/19 0734   Skin Injury Risk (Adult)   Related Risk Factors (Skin Injury Risk) medication;medical devices;mobility impaired     Goal: Skin Health and Integrity  Outcome: Ongoing (interventions implemented as appropriate)   10/23/19 0734   Skin Injury Risk (Adult)   Skin Health and Integrity achieves outcome       Problem: Anesthesia/Analgesia, Neuraxial (Obstetrics)  Goal: Signs and Symptoms of Listed Potential Problems Will be Absent, Minimized or  Managed (Anesthesia/Analgesia, Neuraxial)  Outcome: Ongoing (interventions implemented as appropriate)   10/23/19 4408   Goal/Outcome Evaluation   Problems Assessed (Neuraxial Anesthesia/Analgesia, OB) all   Problems Present (Neuraxial Anesth OB) hypotension;nausea and vomiting

## 2019-10-23 NOTE — PROGRESS NOTES
Comfortable with epidural    cvx 7/90/-1. Unchanged    iupc placed. Pit only at 2mu. toco- q 2-3 min.    fht- reactive, good ronald.      Labor now protracted. iupc to see if contns adequate.   Goal mvu 200-250

## 2019-10-23 NOTE — L&D DELIVERY NOTE
[facility]  Vaginal Delivery Note    Delivery    Sara Len34 y.o.  at 39w4d    Induction: Oxytocin   Induction indication:         Augmentation: Oxytocin   Labor onset:10/22/2019  8:43 PM   Dilation complete:        Beginning of second stage:       Antibiotics received during labor: No            Delivery: Vaginal, Spontaneous     YOB: 2018    Time of Birth: 6:48 PM      Anesthesia: Epidural     Delivering clinician: Yoly Delgado MD       Infant    Findings: Viable female  infant    Infant observations: Weight: 3082 g (6 lb 12.7 oz)    Observations/Comments:  scale 2      Apgars: 7   @ 1 minute /    8   @ 5 minutes     Placenta, Cord, and Fluid    Placenta delivered  Spontaneous   at  10/23/2019  6:52 PM     Cord: 3 vessels  present.   Cord blood obtained: Yes    Cord gases obtained:  Yes      Repair    Episiotomy: none   Lacerations: 2nd   Estimated Blood Loss: 200  mls.       Delivery narrative: The patient is a 34 y.o.  at 39w4d.  Presented for induction but was aicha and in early labor. Membrane rupture/fluid: spontaneous rupture of membranes  at 12:43 AM  on 10/23/2019  Meconium Present  She progressed appropriately in labor with pit augment. FHR were overall reassuring without persistent worrisome decelerations.  SheProgressed to complete at   . Labored down until   . She pushed 1 1/2 hrs minutes and had a   of a  3082 g (6 lb 12.7 oz)  female   infant   7   @ 1 minute /   8   @ 5 minutes. The left shoulder was the anterior shoulder and easily delivered. Nuchal x1- delivered thru. Arterial was pH sent.    Placenta was spontaneously delivered,3 vessel cord, intact. . Cervix and rectum intact. There was a  2nd degree laceration repaired in usual fashion with vicryl suture. EBL was 200  mls. There were no complications. Mother and baby doing well at time of dictation.      Yoly Delgado MD  10/23/19  10:46 PM    Sivakumar np present for delivery due to meconium  Placenta to  pathology    Delivery note completed now-  too soon after delivery that nursing info not yet entered. Refreshing later so missing delivery demographics recorded.

## 2019-10-23 NOTE — PLAN OF CARE
Problem: Patient Care Overview  Goal: Plan of Care Review  Outcome: Ongoing (interventions implemented as appropriate)   10/23/19 1943   Coping/Psychosocial   Plan of Care Reviewed With patient;spouse   Plan of Care Review   Progress improving     Goal: Individualization and Mutuality  Outcome: Ongoing (interventions implemented as appropriate)   10/22/19 2132 10/23/19 0734   Individualization   Patient Specific Preferences --  vaginal delivery, FIONA care, breastfeeding, pain management   Patient Specific Goals (Include Timeframe) --  epidural for pain management, FIONA care, breastfeeding,    Patient Specific Interventions --  frequent position changes, dos santos catheter, peanut ball, epidural, FIONA care, lactation consultant   Mutuality/Individual Preferences   How to Address Anxieties/Fears explaination/education --    Mutuality/Individual Preferences   What Anxieties, Fears, Concerns, or Questions Do You Have About Your Care? giving birth --    What Information Would Help Us Give You More Personalized Care? none --    How Would You and/or Your Support Person Like to Participate in Your Care? be present and cut the cord --      Goal: Discharge Needs Assessment  Outcome: Ongoing (interventions implemented as appropriate)   10/23/19 0734 10/23/19 1943   Discharge Needs Assessment   Readmission Within the Last 30 Days --  no previous admission in last 30 days   Concerns to be Addressed no discharge needs identified --    Patient/Family Anticipates Transition to home --    Patient/Family Anticipated Services at Transition none --    Transportation Concerns car, none --    Transportation Anticipated car, drives self;family or friend will provide --    Anticipated Changes Related to Illness none --    Equipment Needed After Discharge none --    Disability   Equipment Currently Used at Home none --        Problem: Labor (Cervical Ripen, Induct, Augment) (Adult,Obstetrics,Pediatric)  Goal: Signs and Symptoms of Listed Potential  Problems Will be Absent, Minimized or Managed (Labor)  Outcome: Outcome(s) achieved Date Met: 10/23/19   10/23/19 1943   Goal/Outcome Evaluation   Problems Assessed (Labor) all   Problems Present (Labor) none       Problem: Fall Risk,  (Adult,Obstetrics,Pediatric)  Goal: Identify Related Risk Factors and Signs and Symptoms  Outcome: Ongoing (interventions implemented as appropriate)   10/23/19 1943   Fall Risk,  (Adult,Obstetrics,Pediatric)   Related Risk Factors (Fall Risk, ) medication side effects   Signs and Symptoms (Fall Risk, ) presence of fall risk factors     Goal: Absence of Maternal Fall  Outcome: Ongoing (interventions implemented as appropriate)   10/23/19 1943   Fall Risk,  (Adult,Obstetrics,Pediatric)   Absence of Maternal Fall making progress toward outcome     Goal: Absence of  Fall/Drop  Outcome: Ongoing (interventions implemented as appropriate)   10/23/19 1943   Fall Risk,  (Adult,Obstetrics,Pediatric)   Absence of [place] Fall/Drop making progress toward outcome       Problem: Skin Injury Risk (Adult)  Goal: Identify Related Risk Factors and Signs and Symptoms  Outcome: Ongoing (interventions implemented as appropriate)   10/23/19 0734   Skin Injury Risk (Adult)   Related Risk Factors (Skin Injury Risk) medication;medical devices;mobility impaired     Goal: Skin Health and Integrity  Outcome: Ongoing (interventions implemented as appropriate)   10/23/19 1943   Skin Injury Risk (Adult)   Skin Health and Integrity making progress toward outcome       Problem: Anesthesia/Analgesia, Neuraxial (Obstetrics)  Goal: Signs and Symptoms of Listed Potential Problems Will be Absent, Minimized or Managed (Anesthesia/Analgesia, Neuraxial)  Outcome: Ongoing (interventions implemented as appropriate)   10/23/19 1943   Goal/Outcome Evaluation   Problems Assessed (Neuraxial Anesthesia/Analgesia, OB) all   Problems Present (Neuraxial Anesth OB) none        Problem: Breastfeeding (Adult,Obstetrics,Pediatric)  Goal: Signs and Symptoms of Listed Potential Problems Will be Absent, Minimized or Managed (Breastfeeding)   10/23/19 1943   Goal/Outcome Evaluation   Problems Assessed (Breastfeeding) all   Problems Present (Breastfeeding) none

## 2019-10-23 NOTE — NURSING NOTE
Dr. Bates at bedside, bolusing pt through epidural line for pt c/o breakthrough pain that PCA portion of epidural pump was not resolving.  Pt position changed.

## 2019-10-23 NOTE — ANESTHESIA PREPROCEDURE EVALUATION
Anesthesia Evaluation     Patient summary reviewed and Nursing notes reviewed   NPO Solid Status: > 8 hours  NPO Liquid Status: > 2 hours           Airway   Mallampati: II  TM distance: >3 FB  Neck ROM: full  Dental - normal exam     Pulmonary - normal exam   (+) asthma,   Cardiovascular - negative cardio ROS and normal exam  Exercise tolerance: good (4-7 METS)    Rhythm: regular        Neuro/Psych  (+) headaches, psychiatric history Anxiety,     GI/Hepatic/Renal/Endo    (+) obesity, morbid obesity,      Musculoskeletal (-) negative ROS    Abdominal   (+) obese,     Bowel sounds: normal.   Substance History - negative use     OB/GYN    (+) Pregnant,         Other                        Anesthesia Plan    ASA 3     epidural     Anesthetic plan, all risks, benefits, and alternatives have been provided, discussed and informed consent has been obtained with: patient.

## 2019-10-23 NOTE — PROGRESS NOTES
Steady progress all day after pitocin.    Started pushing about 615pm.    Pushing well.  out with pushing.    fht- cat2. repetetive variables with pushing, good variabilitly.        toco- pit at 8 mu. q 2-3 min.       Anticipate vag del

## 2019-10-23 NOTE — PROGRESS NOTES
Comfortable  cvx- 9/90/0    fht- cat 1    toco- q 1-2 min but still suboptimal peak. Pit at 6    Making cervical change despite suboptimal contns

## 2019-10-23 NOTE — ANESTHESIA PROCEDURE NOTES
Labor Epidural      Patient reassessed immediately prior to procedure    Patient location during procedure: OB  Indication:at surgeon's request  Performed By  Anesthesiologist: Anne Stein MD  Preanesthetic Checklist  Completed: patient identified, site marked, surgical consent, pre-op evaluation, timeout performed, IV checked, risks and benefits discussed and monitors and equipment checked  Prep:  Pt Position:sitting  Sterile Tech:cap, gloves, mask and sterile barrier  Prep:chlorhexidine gluconate and isopropyl alcohol  Monitoring:blood pressure monitoring, continuous pulse oximetry and EKG  Epidural Block Procedure:  Approach:midline  Guidance:landmark technique  Location:L4-L5  Needle Type:Tuohy  Needle Gauge:17  Loss of Resistance Medium: saline  Loss of Resistance: 7cm  Cath Depth at skin:14 cm  Paresthesia: none  Aspiration:negative  Test Dose:negative  Number of Attempts: 1  Post Assessment:  Dressing:occlusive dressing applied and secured with tape  Pt Tolerance:patient tolerated the procedure well with no apparent complications  Complications:no

## 2019-10-23 NOTE — H&P
.[facility]  Obstetric History and Physical    Chief Complaint   Patient presents with   • Scheduled Induction     term IOL, +FM, irregular conntractions, denies LOF or VB       Subjective     Patient is a 34 y.o. female  currently at 39w4d, who presented for cervidil induction but was aicha too much for cervidil and was 2cm so changed to pit augmentation.    Comfortable with epidural. Had SROM- meconium    Only on 2u pit    In active labor. 7cm          Past OB History:       Obstetric History       T0      L0     SAB0   TAB0   Ectopic0   Molar0   Multiple0   Live Births0       # Outcome Date GA Lbr Sarkis/2nd Weight Sex Delivery Anes PTL Lv   1 Current                   Past Medical History: Past Medical History:   Diagnosis Date   • Abnormal Pap smear of cervix    • Anemia    • Anxiety    • Asthma    • Benign hematuria     neg work up   • Dysmenorrhea    • Endometriosis    • Foot fracture    • Fracture of spine, lumbar, without spinal cord injury, closed (CMS/[facility]) 2014    L2 25% compression fx   • Herpes     taking valtrex   • Hyperemesis gravidarum    • Infertility associated with anovulation     Femara   • Insomnia    • Migraine    • Palpitations    • Pelvic pain    • PVC (premature ventricular contraction)    • Urinary tract infection     this pregnancy      Past Surgical History Past Surgical History:   Procedure Laterality Date   • COLPOSCOPY      neg   • CYSTOSCOPY     • DIAGNOSTIC LAPAROSCOPY  2018    and chromopertubation    • ENDOSCOPY  2012    DR Hills:  rings in lower esophagus. erosions in stomach   • TONSILLECTOMY        Family History: Family History   Problem Relation Age of Onset   • Breast cancer Mother    • Prostate cancer Father    • Migraines Father    • Hypertension Father    • Seizures Sister    • Heart disease Maternal Grandmother    • Diabetes Maternal Grandmother    • Colon cancer Maternal Grandfather    • Hypertension Paternal Grandfather        Social History:  reports that she has never smoked. She has never used smokeless tobacco.   reports that she does not drink alcohol.   reports that she does not use drugs.    Allergies:     Biaxin [clarithromycin]; Ceclor [cefaclor]; Penicillins; Sulfa antibiotics; and Macrolides and ketolides       Objective       Vital Signs Range for the last 24 hours  Temperature: Temp:  [97.7 °F (36.5 °C)-98.6 °F (37 °C)] 98.3 °F (36.8 °C)   Temp Source: Temp src: Oral   BP: BP: ()/(46-87) 135/72   Pulse: Heart Rate:  [] 86   Respirations: Resp:  [18] 18                   Physical Examination:     General :  Alert in NAD  Abdomen: Gravid, nontender        Cervix: Exam by: Method: sterile exam per RN   Dilation: Cervical Dilation (cm): 7   Effacement: Cervical Effacement: %   Station: Fetal Station: -1       Fetal Heart Rate Assessment   Method: Fetal HR Assessment Method: external   Beats/min: Fetal HR (beats/min): 125   Baseline: Fetal Heart Baseline Rate: normal range   Varibility: Fetal HR Variability: moderate (amplitude range 6 to 25 bpm)   Accels: Fetal HR Accelerations: greater than/equal to 15 bpm, lasting at least 15 seconds   Decels: Fetal HR Decelerations: absent   Tracing Category:       Uterine Assessment   Method: Method: palpation, external tocotransducer   Frequency (min): Contraction Frequency (Minutes): 2-3   Ctx Count in 10 min:     Duration:     Intensity: Contraction Intensity: moderate by palpation   Intensity by IUPC:     Resting Tone: Uterine Resting Tone: soft by palpation   Resting Tone by IUPC:     Grace Units:               Assessment/Plan       Assessment:  1.  Intrauterine pregnancy at 39w4d weeks gestation with reassuring fetal status.    2.  Labor- pit augment    Plan:   Plan of care has been reviewed with patient and family,.   All questions answered.          Office prenatal reviewed        Yoly Delgado MD  10/23/2019  8:08 AM

## 2019-10-24 PROBLEM — Z34.90 PREGNANCY: Status: RESOLVED | Noted: 2019-03-30 | Resolved: 2019-10-24

## 2019-10-24 LAB
BASOPHILS # BLD AUTO: 0.07 10*3/MM3 (ref 0–0.2)
BASOPHILS NFR BLD AUTO: 0.3 % (ref 0–1.5)
DEPRECATED RDW RBC AUTO: 44.7 FL (ref 37–54)
EOSINOPHIL # BLD AUTO: 0.07 10*3/MM3 (ref 0–0.4)
EOSINOPHIL NFR BLD AUTO: 0.3 % (ref 0.3–6.2)
ERYTHROCYTE [DISTWIDTH] IN BLOOD BY AUTOMATED COUNT: 13.5 % (ref 12.3–15.4)
HCT VFR BLD AUTO: 30.3 % (ref 34–46.6)
HGB BLD-MCNC: 10.2 G/DL (ref 12–15.9)
IMM GRANULOCYTES # BLD AUTO: 0.18 10*3/MM3 (ref 0–0.05)
IMM GRANULOCYTES NFR BLD AUTO: 0.9 % (ref 0–0.5)
LYMPHOCYTES # BLD AUTO: 2.23 10*3/MM3 (ref 0.7–3.1)
LYMPHOCYTES NFR BLD AUTO: 11 % (ref 19.6–45.3)
MCH RBC QN AUTO: 30 PG (ref 26.6–33)
MCHC RBC AUTO-ENTMCNC: 33.7 G/DL (ref 31.5–35.7)
MCV RBC AUTO: 89.1 FL (ref 79–97)
MONOCYTES # BLD AUTO: 1.29 10*3/MM3 (ref 0.1–0.9)
MONOCYTES NFR BLD AUTO: 6.4 % (ref 5–12)
NEUTROPHILS # BLD AUTO: 16.37 10*3/MM3 (ref 1.7–7)
NEUTROPHILS NFR BLD AUTO: 81.1 % (ref 42.7–76)
NRBC BLD AUTO-RTO: 0 /100 WBC (ref 0–0.2)
PLATELET # BLD AUTO: 236 10*3/MM3 (ref 140–450)
PMV BLD AUTO: 11.1 FL (ref 6–12)
RBC # BLD AUTO: 3.4 10*6/MM3 (ref 3.77–5.28)
WBC NRBC COR # BLD: 20.21 10*3/MM3 (ref 3.4–10.8)

## 2019-10-24 PROCEDURE — 85025 COMPLETE CBC W/AUTO DIFF WBC: CPT | Performed by: OBSTETRICS & GYNECOLOGY

## 2019-10-24 RX ADMIN — SERTRALINE 50 MG: 50 TABLET, FILM COATED ORAL at 22:02

## 2019-10-24 RX ADMIN — DOCUSATE SODIUM 100 MG: 100 CAPSULE, LIQUID FILLED ORAL at 20:51

## 2019-10-24 RX ADMIN — IBUPROFEN 800 MG: 800 TABLET, FILM COATED ORAL at 17:58

## 2019-10-24 RX ADMIN — DOCUSATE SODIUM 100 MG: 100 CAPSULE, LIQUID FILLED ORAL at 09:29

## 2019-10-24 RX ADMIN — IBUPROFEN 800 MG: 800 TABLET, FILM COATED ORAL at 09:29

## 2019-10-24 RX ADMIN — OXYCODONE AND ACETAMINOPHEN 1 TABLET: 5; 325 TABLET ORAL at 02:05

## 2019-10-24 NOTE — LACTATION NOTE
P1T, baby very sleepy and not willing to open wide for latching. She falls asleep at mom's breast despite repeated attempts to arouse for feedings. Set up mom on her Spectra breast pump and encouraged to pump q2h while awake and feed baby EBM right after pumping. Frequent kangaroo care and access to the breast encouraged as well as hand expression and breast massage. Encouraged to call if needing assistance with latching baby at next feeding.

## 2019-10-24 NOTE — PLAN OF CARE
Problem: Patient Care Overview  Goal: Plan of Care Review  Outcome: Ongoing (interventions implemented as appropriate)   10/24/19 0528   Coping/Psychosocial   Plan of Care Reviewed With patient   Plan of Care Review   Progress improving   OTHER   Outcome Summary AVSS, pt c/o chest pressure and pressure in mid upper back, pt denied soa, cp, or difficulty breathing, EKG done NSR, APRN notified, pt thinks the pressure is from straining during delivery,  at bedside supportive, good pain control with po pain medication, pt void empty x 2     Goal: Individualization and Mutuality  Outcome: Ongoing (interventions implemented as appropriate)    Goal: Discharge Needs Assessment  Outcome: Ongoing (interventions implemented as appropriate)    Goal: Interprofessional Rounds/Family Conf  Outcome: Ongoing (interventions implemented as appropriate)      Problem: Breastfeeding (Adult,Obstetrics,Pediatric)  Goal: Signs and Symptoms of Listed Potential Problems Will be Absent, Minimized or Managed (Breastfeeding)  Outcome: Ongoing (interventions implemented as appropriate)

## 2019-10-24 NOTE — PROGRESS NOTES
[facility]  Vaginal Delivery Progress Note    Patient Name: Sara Hoang  :  1985  MRN:  [id number]      Subjective   Postpartum Day 1: Vaginal Delivery of a female infant.     The patient feels well without complaints.  Her pain is well controlled.  Reports normal lochia.     The patient plans to breastfeed.    Objective     Vital Signs Range for the last 24 hours  Temperature: Temp:  [97.2 °F (36.2 °C)-98.8 °F (37.1 °C)] 98 °F (36.7 °C)       BP: BP: ()/(48-85) 113/77   Pulse: Heart Rate:  [] 85   Respirations: Resp:  [16-18] 18                       Physical Exam:  General: Awake and alert  Abdomen: Fundus: firm, non tender, 1 below umbilicus  Extremities:  trace edema, NT     Labs:     Results from last 7 days   Lab Units 10/24/19  0436 10/22/19  2116   WBC 10*3/mm3 20.21* 15.57*   HEMOGLOBIN g/dL 10.2* 11.3*   HEMATOCRIT % 30.3* 34.9   PLATELETS 10*3/mm3 236 285       Prenatal labs results reviewed:  Yes   Rubella:  Immune  Rh Status:    RH type   Date Value Ref Range Status   10/23/2019 Negative  Corrected     Comment:     RhIG is NOT Indicated. Baby is Rh Negative         Assessment/Plan  : 1. PPD1 S/P  - Doing well, continue usual cares.     WBC elevated-- no evidence infection.  Will repeat in AM          * No active hospital problems. *          Kacie Tello, APRN  10/24/2019  10:05 AM

## 2019-10-25 VITALS
RESPIRATION RATE: 16 BRPM | HEART RATE: 80 BPM | TEMPERATURE: 98 F | OXYGEN SATURATION: 97 % | BODY MASS INDEX: 39.99 KG/M2 | HEIGHT: 65 IN | DIASTOLIC BLOOD PRESSURE: 69 MMHG | SYSTOLIC BLOOD PRESSURE: 103 MMHG | WEIGHT: 240 LBS

## 2019-10-25 LAB
DEPRECATED RDW RBC AUTO: 47 FL (ref 37–54)
ERYTHROCYTE [DISTWIDTH] IN BLOOD BY AUTOMATED COUNT: 14.1 % (ref 12.3–15.4)
HCT VFR BLD AUTO: 30.7 % (ref 34–46.6)
HGB BLD-MCNC: 10 G/DL (ref 12–15.9)
MCH RBC QN AUTO: 29.5 PG (ref 26.6–33)
MCHC RBC AUTO-ENTMCNC: 32.6 G/DL (ref 31.5–35.7)
MCV RBC AUTO: 90.6 FL (ref 79–97)
PLATELET # BLD AUTO: 235 10*3/MM3 (ref 140–450)
PMV BLD AUTO: 10.5 FL (ref 6–12)
RBC # BLD AUTO: 3.39 10*6/MM3 (ref 3.77–5.28)
WBC NRBC COR # BLD: 16.97 10*3/MM3 (ref 3.4–10.8)

## 2019-10-25 PROCEDURE — 85027 COMPLETE CBC AUTOMATED: CPT | Performed by: NURSE PRACTITIONER

## 2019-10-25 RX ORDER — IBUPROFEN 800 MG/1
800 TABLET ORAL EVERY 8 HOURS PRN
Qty: 60 TABLET | Refills: 0 | Status: SHIPPED | OUTPATIENT
Start: 2019-10-25 | End: 2020-01-20

## 2019-10-25 RX ORDER — OXYCODONE HYDROCHLORIDE AND ACETAMINOPHEN 5; 325 MG/1; MG/1
2 TABLET ORAL EVERY 4 HOURS PRN
Qty: 24 TABLET | Refills: 0 | Status: SHIPPED | OUTPATIENT
Start: 2019-10-25 | End: 2019-10-27

## 2019-10-25 RX ADMIN — IBUPROFEN 800 MG: 800 TABLET, FILM COATED ORAL at 04:33

## 2019-10-25 RX ADMIN — IBUPROFEN 800 MG: 800 TABLET, FILM COATED ORAL at 13:03

## 2019-10-25 RX ADMIN — DOCUSATE SODIUM 100 MG: 100 CAPSULE, LIQUID FILLED ORAL at 10:17

## 2019-10-25 RX ADMIN — Medication: at 10:17

## 2019-10-25 NOTE — DISCHARGE SUMMARY
Date of Discharge:  10/25/2019    Discharge Diagnosis: vaginal delivery    Presenting Problem/History of Present Illness  Pregnancy [Z34.90]       Hospital Course  Patient is a 34 y.o. female presented for term IOl.   Delivered viable female infant per Dr. Delgado.  Baby has to stay extra day d/t bilirubin-- mom plans to board in house.  Otherwise ready for d/c.      Procedures Performed         Consults:   Consults     Date and Time Order Name Status Description    10/22/2019 2049 Inpatient consult to Anesthesiology            Condition on Discharge:   Subjective   Postpartum Day 2 Vaginal Delivery.    The patient feels well without complaints.    Vital Signs  Temp:  [97.3 °F (36.3 °C)-98 °F (36.7 °C)] 98 °F (36.7 °C)  Heart Rate:  [80-95] 80  Resp:  [16-18] 16  BP: (103-118)/(69-79) 103/69    Physical Exam:   General: Awake and alert   Abdomen: Fundus: firm, non tender    Extremities:  Calves NT bilaterally    Assessment/Plan     PPD2  S/P  -   Stable for discharge. Instructions reviewed      Discharge Disposition  Home or Self Care    Discharge Medications     Discharge Medications      New Medications      Instructions Start Date   ibuprofen 800 MG tablet  Commonly known as:  ADVIL,MOTRIN   800 mg, Oral, Every 8 Hours PRN      oxyCODONE-acetaminophen 5-325 MG per tablet  Commonly known as:  PERCOCET   2 tablets, Oral, Every 4 Hours PRN         Continue These Medications      Instructions Start Date   prenatal (CLASSIC) vitamin 28-0.8 MG tablet tablet   Oral, Daily      sertraline 50 MG tablet  Commonly known as:  ZOLOFT   50 mg, Oral, Daily      SOLUBLE FIBER/PROBIOTICS PO   1 tablet/day, Oral, Daily      valACYclovir 500 MG tablet  Commonly known as:  VALTREX   500 mg, Oral, 2 Times Daily         Stop These Medications    ferrous sulfate 325 (65 FE) MG tablet     methylPREDNISolone 16 MG tablet  Commonly known as:  MEDROL     ondansetron 8 MG tablet  Commonly known as:  ZOFRAN     pantoprazole 40 MG EC  tablet  Commonly known as:  PROTONIX     promethazine 25 MG tablet  Commonly known as:  PHENERGAN              The patient has been prescribed a controlled substance.  She has been counseled on the risks associated with using the medication.   The addictive potential of this medication and alternatives were discussed carefully with this patient and she demonstrated understanding.  A PAULO report has been obtained and reviewed.       Activity at Discharge: restrictions reviewed    Follow-up Appointments  No future appointments.      Test Results Pending at Discharge       SAIDA Lizama  10/25/19  9:58 AM

## 2019-10-25 NOTE — LACTATION NOTE
Patient preparing for D/C but may board as baby is on bili light. Baby has not been latching due to being so drowsy but moilk is coming in and FOB was taught to syringe/finger feed and baby seems more alert.   Lactation Consult Note    Evaluation Completed: 10/25/2019 11:05 AM  Patient Name: Sara Hoang  :  1985  MRN:  [id number]     REFERRAL  INFORMATION:                          Date of Referral: 10/25/19   Person Making Referral: nurse, patient  Maternal Reason for Referral: breastfeeding currently  Infant Reason for Referral: hyperbilirubinemia    DELIVERY HISTORY:          Skin to skin initiation date/time: 10/23/2019  6:58 PM   Skin to skin end date/time:              MATERNAL ASSESSMENT:  Breast Size Issue: none (10/25/19 1100 : Delfina Kramer RN)  Breast Shape: pendulous, round (10/25/19 1100 : Delfina Kramer, RN)  Breast Density: filling (10/25/19 1100 : Delfina Kramer, RN)                      INFANT ASSESSMENT:  Information for the patient's :  Sara Hoang DsGirl [[id number]]   No past medical history on file.                                                                                                                                MATERNAL INFANT FEEDING:  Maternal Preparation: breast care, hand hygiene (10/25/19 1100 : Delfina Kramer, RN)  Maternal Emotional State: relaxed(has good family support) (10/25/19 1100 : Delfina Kramer, RN)      Signs of Milk Transfer: suck/swallow ratio (10/25/19 1100 : Delfina Kramer, RN)              Milk Ejection Reflex: present (10/25/19 1100 : Delfina Kramer, RN)                                           EQUIPMENT TYPE:  Breast Pump Type: double electric, personal(Spectra 2) (10/25/19 1100 : Delfina Kramer, RN)          Breast Care: Breastfeeding: breast milk to nipples, lanolin to nipples, manual expression to soften breast, milk massaged towards nipple, open to air, supportive bra utilized (10/25/19 1100 :  Delfina Kramer, RN)  Breastfeeding Assistance: infant suck/swallow verified, supplemental feeding provided, support offered (10/25/19 1100 : Delfina Kramer, RN)     Breastfeeding Support: encouragement provided, lactation counseling provided, maternal hydration promoted (10/25/19 1100 : Delfina Kramer, RN)          BREAST PUMPING:  Breast Pumping Interventions: frequent pumping encouraged (10/25/19 1100 : Delfina Kramer, RN)  Breast Pumping: bilateral breasts pumped until soft, double electric breast pump utilized (10/25/19 1100 : Delfina Kramer, RN)    LACTATION REFERRALS:  Lactation Referrals: outpatient lactation program (10/25/19 1100 : Delfina Kramer, RN)

## 2019-10-25 NOTE — LACTATION NOTE
P1. Baby Girl is on bili blanket and not waking for feeding. Patient has her Spectra pump at bedside. Reviewed proper settings and cleaning and encouraged mom to pump q 2 hours days and 3 hours nights. Latch baby as often as she will and feed  EBM in syringe. Mom does well with hand expression also.

## 2020-01-20 ENCOUNTER — OFFICE VISIT (OUTPATIENT)
Dept: FAMILY MEDICINE CLINIC | Facility: CLINIC | Age: 35
End: 2020-01-20

## 2020-01-20 VITALS
SYSTOLIC BLOOD PRESSURE: 114 MMHG | TEMPERATURE: 97.8 F | DIASTOLIC BLOOD PRESSURE: 68 MMHG | OXYGEN SATURATION: 97 % | WEIGHT: 207 LBS | HEART RATE: 65 BPM | BODY MASS INDEX: 34.49 KG/M2 | HEIGHT: 65 IN | RESPIRATION RATE: 16 BRPM

## 2020-01-20 DIAGNOSIS — G43.109 MIGRAINE WITH AURA AND WITHOUT STATUS MIGRAINOSUS, NOT INTRACTABLE: ICD-10-CM

## 2020-01-20 DIAGNOSIS — F41.9 ANXIETY: Primary | ICD-10-CM

## 2020-01-20 PROCEDURE — 99213 OFFICE O/P EST LOW 20 MIN: CPT | Performed by: PHYSICIAN ASSISTANT

## 2020-01-20 RX ORDER — SERTRALINE HYDROCHLORIDE 100 MG/1
100 TABLET, FILM COATED ORAL DAILY
COMMUNITY
Start: 2019-12-30 | End: 2020-01-20 | Stop reason: SDUPTHER

## 2020-01-20 RX ORDER — ALBUTEROL SULFATE 90 UG/1
2 AEROSOL, METERED RESPIRATORY (INHALATION) EVERY 4 HOURS PRN
Qty: 1 INHALER | Refills: 11 | Status: SHIPPED | OUTPATIENT
Start: 2020-01-20 | End: 2020-02-17 | Stop reason: ALTCHOICE

## 2020-01-20 RX ORDER — RIZATRIPTAN BENZOATE 10 MG/1
10 TABLET ORAL ONCE AS NEEDED
Qty: 36 TABLET | Refills: 3 | Status: SHIPPED | OUTPATIENT
Start: 2020-01-20 | End: 2021-06-28 | Stop reason: SDUPTHER

## 2020-01-20 RX ORDER — TOPIRAMATE 25 MG/1
TABLET ORAL
Qty: 120 TABLET | Refills: 5 | Status: SHIPPED | OUTPATIENT
Start: 2020-01-20 | End: 2020-05-12

## 2020-01-20 RX ORDER — SERTRALINE HYDROCHLORIDE 100 MG/1
100 TABLET, FILM COATED ORAL DAILY
Qty: 90 TABLET | Refills: 3 | Status: SHIPPED | OUTPATIENT
Start: 2020-01-20 | End: 2021-07-08 | Stop reason: SDUPTHER

## 2020-01-20 NOTE — PROGRESS NOTES
"Subjective   Sara Hoang is a 34 y.o. female.     History of Present Illness   Sara Hoang female 34 y.o., /68 (BP Location: Left arm, Patient Position: Sitting, Cuff Size: Large Adult)   Pulse 65   Temp 97.8 °F (36.6 °C) (Oral)   Resp 16   Ht 165.1 cm (65\")   Wt 93.9 kg (207 lb)   LMP  (LMP Unknown)   SpO2 97%   BMI 34.45 kg/m²   who presents today for follow up of Anxiety.  She reports well since going back on 100mg; gave birth 10-23-19. No longer breast feeding.  This dose is controlling anxiety and no post partum depression. Onset of symptoms was approximately several years ago.  She denies current suicidal and homicidal ideation. Risk factors are family history of anxiety and or depression and lifestyle of multiple roles.  Previous treatment includes current Rx.  She complains of the following medication side effects: none.  The patient has had no previous counseling..    She has history of migraine and while she was pregnant she did not have any migraines and was off the Topamax.  She is here today to restart on Topamax because she is having 2 migraines a week.  This did work very well before I will also update her prescription for Maxalt for acute migraine episode.  I had her on Elavil in the past and this was stopped due to fatigue.  CT of the brain was done on 12/30/2015 and negative.  I had started her on Topamax January 13, 2016; he did well on 75 mg daily until 2018 she started getting breakthrough migraines and at that time we went up to 50 mg twice a day and that did control her migraines.  Has history of asthma I am going to put her albuterol metered-dose inhaler on file if she needs it    No hx renal stones    Dose this one at bedtime for a week then if get a migraine anytime after that then go up to one pill twice daily for a week then after this if migraine go up to one in AM and 2 in PM for one week, after this if migraine increase to max dose of 2 in AM and 2 in PM (100mg) " daily.  Also warned patient about risk renal stones, numb/tingling hands and feet, and can interfere with concentration.      The following portions of the patient's history were reviewed and updated as appropriate: allergies, current medications, past family history, past medical history, past social history, past surgical history and problem list.    Review of Systems   Constitutional: Negative for activity change, appetite change and unexpected weight change.   HENT: Negative for nosebleeds and trouble swallowing.    Eyes: Negative for pain and visual disturbance.   Respiratory: Negative for chest tightness, shortness of breath and wheezing.    Cardiovascular: Negative for chest pain and palpitations.   Gastrointestinal: Negative for abdominal pain and blood in stool.   Endocrine: Negative.    Genitourinary: Negative for difficulty urinating and hematuria.   Musculoskeletal: Negative for joint swelling.   Skin: Negative for color change and rash.   Allergic/Immunologic: Negative.    Neurological: Positive for headaches. Negative for syncope and speech difficulty.   Hematological: Negative for adenopathy.   Psychiatric/Behavioral: Negative for agitation and confusion.   All other systems reviewed and are negative.      Objective   Physical Exam   Constitutional: She is oriented to person, place, and time. She appears well-developed and well-nourished. No distress.   HENT:   Head: Normocephalic and atraumatic.   Eyes: Pupils are equal, round, and reactive to light. Conjunctivae and EOM are normal. Right eye exhibits no discharge. Left eye exhibits no discharge. No scleral icterus.   Neck: Normal range of motion. Neck supple. No tracheal deviation present. No thyromegaly present.   Cardiovascular: Normal rate, regular rhythm, normal heart sounds, intact distal pulses and normal pulses. Exam reveals no gallop.   No murmur heard.  Pulmonary/Chest: Effort normal and breath sounds normal. No respiratory distress. She  has no wheezes. She has no rales.   Musculoskeletal: Normal range of motion.   Neurological: She is alert and oriented to person, place, and time. She exhibits normal muscle tone. Coordination normal.   Skin: Skin is warm. No rash noted. No erythema. No pallor.   Psychiatric: She has a normal mood and affect. Her behavior is normal. Judgment and thought content normal.   Nursing note and vitals reviewed.      Assessment/Plan   Sara PENN was seen today for migraine.    Diagnoses and all orders for this visit:    Anxiety    Migraine with aura and without status migrainosus, not intractable    Other orders  -     albuterol sulfate  (90 Base) MCG/ACT inhaler; Inhale 2 puffs Every 4 (Four) Hours As Needed for Wheezing.  -     sertraline (ZOLOFT) 100 MG tablet; Take 1 tablet by mouth Daily. For anxiety  -     rizatriptan (MAXALT) 10 MG tablet; Take 1 tablet by mouth 1 (One) Time As Needed for Migraine for up to 1 dose. May repeat in 2 hours if needed  -     topiramate (TOPAMAX) 25 MG tablet; 1 PO at HS X 1 week; increase by 25 mg weekly if continue migraine up to max dose of 2 PO twice daily    no recent asthma and MDI on file  I will restart her on Topamax at the 25 mg and taper up to max dose of 100 a day  We will give her Maxalt to use as needed for acute migraine  Will continue Zoloft at 100 mg and working well for anxiety  She is aware to stop Topamax before trying to have another baby  I also suggest she try co-Q10 and magnesium 400 mg daily for migraine suppression

## 2020-01-20 NOTE — PATIENT INSTRUCTIONS
CoQ10 100mg or 300mg daily  Mg Oxide; 400mg daily  Restart Topamax      Dose this one at bedtime for a week then if get a migraine anytime after that then go up to one pill twice daily for a week then after this if migraine go up to one in AM and 2 in PM for one week, after this if migraine increase to max dose of 2 in AM and 2 in PM (100mg) daily.  Also warned patient about risk renal stones, numb/tingling hands and feet, and can interfere with concentration.

## 2020-02-17 ENCOUNTER — OFFICE VISIT (OUTPATIENT)
Dept: FAMILY MEDICINE CLINIC | Facility: CLINIC | Age: 35
End: 2020-02-17

## 2020-02-17 VITALS
RESPIRATION RATE: 16 BRPM | TEMPERATURE: 97.8 F | DIASTOLIC BLOOD PRESSURE: 70 MMHG | HEIGHT: 65 IN | HEART RATE: 62 BPM | OXYGEN SATURATION: 98 % | WEIGHT: 208 LBS | BODY MASS INDEX: 34.66 KG/M2 | SYSTOLIC BLOOD PRESSURE: 110 MMHG

## 2020-02-17 DIAGNOSIS — E55.9 VITAMIN D DEFICIENCY: ICD-10-CM

## 2020-02-17 DIAGNOSIS — E53.8 LOW FOLIC ACID: ICD-10-CM

## 2020-02-17 DIAGNOSIS — K58.0 IRRITABLE BOWEL SYNDROME WITH DIARRHEA: Primary | ICD-10-CM

## 2020-02-17 DIAGNOSIS — Z00.00 LABORATORY EXAMINATION ORDERED AS PART OF A ROUTINE GENERAL MEDICAL EXAMINATION: ICD-10-CM

## 2020-02-17 PROCEDURE — 99213 OFFICE O/P EST LOW 20 MIN: CPT | Performed by: PHYSICIAN ASSISTANT

## 2020-02-17 RX ORDER — ALBUTEROL SULFATE 90 UG/1
2 AEROSOL, METERED RESPIRATORY (INHALATION)
COMMUNITY
Start: 2020-02-12 | End: 2020-03-13

## 2020-02-17 RX ORDER — DEXTROMETHORPHAN HYDROBROMIDE AND PROMETHAZINE HYDROCHLORIDE 15; 6.25 MG/5ML; MG/5ML
SYRUP ORAL
COMMUNITY
Start: 2020-02-12 | End: 2020-05-12

## 2020-02-17 RX ORDER — DICYCLOMINE HYDROCHLORIDE 10 MG/1
10 CAPSULE ORAL
Qty: 90 CAPSULE | Refills: 2 | Status: SHIPPED | OUTPATIENT
Start: 2020-02-17 | End: 2020-12-08

## 2020-02-17 RX ORDER — BENZONATATE 200 MG/1
CAPSULE ORAL
COMMUNITY
Start: 2020-02-12 | End: 2020-12-08

## 2020-02-17 NOTE — PROGRESS NOTES
Subjective   Sara Hoang is a 34 y.o. female.     History of Present Illness   Sara Hoang 34 y.o. female who presents for evaluation of adominal cramping and Can have nausea when she has the abdominal cramping and this is associated with her episodes of diarrhea.  Not have any current constipation in between episodes.  The day and it could be several times a week.  Go few weeks within be completely normal bowel movements.. Symptoms have been present for several months .  The condition is aggravated by ??stress? . she is experiencing associated nausea with this.  Alleviating factors are nothing with no change in symptoms . Patient denies fever, diarrhea, constipation, GI symptoms waking them up, melena, bright red blood in stool, reflux, vomiting and dysphagia her past medical history is notable for IBS.  Patient denies recent travel.      Dr Hills did colonoscopy 12-2-16  No blood; no weight loss  fam hx GP colon cancer---more than one grandparent --  Has been on Linzess in past and constipation was problem then;   Well on the Zoloft and Topamax for migraine suppression and anxiety treatment  The following portions of the patient's history were reviewed and updated as appropriate: allergies, current medications, past family history, past medical history, past social history, past surgical history and problem list.    Review of Systems   Constitutional: Negative for activity change, appetite change and unexpected weight change.   HENT: Negative for ear discharge, nosebleeds and trouble swallowing.    Eyes: Negative for pain and visual disturbance.   Respiratory: Positive for cough. Negative for chest tightness, shortness of breath and wheezing.    Cardiovascular: Negative for chest pain and palpitations.   Gastrointestinal: Positive for abdominal pain, diarrhea and nausea. Negative for blood in stool.   Endocrine: Negative.    Genitourinary: Negative for difficulty urinating and hematuria.   Musculoskeletal:  Negative for joint swelling.   Skin: Negative for color change and rash.   Allergic/Immunologic: Negative.    Neurological: Negative for syncope and speech difficulty.   Hematological: Negative for adenopathy.   Psychiatric/Behavioral: Negative for agitation and confusion.   All other systems reviewed and are negative.      Objective   Physical Exam   Constitutional: She is oriented to person, place, and time. She appears well-developed and well-nourished. No distress.   HENT:   Head: Normocephalic and atraumatic.   Eyes: Pupils are equal, round, and reactive to light. Conjunctivae and EOM are normal. Right eye exhibits no discharge. Left eye exhibits no discharge. No scleral icterus.   Neck: Normal range of motion. Neck supple. No tracheal deviation present. No thyromegaly present.   Cardiovascular: Normal rate, regular rhythm, normal heart sounds, intact distal pulses and normal pulses. Exam reveals no gallop.   No murmur heard.  Pulmonary/Chest: Effort normal and breath sounds normal. No respiratory distress. She has no wheezes. She has no rales.   Musculoskeletal: Normal range of motion.   Neurological: She is alert and oriented to person, place, and time. She exhibits normal muscle tone. Coordination normal.   Skin: Skin is warm. No rash noted. No erythema. No pallor.   Psychiatric: She has a normal mood and affect. Her behavior is normal. Judgment and thought content normal.   Nursing note and vitals reviewed.      Assessment/Plan   Problems Addressed this Visit     None      Visit Diagnoses     Irritable bowel syndrome with diarrhea    -  Primary    Relevant Orders    Comprehensive metabolic panel    Lipid panel    CBC and Differential    TSH    T3, Free    T4, Free    Vitamin B12    Folate    Vitamin D 25 Hydroxy    Ferritin    Iron Profile    Vitamin D deficiency        Relevant Orders    Comprehensive metabolic panel    Lipid panel    CBC and Differential    TSH    T3, Free    T4, Free    Vitamin B12     Folate    Vitamin D 25 Hydroxy    Ferritin    Iron Profile    Laboratory examination ordered as part of a routine general medical examination        Relevant Orders    Comprehensive metabolic panel    Lipid panel    CBC and Differential    TSH    T3, Free    T4, Free    Vitamin B12    Folate    Vitamin D 25 Hydroxy    Ferritin    Iron Profile      labs  Start Benifiber  Exercise  Bentyl PRN  Probiotic   Trial of dairy free for few weeks  May need to see GI--DR Lutz.

## 2020-02-17 NOTE — PATIENT INSTRUCTIONS
Benefiber ---OTC daily  lDicyclomine tablets or capsules  What is this medicine?  DICYCLOMINE (dye ALDO sidhu) is used to treat bowel problems including irritable bowel syndrome.  This medicine may be used for other purposes; ask your health care provider or pharmacist if you have questions.  COMMON BRAND NAME(S): Bentyl  What should I tell my health care provider before I take this medicine?  They need to know if you have any of these conditions:  -difficulty passing urine  -esophagus problems or heartburn  -glaucoma  -heart disease, or previous heart attack  -myasthenia gravis  -prostate trouble  -stomach infection, or obstruction  -ulcerative colitis  -an unusual or allergic reaction to dicyclomine, other medicines, foods, dyes, or preservatives  -pregnant or trying to get pregnant  -breast-feeding  How should I use this medicine?  Take this medicine by mouth with a glass of water. Follow the directions on the prescription label. It is best to take this medicine on an empty stomach, 30 minutes to 1 hour before meals. Take your medicine at regular intervals. Do not take your medicine more often than directed.  Talk to your pediatrician regarding the use of this medicine in children. Special care may be needed. While this drug may be prescribed for children as young as 6 months of age for selected conditions, precautions do apply.  Patients over 65 years old may have a stronger reaction and need a smaller dose.  Overdosage: If you think you have taken too much of this medicine contact a poison control center or emergency room at once.  NOTE: This medicine is only for you. Do not share this medicine with others.  What if I miss a dose?  If you miss a dose, take it as soon as you can. If it is almost time for your next dose, take only that dose. Do not take double or extra doses.  What may interact with this medicine?  -amantadine  -antacids  -benztropine  -digoxin  -disopyramide  -medicines for allergies, colds  and breathing difficulties  -medicines for alzheimer's disease  -medicines for anxiety or sleeping problems  -medicines for depression or psychotic disturbances  -medicines for diarrhea  -medicines for pain  -metoclopramide  -tegaserod  This list may not describe all possible interactions. Give your health care provider a list of all the medicines, herbs, non-prescription drugs, or dietary supplements you use. Also tell them if you smoke, drink alcohol, or use illegal drugs. Some items may interact with your medicine.  What should I watch for while using this medicine?  You may get drowsy, dizzy, or have blurred vision. Do not drive, use machinery, or do anything that needs mental alertness until you know how this medicine affects you. To reduce the risk of dizzy or fainting spells, do not sit or stand up quickly, especially if you are an older patient. Alcohol can make you more drowsy, avoid alcoholic drinks.  Stay out of bright light and wear sunglasses if this medicine makes your eyes more sensitive to light.  Avoid extreme heat (hot tubs, saunas). This medicine can cause you to sweat less than normal. Your body temperature could increase to dangerous levels, which may lead to heat stroke.  Antacids can stop this medicine from working. If you get an upset stomach and want to take an antacid, make sure there is an interval of at least 1 to 2 hours before or after you take this medicine.  Your mouth may get dry. Chewing sugarless gum or sucking hard candy, and drinking plenty of water may help. Contact your doctor if the problem does not go away or is severe.  What side effects may I notice from receiving this medicine?  Side effects that you should report to your doctor or health care professional as soon as possible:  -agitation, nervousness, confusion  -difficulty swallowing  -dizziness, drowsiness  -fast or slow heartbeat  -hallucinations  -pain or difficulty passing urine  Side effects that usually do not  require medical attention (report to your doctor or health care professional if they continue or are bothersome):  -constipation  -headache  -nausea or vomiting  -sexual difficulty  This list may not describe all possible side effects. Call your doctor for medical advice about side effects. You may report side effects to FDA at 3-400-FDA-6303.  Where should I keep my medicine?  Keep out of the reach of children.  Store at room temperature below 30 degrees C (86 degrees F). Protect from light. Throw away any unused medicine after the expiration date.  NOTE: This sheet is a summary. It may not cover all possible information. If you have questions about this medicine, talk to your doctor, pharmacist, or health care provider.  © 2019 [employer] (2009-04-07 17:12:34)

## 2020-02-18 PROBLEM — E53.8 LOW FOLIC ACID: Status: ACTIVE | Noted: 2020-02-18

## 2020-02-18 LAB
25(OH)D3+25(OH)D2 SERPL-MCNC: 18.9 NG/ML (ref 30–100)
ALBUMIN SERPL-MCNC: 4.5 G/DL (ref 3.8–4.8)
ALBUMIN/GLOB SERPL: 2 {RATIO} (ref 1.2–2.2)
ALP SERPL-CCNC: 139 IU/L (ref 39–117)
ALT SERPL-CCNC: 12 IU/L (ref 0–32)
AST SERPL-CCNC: 14 IU/L (ref 0–40)
BASOPHILS # BLD AUTO: 0 X10E3/UL (ref 0–0.2)
BASOPHILS NFR BLD AUTO: 1 %
BILIRUB SERPL-MCNC: 0.4 MG/DL (ref 0–1.2)
BUN SERPL-MCNC: 15 MG/DL (ref 6–20)
BUN/CREAT SERPL: 16 (ref 9–23)
CALCIUM SERPL-MCNC: 9.3 MG/DL (ref 8.7–10.2)
CHLORIDE SERPL-SCNC: 107 MMOL/L (ref 96–106)
CHOLEST SERPL-MCNC: 192 MG/DL (ref 100–199)
CO2 SERPL-SCNC: 19 MMOL/L (ref 20–29)
CREAT SERPL-MCNC: 0.93 MG/DL (ref 0.57–1)
EOSINOPHIL # BLD AUTO: 0.1 X10E3/UL (ref 0–0.4)
EOSINOPHIL NFR BLD AUTO: 1 %
ERYTHROCYTE [DISTWIDTH] IN BLOOD BY AUTOMATED COUNT: 13.5 % (ref 11.7–15.4)
FERRITIN SERPL-MCNC: 71 NG/ML (ref 15–150)
FOLATE SERPL-MCNC: 3.3 NG/ML
GLOBULIN SER CALC-MCNC: 2.3 G/DL (ref 1.5–4.5)
GLUCOSE SERPL-MCNC: 88 MG/DL (ref 65–99)
HCT VFR BLD AUTO: 39.2 % (ref 34–46.6)
HDLC SERPL-MCNC: 52 MG/DL
HGB BLD-MCNC: 12.8 G/DL (ref 11.1–15.9)
IMM GRANULOCYTES # BLD AUTO: 0 X10E3/UL (ref 0–0.1)
IMM GRANULOCYTES NFR BLD AUTO: 0 %
IRON SATN MFR SERPL: 24 % (ref 15–55)
IRON SERPL-MCNC: 77 UG/DL (ref 27–159)
LDLC SERPL CALC-MCNC: 114 MG/DL (ref 0–99)
LYMPHOCYTES # BLD AUTO: 3 X10E3/UL (ref 0.7–3.1)
LYMPHOCYTES NFR BLD AUTO: 34 %
MCH RBC QN AUTO: 28.4 PG (ref 26.6–33)
MCHC RBC AUTO-ENTMCNC: 32.7 G/DL (ref 31.5–35.7)
MCV RBC AUTO: 87 FL (ref 79–97)
MONOCYTES # BLD AUTO: 0.4 X10E3/UL (ref 0.1–0.9)
MONOCYTES NFR BLD AUTO: 5 %
NEUTROPHILS # BLD AUTO: 5.2 X10E3/UL (ref 1.4–7)
NEUTROPHILS NFR BLD AUTO: 59 %
PLATELET # BLD AUTO: 298 X10E3/UL (ref 150–450)
POTASSIUM SERPL-SCNC: 4.1 MMOL/L (ref 3.5–5.2)
PROT SERPL-MCNC: 6.8 G/DL (ref 6–8.5)
RBC # BLD AUTO: 4.5 X10E6/UL (ref 3.77–5.28)
SODIUM SERPL-SCNC: 140 MMOL/L (ref 134–144)
T3FREE SERPL-MCNC: 2.6 PG/ML (ref 2–4.4)
T4 FREE SERPL-MCNC: 1.04 NG/DL (ref 0.82–1.77)
TIBC SERPL-MCNC: 319 UG/DL (ref 250–450)
TRIGL SERPL-MCNC: 128 MG/DL (ref 0–149)
TSH SERPL DL<=0.005 MIU/L-ACNC: 0.72 UIU/ML (ref 0.45–4.5)
UIBC SERPL-MCNC: 242 UG/DL (ref 131–425)
VIT B12 SERPL-MCNC: 575 PG/ML (ref 232–1245)
VLDLC SERPL CALC-MCNC: 26 MG/DL (ref 5–40)
WBC # BLD AUTO: 8.7 X10E3/UL (ref 3.4–10.8)

## 2020-02-18 RX ORDER — FOLIC ACID 1 MG/1
1 TABLET ORAL DAILY
Qty: 90 TABLET | Refills: 1 | Status: SHIPPED | OUTPATIENT
Start: 2020-02-18 | End: 2021-03-02

## 2020-02-25 DIAGNOSIS — R11.2 NON-INTRACTABLE VOMITING WITH NAUSEA, UNSPECIFIED VOMITING TYPE: Primary | ICD-10-CM

## 2020-03-06 ENCOUNTER — TELEPHONE (OUTPATIENT)
Dept: FAMILY MEDICINE CLINIC | Facility: CLINIC | Age: 35
End: 2020-03-06

## 2020-03-06 NOTE — TELEPHONE ENCOUNTER
----- Message from Sara Hoang sent at 3/6/2020  4:29 PM EST -----  Regarding: Prescription Question  Contact: [phone]  I was exposed to flu A at work and now I have a fever, a headache and body aches. Can you send me tamiflu to the pharmacy?

## 2020-03-07 ENCOUNTER — HOSPITAL ENCOUNTER (OUTPATIENT)
Dept: ULTRASOUND IMAGING | Facility: HOSPITAL | Age: 35
End: 2020-03-07

## 2020-03-10 ENCOUNTER — TELEPHONE (OUTPATIENT)
Dept: FAMILY MEDICINE CLINIC | Facility: CLINIC | Age: 35
End: 2020-03-10

## 2020-03-10 ENCOUNTER — HOSPITAL ENCOUNTER (OUTPATIENT)
Dept: ULTRASOUND IMAGING | Facility: HOSPITAL | Age: 35
Discharge: HOME | End: 2020-03-10
Admitting: PHYSICIAN ASSISTANT

## 2020-03-10 DIAGNOSIS — R11.2 NON-INTRACTABLE VOMITING WITH NAUSEA, UNSPECIFIED VOMITING TYPE: Primary | ICD-10-CM

## 2020-03-10 DIAGNOSIS — R11.2 NON-INTRACTABLE VOMITING WITH NAUSEA, UNSPECIFIED VOMITING TYPE: ICD-10-CM

## 2020-03-10 DIAGNOSIS — K58.0 IRRITABLE BOWEL SYNDROME WITH DIARRHEA: ICD-10-CM

## 2020-03-10 PROCEDURE — 76705 ECHO EXAM OF ABDOMEN: CPT

## 2020-03-10 NOTE — TELEPHONE ENCOUNTER
----- Message from Sara Hoang sent at 3/10/2020  6:00 PM EDT -----  Regarding: RE: Test Results Question  Contact: [phone]  Dr. Ch would be great. Thank you!  ----- Message -----  From: Lashae Hercules PA-C  Sent: 3/10/2020  5:37 PM EDT  To: Sara Hoang  Subject: RE: Test Results Question  Before I order a nuclear test again I have you see the GI doc you have been to Dr. Lutz in the past--- phone number is [phone]---you call; if you want to see a female GI doc I do really like Dr.Sarah Ch and can set up if want    ----- Message -----     From: Sara Hoang     Sent: 3/10/2020  1:46 PM EDT       To: Lashae Hercules PA-C  Subject: Test Results Question    Yes, I am still having issues. Chronic diarrhea and when I have those episodes I have nausea and sometimes vomiting. I’ve been getting some pain in my upper back and in my abdomen, as well.

## 2020-03-12 ENCOUNTER — APPOINTMENT (OUTPATIENT)
Dept: ULTRASOUND IMAGING | Facility: HOSPITAL | Age: 35
End: 2020-03-12

## 2020-04-06 ENCOUNTER — TELEPHONE (OUTPATIENT)
Dept: GASTROENTEROLOGY | Facility: CLINIC | Age: 35
End: 2020-04-06

## 2020-04-07 ENCOUNTER — TELEPHONE (OUTPATIENT)
Dept: GASTROENTEROLOGY | Facility: CLINIC | Age: 35
End: 2020-04-07

## 2020-04-29 ENCOUNTER — TELEPHONE (OUTPATIENT)
Dept: GASTROENTEROLOGY | Facility: CLINIC | Age: 35
End: 2020-04-29

## 2020-05-12 ENCOUNTER — RESULTS ENCOUNTER (OUTPATIENT)
Dept: FAMILY MEDICINE CLINIC | Facility: CLINIC | Age: 35
End: 2020-05-12

## 2020-05-12 ENCOUNTER — TELEPHONE (OUTPATIENT)
Dept: FAMILY MEDICINE CLINIC | Facility: CLINIC | Age: 35
End: 2020-05-12

## 2020-05-12 DIAGNOSIS — K58.0 IRRITABLE BOWEL SYNDROME WITH DIARRHEA: ICD-10-CM

## 2020-05-12 DIAGNOSIS — E55.9 VITAMIN D DEFICIENCY: ICD-10-CM

## 2020-05-12 DIAGNOSIS — Z00.00 LABORATORY EXAMINATION ORDERED AS PART OF A ROUTINE GENERAL MEDICAL EXAMINATION: ICD-10-CM

## 2020-05-12 RX ORDER — TOPIRAMATE 50 MG/1
TABLET, FILM COATED ORAL
Qty: 270 TABLET | Refills: 1 | Status: SHIPPED | OUTPATIENT
Start: 2020-05-12 | End: 2020-11-30 | Stop reason: SDUPTHER

## 2020-05-12 NOTE — TELEPHONE ENCOUNTER
----- Message from Sara Hoang sent at 5/11/2020  9:13 PM EDT -----  Regarding: Prescription Question  Contact: [phone]  aRmesh Bhardwaj,   I increased the dosage of my Topamax like you suggested and have been doing really well on 150 mg. Could you please update my prescription for the higher dosage? Thank you!     Sara Hoang

## 2020-11-30 RX ORDER — TOPIRAMATE 50 MG/1
TABLET, FILM COATED ORAL
Qty: 270 TABLET | Refills: 1 | Status: SHIPPED | OUTPATIENT
Start: 2020-11-30 | End: 2021-05-01

## 2020-12-08 ENCOUNTER — TELEMEDICINE (OUTPATIENT)
Dept: FAMILY MEDICINE CLINIC | Facility: CLINIC | Age: 35
End: 2020-12-08

## 2020-12-08 ENCOUNTER — E-VISIT (OUTPATIENT)
Dept: FAMILY MEDICINE CLINIC | Facility: CLINIC | Age: 35
End: 2020-12-08

## 2020-12-08 DIAGNOSIS — J01.90 ACUTE SINUSITIS, RECURRENCE NOT SPECIFIED, UNSPECIFIED LOCATION: Primary | ICD-10-CM

## 2020-12-08 PROCEDURE — 99213 OFFICE O/P EST LOW 20 MIN: CPT | Performed by: PHYSICIAN ASSISTANT

## 2020-12-08 RX ORDER — OFLOXACIN 3 MG/ML
SOLUTION AURICULAR (OTIC)
COMMUNITY
Start: 2020-12-02 | End: 2021-03-02

## 2020-12-08 RX ORDER — LEVOFLOXACIN 500 MG/1
TABLET, FILM COATED ORAL
Qty: 10 TABLET | Refills: 0 | Status: SHIPPED | OUTPATIENT
Start: 2020-12-08 | End: 2021-02-02

## 2020-12-08 RX ORDER — FLUCONAZOLE 150 MG/1
150 TABLET ORAL ONCE
Qty: 1 TABLET | Refills: 2 | Status: SHIPPED | OUTPATIENT
Start: 2020-12-08 | End: 2020-12-08

## 2020-12-08 RX ORDER — DEXTROMETHORPHAN HYDROBROMIDE AND PROMETHAZINE HYDROCHLORIDE 15; 6.25 MG/5ML; MG/5ML
5 SYRUP ORAL 4 TIMES DAILY PRN
Qty: 118 ML | Refills: 0 | Status: SHIPPED | OUTPATIENT
Start: 2020-12-08 | End: 2021-03-02

## 2020-12-08 NOTE — PROGRESS NOTES
Subjective   Sara Hoang is a 35 y.o. female.   You have chosen to receive care through a telehealth visit.  Do you consent to use a video/audio connection for your medical care today? Yes    History of Present Illness   Sara Hoang 35 y.o. female who presents for evaluation of sinus pressure and congestion, cough, ear pressure, fatigue. Symptoms include congestion, sore throat, swollen glands, post nasal drip, sinus pain and fatigue.  Onset of symptoms was 5 days ago, gradually worsening since that time. Patient denies shortness of breath, wheezing, fever.   Evaluation to date: was seen at [facility] Treatment to date:  eye drops--Floxin otic.     Onset last Wed---right eye then Friday left eye  Went to [facility] 12-5-20 and strep neg, COVID neg  [facility] 11-23-20  The following portions of the patient's history were reviewed and updated as appropriate: allergies, current medications, past family history, past medical history, past social history, past surgical history and problem list.    Review of Systems   Constitutional: Positive for activity change, appetite change and fatigue. Negative for fever.   HENT: Positive for congestion, postnasal drip, sinus pressure, sinus pain, sore throat and voice change. Negative for nosebleeds and trouble swallowing.    Eyes: Positive for discharge, redness and itching. Negative for visual disturbance.   Respiratory: Positive for choking. Negative for stridor.    Cardiovascular: Negative for chest pain.   Gastrointestinal: Negative for blood in stool and diarrhea.   Endocrine: Negative for polydipsia.   Genitourinary: Negative for genital sores and hematuria.   Musculoskeletal: Negative for joint swelling.   Skin: Negative for color change and rash.   Allergic/Immunologic: Negative for immunocompromised state.   Neurological: Negative for seizures, facial asymmetry and speech difficulty.   Hematological: Positive for adenopathy.   Psychiatric/Behavioral: Negative for behavioral  problems, self-injury and suicidal ideas.       Objective   Physical Exam  Constitutional:       General: She is not in acute distress.     Appearance: She is well-developed. She is ill-appearing. She is not toxic-appearing or diaphoretic.   HENT:      Head: Normocephalic and atraumatic.      Right Ear: External ear normal.      Left Ear: External ear normal.      Nose: Congestion present.   Eyes:      Comments: injected   Neck:      Musculoskeletal: Normal range of motion.   Pulmonary:      Effort: Pulmonary effort is normal. No respiratory distress.      Breath sounds: No stridor.   Musculoskeletal: Normal range of motion.   Skin:     General: Skin is dry.      Coloration: Skin is not jaundiced.   Neurological:      General: No focal deficit present.      Mental Status: She is alert and oriented to person, place, and time. Mental status is at baseline.      Coordination: Coordination normal.   Psychiatric:         Mood and Affect: Mood normal.         Behavior: Behavior normal.         Thought Content: Thought content normal.         Judgment: Judgment normal.         Assessment/Plan   Diagnoses and all orders for this visit:    1. Acute sinusitis, recurrence not specified, unspecified location (Primary)    Other orders  -     promethazine-dextromethorphan (PROMETHAZINE-DM) 6.25-15 MG/5ML syrup; Take 5 mL by mouth 4 (Four) Times a Day As Needed for Cough.  Dispense: 118 mL; Refill: 0  -     fluconazole (Diflucan) 150 MG tablet; Take 1 tablet by mouth 1 (One) Time for 1 dose. One PO X 1 for yeast infection  Dispense: 1 tablet; Refill: 2  -     levoFLOXacin (LEVAQUIN) 500 MG tablet; One PO daily for infection  Dispense: 10 tablet; Refill: 0        She does best on Levaquin and sent Rx for sinus infx  Cough Rx also  Can add oral pred if no help  Time spent 11 minutes

## 2020-12-08 NOTE — PATIENT INSTRUCTIONS
Sinusitis, Adult  Sinusitis is inflammation of your sinuses. Sinuses are hollow spaces in the bones around your face. Your sinuses are located:  · Around your eyes.  · In the middle of your forehead.  · Behind your nose.  · In your cheekbones.  Mucus normally drains out of your sinuses. When your nasal tissues become inflamed or swollen, mucus can become trapped or blocked. This allows bacteria, viruses, and fungi to grow, which leads to infection. Most infections of the sinuses are caused by a virus.  Sinusitis can develop quickly. It can last for up to 4 weeks (acute) or for more than 12 weeks (chronic). Sinusitis often develops after a cold.  What are the causes?  This condition is caused by anything that creates swelling in the sinuses or stops mucus from draining. This includes:  · Allergies.  · Asthma.  · Infection from bacteria or viruses.  · Deformities or blockages in your nose or sinuses.  · Abnormal growths in the nose (nasal polyps).  · Pollutants, such as chemicals or irritants in the air.  · Infection from fungi (rare).  What increases the risk?  You are more likely to develop this condition if you:  · Have a weak body defense system (immune system).  · Do a lot of swimming or diving.  · Overuse nasal sprays.  · Smoke.  What are the signs or symptoms?  The main symptoms of this condition are pain and a feeling of pressure around the affected sinuses. Other symptoms include:  · Stuffy nose or congestion.  · Thick drainage from your nose.  · Swelling and warmth over the affected sinuses.  · Headache.  · Upper toothache.  · A cough that may get worse at night.  · Extra mucus that collects in the throat or the back of the nose (postnasal drip).  · Decreased sense of smell and taste.  · Fatigue.  · A fever.  · Sore throat.  · Bad breath.  How is this diagnosed?  This condition is diagnosed based on:  · Your symptoms.  · Your medical history.  · A physical exam.  · Tests to find out if your condition is  acute or chronic. This may include:  ? Checking your nose for nasal polyps.  ? Viewing your sinuses using a device that has a light (endoscope).  ? Testing for allergies or bacteria.  ? Imaging tests, such as an MRI or CT scan.  In rare cases, a bone biopsy may be done to rule out more serious types of fungal sinus disease.  How is this treated?  Treatment for sinusitis depends on the cause and whether your condition is chronic or acute.  · If caused by a virus, your symptoms should go away on their own within 10 days. You may be given medicines to relieve symptoms. They include:  ? Medicines that shrink swollen nasal passages (topical intranasal decongestants).  ? Medicines that treat allergies (antihistamines).  ? A spray that eases inflammation of the nostrils (topical intranasal corticosteroids).  ? Rinses that help get rid of thick mucus in your nose (nasal saline washes).  · If caused by bacteria, your health care provider may recommend waiting to see if your symptoms improve. Most bacterial infections will get better without antibiotic medicine. You may be given antibiotics if you have:  ? A severe infection.  ? A weak immune system.  · If caused by narrow nasal passages or nasal polyps, you may need to have surgery.  Follow these instructions at home:  Medicines  · Take, use, or apply over-the-counter and prescription medicines only as told by your health care provider. These may include nasal sprays.  · If you were prescribed an antibiotic medicine, take it as told by your health care provider. Do not stop taking the antibiotic even if you start to feel better.  Hydrate and humidify    · Drink enough fluid to keep your urine pale yellow. Staying hydrated will help to thin your mucus.  · Use a cool mist humidifier to keep the humidity level in your home above 50%.  · Inhale steam for 10-15 minutes, 3-4 times a day, or as told by your health care provider. You can do this in the bathroom while a hot shower is  running.  · Limit your exposure to cool or dry air.  Rest  · Rest as much as possible.  · Sleep with your head raised (elevated).  · Make sure you get enough sleep each night.  General instructions    · Apply a warm, moist washcloth to your face 3-4 times a day or as told by your health care provider. This will help with discomfort.  · Wash your hands often with soap and water to reduce your exposure to germs. If soap and water are not available, use hand .  · Do not smoke. Avoid being around people who are smoking (secondhand smoke).  · Keep all follow-up visits as told by your health care provider. This is important.  Contact a health care provider if:  · You have a fever.  · Your symptoms get worse.  · Your symptoms do not improve within 10 days.  Get help right away if:  · You have a severe headache.  · You have persistent vomiting.  · You have severe pain or swelling around your face or eyes.  · You have vision problems.  · You develop confusion.  · Your neck is stiff.  · You have trouble breathing.  Summary  · Sinusitis is soreness and inflammation of your sinuses. Sinuses are hollow spaces in the bones around your face.  · This condition is caused by nasal tissues that become inflamed or swollen. The swelling traps or blocks the flow of mucus. This allows bacteria, viruses, and fungi to grow, which leads to infection.  · If you were prescribed an antibiotic medicine, take it as told by your health care provider. Do not stop taking the antibiotic even if you start to feel better.  · Keep all follow-up visits as told by your health care provider. This is important.  This information is not intended to replace advice given to you by your health care provider. Make sure you discuss any questions you have with your health care provider.  Document Revised: 05/20/2019 Document Reviewed: 05/20/2019  [employer] Patient Education © 2020 [employer]

## 2021-01-27 VITALS — WEIGHT: 192 LBS | HEIGHT: 65 IN | TEMPERATURE: 97.3 F

## 2021-01-28 ENCOUNTER — OFFICE (AMBULATORY)
Dept: URBAN - METROPOLITAN AREA CLINIC 75 | Facility: CLINIC | Age: 36
End: 2021-01-28

## 2021-01-28 DIAGNOSIS — R10.10 UPPER ABDOMINAL PAIN, UNSPECIFIED: ICD-10-CM

## 2021-01-28 DIAGNOSIS — R15.2 FECAL URGENCY: ICD-10-CM

## 2021-01-28 DIAGNOSIS — R19.7 DIARRHEA, UNSPECIFIED: ICD-10-CM

## 2021-01-28 DIAGNOSIS — Z83.71 FAMILY HISTORY OF COLONIC POLYPS: ICD-10-CM

## 2021-01-28 DIAGNOSIS — Z80.0 FAMILY HISTORY OF MALIGNANT NEOPLASM OF DIGESTIVE ORGANS: ICD-10-CM

## 2021-01-28 DIAGNOSIS — Z86.010 PERSONAL HISTORY OF COLONIC POLYPS: ICD-10-CM

## 2021-01-28 PROCEDURE — 99204 OFFICE O/P NEW MOD 45 MIN: CPT | Performed by: INTERNAL MEDICINE

## 2021-01-28 RX ORDER — DICYCLOMINE HYDROCHLORIDE 10 MG/1
30 CAPSULE ORAL
Qty: 270 | Refills: 1 | Status: ACTIVE
Start: 2021-01-28

## 2021-02-02 ENCOUNTER — OFFICE VISIT (OUTPATIENT)
Dept: FAMILY MEDICINE CLINIC | Facility: CLINIC | Age: 36
End: 2021-02-02

## 2021-02-02 VITALS
HEIGHT: 65 IN | DIASTOLIC BLOOD PRESSURE: 82 MMHG | SYSTOLIC BLOOD PRESSURE: 116 MMHG | WEIGHT: 194 LBS | TEMPERATURE: 96.9 F | HEART RATE: 82 BPM | BODY MASS INDEX: 32.32 KG/M2

## 2021-02-02 DIAGNOSIS — L04.9 LYMPHADENITIS, ACUTE: Primary | ICD-10-CM

## 2021-02-02 PROCEDURE — 71046 X-RAY EXAM CHEST 2 VIEWS: CPT | Performed by: PHYSICIAN ASSISTANT

## 2021-02-02 PROCEDURE — 99213 OFFICE O/P EST LOW 20 MIN: CPT | Performed by: PHYSICIAN ASSISTANT

## 2021-02-02 RX ORDER — CLINDAMYCIN HYDROCHLORIDE 300 MG/1
300 CAPSULE ORAL 4 TIMES DAILY
Qty: 40 CAPSULE | Refills: 0 | Status: SHIPPED | OUTPATIENT
Start: 2021-02-02 | End: 2021-03-02

## 2021-02-02 RX ORDER — DICYCLOMINE HYDROCHLORIDE 10 MG/1
10 CAPSULE ORAL DAILY PRN
Status: ON HOLD | COMMUNITY
Start: 2021-01-28 | End: 2022-03-03

## 2021-02-02 NOTE — PATIENT INSTRUCTIONS
Lymphadenopathy    Lymphadenopathy means that your lymph glands are swollen or larger than normal (enlarged). Lymph glands, also called lymph nodes, are collections of tissue that filter bacteria, viruses, and waste from your bloodstream. They are part of your body's disease-fighting system (immune system), which protects your body from germs.  There may be different causes of lymphadenopathy, depending on where it is in your body. Some types go away on their own. Lymphadenopathy can occur anywhere that you have lymph glands, including these areas:  · Neck (cervical lymphadenopathy).  · Chest (mediastinal lymphadenopathy).  · Lungs (hilar lymphadenopathy).  · Underarms (axillary lymphadenopathy).  · Groin (inguinal lymphadenopathy).  When your immune system responds to germs, infection-fighting cells and fluid build up in your lymph glands. This causes some swelling and enlargement. If the lymph glands do not go back to normal after you have an infection or disease, your health care provider may do tests. These tests help to monitor your condition and find the reason why the glands are still swollen and enlarged.  Follow these instructions at home:  · Get plenty of rest.  · Take over-the-counter and prescription medicines only as told by your health care provider. Your health care provider may recommend over-the-counter medicines for pain.  · If directed, apply heat to swollen lymph glands as often as told by your health care provider. Use the heat source that your health care provider recommends, such as a moist heat pack or a heating pad.  ? Place a towel between your skin and the heat source.  ? Leave the heat on for 20-30 minutes.  ? Remove the heat if your skin turns bright red. This is especially important if you are unable to feel pain, heat, or cold. You may have a greater risk of getting burned.  · Check your affected lymph glands every day for changes. Check other lymph gland areas as told by your health  care provider. Check for changes such as:  ? More swelling.  ? Sudden increase in size.  ? Redness or pain.  ? Hardness.  · Keep all follow-up visits as told by your health care provider. This is important.  Contact a health care provider if you have:  · Swelling that gets worse or spreads to other areas.  · Problems with breathing.  · Lymph glands that:  ? Are still swollen after 2 weeks.  ? Have suddenly gotten bigger.  ? Are red, painful, or hard.  · A fever or chills.  · Fatigue.  · A sore throat.  · Pain in your abdomen.  · Weight loss.  · Night sweats.  Get help right away if you have:  · Fluid leaking from an enlarged lymph gland.  · Severe pain.  · Chest pain.  · Shortness of breath.  Summary  · Lymphadenopathy means that your lymph glands are swollen or larger than normal (enlarged).  · Lymph glands (also called lymph nodes) are collections of tissue that filter bacteria, viruses, and waste from the bloodstream. They are part of your body's disease-fighting system (immune system).  · Lymphadenopathy can occur anywhere that you have lymph glands.  · If your enlarged and swollen lymph glands do not go back to normal after you have an infection or disease, your health care provider may do tests to monitor your condition and find the reason why the glands are still swollen and enlarged.  · Check your affected lymph glands every day for changes. Check other lymph gland areas as told by your health care provider.  This information is not intended to replace advice given to you by your health care provider. Make sure you discuss any questions you have with your health care provider.  Document Revised: 11/30/2018 Document Reviewed: 11/02/2018  [employer] Patient Education © 2020 [employer]

## 2021-02-02 NOTE — PROGRESS NOTES
"Subjective   Sara Hoang is a 35 y.o. female.     History of Present Illness   Sara Hoang 35 y.o. female /82   Pulse 82   Temp 96.9 °F (36.1 °C)   Ht 165.1 cm (65\")   Wt 88 kg (194 lb)   LMP 01/17/2021   BMI 32.28 kg/m²  who presents today for new nodes left clavicle. No fever, weight loss, night sweats.  Feels fine  she has a history of   Patient Active Problem List   Diagnosis   • Migraine   • Asthma   • Anxiety   • Insomnia   • Hyperemesis gravidarum   • Encounter for supervision of low-risk first pregnancy in first trimester   • Use of letrozole (Femara)   • Rh negative status during pregnancy in first trimester   • Hyperemesis affecting pregnancy, antepartum   • Vaginal bleeding affecting early pregnancy   • Low folic acid   .      No cats  Had labs 1-28-21 at GI--CBC done; need copy  Onset lymph nodes palp left infraclavicular started Sat--1-20-21.  One is sore; had localized edema in this area and this part resolved.  No chills, sweats, fever.  No other lymph nodes palp or sore. Area not red or hot.  NOTE had COVID vaccine 1-22-21.  ? If post inflammatory from vaccine?  Had in left arm and this is on left side.   She is seeing GI and in process of work up for abd pain and diarrhea. LGA  Had CBC Thursday.  Had ESR;    X-Ray  Interpretation report in house X-rays that I personally viewed    Relevant Clinical Issues/Diagnoses/Indications:  Left infraclavicular nodes X 2;         Clinical Findings:  No masses, no hilar nodes, lungs clear, heart size normal          Comparative Data:  none          Date of Previous X-ray:    Change on current X-ray:      The following portions of the patient's history were reviewed and updated as appropriate: allergies, current medications, past family history, past medical history, past social history, past surgical history and problem list.    Review of Systems   Constitutional: Negative for activity change, fever and unexpected weight change.   Respiratory: " Negative for shortness of breath and wheezing.    Cardiovascular: Negative for chest pain.   Gastrointestinal: Positive for abdominal pain and diarrhea.   Musculoskeletal: Negative for joint swelling.   Neurological: Negative for speech difficulty.   Hematological: Positive for adenopathy.   Psychiatric/Behavioral: Negative for confusion and suicidal ideas.       Objective   Physical Exam  Vitals signs and nursing note reviewed.   Constitutional:       General: She is not in acute distress.     Appearance: She is well-developed. She is not ill-appearing or toxic-appearing.   HENT:      Head: Normocephalic.      Right Ear: Tympanic membrane, ear canal and external ear normal.      Left Ear: Tympanic membrane, ear canal and external ear normal.      Nose: Nose normal.      Mouth/Throat:      Pharynx: Oropharynx is clear. No posterior oropharyngeal erythema.      Comments: Mild PND  Eyes:      General: No scleral icterus.     Conjunctiva/sclera: Conjunctivae normal.      Pupils: Pupils are equal, round, and reactive to light.   Neck:      Musculoskeletal: Normal range of motion and neck supple.      Thyroid: No thyromegaly.   Cardiovascular:      Rate and Rhythm: Normal rate and regular rhythm.      Heart sounds: Normal heart sounds. No murmur.   Pulmonary:      Effort: Pulmonary effort is normal. No respiratory distress.      Breath sounds: Normal breath sounds. No rales.   Abdominal:      Palpations: Abdomen is soft.      Tenderness: There is no abdominal tenderness.      Comments: No HSM   Musculoskeletal: Normal range of motion.         General: No deformity.   Lymphadenopathy:      Cervical: No cervical adenopathy.      Right cervical: No superficial, deep or posterior cervical adenopathy.     Left cervical: No superficial, deep or posterior cervical adenopathy.      Upper Body:      Right upper body: No supraclavicular, axillary, pectoral or epitrochlear adenopathy.      Left upper body: Pectoral adenopathy  present. No supraclavicular, axillary or epitrochlear adenopathy.      Lower Body: No right inguinal adenopathy. No left inguinal adenopathy.      Comments: Infraclavicular nodes X 2 left; mobile marble and pea size; mild soreness; not red or hot;    Skin:     General: Skin is warm and dry.      Coloration: Skin is not jaundiced.      Findings: No rash.   Neurological:      General: No focal deficit present.      Mental Status: She is alert and oriented to person, place, and time. Mental status is at baseline.   Psychiatric:         Mood and Affect: Mood normal.         Behavior: Behavior normal.         Thought Content: Thought content normal.         Judgment: Judgment normal.         Assessment/Plan   There are no diagnoses linked to this encounter.  I still want CBC with diff today---copy me on Thurs labs from GI and f/u GI  Clindamycin 300mg QID X 10 days to cover infection --allergy to several meds  Get CXR to look for chest nodes;   Plan CT neck and soft tissue next if these nodes do not resolve after Clindamycin.  Look for malignancy  F/u 10 days             Answers for HPI/ROS submitted by the patient on 1/31/2021   What is the primary reason for your visit?: Other  Please describe your symptoms.: Swelling in left side of neck. Two small lumps (maybe swollen lymph nodes?) near my collarbone.  Have you had these symptoms before?: No  How long have you been having these symptoms?: 1-4 days  Please list any medications you are currently taking for this condition.: None

## 2021-02-03 LAB
ALBUMIN SERPL-MCNC: 4.4 G/DL (ref 3.5–5.2)
ALBUMIN/GLOB SERPL: 1.7 G/DL
ALP SERPL-CCNC: 106 U/L (ref 39–117)
ALT SERPL-CCNC: 7 U/L (ref 1–33)
AST SERPL-CCNC: 12 U/L (ref 1–32)
BASOPHILS # BLD AUTO: 0.04 10*3/MM3 (ref 0–0.2)
BASOPHILS NFR BLD AUTO: 0.6 % (ref 0–1.5)
BILIRUB SERPL-MCNC: 0.4 MG/DL (ref 0–1.2)
BUN SERPL-MCNC: 10 MG/DL (ref 6–20)
BUN/CREAT SERPL: 12.8 (ref 7–25)
CALCIUM SERPL-MCNC: 9.1 MG/DL (ref 8.6–10.5)
CHLORIDE SERPL-SCNC: 106 MMOL/L (ref 98–107)
CO2 SERPL-SCNC: 23.8 MMOL/L (ref 22–29)
CREAT SERPL-MCNC: 0.78 MG/DL (ref 0.57–1)
EOSINOPHIL # BLD AUTO: 0.12 10*3/MM3 (ref 0–0.4)
EOSINOPHIL NFR BLD AUTO: 1.8 % (ref 0.3–6.2)
ERYTHROCYTE [DISTWIDTH] IN BLOOD BY AUTOMATED COUNT: 12.8 % (ref 12.3–15.4)
GLOBULIN SER CALC-MCNC: 2.6 GM/DL
GLUCOSE SERPL-MCNC: 81 MG/DL (ref 65–99)
HCT VFR BLD AUTO: 38.7 % (ref 34–46.6)
HGB BLD-MCNC: 12.6 G/DL (ref 12–15.9)
IMM GRANULOCYTES # BLD AUTO: 0.02 10*3/MM3 (ref 0–0.05)
IMM GRANULOCYTES NFR BLD AUTO: 0.3 % (ref 0–0.5)
LYMPHOCYTES # BLD AUTO: 2.24 10*3/MM3 (ref 0.7–3.1)
LYMPHOCYTES NFR BLD AUTO: 33.7 % (ref 19.6–45.3)
MCH RBC QN AUTO: 28.4 PG (ref 26.6–33)
MCHC RBC AUTO-ENTMCNC: 32.6 G/DL (ref 31.5–35.7)
MCV RBC AUTO: 87.4 FL (ref 79–97)
MONOCYTES # BLD AUTO: 0.37 10*3/MM3 (ref 0.1–0.9)
MONOCYTES NFR BLD AUTO: 5.6 % (ref 5–12)
NEUTROPHILS # BLD AUTO: 3.85 10*3/MM3 (ref 1.7–7)
NEUTROPHILS NFR BLD AUTO: 58 % (ref 42.7–76)
NRBC BLD AUTO-RTO: 0 /100 WBC (ref 0–0.2)
PLATELET # BLD AUTO: 273 10*3/MM3 (ref 140–450)
POTASSIUM SERPL-SCNC: 4.1 MMOL/L (ref 3.5–5.2)
PROT SERPL-MCNC: 7 G/DL (ref 6–8.5)
RBC # BLD AUTO: 4.43 10*6/MM3 (ref 3.77–5.28)
SODIUM SERPL-SCNC: 139 MMOL/L (ref 136–145)
WBC # BLD AUTO: 6.64 10*3/MM3 (ref 3.4–10.8)

## 2021-02-09 VITALS
OXYGEN SATURATION: 98 % | SYSTOLIC BLOOD PRESSURE: 94 MMHG | DIASTOLIC BLOOD PRESSURE: 57 MMHG | HEART RATE: 79 BPM | SYSTOLIC BLOOD PRESSURE: 99 MMHG | RESPIRATION RATE: 21 BRPM | DIASTOLIC BLOOD PRESSURE: 72 MMHG | RESPIRATION RATE: 12 BRPM | RESPIRATION RATE: 23 BRPM | WEIGHT: 192 LBS | SYSTOLIC BLOOD PRESSURE: 118 MMHG | SYSTOLIC BLOOD PRESSURE: 101 MMHG | HEART RATE: 78 BPM | OXYGEN SATURATION: 95 % | RESPIRATION RATE: 22 BRPM | DIASTOLIC BLOOD PRESSURE: 59 MMHG | HEART RATE: 94 BPM | SYSTOLIC BLOOD PRESSURE: 107 MMHG | SYSTOLIC BLOOD PRESSURE: 109 MMHG | HEART RATE: 68 BPM | OXYGEN SATURATION: 100 % | HEART RATE: 66 BPM | HEART RATE: 97 BPM | DIASTOLIC BLOOD PRESSURE: 75 MMHG | HEART RATE: 89 BPM | DIASTOLIC BLOOD PRESSURE: 56 MMHG | HEART RATE: 83 BPM | DIASTOLIC BLOOD PRESSURE: 67 MMHG | SYSTOLIC BLOOD PRESSURE: 117 MMHG | DIASTOLIC BLOOD PRESSURE: 69 MMHG | DIASTOLIC BLOOD PRESSURE: 51 MMHG | SYSTOLIC BLOOD PRESSURE: 95 MMHG | RESPIRATION RATE: 14 BRPM | SYSTOLIC BLOOD PRESSURE: 121 MMHG | HEIGHT: 65 IN | OXYGEN SATURATION: 99 % | RESPIRATION RATE: 18 BRPM | RESPIRATION RATE: 16 BRPM | OXYGEN SATURATION: 93 % | DIASTOLIC BLOOD PRESSURE: 48 MMHG | HEART RATE: 71 BPM | SYSTOLIC BLOOD PRESSURE: 123 MMHG | DIASTOLIC BLOOD PRESSURE: 76 MMHG | SYSTOLIC BLOOD PRESSURE: 105 MMHG | TEMPERATURE: 97.5 F | HEART RATE: 64 BPM

## 2021-02-11 ENCOUNTER — AMBULATORY SURGICAL CENTER (AMBULATORY)
Dept: URBAN - METROPOLITAN AREA SURGERY 17 | Facility: SURGERY | Age: 36
End: 2021-02-11

## 2021-02-11 ENCOUNTER — OFFICE (AMBULATORY)
Dept: URBAN - METROPOLITAN AREA PATHOLOGY 4 | Facility: PATHOLOGY | Age: 36
End: 2021-02-11

## 2021-02-11 DIAGNOSIS — D12.3 BENIGN NEOPLASM OF TRANSVERSE COLON: ICD-10-CM

## 2021-02-11 DIAGNOSIS — R10.10 UPPER ABDOMINAL PAIN, UNSPECIFIED: ICD-10-CM

## 2021-02-11 DIAGNOSIS — K64.8 OTHER HEMORRHOIDS: ICD-10-CM

## 2021-02-11 DIAGNOSIS — K63.89 OTHER SPECIFIED DISEASES OF INTESTINE: ICD-10-CM

## 2021-02-11 DIAGNOSIS — R19.7 DIARRHEA, UNSPECIFIED: ICD-10-CM

## 2021-02-11 DIAGNOSIS — D12.4 BENIGN NEOPLASM OF DESCENDING COLON: ICD-10-CM

## 2021-02-11 DIAGNOSIS — K63.5 POLYP OF COLON: ICD-10-CM

## 2021-02-11 DIAGNOSIS — D12.0 BENIGN NEOPLASM OF CECUM: ICD-10-CM

## 2021-02-11 LAB
GI HISTOLOGY: A. SELECT: (no result)
GI HISTOLOGY: B. UNSPECIFIED: (no result)
GI HISTOLOGY: C. UNSPECIFIED: (no result)
GI HISTOLOGY: D. UNSPECIFIED: (no result)
GI HISTOLOGY: E. UNSPECIFIED: (no result)
GI HISTOLOGY: PDF REPORT: (no result)

## 2021-02-11 PROCEDURE — 45385 COLONOSCOPY W/LESION REMOVAL: CPT | Performed by: INTERNAL MEDICINE

## 2021-02-11 PROCEDURE — 88305 TISSUE EXAM BY PATHOLOGIST: CPT | Performed by: INTERNAL MEDICINE

## 2021-02-11 PROCEDURE — 45380 COLONOSCOPY AND BIOPSY: CPT | Mod: 59 | Performed by: INTERNAL MEDICINE

## 2021-02-11 NOTE — SERVICEHPINOTES
ANGÉLICA PHILLIPS  is a  35  female   who presents today for a  Colonoscopy   for   the indications listed below. The updated Patient Profile was reviewed prior to the procedure, in conjunction with the Physical Exam, including medical conditions, surgical procedures, medications, allergies, family history and social history. See Physical Exam time stamp below for date and time of HPI completion.Pre-operatively, I reviewed the indication(s) for the procedure, the risks of the procedure [including but not limited to: unexpected bleeding possibly requiring hospitalization and/or unplanned repeat procedures, perforation possibly requiring surgical treatment, missed lesions and complications of sedation/MAC (also explained by anesthesia staff)]. I have evaluated the patient for risks associated with the planned anesthesia and the procedure to be performed and find the patient an acceptable candidate for IV sedation.Multiple opportunities were provided for any questions or concerns, and all questions were answered satisfactorily before any anesthesia was administered. We will proceed with the planned procedure.BR

## 2021-02-11 NOTE — SERVICENOTES
Colonoscopy is not 100% accurate and can miss certain lesions.   If symptoms or signs of colon problems arise the study may need to be repeated  earlier than currently anticipated.

## 2021-02-12 ENCOUNTER — TELEPHONE (OUTPATIENT)
Dept: FAMILY MEDICINE CLINIC | Facility: CLINIC | Age: 36
End: 2021-02-12

## 2021-02-12 DIAGNOSIS — L04.9 LYMPHADENITIS, ACUTE: Primary | ICD-10-CM

## 2021-02-12 DIAGNOSIS — R59.0 LYMPHADENOPATHY, SUPRACLAVICULAR: ICD-10-CM

## 2021-02-17 ENCOUNTER — HOSPITAL ENCOUNTER (OUTPATIENT)
Dept: CT IMAGING | Facility: HOSPITAL | Age: 36
Discharge: HOME | End: 2021-02-17
Admitting: PHYSICIAN ASSISTANT

## 2021-02-17 DIAGNOSIS — R59.0 LYMPHADENOPATHY, SUPRACLAVICULAR: ICD-10-CM

## 2021-02-17 DIAGNOSIS — L04.9 LYMPHADENITIS, ACUTE: ICD-10-CM

## 2021-02-17 DIAGNOSIS — R59.0 LYMPHADENOPATHY, SUPRACLAVICULAR: Primary | ICD-10-CM

## 2021-02-17 PROCEDURE — 25010000002 IOPAMIDOL 61 % SOLUTION: Performed by: PHYSICIAN ASSISTANT

## 2021-02-17 PROCEDURE — 71260 CT THORAX DX C+: CPT

## 2021-02-17 RX ADMIN — IOPAMIDOL 80 ML: 612 INJECTION, SOLUTION INTRAVENOUS at 16:50

## 2021-02-18 ENCOUNTER — APPOINTMENT (OUTPATIENT)
Dept: CT IMAGING | Facility: HOSPITAL | Age: 36
End: 2021-02-18

## 2021-03-02 ENCOUNTER — OFFICE VISIT (OUTPATIENT)
Dept: SURGERY | Facility: CLINIC | Age: 36
End: 2021-03-02

## 2021-03-02 VITALS — WEIGHT: 197 LBS | BODY MASS INDEX: 32.82 KG/M2 | HEIGHT: 65 IN

## 2021-03-02 DIAGNOSIS — D17.1 LIPOMA OF CHEST WALL: Primary | ICD-10-CM

## 2021-03-02 PROCEDURE — 99203 OFFICE O/P NEW LOW 30 MIN: CPT | Performed by: SURGERY

## 2021-03-15 NOTE — PROGRESS NOTES
General Surgery  Initial Office Visit    CC: Supraclavicular mass    HPI: The patient is a pleasant 35 y.o. year-old lady who presents today as a referral from her primary care provider, Lashae Hercules, for evaluation of a small mass within her left supraclavicular region that she noticed 5 weeks ago.  Mass is nontender and has not grown much in size.  It feels to be about the size of a nickel.  She was concerned this might represent an enlarged lymph node given her mother had a history of breast cancer that was initially occult and not identified until she finally underwent an excisional biopsy.  She denies any fevers, chills, or unintentional weight loss.  She does report intermittent night sweats.  She recently had a CT of the chest which was normal and showed no significant sign of lymphadenopathy.  She was referred to see me for further evaluation.    Past Medical History:   Asthma  Anxiety  Migraine headaches  History of colon polyps    Past Surgical History:   Tonsillectomy  Diagnostic laparoscopy for possible endometriosis (2018)  Colonoscopy x2 (2016 by Dr. Hills, 2021 by Dr. Bardales)    Medications:   Dicyclomine 10 mg as needed  Vitamin D3 1000 units daily  Topamax 50 mg nightly  Sertraline 100 mg daily  Culturelle probiotic once daily  Rizatriptan 10 mg as needed for migraine headaches    Allergies: Penicillin, sulfa antibiotics, and Ceclor all cause hives; Biaxin causes swelling of her eyelids    Family History: Mother with history of breast cancer, father with history of prostate cancer    Social History: , works as a teacher for [employer], non-smoker, no regular alcohol use    ROS:   Constitutional: Positive for night sweats; negative for fevers or chills  HENT: Negative for hearing loss or runny nose  Eyes: Negative for vision changes or scleral icterus  Respiratory: Negative for cough or shortness of breath  Cardiovascular: Negative for chest pain or heart  palpitations  Gastrointestinal: Negative for abdominal distension, nausea, vomiting, constipation, melena, or hematochezia  Genitourinary: Negative for hematuria or dysuria  Musculoskeletal: Negative for joint swelling or gait instability  Neurologic: Negative for tremors or seizures  Psychiatric: Negative for suicidal ideations or agitation  All other systems reviewed and negative    Physical Exam:  Height: 165 cm  Weight: 89 kg  BMI: 32.78  General: No acute distress, well-nourished & well-developed  HEAD: normocephalic, atraumatic  EYES: normal conjunctiva, sclera anicteric  EARS: grossly normal hearing  NECK: supple, no thyromegaly  CARDIOVASCULAR: regular rate and rhythm  RESPIRATORY: clear to auscultation bilaterally  GASTROINTESTINAL: soft, nontender, non-distended  MUSCULOSKELETAL: normal gait and station. No gross extremity abnormalities  PSYCHIATRIC: oriented x3, normal mood and affect  LYMPHATIC: Left supraclavicular region with a 1.5 cm mobile small lipoma situated just over the top of her clavicle and mobile within the subcutaneous fat over top of the bone, there is no lymphadenopathy in either supraclavicular or axillary regions bilaterally    IMAGING:  CT CHEST (2/17/2021):  FINDINGS: There is no mediastinal or hilar or axillary lymphadenopathy. No supraclavicular lymphadenopathy is identified. A small amount of residual thymic tissue is noted which is within normal limits for patient of this age. Lung window images demonstrate no parenchymal infiltrates or masses. There are no pleural effusions. Images through the upper abdomen demonstrate no abnormalities.     IMPRESSION:  Normal CT scan of the chest.    ASSESSMENT & PLAN  Ms. 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  I reassured her that this does not represent any form of lymphadenopathy or underlying malignancy.  I also reviewed her CT of her chest extremity imaging today.  This lipoma of the  supraclavicular region will be present in a chronic constant duration but does not require any surgical intervention unless it becomes tender, experiences rapid growth, or there are other similar lipomas elsewhere that she wishes to have removed.  She expressed understanding and relief.  She can see me back on an as-needed basis.    Lorena Doty MD  General, Robotic, and Endoscopic Surgery  [facility]    [address], [address]  [place] [address]  P: [phone]  F: [phone]

## 2021-05-01 RX ORDER — TOPIRAMATE 50 MG/1
TABLET, FILM COATED ORAL
Qty: 270 TABLET | Refills: 1 | Status: SHIPPED | OUTPATIENT
Start: 2021-05-01 | End: 2021-07-08

## 2021-06-14 ENCOUNTER — TRANSCRIBE ORDERS (OUTPATIENT)
Dept: SLEEP MEDICINE | Facility: HOSPITAL | Age: 36
End: 2021-06-14

## 2021-06-14 ENCOUNTER — LAB (OUTPATIENT)
Dept: LAB | Facility: HOSPITAL | Age: 36
End: 2021-06-14

## 2021-06-14 ENCOUNTER — TRANSCRIBE ORDERS (OUTPATIENT)
Dept: ADMINISTRATIVE | Facility: HOSPITAL | Age: 36
End: 2021-06-14

## 2021-06-14 ENCOUNTER — HOSPITAL ENCOUNTER (OUTPATIENT)
Facility: HOSPITAL | Age: 36
Setting detail: HOSPITAL OUTPATIENT SURGERY
End: 2021-06-14
Attending: OBSTETRICS & GYNECOLOGY | Admitting: OBSTETRICS & GYNECOLOGY

## 2021-06-14 DIAGNOSIS — O02.1 MISSED ABORTION: ICD-10-CM

## 2021-06-14 DIAGNOSIS — Z01.818 OTHER SPECIFIED PRE-OPERATIVE EXAMINATION: Primary | ICD-10-CM

## 2021-06-14 DIAGNOSIS — Z29.8 NEED FOR PROPHYLACTIC IMMUNOTHERAPY: Primary | ICD-10-CM

## 2021-06-14 DIAGNOSIS — Z29.8 NEED FOR PROPHYLACTIC IMMUNOTHERAPY: ICD-10-CM

## 2021-06-14 LAB
ABO GROUP BLD: NORMAL
BASOPHILS # BLD AUTO: 0.03 10*3/MM3 (ref 0–0.2)
BASOPHILS NFR BLD AUTO: 0.3 % (ref 0–1.5)
BLD GP AB SCN SERPL QL: NEGATIVE
DEPRECATED RDW RBC AUTO: 41.2 FL (ref 37–54)
EOSINOPHIL # BLD AUTO: 0.03 10*3/MM3 (ref 0–0.4)
EOSINOPHIL NFR BLD AUTO: 0.3 % (ref 0.3–6.2)
ERYTHROCYTE [DISTWIDTH] IN BLOOD BY AUTOMATED COUNT: 13 % (ref 12.3–15.4)
HCT VFR BLD AUTO: 38.7 % (ref 34–46.6)
HGB BLD-MCNC: 12.8 G/DL (ref 12–15.9)
IMM GRANULOCYTES # BLD AUTO: 0.04 10*3/MM3 (ref 0–0.05)
IMM GRANULOCYTES NFR BLD AUTO: 0.4 % (ref 0–0.5)
LYMPHOCYTES # BLD AUTO: 2.31 10*3/MM3 (ref 0.7–3.1)
LYMPHOCYTES NFR BLD AUTO: 22.3 % (ref 19.6–45.3)
MCH RBC QN AUTO: 28.8 PG (ref 26.6–33)
MCHC RBC AUTO-ENTMCNC: 33.1 G/DL (ref 31.5–35.7)
MCV RBC AUTO: 87.2 FL (ref 79–97)
MONOCYTES # BLD AUTO: 0.49 10*3/MM3 (ref 0.1–0.9)
MONOCYTES NFR BLD AUTO: 4.7 % (ref 5–12)
NEUTROPHILS NFR BLD AUTO: 7.44 10*3/MM3 (ref 1.7–7)
NEUTROPHILS NFR BLD AUTO: 72 % (ref 42.7–76)
NRBC BLD AUTO-RTO: 0 /100 WBC (ref 0–0.2)
PLATELET # BLD AUTO: 302 10*3/MM3 (ref 140–450)
PMV BLD AUTO: 9.4 FL (ref 6–12)
RBC # BLD AUTO: 4.44 10*6/MM3 (ref 3.77–5.28)
RH BLD: NEGATIVE
SARS-COV-2 ORF1AB RESP QL NAA+PROBE: NOT DETECTED
T&S EXPIRATION DATE: NORMAL
WBC # BLD AUTO: 10.34 10*3/MM3 (ref 3.4–10.8)

## 2021-06-14 PROCEDURE — U0004 COV-19 TEST NON-CDC HGH THRU: HCPCS | Performed by: INTERNAL MEDICINE

## 2021-06-14 PROCEDURE — 86901 BLOOD TYPING SEROLOGIC RH(D): CPT

## 2021-06-14 PROCEDURE — 86850 RBC ANTIBODY SCREEN: CPT

## 2021-06-14 PROCEDURE — 85025 COMPLETE CBC W/AUTO DIFF WBC: CPT

## 2021-06-14 PROCEDURE — 86900 BLOOD TYPING SEROLOGIC ABO: CPT

## 2021-06-14 PROCEDURE — 36415 COLL VENOUS BLD VENIPUNCTURE: CPT

## 2021-06-14 PROCEDURE — C9803 HOPD COVID-19 SPEC COLLECT: HCPCS | Performed by: INTERNAL MEDICINE

## 2021-06-28 RX ORDER — RIZATRIPTAN BENZOATE 10 MG/1
10 TABLET ORAL ONCE AS NEEDED
Qty: 36 TABLET | Refills: 3 | Status: ON HOLD | OUTPATIENT
Start: 2021-06-28 | End: 2022-03-03

## 2021-07-08 ENCOUNTER — OFFICE VISIT (OUTPATIENT)
Dept: FAMILY MEDICINE CLINIC | Facility: CLINIC | Age: 36
End: 2021-07-08

## 2021-07-08 VITALS
HEIGHT: 65 IN | RESPIRATION RATE: 16 BRPM | HEART RATE: 89 BPM | WEIGHT: 206 LBS | SYSTOLIC BLOOD PRESSURE: 110 MMHG | DIASTOLIC BLOOD PRESSURE: 65 MMHG | BODY MASS INDEX: 34.32 KG/M2 | TEMPERATURE: 97.5 F | OXYGEN SATURATION: 99 %

## 2021-07-08 DIAGNOSIS — F41.9 ANXIETY: Primary | ICD-10-CM

## 2021-07-08 DIAGNOSIS — E55.9 VITAMIN D DEFICIENCY: ICD-10-CM

## 2021-07-08 DIAGNOSIS — R25.1 TREMOR, UNSPECIFIED: ICD-10-CM

## 2021-07-08 DIAGNOSIS — E53.8 LOW FOLIC ACID: ICD-10-CM

## 2021-07-08 DIAGNOSIS — Z00.00 LABORATORY EXAMINATION ORDERED AS PART OF A ROUTINE GENERAL MEDICAL EXAMINATION: ICD-10-CM

## 2021-07-08 DIAGNOSIS — G43.109 MIGRAINE WITH AURA AND WITHOUT STATUS MIGRAINOSUS, NOT INTRACTABLE: ICD-10-CM

## 2021-07-08 PROCEDURE — 99214 OFFICE O/P EST MOD 30 MIN: CPT | Performed by: PHYSICIAN ASSISTANT

## 2021-07-08 RX ORDER — SERTRALINE HYDROCHLORIDE 100 MG/1
150 TABLET, FILM COATED ORAL DAILY
Qty: 135 TABLET | Refills: 3 | Status: SHIPPED | OUTPATIENT
Start: 2021-07-08 | End: 2022-07-28

## 2021-07-08 NOTE — PROGRESS NOTES
"Subjective   Sara Hoang is a 35 y.o. female.     History of Present Illness   Sara Hoang 35 y.o. female /65 (BP Location: Left arm, Patient Position: Sitting, Cuff Size: Adult)   Pulse 89   Temp 97.5 °F (36.4 °C)   Resp 16   Ht 165.1 cm (65\")   Wt 93.4 kg (206 lb)   LMP 04/11/2021   SpO2 99%   BMI 34.28 kg/m²  who presents today for new onset head tremor----has to be turning her head and shakes like :\"saying no\" and has to touch face to stop it. Notice more laying down and sitting. Not worse, just persistent for a few mos.   she has a history of   Patient Active Problem List   Diagnosis   • Migraine   • Asthma   • Anxiety   • Insomnia   • Hyperemesis gravidarum   • Encounter for supervision of low-risk first pregnancy in first trimester   • Use of letrozole (Femara)   • Rh negative status during pregnancy in first trimester   • Hyperemesis affecting pregnancy, antepartum   • Vaginal bleeding affecting early pregnancy   • Low folic acid   .    No family hx tremor  Has hx migraine-----she is off Topamax---was pregnant.  More migraines off Rx. Some generalized headaches.   No blurred vision.  No head injury  Started prior to pregnancy    Lipoma on chest wall----saw Dr Soren Ruiz ISAMAR Hoang female 35 y.o., /65 (BP Location: Left arm, Patient Position: Sitting, Cuff Size: Adult)   Pulse 89   Temp 97.5 °F (36.4 °C)   Resp 16   Ht 165.1 cm (65\")   Wt 93.4 kg (206 lb)   LMP 04/11/2021   SpO2 99%   BMI 34.28 kg/m²   who presents today for follow up of Depression and Anxiety.  She reports depressed mood, fatigue, anxiety, anhedonia, impaired concentration, depressed mood and anxiety, excessive worry, unable to relax and irritable. Onset of symptoms was approximately several years and worse since a month ago.  She denies current suicidal and homicidal ideation. Risk factors are family history of anxiety and or depression and lifestyle of multiple roles.  Previous treatment includes " current Rx.  She complains of the following medication side effects: none. The patient declines to go to counseling..          The following portions of the patient's history were reviewed and updated as appropriate: allergies, current medications, past family history, past medical history, past social history, past surgical history and problem list.    Review of Systems   Constitutional: Positive for fatigue. Negative for activity change, appetite change and unexpected weight change.   HENT: Negative for nosebleeds and trouble swallowing.    Eyes: Negative for pain and visual disturbance.   Respiratory: Negative for chest tightness, shortness of breath and wheezing.    Cardiovascular: Negative for chest pain and palpitations.   Gastrointestinal: Negative for abdominal pain and blood in stool.   Endocrine: Negative.    Genitourinary: Negative for difficulty urinating and hematuria.   Musculoskeletal: Negative for joint swelling.   Skin: Negative for color change and rash.   Allergic/Immunologic: Negative.    Neurological: Positive for tremors and headaches. Negative for syncope, speech difficulty and weakness.   Hematological: Negative for adenopathy.   Psychiatric/Behavioral: Positive for decreased concentration and dysphoric mood. Negative for agitation and confusion. The patient is nervous/anxious.    All other systems reviewed and are negative.      Objective   Physical Exam  Vitals and nursing note reviewed.   Constitutional:       General: She is not in acute distress.     Appearance: She is well-developed. She is obese. She is not ill-appearing or toxic-appearing.   HENT:      Head: Normocephalic.      Right Ear: External ear normal.      Left Ear: External ear normal.      Nose: Nose normal.      Mouth/Throat:      Pharynx: Oropharynx is clear.   Eyes:      General: No scleral icterus.     Conjunctiva/sclera: Conjunctivae normal.      Pupils: Pupils are equal, round, and reactive to light.   Neck:       Thyroid: No thyromegaly.      Vascular: No carotid bruit.   Cardiovascular:      Rate and Rhythm: Normal rate and regular rhythm.      Heart sounds: Normal heart sounds. No murmur heard.     Pulmonary:      Effort: Pulmonary effort is normal. No respiratory distress.      Breath sounds: Normal breath sounds.   Musculoskeletal:         General: No deformity. Normal range of motion.      Cervical back: Normal range of motion and neck supple.   Skin:     General: Skin is warm and dry.      Findings: No rash.   Neurological:      General: No focal deficit present.      Mental Status: She is alert and oriented to person, place, and time. Mental status is at baseline.      Cranial Nerves: No cranial nerve deficit.      Sensory: No sensory deficit.      Coordination: Coordination abnormal (I saw her head tremor---side to side like signal NO..  stopped when she touched her face.  ).      Gait: Gait normal.   Psychiatric:         Mood and Affect: Mood normal.         Behavior: Behavior normal.         Thought Content: Thought content normal.         Judgment: Judgment normal.         Assessment/Plan   Diagnoses and all orders for this visit:    1. Anxiety (Primary)  -     Comprehensive metabolic panel  -     Lipid panel  -     CBC and Differential  -     TSH  -     T3, Free  -     T4, Free  -     Vitamin B12  -     Folate  -     Vitamin D 25 Hydroxy  -     Sedimentation Rate    2. Migraine with aura and without status migrainosus, not intractable  -     Comprehensive metabolic panel  -     Lipid panel  -     CBC and Differential  -     TSH  -     T3, Free  -     T4, Free  -     Vitamin B12  -     Folate  -     Vitamin D 25 Hydroxy  -     Sedimentation Rate    3. Low folic acid  -     Comprehensive metabolic panel  -     Lipid panel  -     CBC and Differential  -     TSH  -     T3, Free  -     T4, Free  -     Vitamin B12  -     Folate  -     Vitamin D 25 Hydroxy  -     Sedimentation Rate    4. Vitamin D deficiency  -      Comprehensive metabolic panel  -     Lipid panel  -     CBC and Differential  -     TSH  -     T3, Free  -     T4, Free  -     Vitamin B12  -     Folate  -     Vitamin D 25 Hydroxy  -     Sedimentation Rate    5. Laboratory examination ordered as part of a routine general medical examination  -     Comprehensive metabolic panel  -     Lipid panel  -     CBC and Differential  -     TSH  -     T3, Free  -     T4, Free  -     Vitamin B12  -     Folate  -     Vitamin D 25 Hydroxy  -     Sedimentation Rate    6. Tremor, unspecified  -     Comprehensive metabolic panel  -     Lipid panel  -     CBC and Differential  -     TSH  -     T3, Free  -     T4, Free  -     Vitamin B12  -     Folate  -     Vitamin D 25 Hydroxy  -     Sedimentation Rate    I will get labs--make sure not tremor from B12 or folate--has been low folate in past; also not from thyroid  ? If from anxiety--see if higher dose Zoloft helps  Refer neurologist  Hx low D--lab  Migraines---[employer] works [employer]             Answers for HPI/ROS submitted by the patient on 7/3/2021  What is the primary reason for your visit?: Neurological Problem

## 2021-07-09 LAB
25(OH)D3+25(OH)D2 SERPL-MCNC: 30.6 NG/ML (ref 30–100)
ALBUMIN SERPL-MCNC: 4.7 G/DL (ref 3.5–5.2)
ALBUMIN/GLOB SERPL: 2.2 G/DL
ALP SERPL-CCNC: 102 U/L (ref 39–117)
ALT SERPL-CCNC: 8 U/L (ref 1–33)
AST SERPL-CCNC: 14 U/L (ref 1–32)
BASOPHILS # BLD AUTO: 0.04 10*3/MM3 (ref 0–0.2)
BASOPHILS NFR BLD AUTO: 0.5 % (ref 0–1.5)
BILIRUB SERPL-MCNC: 0.5 MG/DL (ref 0–1.2)
BUN SERPL-MCNC: 13 MG/DL (ref 6–20)
BUN/CREAT SERPL: 17.1 (ref 7–25)
CALCIUM SERPL-MCNC: 9.5 MG/DL (ref 8.6–10.5)
CHLORIDE SERPL-SCNC: 103 MMOL/L (ref 98–107)
CHOLEST SERPL-MCNC: 181 MG/DL (ref 0–200)
CO2 SERPL-SCNC: 27.3 MMOL/L (ref 22–29)
CREAT SERPL-MCNC: 0.76 MG/DL (ref 0.57–1)
EOSINOPHIL # BLD AUTO: 0.07 10*3/MM3 (ref 0–0.4)
EOSINOPHIL NFR BLD AUTO: 0.9 % (ref 0.3–6.2)
ERYTHROCYTE [DISTWIDTH] IN BLOOD BY AUTOMATED COUNT: 12.7 % (ref 12.3–15.4)
ERYTHROCYTE [SEDIMENTATION RATE] IN BLOOD BY WESTERGREN METHOD: 11 MM/HR (ref 0–20)
FOLATE SERPL-MCNC: 11.6 NG/ML (ref 4.78–24.2)
GLOBULIN SER CALC-MCNC: 2.1 GM/DL
GLUCOSE SERPL-MCNC: 90 MG/DL (ref 65–99)
HCT VFR BLD AUTO: 40.9 % (ref 34–46.6)
HDLC SERPL-MCNC: 52 MG/DL (ref 40–60)
HGB BLD-MCNC: 13.1 G/DL (ref 12–15.9)
IMM GRANULOCYTES # BLD AUTO: 0.01 10*3/MM3 (ref 0–0.05)
IMM GRANULOCYTES NFR BLD AUTO: 0.1 % (ref 0–0.5)
LDLC SERPL CALC-MCNC: 110 MG/DL (ref 0–100)
LYMPHOCYTES # BLD AUTO: 2.34 10*3/MM3 (ref 0.7–3.1)
LYMPHOCYTES NFR BLD AUTO: 30.5 % (ref 19.6–45.3)
MCH RBC QN AUTO: 29 PG (ref 26.6–33)
MCHC RBC AUTO-ENTMCNC: 32 G/DL (ref 31.5–35.7)
MCV RBC AUTO: 90.7 FL (ref 79–97)
MONOCYTES # BLD AUTO: 0.39 10*3/MM3 (ref 0.1–0.9)
MONOCYTES NFR BLD AUTO: 5.1 % (ref 5–12)
NEUTROPHILS # BLD AUTO: 4.83 10*3/MM3 (ref 1.7–7)
NEUTROPHILS NFR BLD AUTO: 62.9 % (ref 42.7–76)
NRBC BLD AUTO-RTO: 0 /100 WBC (ref 0–0.2)
PLATELET # BLD AUTO: 295 10*3/MM3 (ref 140–450)
POTASSIUM SERPL-SCNC: 4.8 MMOL/L (ref 3.5–5.2)
PROT SERPL-MCNC: 6.8 G/DL (ref 6–8.5)
RBC # BLD AUTO: 4.51 10*6/MM3 (ref 3.77–5.28)
SODIUM SERPL-SCNC: 140 MMOL/L (ref 136–145)
T3FREE SERPL-MCNC: 3 PG/ML (ref 2–4.4)
T4 FREE SERPL-MCNC: 1.16 NG/DL (ref 0.93–1.7)
TRIGL SERPL-MCNC: 108 MG/DL (ref 0–150)
TSH SERPL DL<=0.005 MIU/L-ACNC: 0.59 UIU/ML (ref 0.27–4.2)
VIT B12 SERPL-MCNC: 442 PG/ML (ref 211–946)
VLDLC SERPL CALC-MCNC: 19 MG/DL (ref 5–40)
WBC # BLD AUTO: 7.68 10*3/MM3 (ref 3.4–10.8)

## 2021-08-04 PROBLEM — K63.89 OTHER SPECIFIED DISEASES OF INTESTINE: Status: ACTIVE | Noted: 2021-02-11

## 2021-10-20 ENCOUNTER — TELEPHONE (OUTPATIENT)
Dept: FAMILY MEDICINE CLINIC | Facility: CLINIC | Age: 36
End: 2021-10-20

## 2021-10-20 ENCOUNTER — TELEMEDICINE (OUTPATIENT)
Dept: FAMILY MEDICINE CLINIC | Facility: CLINIC | Age: 36
End: 2021-10-20

## 2021-10-20 DIAGNOSIS — J45.41 MODERATE PERSISTENT ASTHMA WITH ACUTE EXACERBATION: ICD-10-CM

## 2021-10-20 DIAGNOSIS — J06.9 ACUTE URI: ICD-10-CM

## 2021-10-20 DIAGNOSIS — U07.1 COVID: Primary | ICD-10-CM

## 2021-10-20 PROCEDURE — 99213 OFFICE O/P EST LOW 20 MIN: CPT | Performed by: PHYSICIAN ASSISTANT

## 2021-10-20 RX ORDER — DOXYCYCLINE HYCLATE 100 MG/1
100 CAPSULE ORAL 2 TIMES DAILY
Qty: 20 CAPSULE | Refills: 0 | Status: ON HOLD | OUTPATIENT
Start: 2021-10-20 | End: 2022-03-03

## 2021-10-20 RX ORDER — SODIUM CHLORIDE 9 MG/ML
30 INJECTION, SOLUTION INTRAVENOUS ONCE
Status: CANCELLED | OUTPATIENT
Start: 2021-10-20

## 2021-10-20 RX ORDER — FLUCONAZOLE 150 MG/1
150 TABLET ORAL ONCE
Qty: 1 TABLET | Refills: 2 | Status: SHIPPED | OUTPATIENT
Start: 2021-10-20 | End: 2021-10-20

## 2021-10-20 RX ORDER — EPINEPHRINE 1 MG/ML
0.3 INJECTION, SOLUTION, CONCENTRATE INTRAVENOUS AS NEEDED
Status: CANCELLED | OUTPATIENT
Start: 2021-10-20

## 2021-10-20 RX ORDER — PREDNISONE 10 MG/1
TABLET ORAL
Qty: 35 TABLET | Refills: 0 | Status: ON HOLD | OUTPATIENT
Start: 2021-10-20 | End: 2022-03-03

## 2021-10-20 RX ORDER — ALBUTEROL SULFATE 90 UG/1
2 AEROSOL, METERED RESPIRATORY (INHALATION) EVERY 4 HOURS PRN
Qty: 18 G | Refills: 11 | Status: SHIPPED | OUTPATIENT
Start: 2021-10-20 | End: 2022-08-11

## 2021-10-20 RX ORDER — METHYLPREDNISOLONE SODIUM SUCCINATE 125 MG/2ML
125 INJECTION, POWDER, LYOPHILIZED, FOR SOLUTION INTRAMUSCULAR; INTRAVENOUS AS NEEDED
Status: CANCELLED | OUTPATIENT
Start: 2021-10-20

## 2021-10-20 RX ORDER — DIPHENHYDRAMINE HYDROCHLORIDE 50 MG/ML
50 INJECTION INTRAMUSCULAR; INTRAVENOUS ONCE AS NEEDED
Status: CANCELLED | OUTPATIENT
Start: 2021-10-20

## 2021-10-20 RX ORDER — DIPHENHYDRAMINE HCL 25 MG
50 TABLET ORAL ONCE AS NEEDED
Status: CANCELLED | OUTPATIENT
Start: 2021-10-20

## 2021-10-20 NOTE — TELEPHONE ENCOUNTER
----- Message from Sara Hoang sent at 10/20/2021 11:53 AM EDT -----  Regarding: Non-Urgent Medical Question  Contact: [phone]  Ramesh Bhardwaj! I’ve been sick since Tuesday the 12th and I got tested for Covid Wednesday the 13th and Thursday the 14th, both were negative. The urgent care said upper respiratory infection and gave me a steroid dose pack. Today I tested positive for covid. I’ve been having to use my inhaler a lot the last few days because I’ve been short of breath and wheezing some. Is there anything else I should be doing or taking? Thank you!     Sara Hoang      Please advise.

## 2021-10-20 NOTE — TELEPHONE ENCOUNTER
I sent her message on my chart that I need to do a video visit during lunch to order the IV infusion if she is not pregnant.

## 2021-10-20 NOTE — PATIENT INSTRUCTIONS

## 2021-10-20 NOTE — PROGRESS NOTES
Subjective   Sara Hoang is a 36 y.o. female.   You have chosen to receive care through a telehealth visit.  Do you consent to use a video/audio connection for your medical care today? Yes    History of Present Illness   Sara Hoang 36 y.o. female who presents for evaluation of sinus pressure and congestion, cough, wheezing, shortness of air, fatigue. Symptoms include congestion, post nasal drip, myalgias, headache, fatigue, hoarseness, shortness of breath, exertional SOA, wheezing, fatigue, chest tightness and deep bronchial sounding cough.  Onset of symptoms was 1 week ago, gradually worsening since that time. Patient denies fever, loss of taste or smell.   Evaluation to date: Through telehealth through her work treatment to date:  did have Medrol gómez----done; using Albuerol MDI;  on Femara from GYN, also Dayquil.   Onset Wed --- was seen by telehealth at the onset and initial testing was negative until today. Pt had vaccine.  LMP 10-13-21.  BMI Readings from Last 1 Encounters:   07/08/21 34.28 kg/m²     Patient had positive antigen test that was performed today performed today 10/20/2021 at [facility] and is positive and patient is symptomatic.  She cannot get a copy of her test results until tomorrow.  She knows to upload the results to me so I can place them in this note.  The following portions of the patient's history were reviewed and updated as appropriate: allergies, current medications, past family history, past medical history, past social history, past surgical history and problem list.    Review of Systems   Constitutional: Positive for activity change, appetite change and fatigue. Negative for unexpected weight change.   HENT: Positive for congestion, postnasal drip, rhinorrhea, sinus pressure, sinus pain, sore throat and voice change. Negative for nosebleeds and trouble swallowing.    Eyes: Negative for pain and visual disturbance.   Respiratory: Positive for cough,  chest tightness, shortness of breath and wheezing.    Cardiovascular: Negative for chest pain and palpitations.   Gastrointestinal: Negative for abdominal pain, blood in stool and diarrhea.   Endocrine: Negative.    Genitourinary: Negative for difficulty urinating and hematuria.   Musculoskeletal: Positive for myalgias and neck pain. Negative for joint swelling.   Skin: Negative for color change and rash.   Allergic/Immunologic: Negative.    Neurological: Positive for headaches. Negative for syncope and speech difficulty.   Hematological: Negative for adenopathy.   Psychiatric/Behavioral: Negative for agitation and confusion.   All other systems reviewed and are negative.      Objective   Physical Exam  Constitutional:       General: She is not in acute distress.     Appearance: She is well-developed. She is ill-appearing. She is not diaphoretic.   HENT:      Head: Normocephalic and atraumatic.   Eyes:      Conjunctiva/sclera: Conjunctivae normal.   Pulmonary:      Effort: Pulmonary effort is normal. No respiratory distress.   Musculoskeletal:         General: Normal range of motion.      Cervical back: Normal range of motion.   Skin:     General: Skin is dry.   Neurological:      Mental Status: She is alert and oriented to person, place, and time.      Coordination: Coordination normal.   Psychiatric:         Behavior: Behavior normal.         Thought Content: Thought content normal.         Judgment: Judgment normal.         Assessment/Plan   Diagnoses and all orders for this visit:    1. COVID (Primary)    2. Moderate persistent asthma with acute exacerbation    3. Acute URI      Patient to obtain copy of positive Covid test and upload to chart in the morning  ASAP monoclonal antibody infusion due to onset of symptoms 1 week ago and patient does have asthma--and she is getting worse  We will still send oral prednisone for the asthma flareup and Doxycycline  if she needs first secondary sinus infection  Time video  18 min      I sent her message on my chart that I need to do a video visit during lunch to order the IV infusion if she is not pregnant.

## 2021-10-21 ENCOUNTER — HOSPITAL ENCOUNTER (OUTPATIENT)
Dept: INFUSION THERAPY | Facility: HOSPITAL | Age: 36
Setting detail: INFUSION SERIES
Discharge: HOME | End: 2021-10-21

## 2021-10-21 VITALS
OXYGEN SATURATION: 98 % | RESPIRATION RATE: 16 BRPM | TEMPERATURE: 98 F | DIASTOLIC BLOOD PRESSURE: 77 MMHG | HEART RATE: 65 BPM | SYSTOLIC BLOOD PRESSURE: 117 MMHG

## 2021-10-21 DIAGNOSIS — J45.41 MODERATE PERSISTENT ASTHMA WITH ACUTE EXACERBATION: Primary | ICD-10-CM

## 2021-10-21 PROCEDURE — 25010000002 INJECTION, BAMLANIVIMAB AND ETESEVIMAB, 2100 MG: Performed by: PHYSICIAN ASSISTANT

## 2021-10-21 PROCEDURE — 96365 THER/PROPH/DIAG IV INF INIT: CPT

## 2021-10-21 PROCEDURE — M0245 HC IV INFUSION, BAMLANIVIMAB AND ETESEVIMAB, 2100 MG: HCPCS | Performed by: PHYSICIAN ASSISTANT

## 2021-10-21 RX ORDER — DIPHENHYDRAMINE HYDROCHLORIDE 50 MG/ML
50 INJECTION INTRAMUSCULAR; INTRAVENOUS ONCE AS NEEDED
Status: CANCELLED | OUTPATIENT
Start: 2021-10-21

## 2021-10-21 RX ORDER — DIPHENHYDRAMINE HCL 50 MG
50 CAPSULE ORAL ONCE AS NEEDED
Status: DISCONTINUED | OUTPATIENT
Start: 2021-10-21 | End: 2021-10-23 | Stop reason: HOSPADM

## 2021-10-21 RX ORDER — DIPHENHYDRAMINE HYDROCHLORIDE 50 MG/ML
50 INJECTION INTRAMUSCULAR; INTRAVENOUS ONCE AS NEEDED
Status: DISCONTINUED | OUTPATIENT
Start: 2021-10-21 | End: 2021-10-23 | Stop reason: HOSPADM

## 2021-10-21 RX ORDER — METHYLPREDNISOLONE SODIUM SUCCINATE 125 MG/2ML
125 INJECTION, POWDER, LYOPHILIZED, FOR SOLUTION INTRAMUSCULAR; INTRAVENOUS AS NEEDED
Status: DISCONTINUED | OUTPATIENT
Start: 2021-10-21 | End: 2021-10-23 | Stop reason: HOSPADM

## 2021-10-21 RX ORDER — SODIUM CHLORIDE 9 MG/ML
30 INJECTION, SOLUTION INTRAVENOUS ONCE
Status: COMPLETED | OUTPATIENT
Start: 2021-10-21 | End: 2021-10-21

## 2021-10-21 RX ORDER — METHYLPREDNISOLONE SODIUM SUCCINATE 125 MG/2ML
125 INJECTION, POWDER, LYOPHILIZED, FOR SOLUTION INTRAMUSCULAR; INTRAVENOUS AS NEEDED
Status: CANCELLED | OUTPATIENT
Start: 2021-10-21

## 2021-10-21 RX ORDER — SODIUM CHLORIDE 9 MG/ML
30 INJECTION, SOLUTION INTRAVENOUS ONCE
Status: CANCELLED | OUTPATIENT
Start: 2021-10-21

## 2021-10-21 RX ORDER — DIPHENHYDRAMINE HCL 50 MG
50 CAPSULE ORAL ONCE AS NEEDED
Status: CANCELLED | OUTPATIENT
Start: 2021-10-21

## 2021-10-21 RX ADMIN — SODIUM CHLORIDE 30 ML/HR: 9 INJECTION, SOLUTION INTRAVENOUS at 08:29

## 2021-10-21 RX ADMIN — SODIUM CHLORIDE: 9 INJECTION, SOLUTION INTRAVENOUS at 08:28

## 2021-10-21 NOTE — PROGRESS NOTES
Subjective   Sara Hoang is a 36 y.o. female.   You have chosen to receive care through a telehealth visit.  Do you consent to use a video/audio connection for your medical care today? Yes    History of Present Illness   Sara Hoang 36 y.o. female who presents for evaluation of sinus pressure and congestion, cough, wheezing, shortness of air, fatigue. Symptoms include congestion, post nasal drip, myalgias, headache, fatigue, hoarseness, shortness of breath, exertional SOA, wheezing, fatigue, chest tightness and deep bronchial sounding cough.  Onset of symptoms was 1 week ago, gradually worsening since that time. Patient denies fever, loss of taste or smell.   Evaluation to date: Through telehealth through her work treatment to date:  did have Medrol gómez----done; using Albuerol MDI;  on Femara from GYN, also Dayquil.   Onset Wed --- was seen by telehealth at the onset and initial testing was negative until today. Pt had vaccine.  LMP 10-13-21.  BMI Readings from Last 1 Encounters:   07/08/21 34.28 kg/m²     Patient had positive antigen test that was performed today performed today 10/20/2021 at [facility] and is positive and patient is symptomatic.  She cannot get a copy of her test results until tomorrow.  She knows to upload the results to me so I can place them in this note.  The following portions of the patient's history were reviewed and updated as appropriate: allergies, current medications, past family history, past medical history, past social history, past surgical history and problem list.    Review of Systems   Constitutional: Positive for activity change, appetite change and fatigue. Negative for unexpected weight change.   HENT: Positive for congestion, postnasal drip, rhinorrhea, sinus pressure, sinus pain, sore throat and voice change. Negative for nosebleeds and trouble swallowing.    Eyes: Negative for pain and visual disturbance.   Respiratory: Positive for cough,  chest tightness, shortness of breath and wheezing.    Cardiovascular: Negative for chest pain and palpitations.   Gastrointestinal: Negative for abdominal pain, blood in stool and diarrhea.   Endocrine: Negative.    Genitourinary: Negative for difficulty urinating and hematuria.   Musculoskeletal: Positive for myalgias and neck pain. Negative for joint swelling.   Skin: Negative for color change and rash.   Allergic/Immunologic: Negative.    Neurological: Positive for headaches. Negative for syncope and speech difficulty.   Hematological: Negative for adenopathy.   Psychiatric/Behavioral: Negative for agitation and confusion.   All other systems reviewed and are negative.      Objective   Physical Exam  Constitutional:       General: She is not in acute distress.     Appearance: She is well-developed. She is ill-appearing. She is not diaphoretic.   HENT:      Head: Normocephalic and atraumatic.   Eyes:      Conjunctiva/sclera: Conjunctivae normal.   Pulmonary:      Effort: Pulmonary effort is normal. No respiratory distress.   Musculoskeletal:         General: Normal range of motion.      Cervical back: Normal range of motion.   Skin:     General: Skin is dry.   Neurological:      Mental Status: She is alert and oriented to person, place, and time.      Coordination: Coordination normal.   Psychiatric:         Behavior: Behavior normal.         Thought Content: Thought content normal.         Judgment: Judgment normal.         Assessment/Plan   Diagnoses and all orders for this visit:    1. COVID (Primary)  -     Ambulatory Referral to [facility] For Infusion Treatment    2. Moderate persistent asthma with acute exacerbation    3. Acute URI    Other orders  -     predniSONE (DELTASONE) 10 MG tablet; 5 p.o. x3 days, 4 p.o. x2 days, 3 p.o. x2 days, 2 p.o. x2 days, 1 p.o. x2 days may split dose, take with food f  Dispense: 35 tablet; Refill: 0  -     fluconazole (Diflucan) 150 MG tablet; Take 1 tablet by mouth 1 (One) Time  for 1 dose. One PO X 1 for yeast infection  Dispense: 1 tablet; Refill: 2  -     doxycycline (VIBRAMYCIN) 100 MG capsule; Take 1 capsule by mouth 2 (Two) Times a Day. For infection  Dispense: 20 capsule; Refill: 0  -     albuterol sulfate  (90 Base) MCG/ACT inhaler; Inhale 2 puffs Every 4 (Four) Hours As Needed for Wheezing or Shortness of Air.  Dispense: 18 g; Refill: 11      Patient to obtain copy of positive Covid test and upload to chart in the morning  ASAP monoclonal antibody infusion due to onset of symptoms 1 week ago and patient does have asthma--and she is getting worse  We will still send oral prednisone for the asthma flareup and Doxycycline  if she needs first secondary sinus infection  Time video 18 min      I sent her message on my chart that I need to do a video visit during lunch to order the IV infusion if she is not pregnant.

## 2021-10-21 NOTE — PROGRESS NOTES
Patient provided with Fact Sheet for Patients, Parents and Caregivers Emergency Use Authorization (EUA) of Bamlanivimab / Etesevimab  for Coronavirus Disease 2019 (COVID-19) form.    Reviewed and patient verbalized understanding.  Appropriate PPE worn during the care of the patient.  Advised patient not to receive Covid vaccine for 90 days.    Patient tolerated infusion without difficulty and observed for  60 minutes post infusion. Patient discharged at 1015 per wheelchair

## 2022-01-06 LAB
EXTERNAL HEPATITIS B SURFACE ANTIGEN: NEGATIVE
EXTERNAL HEPATITIS C AB: NORMAL
EXTERNAL RUBELLA QUALITATIVE: NORMAL
EXTERNAL SYPHILIS RPR SCREEN: NEGATIVE

## 2022-01-18 ENCOUNTER — OFFICE VISIT (OUTPATIENT)
Dept: NEUROLOGY | Facility: CLINIC | Age: 37
End: 2022-01-18

## 2022-01-18 VITALS
SYSTOLIC BLOOD PRESSURE: 110 MMHG | HEART RATE: 97 BPM | OXYGEN SATURATION: 98 % | WEIGHT: 225 LBS | BODY MASS INDEX: 37.49 KG/M2 | DIASTOLIC BLOOD PRESSURE: 70 MMHG | HEIGHT: 65 IN

## 2022-01-18 DIAGNOSIS — G24.3 SPASMODIC TORTICOLLIS: Primary | ICD-10-CM

## 2022-01-18 DIAGNOSIS — G43.109 MIGRAINE WITH AURA AND WITHOUT STATUS MIGRAINOSUS, NOT INTRACTABLE: ICD-10-CM

## 2022-01-18 PROCEDURE — 99203 OFFICE O/P NEW LOW 30 MIN: CPT | Performed by: PSYCHIATRY & NEUROLOGY

## 2022-01-18 RX ORDER — ASPIRIN 81 MG/1
81 TABLET ORAL DAILY
COMMUNITY
End: 2022-09-19

## 2022-01-18 RX ORDER — PROMETHAZINE HYDROCHLORIDE 25 MG/1
TABLET ORAL
COMMUNITY
Start: 2021-12-21 | End: 2022-08-11

## 2022-01-18 RX ORDER — PYRIDOXINE HCL (VITAMIN B6) 25 MG
25 TABLET ORAL DAILY
COMMUNITY
End: 2022-07-01

## 2022-01-18 NOTE — PROGRESS NOTES
CC: Head tremor    HPI:  Sara Hoang is a  36 y.o.  right-handed white female who was sent for neurological consultation by THOMPSON Almeida regarding head tremor.  The patient indicates that she notices it when she has her head turned to the left.  She does not have it in primary position and the head remains in primary position without tilt.  She has noticed when turning the head to the left and the tremor begins if she puts a slight amount of counterpressure on the left side of her face the tremor will stop.  She denies any neck pain or any history of a whiplash injury to the neck.  She states that occasionally she will notice a slight tremor in either of her thumbs but there is no unusual cramping in the hand with writing or any other repetitive use.    She does have history of an upper lumbar compression fracture treated conservatively.  She has history of migraine which have reduced in frequency as an adult but she had been treated with Topamax and intermittent Maxalt both of which were very helpful and the frequency of headache was about every couple of months until she had to stop her medication because she became pregnant.  She has associated with her headaches nausea and vomiting and light and sound sensitivity and occasionally she is seeing the spots but had no headache.    She indicates that she had a CT scan of the head  12/14/2015 which was negative.        Past Medical History:   Diagnosis Date   • Abnormal Pap smear of cervix    • Anemia    • Anxiety    • Asthma    • Benign hematuria 2010    neg work up   • Colon polyps 02/11/2021   • Colon polyps 2017   • Dysmenorrhea    • Endometriosis    • Foot fracture    • Fracture of spine, lumbar, without spinal cord injury, closed ([facility]) 01/02/2014    L2 25% compression fx   • Herpes     taking valtrex   • Hyperemesis gravidarum 2019   • Infertility associated with anovulation     Femara   • Insomnia    • Migraine    • Palpitations    • Pelvic pain    • PVC  (premature ventricular contraction)    • Urinary tract infection     this pregnancy         Past Surgical History:   Procedure Laterality Date   • COLONOSCOPY W/ POLYPECTOMY N/A 02/11/2021    [facility], Dr. Bardales, repeat in 5 years   • COLONOSCOPY W/ POLYPECTOMY N/A 2017    Dr. Hills   • COLPOSCOPY  2009    neg   • CYSTOSCOPY     • DIAGNOSTIC LAPAROSCOPY N/A 12/2018    and chromopertubation    • ENDOSCOPY N/A 12/17/2012    Dr. Hills:  rings in lower esophagus. erosions in stomach   • PERIPHERALLY INSERTED CENTRAL CATHETER INSERTION N/A 2019   • TONSILLECTOMY Bilateral 2014           Current Outpatient Medications:   •  aspirin (aspirin) 81 MG EC tablet, Take 81 mg by mouth Daily., Disp: , Rfl:   •  doxylamine (UNISOM) 25 MG tablet, Take 25 mg by mouth At Night As Needed for Sleep., Disp: , Rfl:   •  Prenatal Vit-Fe Fumarate-FA (PRENATAL VITAMIN PO), Take  by mouth., Disp: , Rfl:   •  promethazine (PHENERGAN) 25 MG tablet, TAKE 1 TABLET BY MOUTH EVERY 4 TO 6 HOURS, Disp: , Rfl:   •  pyridoxine (VITAMIN B-6) 25 MG tablet, Take 25 mg by mouth Daily., Disp: , Rfl:   •  sertraline (ZOLOFT) 100 MG tablet, Take 1.5 tablets by mouth Daily. For anxiety--new dose (Patient taking differently: Take 100 mg by mouth Daily. For anxiety--new dose), Disp: 135 tablet, Rfl: 3  •  albuterol sulfate  (90 Base) MCG/ACT inhaler, Inhale 2 puffs Every 4 (Four) Hours As Needed for Wheezing or Shortness of Air., Disp: 18 g, Rfl: 11  •  dicyclomine (BENTYL) 10 MG capsule, Take 10 mg by mouth Daily As Needed., Disp: , Rfl:   •  doxycycline (VIBRAMYCIN) 100 MG capsule, Take 1 capsule by mouth 2 (Two) Times a Day. For infection, Disp: 20 capsule, Rfl: 0  •  predniSONE (DELTASONE) 10 MG tablet, 5 p.o. x3 days, 4 p.o. x2 days, 3 p.o. x2 days, 2 p.o. x2 days, 1 p.o. x2 days may split dose, take with food f, Disp: 35 tablet, Rfl: 0  •  Probiotic Product (SOLUBLE FIBER/PROBIOTICS PO), Take 1 tablet/day by mouth Daily., Disp: ,  Rfl:   •  rizatriptan (MAXALT) 10 MG tablet, Take 1 tablet by mouth 1 (One) Time As Needed for Migraine for up to 1 dose. May repeat in 2 hours if needed, Disp: 36 tablet, Rfl: 3  •  vitamin d (CHOLECALIFEROL) 125 MCG (5000 UT) capsule, One PO daily, Disp: 90 capsule, Rfl: 3      Family History   Problem Relation Age of Onset   • Breast cancer Mother    • Prostate cancer Father    • Migraines Father    • Hypertension Father    • Seizures Sister    • Heart disease Maternal Grandmother    • Diabetes Maternal Grandmother    • Colon cancer Maternal Grandfather    • Hypertension Paternal Grandfather          Social History     Socioeconomic History   • Marital status:    Tobacco Use   • Smoking status: Never Smoker   • Smokeless tobacco: Never Used   Vaping Use   • Vaping Use: Never used   Substance and Sexual Activity   • Alcohol use: No   • Drug use: No   • Sexual activity: Defer         Allergies   Allergen Reactions   • Biaxin [Clarithromycin] Swelling     Eyelid   • Ceclor [Cefaclor] Hives     Has had Rocephin   • Penicillins Hives   • Sulfa Antibiotics Hives         Pain Scale: 0/10        ROS:  Review of Systems   Constitutional: Negative for activity change, appetite change and fatigue.   HENT: Negative for ear pain, facial swelling and hearing loss.    Eyes: Negative for pain, redness and itching.   Respiratory: Negative for cough, choking and shortness of breath.    Cardiovascular: Negative for chest pain and leg swelling.   Gastrointestinal: Negative for abdominal pain, nausea and vomiting.   Endocrine: Negative for cold intolerance, heat intolerance and polydipsia.   Musculoskeletal: Negative for arthralgias, back pain and joint swelling.   Skin: Negative for color change, pallor and rash.   Allergic/Immunologic: Positive for environmental allergies. Negative for food allergies.   Neurological: Positive for tremors and headaches. Negative for dizziness, seizures, syncope, facial asymmetry, speech  "difficulty, weakness, light-headedness and numbness.   Psychiatric/Behavioral: Positive for sleep disturbance. Negative for agitation, behavioral problems, confusion, decreased concentration, dysphoric mood, hallucinations, self-injury and suicidal ideas. The patient is nervous/anxious. The patient is not hyperactive.          I have reviewed and agree with the above ROS completed by the medical assistant.      Physical Exam:  Vitals:    01/18/22 1452   BP: 110/70   Pulse: 97   SpO2: 98%   Weight: 102 kg (225 lb)   Height: 165.1 cm (65\")     Orthostatic BP:    Body mass index is 37.44 kg/m².    Physical Exam  General: M obese white female no acute distress  HEENT: Normocephalic no evidence of trauma.  Discs poorly seen.  Throat  Neck: Supple.  No thyromegaly.  No cervical bruits.  With head turn to the left there is a tremor which seemed fairly regular.  It was horizontally oriented without tilt.  With head in primary position I was unable to demonstrate any significant muscle spasms in the neck muscles.  Heart: Regular rate and rhythm no murmurs.  No pedal edema.  Extremities: Radial pulses were strong and simultaneous.      Neurological Exam:   Mental Status: Awake, alert, oriented to person, place and time.  Conversant without evidence of an affective disorder, thought disorder, delusions or hallucinations.  Attention span and concentration are normal.  HCF: No aphasia, apraxia or dysarthria.  Recent and remote memory intact.  Knowledgeof recent events intact.  CN: I:   II: Visual fields full without left inattention   III, IV, VI: Eye movements intact without nystagmus or ptosis.  Pupils equal round and reactive to light.   V,VII: Light touch and pinprick intact all 3 divisions of V.  Facial muscles symmetrical.   VIII: Hearing intact to finger rub   IX,X: Soft palate elevates symmetrically   XI: Sternomastoid and trapezius are strong.   XII: Tongue midline without atrophy or fasciculations  Motor: Normal tone " and bulk in the upper and lower extremities   Power testing: Full power in all muscles tested arms and legs  Reflexes: Upper extremities: +2 diffusely        Lower extremities: +2 diffusely.  1 beat clonus at the ankles        Toe signs: Downgoing bilaterally  Sensory: Light touch: Diffusely intact arms and legs        Pinprick: Diffusely intact arms and legs        Vibration: Intact at the ankles        Position: Intact at the great toes    Cerebellar: Finger-to-nose: Normal           Rapid movement: Normal           Heel-to-shin: Normal  Gait and Station: Normal casual, toe, heel and tandem walk.  No Romberg no drift    Results:      Lab Results   Component Value Date    GLUCOSE 90 07/08/2021    BUN 13 07/08/2021    CREATININE 0.76 07/08/2021    EGFRIFNONA 87 07/08/2021    EGFRIFAFRI 106 07/08/2021    BCR 17.1 07/08/2021    CO2 27.3 07/08/2021    CALCIUM 9.5 07/08/2021    PROTENTOTREF 6.8 07/08/2021    ALBUMIN 4.70 07/08/2021    LABIL2 2.2 07/08/2021    AST 14 07/08/2021    ALT 8 07/08/2021       Lab Results   Component Value Date    WBC 7.68 07/08/2021    HGB 13.1 07/08/2021    HCT 40.9 07/08/2021    MCV 90.7 07/08/2021     07/08/2021         .  Lab Results   Component Value Date    RPR Non-Reactive 03/05/2019         Lab Results   Component Value Date    TSH 0.586 07/08/2021         Lab Results   Component Value Date    UIZFSEDP34 442 07/08/2021         Lab Results   Component Value Date    FOLATE 11.60 07/08/2021         No results found for: HGBA1C      Lab Results   Component Value Date    GLUCOSE 90 07/08/2021    BUN 13 07/08/2021    CREATININE 0.76 07/08/2021    EGFRIFNONA 87 07/08/2021    EGFRIFAFRI 106 07/08/2021    BCR 17.1 07/08/2021    K 4.8 07/08/2021    CO2 27.3 07/08/2021    CALCIUM 9.5 07/08/2021    PROTENTOTREF 6.8 07/08/2021    ALBUMIN 4.70 07/08/2021    LABIL2 2.2 07/08/2021    AST 14 07/08/2021    ALT 8 07/08/2021         Lab Results   Component Value Date    WBC 7.68 07/08/2021    HGB  "13.1 07/08/2021    HCT 40.9 07/08/2021    MCV 90.7 07/08/2021     07/08/2021             Assessment:   1.  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  2.  Minimal thumb tremor  3.  Migraine headaches        Plan:  1.  Since the patient is pregnant there is very little to do at this point.  I think it is probably best to have her seen by movement disorder subspecialty since I believe the best treatment option is Botox as opposed to muscle relaxant type medications which all are typically sedative.  2.  We will send consult to [facility] movement disorders and she can consider setting up an appointment perhaps after delivery of her baby.          Time: 45 minutes            Dictated utilizing Dragon dictation.   "

## 2022-01-27 ENCOUNTER — PATIENT ROUNDING (BHMG ONLY) (OUTPATIENT)
Dept: NEUROLOGY | Facility: CLINIC | Age: 37
End: 2022-01-27

## 2022-03-03 ENCOUNTER — HOSPITAL ENCOUNTER (OUTPATIENT)
Facility: HOSPITAL | Age: 37
Discharge: HOME | End: 2022-03-04
Attending: OBSTETRICS & GYNECOLOGY | Admitting: OBSTETRICS & GYNECOLOGY

## 2022-03-03 VITALS
HEIGHT: 65 IN | DIASTOLIC BLOOD PRESSURE: 59 MMHG | HEART RATE: 83 BPM | BODY MASS INDEX: 38.49 KG/M2 | OXYGEN SATURATION: 98 % | TEMPERATURE: 98.2 F | WEIGHT: 231 LBS | RESPIRATION RATE: 18 BRPM | SYSTOLIC BLOOD PRESSURE: 93 MMHG

## 2022-03-03 LAB
ABO GROUP BLD: NORMAL
ALBUMIN SERPL-MCNC: 3.7 G/DL (ref 3.5–5.2)
ALBUMIN/GLOB SERPL: 1.3 G/DL
ALP SERPL-CCNC: 95 U/L (ref 39–117)
ALT SERPL W P-5'-P-CCNC: 7 U/L (ref 1–33)
ANION GAP SERPL CALCULATED.3IONS-SCNC: 13 MMOL/L (ref 5–15)
AST SERPL-CCNC: 11 U/L (ref 1–32)
BACTERIA UR QL AUTO: ABNORMAL /HPF
BASOPHILS # BLD AUTO: 0.02 10*3/MM3 (ref 0–0.2)
BASOPHILS NFR BLD AUTO: 0.2 % (ref 0–1.5)
BILIRUB SERPL-MCNC: 0.5 MG/DL (ref 0–1.2)
BILIRUB UR QL STRIP: NEGATIVE
BLD GP AB SCN SERPL QL: NEGATIVE
BUN SERPL-MCNC: 5 MG/DL (ref 6–20)
BUN/CREAT SERPL: 9.6 (ref 7–25)
CALCIUM SPEC-SCNC: 8.6 MG/DL (ref 8.6–10.5)
CHLORIDE SERPL-SCNC: 102 MMOL/L (ref 98–107)
CLARITY UR: ABNORMAL
CO2 SERPL-SCNC: 21 MMOL/L (ref 22–29)
COLOR UR: YELLOW
CREAT SERPL-MCNC: 0.52 MG/DL (ref 0.57–1)
DEPRECATED RDW RBC AUTO: 45.2 FL (ref 37–54)
EGFRCR SERPLBLD CKD-EPI 2021: 124.4 ML/MIN/1.73
EOSINOPHIL # BLD AUTO: 0.03 10*3/MM3 (ref 0–0.4)
EOSINOPHIL NFR BLD AUTO: 0.3 % (ref 0.3–6.2)
ERYTHROCYTE [DISTWIDTH] IN BLOOD BY AUTOMATED COUNT: 13.3 % (ref 12.3–15.4)
GLOBULIN UR ELPH-MCNC: 2.9 GM/DL
GLUCOSE SERPL-MCNC: 85 MG/DL (ref 65–99)
GLUCOSE UR STRIP-MCNC: NEGATIVE MG/DL
HCT VFR BLD AUTO: 39.2 % (ref 34–46.6)
HGB BLD-MCNC: 12.5 G/DL (ref 12–15.9)
HGB UR QL STRIP.AUTO: ABNORMAL
HYALINE CASTS UR QL AUTO: ABNORMAL /LPF
IMM GRANULOCYTES # BLD AUTO: 0.06 10*3/MM3 (ref 0–0.05)
IMM GRANULOCYTES NFR BLD AUTO: 0.6 % (ref 0–0.5)
KETONES UR QL STRIP: ABNORMAL
LEUKOCYTE ESTERASE UR QL STRIP.AUTO: ABNORMAL
LYMPHOCYTES # BLD AUTO: 1.57 10*3/MM3 (ref 0.7–3.1)
LYMPHOCYTES NFR BLD AUTO: 15 % (ref 19.6–45.3)
MAGNESIUM SERPL-MCNC: 2 MG/DL (ref 1.6–2.6)
MCH RBC QN AUTO: 29.2 PG (ref 26.6–33)
MCHC RBC AUTO-ENTMCNC: 31.9 G/DL (ref 31.5–35.7)
MCV RBC AUTO: 91.6 FL (ref 79–97)
MONOCYTES # BLD AUTO: 0.38 10*3/MM3 (ref 0.1–0.9)
MONOCYTES NFR BLD AUTO: 3.6 % (ref 5–12)
NEUTROPHILS NFR BLD AUTO: 8.43 10*3/MM3 (ref 1.7–7)
NEUTROPHILS NFR BLD AUTO: 80.3 % (ref 42.7–76)
NITRITE UR QL STRIP: NEGATIVE
NRBC BLD AUTO-RTO: 0 /100 WBC (ref 0–0.2)
PH UR STRIP.AUTO: 7 [PH] (ref 5–8)
PLATELET # BLD AUTO: 232 10*3/MM3 (ref 140–450)
PMV BLD AUTO: 9.9 FL (ref 6–12)
POTASSIUM SERPL-SCNC: 3.4 MMOL/L (ref 3.5–5.2)
PROT SERPL-MCNC: 6.6 G/DL (ref 6–8.5)
PROT UR QL STRIP: ABNORMAL
RBC # BLD AUTO: 4.28 10*6/MM3 (ref 3.77–5.28)
RBC # UR STRIP: ABNORMAL /HPF
REF LAB TEST METHOD: ABNORMAL
RH BLD: NEGATIVE
SARS-COV-2 RNA PNL SPEC NAA+PROBE: NOT DETECTED
SODIUM SERPL-SCNC: 136 MMOL/L (ref 136–145)
SP GR UR STRIP: 1.02 (ref 1–1.03)
SQUAMOUS #/AREA URNS HPF: ABNORMAL /HPF
T&S EXPIRATION DATE: NORMAL
UROBILINOGEN UR QL STRIP: ABNORMAL
WBC # UR STRIP: ABNORMAL /HPF
WBC NRBC COR # BLD: 10.49 10*3/MM3 (ref 3.4–10.8)

## 2022-03-03 PROCEDURE — G0463 HOSPITAL OUTPT CLINIC VISIT: HCPCS

## 2022-03-03 PROCEDURE — 81001 URINALYSIS AUTO W/SCOPE: CPT | Performed by: OBSTETRICS & GYNECOLOGY

## 2022-03-03 PROCEDURE — 96361 HYDRATE IV INFUSION ADD-ON: CPT

## 2022-03-03 PROCEDURE — 99283 EMERGENCY DEPT VISIT LOW MDM: CPT | Performed by: OBSTETRICS & GYNECOLOGY

## 2022-03-03 PROCEDURE — 96374 THER/PROPH/DIAG INJ IV PUSH: CPT | Performed by: OBSTETRICS & GYNECOLOGY

## 2022-03-03 PROCEDURE — 83735 ASSAY OF MAGNESIUM: CPT | Performed by: OBSTETRICS & GYNECOLOGY

## 2022-03-03 PROCEDURE — 25010000002 ONDANSETRON PER 1 MG: Performed by: OBSTETRICS & GYNECOLOGY

## 2022-03-03 PROCEDURE — 87086 URINE CULTURE/COLONY COUNT: CPT | Performed by: OBSTETRICS & GYNECOLOGY

## 2022-03-03 PROCEDURE — 86900 BLOOD TYPING SEROLOGIC ABO: CPT | Performed by: OBSTETRICS & GYNECOLOGY

## 2022-03-03 PROCEDURE — 86901 BLOOD TYPING SEROLOGIC RH(D): CPT | Performed by: OBSTETRICS & GYNECOLOGY

## 2022-03-03 PROCEDURE — 85025 COMPLETE CBC W/AUTO DIFF WBC: CPT | Performed by: OBSTETRICS & GYNECOLOGY

## 2022-03-03 PROCEDURE — 87635 SARS-COV-2 COVID-19 AMP PRB: CPT | Performed by: OBSTETRICS & GYNECOLOGY

## 2022-03-03 PROCEDURE — 96361 HYDRATE IV INFUSION ADD-ON: CPT | Performed by: OBSTETRICS & GYNECOLOGY

## 2022-03-03 PROCEDURE — 80053 COMPREHEN METABOLIC PANEL: CPT | Performed by: OBSTETRICS & GYNECOLOGY

## 2022-03-03 PROCEDURE — 86850 RBC ANTIBODY SCREEN: CPT | Performed by: OBSTETRICS & GYNECOLOGY

## 2022-03-03 RX ORDER — SODIUM CHLORIDE 0.9 % (FLUSH) 0.9 %
10 SYRINGE (ML) INJECTION EVERY 12 HOURS SCHEDULED
Status: DISCONTINUED | OUTPATIENT
Start: 2022-03-03 | End: 2022-03-04 | Stop reason: HOSPADM

## 2022-03-03 RX ORDER — SODIUM CHLORIDE, SODIUM LACTATE, POTASSIUM CHLORIDE, CALCIUM CHLORIDE 600; 310; 30; 20 MG/100ML; MG/100ML; MG/100ML; MG/100ML
125 INJECTION, SOLUTION INTRAVENOUS CONTINUOUS
Status: DISCONTINUED | OUTPATIENT
Start: 2022-03-03 | End: 2022-03-04 | Stop reason: HOSPADM

## 2022-03-03 RX ORDER — ONDANSETRON 2 MG/ML
4 INJECTION INTRAMUSCULAR; INTRAVENOUS EVERY 6 HOURS PRN
Status: DISCONTINUED | OUTPATIENT
Start: 2022-03-03 | End: 2022-03-04 | Stop reason: HOSPADM

## 2022-03-03 RX ORDER — ONDANSETRON HYDROCHLORIDE 8 MG/1
8 TABLET, FILM COATED ORAL EVERY 6 HOURS
COMMUNITY
End: 2022-08-11

## 2022-03-03 RX ORDER — ACETAMINOPHEN 650 MG/1
650 SUPPOSITORY RECTAL EVERY 6 HOURS PRN
Status: DISCONTINUED | OUTPATIENT
Start: 2022-03-03 | End: 2022-03-04 | Stop reason: HOSPADM

## 2022-03-03 RX ORDER — PROMETHAZINE HYDROCHLORIDE 12.5 MG/1
12.5 SUPPOSITORY RECTAL EVERY 4 HOURS PRN
Status: DISCONTINUED | OUTPATIENT
Start: 2022-03-03 | End: 2022-03-04 | Stop reason: HOSPADM

## 2022-03-03 RX ORDER — SODIUM CHLORIDE 0.9 % (FLUSH) 0.9 %
10 SYRINGE (ML) INJECTION AS NEEDED
Status: DISCONTINUED | OUTPATIENT
Start: 2022-03-03 | End: 2022-03-04 | Stop reason: HOSPADM

## 2022-03-03 RX ADMIN — ACETAMINOPHEN 650 MG: 650 SUPPOSITORY RECTAL at 23:56

## 2022-03-03 RX ADMIN — SODIUM CHLORIDE, POTASSIUM CHLORIDE, SODIUM LACTATE AND CALCIUM CHLORIDE 125 ML/HR: 600; 310; 30; 20 INJECTION, SOLUTION INTRAVENOUS at 22:26

## 2022-03-03 RX ADMIN — SODIUM CHLORIDE, POTASSIUM CHLORIDE, SODIUM LACTATE AND CALCIUM CHLORIDE 1000 ML: 600; 310; 30; 20 INJECTION, SOLUTION INTRAVENOUS at 20:25

## 2022-03-03 RX ADMIN — ONDANSETRON 4 MG: 2 INJECTION INTRAMUSCULAR; INTRAVENOUS at 20:41

## 2022-03-04 PROBLEM — Z34.90 PREGNANCY: Status: ACTIVE | Noted: 2022-03-04

## 2022-03-04 PROCEDURE — 96361 HYDRATE IV INFUSION ADD-ON: CPT

## 2022-03-04 RX ORDER — METHYLPREDNISOLONE 4 MG/1
TABLET ORAL
Qty: 21 TABLET | Refills: 0 | Status: SHIPPED | OUTPATIENT
Start: 2022-03-04 | End: 2022-07-01

## 2022-03-04 RX ORDER — PROMETHAZINE HYDROCHLORIDE 12.5 MG/1
12.5 SUPPOSITORY RECTAL EVERY 4 HOURS PRN
Qty: 12 SUPPOSITORY | Refills: 0 | Status: SHIPPED | OUTPATIENT
Start: 2022-03-04 | End: 2022-07-01

## 2022-03-04 RX ADMIN — SODIUM CHLORIDE, POTASSIUM CHLORIDE, SODIUM LACTATE AND CALCIUM CHLORIDE 125 ML/HR: 600; 310; 30; 20 INJECTION, SOLUTION INTRAVENOUS at 06:39

## 2022-03-04 RX ADMIN — PROMETHAZINE HYDROCHLORIDE 12.5 MG: 12.5 SUPPOSITORY RECTAL at 12:49

## 2022-03-04 RX ADMIN — PROMETHAZINE HYDROCHLORIDE 12.5 MG: 12.5 SUPPOSITORY RECTAL at 00:06

## 2022-03-04 NOTE — PLAN OF CARE
Goal Outcome Evaluation:  Plan of Care Reviewed With: patient, mother        Progress: no change  Outcome Summary: VOmiting and nausea

## 2022-03-04 NOTE — PLAN OF CARE
Goal Outcome Evaluation:  Plan of Care Reviewed With: patient, mother        Progress: no change  Outcome Summary: Patient resting well through the night.  Nausea and vomiting resolved at this time.

## 2022-03-04 NOTE — DISCHARGE SUMMARY
Date of Discharge:  3/4/2022    Discharge Diagnosis: sudden onset n/v    Presenting Problem/History of Present Illness  Pregnancy [Z34.90]  Hyperemesis affecting pregnancy, antepartum [O21.0]       Hospital Course  Patient is a 36 y.o. female presented with n/v x24hr and hasn't kept anything down.  Had hyperemesis w/ g1 but not this time-- has just had typical nausea.  Feels better after rectal phenergan.  Hasn't vomited in a few hours.  Was able to eat this am and MACHADO improved a little.  Wants to do steroid taper- will send.      Procedures Performed         Consults:   Consults     No orders found for last 30 day(s).          Pertinent Test Results: labs: cbc    Condition on Discharge:  Feels better.  Reports good FM.  No vb.  Ready for discharge    Vital Signs  Temp:  [98.2 °F (36.8 °C)] 98.2 °F (36.8 °C)  Heart Rate:  [83-90] 83  Resp:  [18] 18  BP: ()/(59-75) 93/59    Physical Exam:  General: Awake and alert  Abdomen: Gravid, soft,  non tender  Extremities:  Calves NT bilaterally        Discharge Disposition  Home or Self Care    Discharge Medications     Discharge Medications      New Medications      Instructions Start Date   methylPREDNISolone 4 MG dose pack  Commonly known as: MEDROL   Take as directed on package instructions.         Changes to Medications      Instructions Start Date   promethazine 25 MG tablet  Commonly known as: PHENERGAN  What changed: Another medication with the same name was added. Make sure you understand how and when to take each.   TAKE 1 TABLET BY MOUTH EVERY 4 TO 6 HOURS      promethazine 12.5 MG suppository  Commonly known as: PHENERGAN  What changed: You were already taking a medication with the same name, and this prescription was added. Make sure you understand how and when to take each.   12.5 mg, Rectal, Every 4 Hours PRN      sertraline 100 MG tablet  Commonly known as: ZOLOFT  What changed: how much to take   150 mg, Oral, Daily, For anxiety--new dose          Continue These Medications      Instructions Start Date   albuterol sulfate  (90 Base) MCG/ACT inhaler  Commonly known as: PROVENTIL HFA;VENTOLIN HFA;PROAIR HFA   2 puffs, Inhalation, Every 4 Hours PRN      aspirin 81 MG EC tablet   81 mg, Oral, Daily      doxylamine 25 MG tablet  Commonly known as: UNISOM   25 mg, Oral, Nightly PRN      ondansetron 8 MG tablet  Commonly known as: ZOFRAN   8 mg, Oral, Every 6 Hours      PRENATAL VITAMIN PO   Oral      pyridoxine 25 MG tablet  Commonly known as: VITAMIN B-6   25 mg, Oral, Daily      SOLUBLE FIBER/PROBIOTICS PO   1 tablet/day, Oral, Daily             Discharge Diet:     Activity at Discharge:     Follow-up Appointments  No future appointments.      Test Results Pending at Discharge  Pending Labs     Order Current Status    Urine Culture - Urine, Urine, Clean Catch Preliminary result           SAIDA Lizama  03/04/22  12:46 EST

## 2022-03-04 NOTE — H&P
[facility]  Obstetric History and Physical    Patient Name: Sara Hoang  :  1985  MRN:  [id number]        Chief Complaint   Patient presents with   • Vomiting     FABRICIO- Pt is 16+4 weeks and has beeing vomiting and unable to keep anything down for 24 hours. pt has no OB complaints at this time. P has history of hyperemsis with last pregnancy and has been taking zofran and phenrgan for management currently.        Subjective     Patient is a 36 y.o. female  currently at 16w5d, who presents  W/ sudden onset vomiting that persisted for > 24hr.  Hx hyperemesis w/ g1 but has been well managed this time w/ phenergan/ zofran.  Feels better this am-- hasn't vomited since phenergan suppository but has intense frontal HA.  Hasn't had caffeine in 2d and hasn't eaten.           Her prenatal care is significant for  .        Past Medical History: Past Medical History:   Diagnosis Date   • Abnormal Pap smear of cervix    • Anemia    • Anxiety    • Asthma    • Benign hematuria     neg work up   • Colon polyps 2021   • Colon polyps    • Dysmenorrhea    • Endometriosis    • Foot fracture    • Fracture of spine, lumbar, without spinal cord injury, closed ([facility]) 2014    L2 25% compression fx   • Herpes     taking valtrex   • Hyperemesis gravidarum    • Infertility associated with anovulation     Femara   • Insomnia    • Migraine    • Palpitations    • Pelvic pain    • PVC (premature ventricular contraction)    • Urinary tract infection     this pregnancy      Past Surgical History Past Surgical History:   Procedure Laterality Date   • COLONOSCOPY W/ POLYPECTOMY N/A 2021    [facility], Dr. Bardales, repeat in 5 years   • COLONOSCOPY W/ POLYPECTOMY N/A     Dr. Hills   • COLPOSCOPY      neg   • CYSTOSCOPY     • DIAGNOSTIC LAPAROSCOPY N/A 2018    and chromopertubation    • ENDOSCOPY N/A 2012    Dr. Hills:  rings in lower esophagus. erosions in stomach   •  PERIPHERALLY INSERTED CENTRAL CATHETER INSERTION N/A 2019   • TONSILLECTOMY Bilateral       Family History: Family History   Problem Relation Age of Onset   • Breast cancer Mother    • Prostate cancer Father    • Migraines Father    • Hypertension Father    • Seizures Sister    • Heart disease Maternal Grandmother    • Diabetes Maternal Grandmother    • Colon cancer Maternal Grandfather    • Hypertension Paternal Grandfather       Social History:  reports that she has never smoked. She has never used smokeless tobacco.   reports no history of alcohol use.   reports no history of drug use.    Allergies:     Biaxin [clarithromycin], Ceclor [cefaclor], Penicillins, and Sulfa antibiotics     Past OB History:       OB History    Para Term  AB Living   3 1 1 0 1 1   SAB IAB Ectopic Molar Multiple Live Births   1 0 0 0 0 1      # Outcome Date GA Lbr Sarkis/2nd Weight Sex Delivery Anes PTL Lv   3 Current            2 SAB 21 8w0d          1 Term 10/23/19 39w4d  3082 g (6 lb 12.7 oz) F Vag-Spont EPI N NARINDER      Birth Comments: scale 2      Name: JNAN LAURA [facility]      Apgar1: 7  Apgar5: 8         Objective       Vital Signs Range for the last 24 hours  Temperature: Temp:  [98.2 °F (36.8 °C)] 98.2 °F (36.8 °C)   Temp Source: Temp src: Oral   BP: BP: ()/(59-75) 93/59   Pulse: Heart Rate:  [83-90] 83   Respirations: Resp:  [18] 18             Physical Exam:        General :    Alert and cooperative in NAD   Lungs:   Clear to auscultation bilaterally   CV:   Regular rate and rhythm   Abdomen:  Gravid, non-tender, no masses   Vaginal exam: deferred     LE:   no edema                  A/P:  16w4d w/ n/v x24hr.  Hx hyperemesis w/ g1 but not this time.  Better w/ phenergan suppository.  No vomiting in a few hours   Frontal HA this am.  Rec caffeine and try to eat and see how she feels.  Otherwise ready to go home today        Problem List Items Addressed This Visit     None                  Kacie Toure  SAIDA Tello  3/4/2022  09:57 EST

## 2022-03-04 NOTE — OBED NOTES
FABRICIO Note OB        Patient Name: Sara Hoang  YOB: 1985  MRN: [id number]  Admission Date: 3/3/2022  7:02 PM  Date of Service: 3/3/2022    Chief Complaint: Vomiting (FABRICIO- Pt is 16+4 weeks and has beeing vomiting and unable to keep anything down for 24 hours. pt has no OB complaints at this time. P has history of hyperemsis with last pregnancy and has been taking zofran and phenrgan for management currently. )        Subjective     Sara Hoang is a 36 y.o. female  at 16w4d with Estimated Date of Delivery: 22 who presents with the chief complaint listed above.  She sees Franci Winter MD for her prenatal care. Her pregnancy has been complicated by:  asthma, anxiety, hyperemesis.    Patient with complaint of inability to keep any medication down or tolerate PO since last night.  She has been using zofran and phenergan but states she cannot keep them down so it is not helping.  She reports severe hyperemesis last pregnancy requiring a zofran pump and steroid tapers.  She states reglan doesn't help and gives her an adverse reaction (hyperactivity).      She describes fetal movement as not asked.  She denies rupture of membranes.  She denies vaginal bleeding. She is not feeling contractions.          Objective   Patient Active Problem List    Diagnosis    • Moderate persistent asthma with acute exacerbation [J45.41]    • Low folic acid [E53.8]    • Vaginal bleeding affecting early pregnancy [O20.9]    • Hyperemesis affecting pregnancy, antepartum [O21.0]    • Hyperemesis gravidarum [O21.0]    • Encounter for supervision of low-risk first pregnancy in first trimester [Z34.01]    • Use of letrozole (Femara) [Z79.811]    • Rh negative status during pregnancy in first trimester [O26.891, Z67.91]    • Asthma [J45.909]    • Anxiety [F41.9]    • Insomnia [G47.00]    • Migraine headache [G43.909]         OB History    Para Term  AB Living   3 1 1 0 1 1   SAB IAB Ectopic Molar  Multiple Live Births   1 0 0 0 0 1      # Outcome Date GA Lbr Sarkis/2nd Weight Sex Delivery Anes PTL Lv   3 Current            2 SAB 06/02/21 8w0d          1 Term 10/23/19 39w4d  3082 g (6 lb 12.7 oz) F Vag-Spont EPI N NARINDER      Birth Comments: scale 2      Name: JANN LAURA DSGIRL      Apgar1: 7  Apgar5: 8        Past Medical History:   Diagnosis Date   • Abnormal Pap smear of cervix    • Anemia    • Anxiety    • Asthma    • Benign hematuria 2010    neg work up   • Colon polyps 02/11/2021   • Colon polyps 2017   • Dysmenorrhea    • Endometriosis    • Foot fracture    • Fracture of spine, lumbar, without spinal cord injury, closed (HCC) 01/02/2014    L2 25% compression fx   • Herpes     taking valtrex   • Hyperemesis gravidarum 2019   • Infertility associated with anovulation     Femara   • Insomnia    • Migraine    • Palpitations    • Pelvic pain    • PVC (premature ventricular contraction)    • Urinary tract infection     this pregnancy       Past Surgical History:   Procedure Laterality Date   • COLONOSCOPY W/ POLYPECTOMY N/A 02/11/2021    [facility], Dr. Bardales, repeat in 5 years   • COLONOSCOPY W/ POLYPECTOMY N/A 2017    Dr. Hills   • COLPOSCOPY  2009    neg   • CYSTOSCOPY     • DIAGNOSTIC LAPAROSCOPY N/A 12/2018    and chromopertubation    • ENDOSCOPY N/A 12/17/2012    Dr. Hills:  rings in lower esophagus. erosions in stomach   • PERIPHERALLY INSERTED CENTRAL CATHETER INSERTION N/A 2019   • TONSILLECTOMY Bilateral 2014       No current facility-administered medications on file prior to encounter.     Current Outpatient Medications on File Prior to Encounter   Medication Sig Dispense Refill   • aspirin (aspirin) 81 MG EC tablet Take 81 mg by mouth Daily.     • ondansetron (ZOFRAN) 8 MG tablet Take 8 mg by mouth Every 6 (Six) Hours.     • Prenatal Vit-Fe Fumarate-FA (PRENATAL VITAMIN PO) Take  by mouth.     • Probiotic Product (SOLUBLE FIBER/PROBIOTICS PO) Take 1 tablet/day by mouth Daily.     •  promethazine (PHENERGAN) 25 MG tablet TAKE 1 TABLET BY MOUTH EVERY 4 TO 6 HOURS     • sertraline (ZOLOFT) 100 MG tablet Take 1.5 tablets by mouth Daily. For anxiety--new dose (Patient taking differently: Take 100 mg by mouth Daily. For anxiety--new dose) 135 tablet 3   • albuterol sulfate  (90 Base) MCG/ACT inhaler Inhale 2 puffs Every 4 (Four) Hours As Needed for Wheezing or Shortness of Air. 18 g 11   • doxylamine (UNISOM) 25 MG tablet Take 25 mg by mouth At Night As Needed for Sleep.     • pyridoxine (VITAMIN B-6) 25 MG tablet Take 25 mg by mouth Daily.     • [DISCONTINUED] dicyclomine (BENTYL) 10 MG capsule Take 10 mg by mouth Daily As Needed.     • [DISCONTINUED] doxycycline (VIBRAMYCIN) 100 MG capsule Take 1 capsule by mouth 2 (Two) Times a Day. For infection 20 capsule 0   • [DISCONTINUED] predniSONE (DELTASONE) 10 MG tablet 5 p.o. x3 days, 4 p.o. x2 days, 3 p.o. x2 days, 2 p.o. x2 days, 1 p.o. x2 days may split dose, take with food f 35 tablet 0   • [DISCONTINUED] rizatriptan (MAXALT) 10 MG tablet Take 1 tablet by mouth 1 (One) Time As Needed for Migraine for up to 1 dose. May repeat in 2 hours if needed 36 tablet 3   • [DISCONTINUED] vitamin d (CHOLECALIFEROL) 125 MCG (5000 UT) capsule One PO daily 90 capsule 3       Allergies   Allergen Reactions   • Biaxin [Clarithromycin] Swelling     Eyelid   • Ceclor [Cefaclor] Hives     Has had Rocephin   • Penicillins Hives   • Sulfa Antibiotics Hives       Family History   Problem Relation Age of Onset   • Breast cancer Mother    • Prostate cancer Father    • Migraines Father    • Hypertension Father    • Seizures Sister    • Heart disease Maternal Grandmother    • Diabetes Maternal Grandmother    • Colon cancer Maternal Grandfather    • Hypertension Paternal Grandfather        Social History     Socioeconomic History   • Marital status:    Tobacco Use   • Smoking status: Never Smoker   • Smokeless tobacco: Never Used   Vaping Use   • Vaping Use: Never  "used   Substance and Sexual Activity   • Alcohol use: No   • Drug use: No   • Sexual activity: Defer           Review of Systems   Constitutional: Negative for chills, fatigue and fever.   HENT: Negative for congestion, rhinorrhea and sore throat.    Eyes: Negative for visual disturbance.   Respiratory: Negative.    Cardiovascular: Negative.    Gastrointestinal: Positive for nausea and vomiting. Negative for abdominal pain, constipation and diarrhea.   Genitourinary: Negative for difficulty urinating, dyspareunia, dysuria, flank pain, frequency, genital sores, hematuria, pelvic pain, urgency, vaginal bleeding, vaginal discharge and vaginal pain.   Neurological: Negative for dizziness, seizures, light-headedness and headaches.   Psychiatric/Behavioral: Negative for sleep disturbance. The patient is not nervous/anxious.           PHYSICAL EXAM:      VITAL SIGNS:  Vitals:    03/03/22 1931 03/03/22 1934 03/03/22 1935 03/03/22 1940   BP: 115/65      BP Location:       Patient Position:       Pulse: 87  89 86   Resp:       Temp:       TempSrc:       SpO2:   97% 98%   Weight:  105 kg (231 lb)     Height:  165.1 cm (65\")          FHT'S:                   130s via doptones        PHYSICAL EXAM:    General: well developed; well nourished  no acute distress   Heart: Not performed.   Lungs  : breathing is unlabored     Abdomen: soft, non-tender; no masses  no umbilical or inguinal hernias are present  no hepato-splenomegaly       Cervix: was not checked.      Contractions: none        Extremities: peripheral pulses normal, no pedal edema, no clubbing or cyanosis      LABS AND TESTING ORDERED:  1. Urinalysis  2. CBC  3. CMP    LAB RESULTS:    Recent Results (from the past 24 hour(s))   Urinalysis With Culture If Indicated - Urine, Clean Catch    Collection Time: 03/03/22  7:37 PM    Specimen: Urine, Clean Catch   Result Value Ref Range    Color, UA Yellow Yellow, Straw    Appearance, UA Cloudy (A) Clear    pH, UA 7.0 5.0 - 8.0 " "   Specific Union, UA 1.018 1.005 - 1.030    Glucose, UA Negative Negative    Ketones, UA 40 mg/dL (2+) (A) Negative    Bilirubin, UA Negative Negative    Blood, UA Small (1+) (A) Negative    Protein, UA Trace (A) Negative    Leuk Esterase, UA Large (3+) (A) Negative    Nitrite, UA Negative Negative    Urobilinogen, UA 1.0 E.U./dL 0.2 - 1.0 E.U./dL   Urinalysis, Microscopic Only - Urine, Clean Catch    Collection Time: 22  7:37 PM    Specimen: Urine, Clean Catch   Result Value Ref Range    RBC, UA 21-30 (A) None Seen, 0-2 /HPF    WBC, UA Too Numerous to Count (A) None Seen, 0-2 /HPF    Bacteria, UA 3+ (A) None Seen /HPF    Squamous Epithelial Cells, UA 7-12 (A) None Seen, 0-2 /HPF    Hyaline Casts, UA 3-6 None Seen /LPF    Methodology Automated Microscopy        Lab Results   Component Value Date    ABO B 2021    RH Negative 2021       Lab Results   Component Value Date    STREPGPB NEG 10/14/2019                 Assessment/Plan     ASSESSMENT/PLAN:  Sara Hoang is a 36 y.o. female  at 16w4d who presented with: hyperemesis gravidarum.  Patient given 1 L IVFs and 4 grams of zofran.  She did not have an improvement in symptoms and requested admission for further management.  Labs checked showing mild hypokalemia and ketonuria consistent with dehydration from hyperemesis.  Plan to make patient NPO and schedule zofran and phenergan for symptoms.  Discussed with Dr. Watson who will assume care as on call physician.          Final Impression:  • Pregnancy at 16w4d  • Hyperemesis exacerbation  • Maternal vital signs were reviewed and were unremarkable              Vitals:    22 1931 22 1934 22 [id number] 1940   BP: 115/65      BP Location:       Patient Position:       Pulse: 87  89 86   Resp:       Temp:       TempSrc:       SpO2:   97% 98%   Weight:  105 kg (231 lb)     Height:  165.1 cm (65\")     •     Lab Results   Component Value Date    STREPGPB NEG 10/14/2019 "   •   Lab Results   Component Value Date    ABO B 06/14/2021    RH Negative 06/14/2021   •   • COVID - 19 status unknown      PLAN:       I have spent 30 minutes including face to face time with the patient, greater than 50% in discussion of the diagnosis (counseling) and/or coordination of care.     Kate Diop MD  3/3/2022  20:22 EST  OB Hospitalist  Phone:  [phone]

## 2022-03-05 LAB — BACTERIA SPEC AEROBE CULT: NORMAL

## 2022-03-07 NOTE — H&P
H&P reviewed. The patient was examined and there are no changes to the H&P.    Kalpana Watson MD  3/7/2022  08:00 EST

## 2022-06-24 ENCOUNTER — TRANSCRIBE ORDERS (OUTPATIENT)
Dept: ADMINISTRATIVE | Facility: HOSPITAL | Age: 37
End: 2022-06-24

## 2022-06-24 DIAGNOSIS — D50.9 IRON DEFICIENCY ANEMIA OF PREGNANCY: Primary | ICD-10-CM

## 2022-06-24 DIAGNOSIS — O99.019 IRON DEFICIENCY ANEMIA OF PREGNANCY: Primary | ICD-10-CM

## 2022-06-29 PROBLEM — O99.019 ANEMIA OF PREGNANCY: Status: ACTIVE | Noted: 2022-06-29

## 2022-07-01 ENCOUNTER — HOSPITAL ENCOUNTER (OUTPATIENT)
Dept: INFUSION THERAPY | Facility: HOSPITAL | Age: 37
Discharge: HOME | End: 2022-07-01
Admitting: OBSTETRICS & GYNECOLOGY

## 2022-07-01 VITALS
RESPIRATION RATE: 20 BRPM | HEART RATE: 96 BPM | DIASTOLIC BLOOD PRESSURE: 65 MMHG | SYSTOLIC BLOOD PRESSURE: 113 MMHG | TEMPERATURE: 97.2 F | OXYGEN SATURATION: 98 %

## 2022-07-01 DIAGNOSIS — O99.019 ANTEPARTUM ANEMIA: Primary | ICD-10-CM

## 2022-07-01 PROCEDURE — 96365 THER/PROPH/DIAG IV INF INIT: CPT

## 2022-07-01 PROCEDURE — 25010000002 IRON SUCROSE PER 1 MG: Performed by: OBSTETRICS & GYNECOLOGY

## 2022-07-01 RX ADMIN — IRON SUCROSE 300 MG: 20 INJECTION, SOLUTION INTRAVENOUS at 09:00

## 2022-07-01 NOTE — PROGRESS NOTES
Pt given information concerning medication verbally and written.  Pt verbalized understanding. Pt had vagal response with IV start.  Pt felt lightheaded, dizzy, n/v and visual disturbances.  Bp 89/45.  Pt given emesis bag, cold wash cloth and reclined in chair.   Pt states she normally has n/v but has not had issues with an IV or lab draw in the past.       Pt tolerated infusion well. Pt monitored for 30 minutes post infusion without complications.  VS wnl and pt dc'ed ambulatory.

## 2022-07-08 ENCOUNTER — HOSPITAL ENCOUNTER (OUTPATIENT)
Dept: INFUSION THERAPY | Facility: HOSPITAL | Age: 37
Discharge: HOME | End: 2022-07-08
Admitting: OBSTETRICS & GYNECOLOGY

## 2022-07-08 VITALS
RESPIRATION RATE: 20 BRPM | DIASTOLIC BLOOD PRESSURE: 73 MMHG | OXYGEN SATURATION: 97 % | TEMPERATURE: 96.6 F | HEART RATE: 103 BPM | SYSTOLIC BLOOD PRESSURE: 103 MMHG

## 2022-07-08 DIAGNOSIS — O99.019 ANTEPARTUM ANEMIA: Primary | ICD-10-CM

## 2022-07-08 PROCEDURE — 96366 THER/PROPH/DIAG IV INF ADDON: CPT

## 2022-07-08 PROCEDURE — 25010000002 IRON SUCROSE PER 1 MG: Performed by: OBSTETRICS & GYNECOLOGY

## 2022-07-08 PROCEDURE — 96365 THER/PROPH/DIAG IV INF INIT: CPT

## 2022-07-08 RX ADMIN — IRON SUCROSE 300 MG: 20 INJECTION, SOLUTION INTRAVENOUS at 10:48

## 2022-07-08 NOTE — PROGRESS NOTES
1100-Dinemap set for vital signs every 15 minute interval but when I checked patient after lunch, it only one set of vital signs had been recorded.  Machine reseet.

## 2022-07-15 ENCOUNTER — HOSPITAL ENCOUNTER (OUTPATIENT)
Dept: INFUSION THERAPY | Facility: HOSPITAL | Age: 37
Discharge: HOME | End: 2022-07-15
Admitting: OBSTETRICS & GYNECOLOGY

## 2022-07-15 VITALS
HEART RATE: 77 BPM | RESPIRATION RATE: 20 BRPM | TEMPERATURE: 96.8 F | OXYGEN SATURATION: 98 % | SYSTOLIC BLOOD PRESSURE: 114 MMHG | DIASTOLIC BLOOD PRESSURE: 61 MMHG

## 2022-07-15 DIAGNOSIS — O99.019 ANTEPARTUM ANEMIA: Primary | ICD-10-CM

## 2022-07-15 PROCEDURE — 96365 THER/PROPH/DIAG IV INF INIT: CPT

## 2022-07-15 PROCEDURE — 96366 THER/PROPH/DIAG IV INF ADDON: CPT

## 2022-07-15 PROCEDURE — 25010000002 IRON SUCROSE PER 1 MG: Performed by: OBSTETRICS & GYNECOLOGY

## 2022-07-15 RX ADMIN — IRON SUCROSE 300 MG: 20 INJECTION, SOLUTION INTRAVENOUS at 09:55

## 2022-07-18 LAB — EXTERNAL GROUP B STREP ANTIGEN: NORMAL

## 2022-07-28 ENCOUNTER — HOSPITAL ENCOUNTER (EMERGENCY)
Facility: HOSPITAL | Age: 37
Discharge: HOME | End: 2022-07-28
Attending: OBSTETRICS & GYNECOLOGY | Admitting: OBSTETRICS & GYNECOLOGY

## 2022-07-28 VITALS
WEIGHT: 258 LBS | TEMPERATURE: 98.5 F | OXYGEN SATURATION: 97 % | HEART RATE: 85 BPM | DIASTOLIC BLOOD PRESSURE: 71 MMHG | HEIGHT: 65 IN | BODY MASS INDEX: 42.99 KG/M2 | RESPIRATION RATE: 15 BRPM | SYSTOLIC BLOOD PRESSURE: 105 MMHG

## 2022-07-28 LAB
ALBUMIN SERPL-MCNC: 3.3 G/DL (ref 3.5–5.2)
ALBUMIN/GLOB SERPL: 1.2 G/DL
ALP SERPL-CCNC: 130 U/L (ref 39–117)
ALT SERPL W P-5'-P-CCNC: 8 U/L (ref 1–33)
ANION GAP SERPL CALCULATED.3IONS-SCNC: 12.3 MMOL/L (ref 5–15)
AST SERPL-CCNC: 10 U/L (ref 1–32)
BACTERIA UR QL AUTO: ABNORMAL /HPF
BASOPHILS # BLD AUTO: 0.03 10*3/MM3 (ref 0–0.2)
BASOPHILS NFR BLD AUTO: 0.2 % (ref 0–1.5)
BILIRUB SERPL-MCNC: 0.2 MG/DL (ref 0–1.2)
BILIRUB UR QL STRIP: NEGATIVE
BUN SERPL-MCNC: 8 MG/DL (ref 6–20)
BUN/CREAT SERPL: 14.5 (ref 7–25)
CALCIUM SPEC-SCNC: 9.4 MG/DL (ref 8.6–10.5)
CHLORIDE SERPL-SCNC: 103 MMOL/L (ref 98–107)
CLARITY UR: ABNORMAL
CO2 SERPL-SCNC: 20.7 MMOL/L (ref 22–29)
COD CRY URNS QL: ABNORMAL /HPF
COLOR UR: YELLOW
CREAT SERPL-MCNC: 0.55 MG/DL (ref 0.57–1)
CREAT UR-MCNC: 92.3 MG/DL
DEPRECATED RDW RBC AUTO: 50.7 FL (ref 37–54)
EGFRCR SERPLBLD CKD-EPI 2021: 122.8 ML/MIN/1.73
EOSINOPHIL # BLD AUTO: 0.06 10*3/MM3 (ref 0–0.4)
EOSINOPHIL NFR BLD AUTO: 0.5 % (ref 0.3–6.2)
ERYTHROCYTE [DISTWIDTH] IN BLOOD BY AUTOMATED COUNT: 16.1 % (ref 12.3–15.4)
GLOBULIN UR ELPH-MCNC: 2.8 GM/DL
GLUCOSE SERPL-MCNC: 85 MG/DL (ref 65–99)
GLUCOSE UR STRIP-MCNC: NEGATIVE MG/DL
HCT VFR BLD AUTO: 33.3 % (ref 34–46.6)
HGB BLD-MCNC: 10.9 G/DL (ref 12–15.9)
HGB UR QL STRIP.AUTO: ABNORMAL
HYALINE CASTS UR QL AUTO: ABNORMAL /LPF
IMM GRANULOCYTES # BLD AUTO: 0.1 10*3/MM3 (ref 0–0.05)
IMM GRANULOCYTES NFR BLD AUTO: 0.8 % (ref 0–0.5)
KETONES UR QL STRIP: NEGATIVE
LEUKOCYTE ESTERASE UR QL STRIP.AUTO: ABNORMAL
LYMPHOCYTES # BLD AUTO: 2.18 10*3/MM3 (ref 0.7–3.1)
LYMPHOCYTES NFR BLD AUTO: 17 % (ref 19.6–45.3)
MCH RBC QN AUTO: 28.9 PG (ref 26.6–33)
MCHC RBC AUTO-ENTMCNC: 32.7 G/DL (ref 31.5–35.7)
MCV RBC AUTO: 88.3 FL (ref 79–97)
MONOCYTES # BLD AUTO: 0.67 10*3/MM3 (ref 0.1–0.9)
MONOCYTES NFR BLD AUTO: 5.2 % (ref 5–12)
NEUTROPHILS NFR BLD AUTO: 76.3 % (ref 42.7–76)
NEUTROPHILS NFR BLD AUTO: 9.81 10*3/MM3 (ref 1.7–7)
NITRITE UR QL STRIP: NEGATIVE
NRBC BLD AUTO-RTO: 0 /100 WBC (ref 0–0.2)
PH UR STRIP.AUTO: 6 [PH] (ref 5–8)
PLATELET # BLD AUTO: 225 10*3/MM3 (ref 140–450)
PMV BLD AUTO: 9.6 FL (ref 6–12)
POTASSIUM SERPL-SCNC: 3.7 MMOL/L (ref 3.5–5.2)
PROT ?TM UR-MCNC: 23.3 MG/DL
PROT SERPL-MCNC: 6.1 G/DL (ref 6–8.5)
PROT UR QL STRIP: ABNORMAL
PROT/CREAT UR: 252.4 MG/G CREA (ref 0–200)
RBC # BLD AUTO: 3.77 10*6/MM3 (ref 3.77–5.28)
RBC # UR STRIP: ABNORMAL /HPF
REF LAB TEST METHOD: ABNORMAL
SODIUM SERPL-SCNC: 136 MMOL/L (ref 136–145)
SP GR UR STRIP: 1.02 (ref 1–1.03)
SQUAMOUS #/AREA URNS HPF: ABNORMAL /HPF
UROBILINOGEN UR QL STRIP: ABNORMAL
WBC # UR STRIP: ABNORMAL /HPF
WBC NRBC COR # BLD: 12.85 10*3/MM3 (ref 3.4–10.8)

## 2022-07-28 PROCEDURE — 59025 FETAL NON-STRESS TEST: CPT

## 2022-07-28 PROCEDURE — 99284 EMERGENCY DEPT VISIT MOD MDM: CPT | Performed by: OBSTETRICS & GYNECOLOGY

## 2022-07-28 PROCEDURE — 82570 ASSAY OF URINE CREATININE: CPT | Performed by: OBSTETRICS & GYNECOLOGY

## 2022-07-28 PROCEDURE — 59025 FETAL NON-STRESS TEST: CPT | Performed by: OBSTETRICS & GYNECOLOGY

## 2022-07-28 PROCEDURE — 85025 COMPLETE CBC W/AUTO DIFF WBC: CPT | Performed by: OBSTETRICS & GYNECOLOGY

## 2022-07-28 PROCEDURE — 80053 COMPREHEN METABOLIC PANEL: CPT | Performed by: OBSTETRICS & GYNECOLOGY

## 2022-07-28 PROCEDURE — 87086 URINE CULTURE/COLONY COUNT: CPT | Performed by: OBSTETRICS & GYNECOLOGY

## 2022-07-28 PROCEDURE — 81001 URINALYSIS AUTO W/SCOPE: CPT | Performed by: OBSTETRICS & GYNECOLOGY

## 2022-07-28 PROCEDURE — 84156 ASSAY OF PROTEIN URINE: CPT | Performed by: OBSTETRICS & GYNECOLOGY

## 2022-07-28 RX ORDER — SERTRALINE HYDROCHLORIDE 100 MG/1
TABLET, FILM COATED ORAL
Qty: 135 TABLET | Refills: 0 | Status: SHIPPED | OUTPATIENT
Start: 2022-07-28 | End: 2022-09-19 | Stop reason: SDUPTHER

## 2022-07-28 NOTE — OBED NOTES
[facility]  Jann Hoang  : 1985  MRN: [id number]  CSN: [id number]    FABRICIO History and Physical    Subjective   Chief Complaint   Patient presents with   • Elevated Blood Pressure     Pt stated she is monitoring BP at home d/t proteinuria .  She had some elevated BP at home per her monitor. 140/88, 133/87, 158/96 began bilateral foot swelling onset yesterday      Jann Hoang is a 36 y.o. year old  with an Estimated Date of Delivery: 22 currently at 37w4d presenting with request for BP check. She states her BP in the office was not elevated but she was spilling protein in her urine.  She denies other PIH symptoms: headache, blurred vision, RUQ pain.   She came to the FABRICIO because her BP readings at home were: 140/88, 133/87, 158/96. Office BP results are unavailable but the patient states they were not elevated, she had noted urine protein. She was asked to check her BP at home.  She denies ROM, vaginal bleeding or contractions.    Prenatal care has been with Dr Winter.  It has been unremarkable except for history of a migraine 6 months ago and anemia.    OB History    Para Term  AB Living   3 1 1 0 1 1   SAB IAB Ectopic Molar Multiple Live Births   1 0 0 0 0 1      # Outcome Date GA Lbr Sarkis/2nd Weight Sex Delivery Anes PTL Lv   3 Current            2 SAB 21 8w0d          1 Term 10/23/19 39w4d  3082 g (6 lb 12.7 oz) F Vag-Spont EPI N NARINDER      Birth Comments: scale 2      Name: JANN HOANG DSGIRL      Apgar1: 7  Apgar5: 8     Past Medical History:   Diagnosis Date   • Benign hematuria     neg work up   • Fracture of spine, lumbar, without spinal cord injury, closed (HCC) 2014    L2 25% compression fx   • Colon polyps 2017   • Hyperemesis gravidarum 2019   • Colon polyps 2021   • Abnormal Pap smear of cervix    • Anemia    • Anxiety    • Asthma    • Dysmenorrhea    • Endometriosis    • Foot fracture    • Herpes     taking valtrex   • Infertility  "associated with anovulation     Femara   • Insomnia    • Migraine    • Palpitations    • Pelvic pain    • PVC (premature ventricular contraction)    • Urinary tract infection     this pregnancy     Past Surgical History:   Procedure Laterality Date   • COLPOSCOPY  2009    neg   • ENDOSCOPY N/A 12/17/2012    Dr. Hills:  rings in lower esophagus. erosions in stomach   • TONSILLECTOMY Bilateral 2014   • COLONOSCOPY W/ POLYPECTOMY N/A 2017    Dr. Hills   • DIAGNOSTIC LAPAROSCOPY N/A 12/2018    and chromopertubation    • PERIPHERALLY INSERTED CENTRAL CATHETER INSERTION N/A 2019   • COLONOSCOPY W/ POLYPECTOMY N/A 02/11/2021    [facility], Dr. Bardales, repeat in 5 years   • CYSTOSCOPY       No current facility-administered medications for this encounter.    Allergies   Allergen Reactions   • Biaxin [Clarithromycin] Swelling     Eyelid   • Ceclor [Cefaclor] Hives     Has had Rocephin   • Penicillins Hives   • Sulfa Antibiotics Hives     Social History    Tobacco Use      Smoking status: Never Smoker      Smokeless tobacco: Never Used    Review of Systems    Negative except for HPI  Objective   /69   Pulse 89   Temp 98.5 °F (36.9 °C) (Oral)   Resp 15   Ht 165.1 cm (65\")   Wt 117 kg (258 lb)   LMP 11/07/2021 (Exact Date)   SpO2 97%   BMI 42.93 kg/m²   General: WD, WN, NAD   Abdomen: Soft, nontender   FHT's: 120 baseline, Cat I reactive NST      Cervix: 2 cm/30%/-3   Presentation: vtx   Contractions: none   Back: No CVA tenderness      Prenatal Labs  Lab Results   Component Value Date    HGB 12.5 03/03/2022    RUBELLAABIGG Immune 03/05/2019    HEPBSAG Negative 03/05/2019    ABSCRN Negative 03/03/2022    EFW5AOC2 Non-Reactive 03/05/2019    HEPCVIRUSABY Negative 03/05/2019    STREPGPB NEG 10/14/2019    URINECX >100,000 CFU/mL Mixed Emelia Isolated 03/03/2022       Current Labs Reviewed    Latest Reference Range & Units 07/28/22 20:08   Glucose 65 - 99 mg/dL 85   Sodium 136 - 145 mmol/L 136   Potassium " 3.5 - 5.2 mmol/L 3.7   CO2 22.0 - 29.0 mmol/L 20.7 (L)   Chloride 98 - 107 mmol/L 103   Anion Gap 5.0 - 15.0 mmol/L 12.3   Creatinine 0.57 - 1.00 mg/dL 0.55 (L)   BUN 6 - 20 mg/dL 8   BUN/Creatinine Ratio 7.0 - 25.0  14.5   Calcium 8.6 - 10.5 mg/dL 9.4   eGFR >60.0 mL/min/1.73 122.8   Alkaline Phosphatase 39 - 117 U/L 130 (H)   Total Protein 6.0 - 8.5 g/dL 6.1   ALT (SGPT) 1 - 33 U/L 8   AST (SGOT) 1 - 32 U/L 10   Total Bilirubin 0.0 - 1.2 mg/dL 0.2   Albumin 3.50 - 5.20 g/dL 3.30 (L)   Globulin gm/dL 2.8   A/G Ratio g/dL 1.2      Latest Reference Range & Units 07/28/22 20:08   WBC 3.40 - 10.80 10*3/mm3 12.85 (H)   RBC 3.77 - 5.28 10*6/mm3 3.77   Hemoglobin 12.0 - 15.9 g/dL 10.9 (L)   Hematocrit 34.0 - 46.6 % 33.3 (L)   RDW 12.3 - 15.4 % 16.1 (H)   MCV 79.0 - 97.0 fL 88.3   MCH 26.6 - 33.0 pg 28.9   MCHC 31.5 - 35.7 g/dL 32.7   MPV 6.0 - 12.0 fL 9.6   Platelets 140 - 450 10*3/mm3 225   RDW-SD 37.0 - 54.0 fl 50.7   Neutrophil Rel % 42.7 - 76.0 % 76.3 (H)   Lymphocyte Rel % 19.6 - 45.3 % 17.0 (L)   Monocyte Rel % 5.0 - 12.0 % 5.2   Eosinophil Rel % 0.3 - 6.2 % 0.5   Basophil Rel % 0.0 - 1.5 % 0.2   Immature Granulocyte Rel % 0.0 - 0.5 % 0.8 (H)   Neutrophils Absolute 1.70 - 7.00 10*3/mm3 9.81 (H)   Lymphocytes Absolute 0.70 - 3.10 10*3/mm3 2.18   Monocytes Absolute 0.10 - 0.90 10*3/mm3 0.67   Eosinophils Absolute 0.00 - 0.40 10*3/mm3 0.06   Basophils Absolute 0.00 - 0.20 10*3/mm3 0.03   Immature Grans, Absolute 0.00 - 0.05 10*3/mm3 0.10 (H)   nRBC 0.0 - 0.2 /100 WBC 0.0      Latest Reference Range & Units 07/28/22 20:08   Creatinine, Urine mg/dL 92.3   Total Protein, Urine mg/dL 23.3   Protein/Creatinine Ratio 0.0 - 200.0 mg/G Crea 252.4 (H)          Assessment   1. IUP at 37w4d  2. Proteinuria     Plan   1.  Keep OB visit on Monday for IOL discussion  2.  PIH precautions given and when to return before Monday  3. Continue BP checks at home    Julita Hamilton MD   OB Hospitalist on call  7/28/2022

## 2022-07-29 LAB — BACTERIA SPEC AEROBE CULT: NO GROWTH

## 2022-08-06 LAB — HIV1 P24 AG SERPL QL IA: NORMAL

## 2022-08-08 ENCOUNTER — HOSPITAL ENCOUNTER (INPATIENT)
Dept: LABOR AND DELIVERY | Facility: HOSPITAL | Age: 37
Discharge: HOME | End: 2022-08-08

## 2022-08-08 ENCOUNTER — ANESTHESIA (OUTPATIENT)
Dept: LABOR AND DELIVERY | Facility: HOSPITAL | Age: 37
End: 2022-08-08

## 2022-08-08 ENCOUNTER — HOSPITAL ENCOUNTER (INPATIENT)
Facility: HOSPITAL | Age: 37
LOS: 3 days | Discharge: HOME | End: 2022-08-11
Attending: OBSTETRICS & GYNECOLOGY | Admitting: OBSTETRICS & GYNECOLOGY

## 2022-08-08 ENCOUNTER — ANESTHESIA EVENT (OUTPATIENT)
Dept: LABOR AND DELIVERY | Facility: HOSPITAL | Age: 37
End: 2022-08-08

## 2022-08-08 LAB
ABO GROUP BLD: NORMAL
ALBUMIN SERPL-MCNC: 3.6 G/DL (ref 3.5–5.2)
ALBUMIN/GLOB SERPL: 1.5 G/DL
ALP SERPL-CCNC: 144 U/L (ref 39–117)
ALT SERPL W P-5'-P-CCNC: 8 U/L (ref 1–33)
ANION GAP SERPL CALCULATED.3IONS-SCNC: 13.5 MMOL/L (ref 5–15)
AST SERPL-CCNC: 9 U/L (ref 1–32)
BILIRUB SERPL-MCNC: 0.3 MG/DL (ref 0–1.2)
BLD GP AB SCN SERPL QL: NEGATIVE
BUN SERPL-MCNC: 7 MG/DL (ref 6–20)
BUN/CREAT SERPL: 10.3 (ref 7–25)
CALCIUM SPEC-SCNC: 9 MG/DL (ref 8.6–10.5)
CHLORIDE SERPL-SCNC: 102 MMOL/L (ref 98–107)
CO2 SERPL-SCNC: 21.5 MMOL/L (ref 22–29)
CREAT SERPL-MCNC: 0.68 MG/DL (ref 0.57–1)
DEPRECATED RDW RBC AUTO: 51.6 FL (ref 37–54)
EGFRCR SERPLBLD CKD-EPI 2021: 116.6 ML/MIN/1.73
ERYTHROCYTE [DISTWIDTH] IN BLOOD BY AUTOMATED COUNT: 15.8 % (ref 12.3–15.4)
GLOBULIN UR ELPH-MCNC: 2.4 GM/DL
GLUCOSE SERPL-MCNC: 107 MG/DL (ref 65–99)
HCT VFR BLD AUTO: 35.6 % (ref 34–46.6)
HGB BLD-MCNC: 11.6 G/DL (ref 12–15.9)
MCH RBC QN AUTO: 28.7 PG (ref 26.6–33)
MCHC RBC AUTO-ENTMCNC: 32.6 G/DL (ref 31.5–35.7)
MCV RBC AUTO: 88.1 FL (ref 79–97)
PLATELET # BLD AUTO: 243 10*3/MM3 (ref 140–450)
PMV BLD AUTO: 9.9 FL (ref 6–12)
POTASSIUM SERPL-SCNC: 3.6 MMOL/L (ref 3.5–5.2)
PROT SERPL-MCNC: 6 G/DL (ref 6–8.5)
RBC # BLD AUTO: 4.04 10*6/MM3 (ref 3.77–5.28)
RH BLD: NEGATIVE
SARS-COV-2 RNA PNL SPEC NAA+PROBE: NOT DETECTED
SODIUM SERPL-SCNC: 137 MMOL/L (ref 136–145)
T&S EXPIRATION DATE: NORMAL
WBC NRBC COR # BLD: 13.47 10*3/MM3 (ref 3.4–10.8)

## 2022-08-08 PROCEDURE — 85027 COMPLETE CBC AUTOMATED: CPT | Performed by: OBSTETRICS & GYNECOLOGY

## 2022-08-08 PROCEDURE — 86901 BLOOD TYPING SEROLOGIC RH(D): CPT | Performed by: OBSTETRICS & GYNECOLOGY

## 2022-08-08 PROCEDURE — 87635 SARS-COV-2 COVID-19 AMP PRB: CPT | Performed by: OBSTETRICS & GYNECOLOGY

## 2022-08-08 PROCEDURE — 80053 COMPREHEN METABOLIC PANEL: CPT | Performed by: OBSTETRICS & GYNECOLOGY

## 2022-08-08 PROCEDURE — 86900 BLOOD TYPING SEROLOGIC ABO: CPT | Performed by: OBSTETRICS & GYNECOLOGY

## 2022-08-08 PROCEDURE — 25010000002 DIPHENHYDRAMINE PER 50 MG: Performed by: STUDENT IN AN ORGANIZED HEALTH CARE EDUCATION/TRAINING PROGRAM

## 2022-08-08 PROCEDURE — C1755 CATHETER, INTRASPINAL: HCPCS | Performed by: ANESTHESIOLOGY

## 2022-08-08 PROCEDURE — 86850 RBC ANTIBODY SCREEN: CPT | Performed by: OBSTETRICS & GYNECOLOGY

## 2022-08-08 RX ORDER — SODIUM CHLORIDE 0.9 % (FLUSH) 0.9 %
10 SYRINGE (ML) INJECTION AS NEEDED
Status: DISCONTINUED | OUTPATIENT
Start: 2022-08-08 | End: 2022-08-09 | Stop reason: HOSPADM

## 2022-08-08 RX ORDER — BUPIVACAINE HYDROCHLORIDE AND EPINEPHRINE 2.5; 5 MG/ML; UG/ML
INJECTION, SOLUTION EPIDURAL; INFILTRATION; INTRACAUDAL; PERINEURAL AS NEEDED
Status: DISCONTINUED | OUTPATIENT
Start: 2022-08-08 | End: 2022-08-09 | Stop reason: SURG

## 2022-08-08 RX ORDER — FENTANYL CIT 0.2 MG/100ML-ROPIV 0.2%-NACL 0.9% EPIDURAL INJ 2/0.2 MCG/ML-%
10 SOLUTION INJECTION CONTINUOUS
Status: DISCONTINUED | OUTPATIENT
Start: 2022-08-08 | End: 2022-08-09

## 2022-08-08 RX ORDER — ONDANSETRON 4 MG/1
4 TABLET, FILM COATED ORAL EVERY 6 HOURS PRN
Status: DISCONTINUED | OUTPATIENT
Start: 2022-08-08 | End: 2022-08-09 | Stop reason: HOSPADM

## 2022-08-08 RX ORDER — SODIUM CHLORIDE, SODIUM LACTATE, POTASSIUM CHLORIDE, CALCIUM CHLORIDE 600; 310; 30; 20 MG/100ML; MG/100ML; MG/100ML; MG/100ML
125 INJECTION, SOLUTION INTRAVENOUS CONTINUOUS
Status: DISCONTINUED | OUTPATIENT
Start: 2022-08-08 | End: 2022-08-09

## 2022-08-08 RX ORDER — EPHEDRINE SULFATE 50 MG/ML
5 INJECTION, SOLUTION INTRAVENOUS AS NEEDED
Status: DISCONTINUED | OUTPATIENT
Start: 2022-08-08 | End: 2022-08-09 | Stop reason: HOSPADM

## 2022-08-08 RX ORDER — FAMOTIDINE 10 MG/ML
20 INJECTION, SOLUTION INTRAVENOUS ONCE AS NEEDED
Status: DISCONTINUED | OUTPATIENT
Start: 2022-08-08 | End: 2022-08-09 | Stop reason: HOSPADM

## 2022-08-08 RX ORDER — ONDANSETRON 2 MG/ML
4 INJECTION INTRAMUSCULAR; INTRAVENOUS ONCE AS NEEDED
Status: DISCONTINUED | OUTPATIENT
Start: 2022-08-08 | End: 2022-08-09 | Stop reason: HOSPADM

## 2022-08-08 RX ORDER — DIPHENHYDRAMINE HYDROCHLORIDE 50 MG/ML
12.5 INJECTION INTRAMUSCULAR; INTRAVENOUS EVERY 8 HOURS PRN
Status: DISCONTINUED | OUTPATIENT
Start: 2022-08-08 | End: 2022-08-09 | Stop reason: HOSPADM

## 2022-08-08 RX ORDER — FAMOTIDINE 20 MG/1
20 TABLET, FILM COATED ORAL EVERY 12 HOURS SCHEDULED
Status: DISCONTINUED | OUTPATIENT
Start: 2022-08-08 | End: 2022-08-09 | Stop reason: HOSPADM

## 2022-08-08 RX ORDER — MAGNESIUM CARB/ALUMINUM HYDROX 105-160MG
30 TABLET,CHEWABLE ORAL DAILY PRN
Status: DISCONTINUED | OUTPATIENT
Start: 2022-08-08 | End: 2022-08-09 | Stop reason: HOSPADM

## 2022-08-08 RX ORDER — ONDANSETRON 2 MG/ML
4 INJECTION INTRAMUSCULAR; INTRAVENOUS EVERY 6 HOURS PRN
Status: DISCONTINUED | OUTPATIENT
Start: 2022-08-08 | End: 2022-08-09 | Stop reason: HOSPADM

## 2022-08-08 RX ORDER — FAMOTIDINE 10 MG/ML
20 INJECTION, SOLUTION INTRAVENOUS EVERY 12 HOURS SCHEDULED
Status: DISCONTINUED | OUTPATIENT
Start: 2022-08-08 | End: 2022-08-09 | Stop reason: HOSPADM

## 2022-08-08 RX ORDER — LIDOCAINE HYDROCHLORIDE AND EPINEPHRINE 15; 5 MG/ML; UG/ML
INJECTION, SOLUTION EPIDURAL AS NEEDED
Status: DISCONTINUED | OUTPATIENT
Start: 2022-08-08 | End: 2022-08-09 | Stop reason: SURG

## 2022-08-08 RX ORDER — OXYTOCIN/0.9 % SODIUM CHLORIDE 30/500 ML
2-20 PLASTIC BAG, INJECTION (ML) INTRAVENOUS
Status: DISCONTINUED | OUTPATIENT
Start: 2022-08-08 | End: 2022-08-09 | Stop reason: HOSPADM

## 2022-08-08 RX ORDER — VALACYCLOVIR HYDROCHLORIDE 500 MG/1
500 TABLET, FILM COATED ORAL 2 TIMES DAILY
COMMUNITY
End: 2022-09-19

## 2022-08-08 RX ORDER — ACETAMINOPHEN 325 MG/1
650 TABLET ORAL EVERY 4 HOURS PRN
Status: DISCONTINUED | OUTPATIENT
Start: 2022-08-08 | End: 2022-08-09 | Stop reason: HOSPADM

## 2022-08-08 RX ADMIN — Medication 10 ML/HR: at 21:54

## 2022-08-08 RX ADMIN — LIDOCAINE HYDROCHLORIDE,EPINEPHRINE BITARTRATE 3 ML: 15; .005 INJECTION, SOLUTION EPIDURAL; INFILTRATION; INTRACAUDAL; PERINEURAL at 15:36

## 2022-08-08 RX ADMIN — BUPIVACAINE HYDROCHLORIDE AND EPINEPHRINE BITARTRATE 10 ML: 2.5; .0091 INJECTION, SOLUTION EPIDURAL; INFILTRATION; INTRACAUDAL; PERINEURAL at 15:49

## 2022-08-08 RX ADMIN — LIDOCAINE HYDROCHLORIDE,EPINEPHRINE BITARTRATE 3 ML: 15; .005 INJECTION, SOLUTION EPIDURAL; INFILTRATION; INTRACAUDAL; PERINEURAL at 15:46

## 2022-08-08 RX ADMIN — SODIUM CHLORIDE, POTASSIUM CHLORIDE, SODIUM LACTATE AND CALCIUM CHLORIDE 125 ML/HR: 600; 310; 30; 20 INJECTION, SOLUTION INTRAVENOUS at 12:37

## 2022-08-08 RX ADMIN — EPHEDRINE SULFATE 5 MG: 50 INJECTION INTRAVENOUS at 16:21

## 2022-08-08 RX ADMIN — ACETAMINOPHEN 650 MG: 325 TABLET, FILM COATED ORAL at 21:57

## 2022-08-08 RX ADMIN — SODIUM CHLORIDE, POTASSIUM CHLORIDE, SODIUM LACTATE AND CALCIUM CHLORIDE 1000 ML: 600; 310; 30; 20 INJECTION, SOLUTION INTRAVENOUS at 15:45

## 2022-08-08 RX ADMIN — SODIUM CHLORIDE, POTASSIUM CHLORIDE, SODIUM LACTATE AND CALCIUM CHLORIDE 125 ML/HR: 600; 310; 30; 20 INJECTION, SOLUTION INTRAVENOUS at 17:02

## 2022-08-08 RX ADMIN — Medication 2 MILLI-UNITS/MIN: at 12:56

## 2022-08-08 RX ADMIN — DIPHENHYDRAMINE HYDROCHLORIDE 12.5 MG: 50 INJECTION, SOLUTION INTRAMUSCULAR; INTRAVENOUS at 23:34

## 2022-08-08 NOTE — ANESTHESIA PROCEDURE NOTES
Labor Epidural      Patient reassessed immediately prior to procedure    Patient location during procedure: OB  Start Time: 8/8/2022 3:26 PM  Stop Time: 8/8/2022 3:46 PM  Performed By  Anesthesiologist: Franci Monson MD  Preanesthetic Checklist  Completed: patient identified, IV checked, site marked, risks and benefits discussed, surgical consent, monitors and equipment checked, pre-op evaluation and timeout performed  Additional Notes  On first needle pass at L4/5, noted slight heme with needle advancement, however normal loss of resistance, normal feel of dilation of epidural space, catheter advanced easily. Repetitive aspiration revealed blood tinged saline, but no davin blood. Test dose of 3mL of 1.5% Lidocaine with epi 1:200k resulted in pt immediately feeling dizzy and jittery. Did not improve and HR increased from 90 to mid 130's. Positive intravascular test dose declared and catheter was removed. HTN followed, however blood pressure normalized to baseline within 5 minutes. After blood pressure normalized, new kit utilized at L 3/4, easy needle pass, normal loss of resistance, negative test dose. Catheter was dosed with improvement in pain. Pt's HR and blood pressure were normalized when I left the room.   Prep:  Pt Position:sitting  Sterile Tech:cap, gloves and mask  Prep:chlorhexidine gluconate and isopropyl alcohol  Monitoring:blood pressure monitoring and continuous pulse oximetry  Epidural Block Procedure:  Approach:midline  Guidance:landmark technique and palpation technique  Location:L4-L5 (see below )  Needle Type:Tuohy  Needle Gauge:19 G  Loss of Resistance Medium: air (Mixed air and saline)  Loss of Resistance: 8cm  Cath Depth at skin:13 cm  Paresthesia: none  Aspiration:negative  Test Dose:positive  Number of Attempts: 2  Post Assessment:  Dressing:occlusive dressing applied and secured with tape  Complications:yes

## 2022-08-08 NOTE — H&P
[facility]  Obstetric History and Physical    Patient Name: Sara Hoang  :  1985  MRN:  [id number]      No chief complaint on file.      Subjective     Patient is a 36 y.o. female  currently at 39w1d, who presents for term IOL.       Her prenatal care has been  complicated by AMA    Objective       Vital Signs Range for the last 24 hours  Temperature: Temp:  [97.8 °F (36.6 °C)] 97.8 °F (36.6 °C)   Temp Source: Temp src: Oral   BP: BP: (124)/(77) 124/77   Pulse: Heart Rate:  [113-122] 115   Respirations: Resp:  [18] 18                   Physical Examination:     General :  Alert in NAD  Abdomen: Gravid, EFW 7.5lbs by leopolds    Presentation: ceph   Cervix: Exam by: Method: sterile exam per RN   Dilation: Cervical Dilation (cm): 2   Effacement: Cervical Effacement: 30%   Station: Fetal Station: -3       Fetal Heart Rate Assessment reactive, cat I    Uterine Assessment irregular              Assessment & Plan     1.  Intrauterine pregnancy at 39w1d weeks gestation for term IOL.   reactive fetal status.   Continue pitocin. GBS neg. Anticipate .  Plan of care has been reviewed with patient and family.  All questions answered.      * No active hospital problems. *        H&P updated. The patient was examined and the following changes are noted above.         Franci Winter MD  2022  12:59 EDT

## 2022-08-08 NOTE — ANESTHESIA PREPROCEDURE EVALUATION
Anesthesia Evaluation     NPO Solid Status: > 8 hours  NPO Liquid Status: > 2 hours           Airway   Mallampati: II  TM distance: >3 FB  Large neck circumference  Dental      Pulmonary - normal exam   (+) asthma,  Cardiovascular   Exercise tolerance: good (4-7 METS)        Neuro/Psych  (+) headaches, psychiatric history Anxiety,    GI/Hepatic/Renal/Endo    (+) obesity,       Musculoskeletal     Abdominal    Substance History      OB/GYN    (+) Pregnant,         Other                        Anesthesia Plan    ASA 3     epidural     (IUP at 39w1d for labor analgesia )    Anesthetic plan, risks, benefits, and alternatives have been provided, discussed and informed consent has been obtained with: patient.        CODE STATUS:

## 2022-08-08 NOTE — PLAN OF CARE
Problem: Adult Inpatient Plan of Care  Goal: Absence of Hospital-Acquired Illness or Injury  Intervention: Identify and Manage Fall Risk  Recent Flowsheet Documentation  Taken 8/8/2022 1630 by Magaly Merino RN  Safety Promotion/Fall Prevention: safety round/check completed  Taken 8/8/2022 1330 by Magaly Merino RN  Safety Promotion/Fall Prevention: safety round/check completed  Intervention: Prevent and Manage VTE (Venous Thromboembolism) Risk  Recent Flowsheet Documentation  Taken 8/8/2022 1630 by Magaly Merino RN  VTE Prevention/Management: sequential compression devices on  Taken 8/8/2022 1330 by Magaly Merino RN  Activity Management: activity adjusted per tolerance  Goal: Optimal Comfort and Wellbeing  Intervention: Provide Person-Centered Care  Recent Flowsheet Documentation  Taken 8/8/2022 1630 by Magaly Merino RN  Trust Relationship/Rapport:   care explained   emotional support provided   questions answered   questions encouraged   reassurance provided  Taken 8/8/2022 1330 by Magaly Merino RN  Trust Relationship/Rapport:   questions answered   questions encouraged  Goal: Readiness for Transition of Care  Intervention: Mutually Develop Transition Plan  Recent Flowsheet Documentation  Taken 8/8/2022 1320 by Magaly Merino RN  Equipment Currently Used at Home: none  Transportation Anticipated: family or friend will provide  Patient/Family Anticipated Services at Transition: none  Patient/Family Anticipates Transition to: home  Taken 8/8/2022 1317 by Magaly Merino RN  Equipment Currently Used at Home: none   Goal Outcome Evaluation:

## 2022-08-09 PROCEDURE — 10907ZC DRAINAGE OF AMNIOTIC FLUID, THERAPEUTIC FROM PRODUCTS OF CONCEPTION, VIA NATURAL OR ARTIFICIAL OPENING: ICD-10-PCS | Performed by: OBSTETRICS & GYNECOLOGY

## 2022-08-09 PROCEDURE — 3E033VJ INTRODUCTION OF OTHER HORMONE INTO PERIPHERAL VEIN, PERCUTANEOUS APPROACH: ICD-10-PCS | Performed by: OBSTETRICS & GYNECOLOGY

## 2022-08-09 PROCEDURE — 25010000002 ONDANSETRON PER 1 MG: Performed by: OBSTETRICS & GYNECOLOGY

## 2022-08-09 PROCEDURE — 0HQ9XZZ REPAIR PERINEUM SKIN, EXTERNAL APPROACH: ICD-10-PCS | Performed by: OBSTETRICS & GYNECOLOGY

## 2022-08-09 RX ORDER — CARBOPROST TROMETHAMINE 250 UG/ML
250 INJECTION, SOLUTION INTRAMUSCULAR
Status: DISCONTINUED | OUTPATIENT
Start: 2022-08-09 | End: 2022-08-09 | Stop reason: HOSPADM

## 2022-08-09 RX ORDER — DOCUSATE SODIUM 100 MG/1
100 CAPSULE, LIQUID FILLED ORAL 2 TIMES DAILY
Status: DISCONTINUED | OUTPATIENT
Start: 2022-08-09 | End: 2022-08-11 | Stop reason: HOSPADM

## 2022-08-09 RX ORDER — PRENATAL VIT/IRON FUM/FOLIC AC 27MG-0.8MG
1 TABLET ORAL DAILY
Status: DISCONTINUED | OUTPATIENT
Start: 2022-08-09 | End: 2022-08-11 | Stop reason: HOSPADM

## 2022-08-09 RX ORDER — ONDANSETRON 4 MG/1
4 TABLET, FILM COATED ORAL EVERY 8 HOURS PRN
Status: DISCONTINUED | OUTPATIENT
Start: 2022-08-09 | End: 2022-08-11 | Stop reason: HOSPADM

## 2022-08-09 RX ORDER — METHYLERGONOVINE MALEATE 0.2 MG/ML
200 INJECTION INTRAVENOUS ONCE AS NEEDED
Status: DISCONTINUED | OUTPATIENT
Start: 2022-08-09 | End: 2022-08-09 | Stop reason: HOSPADM

## 2022-08-09 RX ORDER — HYDROCORTISONE 25 MG/G
1 CREAM TOPICAL AS NEEDED
Status: DISCONTINUED | OUTPATIENT
Start: 2022-08-09 | End: 2022-08-11 | Stop reason: HOSPADM

## 2022-08-09 RX ORDER — IBUPROFEN 600 MG/1
600 TABLET ORAL EVERY 8 HOURS PRN
Status: DISCONTINUED | OUTPATIENT
Start: 2022-08-09 | End: 2022-08-11 | Stop reason: HOSPADM

## 2022-08-09 RX ORDER — ACETAMINOPHEN 325 MG/1
650 TABLET ORAL EVERY 4 HOURS PRN
Status: DISCONTINUED | OUTPATIENT
Start: 2022-08-09 | End: 2022-08-11 | Stop reason: HOSPADM

## 2022-08-09 RX ORDER — MISOPROSTOL 200 UG/1
800 TABLET ORAL ONCE AS NEEDED
Status: DISCONTINUED | OUTPATIENT
Start: 2022-08-09 | End: 2022-08-09 | Stop reason: HOSPADM

## 2022-08-09 RX ORDER — OXYTOCIN/0.9 % SODIUM CHLORIDE 30/500 ML
125 PLASTIC BAG, INJECTION (ML) INTRAVENOUS CONTINUOUS PRN
Status: COMPLETED | OUTPATIENT
Start: 2022-08-09 | End: 2022-08-09

## 2022-08-09 RX ORDER — BISACODYL 10 MG
10 SUPPOSITORY, RECTAL RECTAL DAILY PRN
Status: DISCONTINUED | OUTPATIENT
Start: 2022-08-10 | End: 2022-08-11 | Stop reason: HOSPADM

## 2022-08-09 RX ORDER — SERTRALINE HYDROCHLORIDE 100 MG/1
100 TABLET, FILM COATED ORAL DAILY
Status: DISCONTINUED | OUTPATIENT
Start: 2022-08-09 | End: 2022-08-09

## 2022-08-09 RX ORDER — ERYTHROMYCIN 5 MG/G
OINTMENT OPHTHALMIC
Status: ACTIVE
Start: 2022-08-09 | End: 2022-08-09

## 2022-08-09 RX ORDER — OXYCODONE AND ACETAMINOPHEN 10; 325 MG/1; MG/1
1 TABLET ORAL EVERY 4 HOURS PRN
Status: DISCONTINUED | OUTPATIENT
Start: 2022-08-09 | End: 2022-08-11 | Stop reason: HOSPADM

## 2022-08-09 RX ORDER — IBUPROFEN 600 MG/1
600 TABLET ORAL EVERY 6 HOURS PRN
Status: DISCONTINUED | OUTPATIENT
Start: 2022-08-09 | End: 2022-08-09 | Stop reason: HOSPADM

## 2022-08-09 RX ORDER — OXYTOCIN/0.9 % SODIUM CHLORIDE 30/500 ML
250 PLASTIC BAG, INJECTION (ML) INTRAVENOUS CONTINUOUS PRN
Status: ACTIVE | OUTPATIENT
Start: 2022-08-09 | End: 2022-08-09

## 2022-08-09 RX ORDER — SERTRALINE HYDROCHLORIDE 100 MG/1
100 TABLET, FILM COATED ORAL NIGHTLY
Status: DISCONTINUED | OUTPATIENT
Start: 2022-08-09 | End: 2022-08-11 | Stop reason: HOSPADM

## 2022-08-09 RX ORDER — SODIUM CHLORIDE 0.9 % (FLUSH) 0.9 %
1-10 SYRINGE (ML) INJECTION AS NEEDED
Status: DISCONTINUED | OUTPATIENT
Start: 2022-08-09 | End: 2022-08-11 | Stop reason: HOSPADM

## 2022-08-09 RX ORDER — OXYTOCIN/0.9 % SODIUM CHLORIDE 30/500 ML
999 PLASTIC BAG, INJECTION (ML) INTRAVENOUS ONCE
Status: COMPLETED | OUTPATIENT
Start: 2022-08-09 | End: 2022-08-09

## 2022-08-09 RX ORDER — PHYTONADIONE 1 MG/.5ML
INJECTION, EMULSION INTRAMUSCULAR; INTRAVENOUS; SUBCUTANEOUS
Status: ACTIVE
Start: 2022-08-09 | End: 2022-08-09

## 2022-08-09 RX ORDER — OXYCODONE HYDROCHLORIDE AND ACETAMINOPHEN 5; 325 MG/1; MG/1
1 TABLET ORAL EVERY 4 HOURS PRN
Status: DISCONTINUED | OUTPATIENT
Start: 2022-08-09 | End: 2022-08-11 | Stop reason: HOSPADM

## 2022-08-09 RX ADMIN — SODIUM CHLORIDE, POTASSIUM CHLORIDE, SODIUM LACTATE AND CALCIUM CHLORIDE 125 ML/HR: 600; 310; 30; 20 INJECTION, SOLUTION INTRAVENOUS at 01:09

## 2022-08-09 RX ADMIN — Medication: at 08:44

## 2022-08-09 RX ADMIN — IBUPROFEN 600 MG: 600 TABLET ORAL at 14:33

## 2022-08-09 RX ADMIN — Medication 1 APPLICATION: at 08:44

## 2022-08-09 RX ADMIN — DOCUSATE SODIUM 100 MG: 100 CAPSULE, LIQUID FILLED ORAL at 08:44

## 2022-08-09 RX ADMIN — OXYCODONE AND ACETAMINOPHEN 1 TABLET: 5; 325 TABLET ORAL at 08:44

## 2022-08-09 RX ADMIN — OXYCODONE AND ACETAMINOPHEN 1 TABLET: 5; 325 TABLET ORAL at 20:34

## 2022-08-09 RX ADMIN — DOCUSATE SODIUM 100 MG: 100 CAPSULE, LIQUID FILLED ORAL at 20:34

## 2022-08-09 RX ADMIN — ONDANSETRON 4 MG: 2 INJECTION INTRAMUSCULAR; INTRAVENOUS at 01:58

## 2022-08-09 RX ADMIN — Medication 999 ML/HR: at 05:01

## 2022-08-09 RX ADMIN — Medication 1 TABLET: at 08:43

## 2022-08-09 RX ADMIN — IBUPROFEN 600 MG: 600 TABLET ORAL at 05:54

## 2022-08-09 RX ADMIN — Medication 125 ML/HR: at 06:19

## 2022-08-09 RX ADMIN — SERTRALINE 100 MG: 100 TABLET, FILM COATED ORAL at 20:34

## 2022-08-09 RX ADMIN — OXYCODONE AND ACETAMINOPHEN 1 TABLET: 325; 10 TABLET ORAL at 16:04

## 2022-08-09 RX ADMIN — SODIUM CHLORIDE, POTASSIUM CHLORIDE, SODIUM LACTATE AND CALCIUM CHLORIDE 125 ML/HR: 600; 310; 30; 20 INJECTION, SOLUTION INTRAVENOUS at 03:58

## 2022-08-09 NOTE — PLAN OF CARE
Problem: Adult Inpatient Plan of Care  Goal: Plan of Care Review  Outcome: Ongoing, Progressing  Goal: Patient-Specific Goal (Individualized)  Outcome: Ongoing, Progressing  Goal: Absence of Hospital-Acquired Illness or Injury  Outcome: Ongoing, Progressing  Intervention: Identify and Manage Fall Risk  Recent Flowsheet Documentation  Taken 8/9/2022 1848 by Kandice Bynum RN  Safety Promotion/Fall Prevention: safety round/check completed  Taken 8/9/2022 1703 by Kandice Bynum RN  Safety Promotion/Fall Prevention:   safety round/check completed   room organization consistent  Taken 8/9/2022 1604 by Kandice Bynum RN  Safety Promotion/Fall Prevention:   safety round/check completed   room organization consistent   nonskid shoes/slippers when out of bed  Taken 8/9/2022 1433 by Kandice Bynum RN  Safety Promotion/Fall Prevention:   safety round/check completed   room organization consistent  Taken 8/9/2022 1210 by Kandice Bynum RN  Safety Promotion/Fall Prevention:   safety round/check completed   nonskid shoes/slippers when out of bed  Taken 8/9/2022 1147 by Kandice Bynum RN  Safety Promotion/Fall Prevention: safety round/check completed  Taken 8/9/2022 0945 by Kandice Bynum RN  Safety Promotion/Fall Prevention: safety round/check completed  Taken 8/9/2022 0935 by Kandice Bynum RN  Safety Promotion/Fall Prevention: safety round/check completed  Taken 8/9/2022 0844 by Kandice Bynum RN  Safety Promotion/Fall Prevention:   safety round/check completed   nonskid shoes/slippers when out of bed  Taken 8/9/2022 0830 by Kandice Bynum RN  Safety Promotion/Fall Prevention: safety round/check completed  Taken 8/9/2022 0800 by Kandice Bynum RN  Safety Promotion/Fall Prevention: safety round/check completed  Intervention: Prevent and Manage VTE (Venous Thromboembolism) Risk  Recent Flowsheet Documentation  Taken 8/9/2022 0945 by Kandice Bynum RN  Activity Management: ambulated to bathroom  Taken 8/9/2022 0935 by Kandice Bynum RN  Activity Management: activity  encouraged  Taken 2022 0800 by Kandice Bynum RN  Activity Management: bedrest  Goal: Optimal Comfort and Wellbeing  Outcome: Ongoing, Progressing  Intervention: Monitor Pain and Promote Comfort  Recent Flowsheet Documentation  Taken 2022 1604 by Kandice Bynum RN  Pain Management Interventions:   see MAR   quiet environment facilitated  Taken 2022 1433 by Kandice Bynum RN  Pain Management Interventions:   see MAR   quiet environment facilitated  Taken 2022 0945 by Kandice Bynum RN  Pain Management Interventions:   quiet environment facilitated   pain management plan reviewed with patient/caregiver  Taken 2022 0935 by Kandice Bynum RN  Pain Management Interventions: position adjusted  Taken 2022 0844 by Kandice Bynum RN  Pain Management Interventions:   quiet environment facilitated   see MAR  Taken 2022 0830 by Kandice Bynum RN  Pain Management Interventions: quiet environment facilitated  Taken 2022 0800 by Kandice Bynum RN  Pain Management Interventions: quiet environment facilitated  Intervention: Provide Person-Centered Care  Recent Flowsheet Documentation  Taken 2022 0800 by Kandice yBnum RN  Trust Relationship/Rapport:   care explained   choices provided   questions answered   questions encouraged  Goal: Readiness for Transition of Care  Outcome: Ongoing, Progressing     Problem:  Fall Injury Risk  Goal: Absence of Fall, Infant Drop and Related Injury  Outcome: Ongoing, Progressing  Intervention: Identify and Manage Contributors  Recent Flowsheet Documentation  Taken 2022 0945 by Kandice Bynum RN  Self-Care Promotion: independence encouraged  Taken 2022 0800 by Kandice Bynum RN  Medication Review/Management: medications reviewed  Self-Care Promotion: independence encouraged  Intervention: Promote Injury-Free Environment  Recent Flowsheet Documentation  Taken 2022 1848 by Kandice Bynum RN  Safety Promotion/Fall Prevention: safety round/check completed  Taken 2022 1703 by Kandice Bynum  RN  Safety Promotion/Fall Prevention:   safety round/check completed   room organization consistent  Taken 8/9/2022 1604 by Kandice Bynum RN  Safety Promotion/Fall Prevention:   safety round/check completed   room organization consistent   nonskid shoes/slippers when out of bed  Taken 8/9/2022 1433 by Kandice Bynum RN  Safety Promotion/Fall Prevention:   safety round/check completed   room organization consistent  Taken 8/9/2022 1210 by Kandice Bynum RN  Safety Promotion/Fall Prevention:   safety round/check completed   nonskid shoes/slippers when out of bed  Taken 8/9/2022 1147 by Kandice Bynum RN  Safety Promotion/Fall Prevention: safety round/check completed  Taken 8/9/2022 0945 by Kandice Bynum RN  Safety Promotion/Fall Prevention: safety round/check completed  Taken 8/9/2022 0935 by Kandice Bynum RN  Safety Promotion/Fall Prevention: safety round/check completed  Taken 8/9/2022 0844 by Kandice Bynum RN  Safety Promotion/Fall Prevention:   safety round/check completed   nonskid shoes/slippers when out of bed  Taken 8/9/2022 0830 by Kandice Bynum RN  Safety Promotion/Fall Prevention: safety round/check completed  Taken 8/9/2022 0800 by Kandice Bynum RN  Safety Promotion/Fall Prevention: safety round/check completed     Problem: Skin Injury Risk Increased  Goal: Skin Health and Integrity  Outcome: Ongoing, Progressing     Problem: Bleeding (Labor)  Goal: Hemostasis  Outcome: Ongoing, Progressing     Problem: Change in Fetal Wellbeing (Labor)  Goal: Stable Fetal Wellbeing  Outcome: Ongoing, Progressing     Problem: Delayed Labor Progression (Labor)  Goal: Effective Progression to Delivery  Outcome: Ongoing, Progressing     Problem: Infection (Labor)  Goal: Absence of Infection Signs and Symptoms  Outcome: Ongoing, Progressing     Problem: Labor Pain (Labor)  Goal: Acceptable Pain Control  Outcome: Ongoing, Progressing     Problem: Uterine Tachysystole (Labor)  Goal: Normal Uterine Contraction Pattern  Outcome: Ongoing, Progressing      Problem: Device-Related Complication Risk (Anesthesia/Analgesia, Neuraxial)  Goal: Safe Infusion Delivery Completion  Outcome: Ongoing, Progressing     Problem: Infection (Anesthesia/Analgesia, Neuraxial)  Goal: Absence of Infection Signs and Symptoms  Outcome: Ongoing, Progressing     Problem: Nausea and Vomiting (Anesthesia/Analgesia, Neuraxial)  Goal: Nausea and Vomiting Relief  Outcome: Ongoing, Progressing     Problem: Pain (Anesthesia/Analgesia, Neuraxial)  Goal: Effective Pain Control  Outcome: Ongoing, Progressing  Intervention: Prevent or Manage Pain  Recent Flowsheet Documentation  Taken 8/9/2022 1604 by Kandice Bynum RN  Pain Management Interventions:   see MAR   quiet environment facilitated  Taken 8/9/2022 1433 by Kandice Bynum RN  Pain Management Interventions:   see MAR   quiet environment facilitated  Taken 8/9/2022 0945 by Kandice Bynum RN  Pain Management Interventions:   quiet environment facilitated   pain management plan reviewed with patient/caregiver  Taken 8/9/2022 0935 by Kandice Bynum RN  Pain Management Interventions: position adjusted  Taken 8/9/2022 0844 by Kandice Bynum RN  Pain Management Interventions:   quiet environment facilitated   see MAR  Taken 8/9/2022 0830 by Kandice Bynum RN  Pain Management Interventions: quiet environment facilitated  Taken 8/9/2022 0800 by Kandice Bynum RN  Pain Management Interventions: quiet environment facilitated     Problem: Respiratory Compromise (Anesthesia/Analgesia, Neuraxial)  Goal: Effective Oxygenation and Ventilation  Outcome: Ongoing, Progressing     Problem: Sensorimotor Impairment (Anesthesia/Analgesia, Neuraxial)  Goal: Baseline Motor Function  Outcome: Ongoing, Progressing  Intervention: Optimize Sensorimotor Function  Recent Flowsheet Documentation  Taken 8/9/2022 1848 by Kandice Bynum RN  Safety Promotion/Fall Prevention: safety round/check completed  Taken 8/9/2022 1703 by Kandice Bynum RN  Safety Promotion/Fall Prevention:   safety round/check completed    room organization consistent  Taken 8/9/2022 1604 by Kandice Bynum RN  Safety Promotion/Fall Prevention:   safety round/check completed   room organization consistent   nonskid shoes/slippers when out of bed  Taken 8/9/2022 1433 by Kandice Bynum RN  Safety Promotion/Fall Prevention:   safety round/check completed   room organization consistent  Taken 8/9/2022 1210 by Kandice Bynum RN  Safety Promotion/Fall Prevention:   safety round/check completed   nonskid shoes/slippers when out of bed  Taken 8/9/2022 1147 by Kandice Bynum RN  Safety Promotion/Fall Prevention: safety round/check completed  Taken 8/9/2022 0945 by Kandice Bynum RN  Safety Promotion/Fall Prevention: safety round/check completed  Taken 8/9/2022 0935 by Kandice Bynum RN  Safety Promotion/Fall Prevention: safety round/check completed  Taken 8/9/2022 0844 by Kandice Bynum RN  Safety Promotion/Fall Prevention:   safety round/check completed   nonskid shoes/slippers when out of bed  Taken 8/9/2022 0830 by Kandice Bynum RN  Safety Promotion/Fall Prevention: safety round/check completed  Taken 8/9/2022 0800 by Kandice Bynum RN  Safety Promotion/Fall Prevention: safety round/check completed     Problem: Urinary Retention (Anesthesia/Analgesia, Neuraxial)  Goal: Effective Urinary Elimination  Outcome: Ongoing, Progressing   Goal Outcome Evaluation:

## 2022-08-09 NOTE — PLAN OF CARE
Problem: Adult Inpatient Plan of Care  Goal: Plan of Care Review  Outcome: Ongoing, Progressing  Flowsheets (Taken 2022)  Plan of Care Reviewed With: patient  Goal: Patient-Specific Goal (Individualized)  Outcome: Ongoing, Progressing  Flowsheets (Taken 2022)  Patient-Specific Goals (Include Timeframe): 36 y.o.  at 39+0 wks who presents elective induction. pitocin infusing at 16 mlu and patient comfortable with epidural.  Individualized Care Needs: healthy mom and baby  Goal: Absence of Hospital-Acquired Illness or Injury  Outcome: Ongoing, Progressing  Intervention: Identify and Manage Fall Risk  Recent Flowsheet Documentation  Taken 2022 1630 by Magaly Merino RN  Safety Promotion/Fall Prevention: safety round/check completed  Taken 2022 1330 by Magaly Merino RN  Safety Promotion/Fall Prevention: safety round/check completed  Intervention: Prevent and Manage VTE (Venous Thromboembolism) Risk  Recent Flowsheet Documentation  Taken 2022 1630 by Magaly Merino RN  VTE Prevention/Management: sequential compression devices on  Taken 2022 1330 by Magaly Merino RN  Activity Management: activity adjusted per tolerance  Goal: Optimal Comfort and Wellbeing  Outcome: Ongoing, Progressing  Intervention: Provide Person-Centered Care  Recent Flowsheet Documentation  Taken 2022 1630 by Magaly Merino RN  Trust Relationship/Rapport:   care explained   emotional support provided   questions answered   questions encouraged   reassurance provided  Taken 2022 1330 by Magaly Merino RN  Trust Relationship/Rapport:   questions answered   questions encouraged  Goal: Readiness for Transition of Care  Outcome: Ongoing, Progressing  Intervention: Mutually Develop Transition Plan  Recent Flowsheet Documentation  Taken 2022 1320 by Magaly Merino RN  Equipment Currently Used at Home: none  Transportation Anticipated: family or friend will  provide  Patient/Family Anticipated Services at Transition: none  Patient/Family Anticipates Transition to: home  Taken 8/8/2022 1317 by Magaly Merino, RN  Equipment Currently Used at Home: none   Goal Outcome Evaluation:  Plan of Care Reviewed With: patient

## 2022-08-09 NOTE — ANESTHESIA POSTPROCEDURE EVALUATION
"Patient: Sara Hoang    Procedure Summary     Date: 08/08/22 Room / Location:     Anesthesia Start: 1526 Anesthesia Stop: 08/09/22 0459    Procedure: LABOR ANALGESIA Diagnosis:     Scheduled Providers:  Provider: Franci Monson MD    Anesthesia Type: epidural ASA Status: 3          Anesthesia Type: epidural    Vitals  Vitals Value Taken Time   BP 89/52 08/09/22 0601   Temp 36.8 °C (98.3 °F) 08/09/22 0508   Pulse 77 08/09/22 0601   Resp 16 08/09/22 0531   SpO2 98 % 08/09/22 0510   Vitals shown include unvalidated device data.        Post Anesthesia Care and Evaluation    Patient location during evaluation: bedside  Patient participation: complete - patient participated  Level of consciousness: awake and alert  Pain management: adequate    Airway patency: patent  Anesthetic complications: No anesthetic complications  PONV Status: none  Cardiovascular status: acceptable  Respiratory status: acceptable  Hydration status: acceptable    Comments: /91   Pulse 77   Temp 36.8 °C (98.3 °F) (Oral)   Resp 16   Ht 165.1 cm (65\")   Wt 117 kg (259 lb)   LMP 11/07/2021 (Exact Date)   SpO2 98%   Breastfeeding Yes   BMI 43.10 kg/m²     No anesthesia care post op    "

## 2022-08-09 NOTE — L&D DELIVERY NOTE
[facility]  Vaginal Delivery Note    Patient Name: Sara Hoang  :  1985  MRN:  [id number]      Delivery     Delivery: Vaginal, Spontaneous     YOB: 2021    Time of Birth: 4:59 AM      Anesthesia: Epidural     Delivering clinician: Franci Winter MD       Infant    Findings: Viable male  infant    Infant observations: Weight: 2933 g (6 lb 7.5 oz)   Observations/Comments:  scale #1      Apgars: 8  @ 1 minute /    9  @ 5 minutes     Placenta, Cord, and Fluid    Placenta delivered  Spontaneous   at  2022  5:01 AM     Cord: 3 vessels  present.   Cord blood obtained: No    Cord gases obtained:  No      Repair    Episiotomy: No   Lacerations: 1st deg   Estimated Blood Loss:   50 mls.          Delivery narrative: The patient is a 36 y.o.  at 39w2d.  Presented for term IOL. Progressed normally to C/C/+1 with pitocin and AROM. Fetal status reassuring throughout.  of viable male infant  @ 4:59 AM  over an intact perineum. ASDE. 2933 g (6 lb 7.5 oz) .  Placenta delivered spontaneously, intact with 3 vessel cord. Cervix and rectum intact. 1st degree laceration repaired in usual fashion with vicryl suture. Mother and baby recovering good condition.       Franci Winter MD  22  05:24 EDT

## 2022-08-10 LAB
BASOPHILS # BLD AUTO: 0.04 10*3/MM3 (ref 0–0.2)
BASOPHILS NFR BLD AUTO: 0.3 % (ref 0–1.5)
DEPRECATED RDW RBC AUTO: 53.8 FL (ref 37–54)
EOSINOPHIL # BLD AUTO: 0.16 10*3/MM3 (ref 0–0.4)
EOSINOPHIL NFR BLD AUTO: 1.3 % (ref 0.3–6.2)
ERYTHROCYTE [DISTWIDTH] IN BLOOD BY AUTOMATED COUNT: 16.4 % (ref 12.3–15.4)
HCT VFR BLD AUTO: 31.5 % (ref 34–46.6)
HGB BLD-MCNC: 10.1 G/DL (ref 12–15.9)
IMM GRANULOCYTES # BLD AUTO: 0.09 10*3/MM3 (ref 0–0.05)
IMM GRANULOCYTES NFR BLD AUTO: 0.7 % (ref 0–0.5)
LYMPHOCYTES # BLD AUTO: 3.27 10*3/MM3 (ref 0.7–3.1)
LYMPHOCYTES NFR BLD AUTO: 27 % (ref 19.6–45.3)
MCH RBC QN AUTO: 28.8 PG (ref 26.6–33)
MCHC RBC AUTO-ENTMCNC: 32.1 G/DL (ref 31.5–35.7)
MCV RBC AUTO: 89.7 FL (ref 79–97)
MONOCYTES # BLD AUTO: 0.53 10*3/MM3 (ref 0.1–0.9)
MONOCYTES NFR BLD AUTO: 4.4 % (ref 5–12)
NEUTROPHILS NFR BLD AUTO: 66.3 % (ref 42.7–76)
NEUTROPHILS NFR BLD AUTO: 8 10*3/MM3 (ref 1.7–7)
NRBC BLD AUTO-RTO: 0 /100 WBC (ref 0–0.2)
PLATELET # BLD AUTO: 193 10*3/MM3 (ref 140–450)
PMV BLD AUTO: 9.6 FL (ref 6–12)
RBC # BLD AUTO: 3.51 10*6/MM3 (ref 3.77–5.28)
WBC NRBC COR # BLD: 12.09 10*3/MM3 (ref 3.4–10.8)

## 2022-08-10 PROCEDURE — 85025 COMPLETE CBC W/AUTO DIFF WBC: CPT | Performed by: OBSTETRICS & GYNECOLOGY

## 2022-08-10 RX ADMIN — IBUPROFEN 600 MG: 600 TABLET ORAL at 19:46

## 2022-08-10 RX ADMIN — OXYCODONE AND ACETAMINOPHEN 1 TABLET: 5; 325 TABLET ORAL at 19:46

## 2022-08-10 RX ADMIN — IBUPROFEN 600 MG: 600 TABLET ORAL at 11:50

## 2022-08-10 RX ADMIN — OXYCODONE AND ACETAMINOPHEN 1 TABLET: 5; 325 TABLET ORAL at 02:55

## 2022-08-10 RX ADMIN — DOCUSATE SODIUM 100 MG: 100 CAPSULE, LIQUID FILLED ORAL at 08:36

## 2022-08-10 RX ADMIN — Medication 1 TABLET: at 08:36

## 2022-08-10 RX ADMIN — SERTRALINE 100 MG: 100 TABLET, FILM COATED ORAL at 20:00

## 2022-08-10 RX ADMIN — DOCUSATE SODIUM 100 MG: 100 CAPSULE, LIQUID FILLED ORAL at 20:00

## 2022-08-10 RX ADMIN — IBUPROFEN 600 MG: 600 TABLET ORAL at 02:55

## 2022-08-10 NOTE — LACTATION NOTE
PT's RN asked LC to bring nipple shield to PT. Reports she wants to try one due to having trouble latching baby.  NS 24 mm given to mom with instructions of placing and use. Baby was able to latch to the right breast in football hold.

## 2022-08-10 NOTE — PLAN OF CARE
Goal Outcome Evaluation:  Plan of Care Reviewed With: patient        Progress: improving  Outcome Evaluation: VSS; pain controlled; breastfeeding and bonding with infant

## 2022-08-10 NOTE — LACTATION NOTE
Mom wanting assistance with latching baby. LC attempting to assist mom with latching. Baby sleepy and not wanting to latch with or with out nipple shield at this time. Educated mom on hand expression. Mom using hand expression and hand pump. She was able to express 2 cc's of colostrum which was syringe fed back to baby. Mom cont to use hand pump to get more colostrum for baby. Encouraged to try BF again within one hour and call LC as needed. Encouraged mom to pump 3-4 times a day after BF and give all colostrum back to baby.  Lactation Consult Note    Evaluation Completed: 8/10/2022 08:36 EDT  Patient Name: Sara Hoang  :  1985  MRN:  [id number]     REFERRAL  INFORMATION:                          Date of Referral: 22   Person Making Referral: patient, nurse  Maternal Reason for Referral: breastfeeding currently  Infant Reason for Referral: sleepy    DELIVERY HISTORY:        Skin to skin initiation date/time: 2022  5:17 AM   Skin to skin end date/time:           MATERNAL ASSESSMENT:                               INFANT ASSESSMENT:  Information for the patient's :  Sara Hoang [[id number]]   No past medical history on file.                                                                                                     MATERNAL INFANT FEEDING:                                                                       EQUIPMENT TYPE:                                 BREAST PUMPING:          LACTATION REFERRALS:

## 2022-08-10 NOTE — PROGRESS NOTES
[facility]  Vaginal Delivery Progress Note    Patient Name: Sara Hoang  :  1985  MRN:  [id number]      Subjective   Postpartum Day 1: Vaginal Delivery of a male infant.     The patient feels well without complaints.  Her pain is well controlled.  Reports normal lochia.     The patient plans to breastfeed.    Objective     Vital Signs Range for the last 24 hours  Temperature: Temp:  [97 °F (36.1 °C)-98.4 °F (36.9 °C)] 97.9 °F (36.6 °C)       BP: BP: (101-121)/(64-86) 121/86   Pulse: Heart Rate:  [74-89] 80   Respirations: Resp:  [16-18] 16                       Physical Exam:  General: Awake and alert  Abdomen: Fundus: firm, non tender, 1 below umbilicus  Extremities:  trace edema, NT     Labs:     Results from last 7 days   Lab Units 08/10/22  0527 22  1250   WBC 10*3/mm3 12.09* 13.47*   HEMOGLOBIN g/dL 10.1* 11.6*   HEMATOCRIT % 31.5* 35.6   PLATELETS 10*3/mm3 193 243       Prenatal labs results reviewed:  Yes   Rubella:  IMMUNE  Rh Status:    RH type   Date Value Ref Range Status   2022 Negative  Final         Assessment & Plan  : 1. PPD1 S/P  - Doing well, continue usual cares. Wants infant male to have circ today.  Should be able to d/c tmrw.           * No active hospital problems. *          THOMPSON Polo  8/10/2022  09:52 EDT

## 2022-08-10 NOTE — LACTATION NOTE
Lactation Consult Note  P2, T baby admitted this AM. Mom was unsuccessful BF her 1-st child. She called for help.Reported baby has been very sleepy.  Assisted mother in waking up the baby and in latching him in a cross cradle position to the right breast. Educated mom starting nose to nipple to obtain deep latch and baby was able to achieve it after few attempts and some suck training. PT’s nipples are everted put retracting and infant has trouble staying latched and constantly popping off. Hand pump given with instructions of use and cleaning. That helped to bring nipple out, so baby was able finally to latch deeper.He is latching well, has nutritive suckle, and has a good jaw rotation, but is falling asleep easily. Discussed ways to keep baby awake during BF. Encouraged mom to try to BF every 2-3 hours for 15 min. on each side. Educated on importance of deep latching, hand expression, how to know if baby is getting enough, different ways to rouse infant and burping him. Mom is able easily to express colostrum. PT reports that she already has PBP. She declines any questions and concerns at this time. Encouraged to call LC if needing further assistance.          Evaluation Completed: 2022 23:35 EDT  Patient Name: Sara Hoang  :  1985  MRN:  [id number]     REFERRAL  INFORMATION:                          Date of Referral: 22   Person Making Referral: patient, nurse  Maternal Reason for Referral: breastfeeding currently  Infant Reason for Referral: sleepy    DELIVERY HISTORY:        Skin to skin initiation date/time: 2022  5:17 AM   Skin to skin end date/time:           MATERNAL ASSESSMENT:  Breast Size Issue: none (22)  Breast Shape: Bilateral:, pendulous (22)  Breast Density: Bilateral:, soft (22)  Areola: Bilateral:, elastic (22)  Nipples: Bilateral:, everted, retracting (22)                INFANT ASSESSMENT:  Information for the  patient's :  Sara Hoangoy [[id number]]   No past medical history on file.     Feeding Readiness Cues: sleeping (22)                     Feeding Interventions: arousal required, jaw supported, latch assistance provided, lips stroked, sucking promoted (22)               Breastfeeding: breastfeeding, right side only (22)   Infant Positioning: cross-cradle (22)         Effective Latch During Feeding: yes (22)   Suck/Swallow/Breathing Coordination: present (22)   Signs of Milk Transfer: deep jaw excursions noted (22)       Latch: 1-->repeated attempts, holds nipple in mouth, stimulate to suck (22)   Audible Swallowin-->a few with stimulation (22)   Type of Nipple: 1-->flat (22)   Comfort (Breast/Nipple): 2-->soft/nontender (22)   Hold (Positioning): 0-->full assist (staff holds infant at breast) (22)   Latch Score: 5 (22)                    MATERNAL INFANT FEEDING:     Maternal Emotional State: receptive, relaxed (22)  Infant Positioning: cross-cradle (22)   Signs of Milk Transfer: deep jaw excursions noted (22)  Pain with Feeding: no (22)           Milk Ejection Reflex: present (22)           Latch Assistance: full assistance needed (22)                               EQUIPMENT TYPE:                                 BREAST PUMPING:          LACTATION REFERRALS:

## 2022-08-11 VITALS
RESPIRATION RATE: 16 BRPM | HEIGHT: 65 IN | WEIGHT: 259 LBS | SYSTOLIC BLOOD PRESSURE: 113 MMHG | TEMPERATURE: 98.3 F | DIASTOLIC BLOOD PRESSURE: 80 MMHG | BODY MASS INDEX: 43.15 KG/M2 | OXYGEN SATURATION: 95 % | HEART RATE: 83 BPM

## 2022-08-11 RX ORDER — OXYCODONE HYDROCHLORIDE AND ACETAMINOPHEN 5; 325 MG/1; MG/1
1 TABLET ORAL EVERY 4 HOURS PRN
Qty: 12 TABLET | Refills: 0 | Status: SHIPPED | OUTPATIENT
Start: 2022-08-11 | End: 2022-08-14

## 2022-08-11 RX ORDER — IBUPROFEN 600 MG/1
600 TABLET ORAL EVERY 8 HOURS PRN
Qty: 60 TABLET | Refills: 0 | Status: SHIPPED | OUTPATIENT
Start: 2022-08-11 | End: 2022-09-19

## 2022-08-11 RX ADMIN — DOCUSATE SODIUM 100 MG: 100 CAPSULE, LIQUID FILLED ORAL at 08:34

## 2022-08-11 RX ADMIN — IBUPROFEN 600 MG: 600 TABLET ORAL at 08:34

## 2022-08-11 RX ADMIN — Medication 1 TABLET: at 08:34

## 2022-08-11 NOTE — PLAN OF CARE
Goal Outcome Evaluation:  Patient education complete. Patient ready for discharge to home.

## 2022-08-11 NOTE — DISCHARGE SUMMARY
Date of Discharge:  2022    Discharge Diagnosis: vaginal delivery    Presenting Problem/History of Present Illness  Pregnancy [Z34.90]       Hospital Course  Patient is a 36 y.o. female presented for term IOL.  Delivered viable male infant per Dr. Winter.  No pp complications.  Ready for d/c home.      Procedures Performed         Consults:   Consults     No orders found from 7/10/2022 to 2022.          Condition on Discharge:   Subjective   Postpartum Day 2 Vaginal Delivery.    The patient feels well without complaints.    Vital Signs  Temp:  [97.7 °F (36.5 °C)-98.4 °F (36.9 °C)] 98.3 °F (36.8 °C)  Heart Rate:  [78-83] 83  Resp:  [16] 16  BP: (105-113)/(67-80) 113/80    Physical Exam:   General: Awake and alert   Abdomen: Fundus: firm, non tender    Extremities:  Calves NT bilaterally    Assessment & Plan     PPD2  S/P  -   Stable for discharge. Instructions reviewed      Discharge Disposition  Home or Self Care    Discharge Medications     Discharge Medications      New Medications      Instructions Start Date   ibuprofen 600 MG tablet  Commonly known as: ADVIL,MOTRIN   600 mg, Oral, Every 8 Hours PRN      oxyCODONE-acetaminophen 5-325 MG per tablet  Commonly known as: PERCOCET   1 tablet, Oral, Every 4 Hours PRN         Continue These Medications      Instructions Start Date   aspirin 81 MG EC tablet   81 mg, Oral, Daily      PRENATAL VITAMIN PO   Oral      sertraline 100 MG tablet  Commonly known as: ZOLOFT   TAKE 1 AND 1/2 TABLETS BY MOUTH DAILY FOR ANXIETY      valACYclovir 500 MG tablet  Commonly known as: VALTREX   500 mg, Oral, 2 Times Daily         Stop These Medications    albuterol sulfate  (90 Base) MCG/ACT inhaler  Commonly known as: PROVENTIL HFA;VENTOLIN HFA;PROAIR HFA     doxylamine 25 MG tablet  Commonly known as: UNISOM     ondansetron 8 MG tablet  Commonly known as: ZOFRAN     promethazine 25 MG tablet  Commonly known as: PHENERGAN     SOLUBLE FIBER/PROBIOTICS PO               The patient has been prescribed a controlled substance.  She has been counseled on the risks associated with using the medication.   The addictive potential of this medication and alternatives were discussed carefully with this patient and she demonstrated understanding.  A PAULO report has been obtained and reviewed.       Activity at Discharge: restrictions reviewed    Follow-up Appointments  No future appointments.      Test Results Pending at Discharge       Kacie Tello, SAIDA  08/11/22  10:25 EDT

## 2022-08-23 RX ORDER — RIZATRIPTAN BENZOATE 10 MG/1
TABLET ORAL
Qty: 36 TABLET | Refills: 3 | OUTPATIENT
Start: 2022-08-23

## 2022-09-19 ENCOUNTER — OFFICE VISIT (OUTPATIENT)
Dept: FAMILY MEDICINE CLINIC | Facility: CLINIC | Age: 37
End: 2022-09-19

## 2022-09-19 VITALS
HEART RATE: 80 BPM | SYSTOLIC BLOOD PRESSURE: 122 MMHG | TEMPERATURE: 97.5 F | HEIGHT: 65 IN | OXYGEN SATURATION: 98 % | DIASTOLIC BLOOD PRESSURE: 65 MMHG | BODY MASS INDEX: 36.99 KG/M2 | WEIGHT: 222 LBS | RESPIRATION RATE: 16 BRPM

## 2022-09-19 DIAGNOSIS — K58.0 IRRITABLE BOWEL SYNDROME WITH DIARRHEA: ICD-10-CM

## 2022-09-19 DIAGNOSIS — G43.109 MIGRAINE WITH AURA AND WITHOUT STATUS MIGRAINOSUS, NOT INTRACTABLE: Primary | ICD-10-CM

## 2022-09-19 DIAGNOSIS — F41.9 ANXIETY: ICD-10-CM

## 2022-09-19 DIAGNOSIS — J45.20 MILD INTERMITTENT ASTHMA WITHOUT COMPLICATION: ICD-10-CM

## 2022-09-19 PROCEDURE — 99214 OFFICE O/P EST MOD 30 MIN: CPT | Performed by: PHYSICIAN ASSISTANT

## 2022-09-19 RX ORDER — LEVONORGESTREL AND ETHINYL ESTRADIOL 0.1-0.02MG
1 KIT ORAL NIGHTLY
COMMUNITY
Start: 2022-09-16

## 2022-09-19 RX ORDER — RIZATRIPTAN BENZOATE 10 MG/1
10 TABLET ORAL ONCE AS NEEDED
Qty: 24 TABLET | Refills: 3 | Status: SHIPPED | OUTPATIENT
Start: 2022-09-19

## 2022-09-19 RX ORDER — SERTRALINE HYDROCHLORIDE 100 MG/1
TABLET, FILM COATED ORAL
Qty: 90 TABLET | Refills: 3 | Status: SHIPPED | OUTPATIENT
Start: 2022-09-19

## 2022-09-19 RX ORDER — TOPIRAMATE 50 MG/1
TABLET, FILM COATED ORAL
Qty: 270 TABLET | Refills: 3 | Status: SHIPPED | OUTPATIENT
Start: 2022-09-19

## 2022-09-19 RX ORDER — BACILLUS COAGULANS/INULIN 1B-250 MG
1 CAPSULE ORAL EVERY MORNING
COMMUNITY

## 2022-09-19 RX ORDER — DICYCLOMINE HYDROCHLORIDE 10 MG/1
10 CAPSULE ORAL
Qty: 60 CAPSULE | Refills: 11 | Status: SHIPPED | OUTPATIENT
Start: 2022-09-19

## 2022-09-19 RX ORDER — TOPIRAMATE 50 MG/1
TABLET, FILM COATED ORAL
COMMUNITY
End: 2022-09-19 | Stop reason: SDUPTHER

## 2022-09-19 NOTE — PATIENT INSTRUCTIONS
Exercising to Lose Weight  Getting regular exercise is important for everyone. It is especially important if you are overweight. Being overweight increases your risk of heart disease, stroke, diabetes, high blood pressure, and several types of cancer. Exercising, and reducing the calories you consume, can help you lose weight and improve fitness and health.  Exercise can be moderate or vigorous intensity. To lose weight, most people need to do a certain amount of moderate or vigorous-intensity exercise each week.  How can exercise affect me?  You lose weight when you exercise enough to burn more calories than you eat. Exercise also reduces body fat and builds muscle. The more muscle you have, the more calories you burn. Exercise also:  Improves mood.  Reduces stress and tension.  Improves your overall fitness, flexibility, and endurance.  Increases bone strength.  Moderate-intensity exercise  Moderate-intensity exercise is any activity that gets you moving enough to burn at least three times more energy (calories) than if you were sitting.  Examples of moderate exercise include:  Walking a mile in 15 minutes.  Doing light yard work.  Biking at an easy pace.  Most people should get at least 150 minutes of moderate-intensity exercise a week to maintain their body weight.  Vigorous-intensity exercise  Vigorous-intensity exercise is any activity that gets you moving enough to burn at least six times more calories than if you were sitting. When you exercise at this intensity, you should be working hard enough that you are not able to carry on a conversation.  Examples of vigorous exercise include:  Running.  Playing a team sport, such as football, basketball, and soccer.  Jumping rope.  Most people should get at least 75 minutes a week of vigorous exercise to maintain their body weight.  What actions can I take to lose weight?  The amount of exercise you need to lose weight depends on:  Your age.  The type of  exercise.  Any health conditions you have.  Your overall physical ability.  Talk to your health care provider about how much exercise you need and what types of activities are safe for you.  Nutrition    Make changes to your diet as told by your health care provider or diet and nutrition specialist (dietitian). This may include:  Eating fewer calories.  Eating more protein.  Eating less unhealthy fats.  Eating a diet that includes fresh fruits and vegetables, whole grains, low-fat dairy products, and lean protein.  Avoiding foods with added fat, salt, and sugar.  Drink plenty of water while you exercise to prevent dehydration or heat stroke.  Activity  Choose an activity that you enjoy and set realistic goals. Your health care provider can help you make an exercise plan that works for you.  Exercise at a moderate or vigorous intensity most days of the week.  The intensity of exercise may vary from person to person. You can tell how intense a workout is for you by paying attention to your breathing and heartbeat. Most people will notice their breathing and heartbeat get faster with more intense exercise.  Do resistance training twice each week, such as:  Push-ups.  Sit-ups.  Lifting weights.  Using resistance bands.  Getting short amounts of exercise can be just as helpful as long, structured periods of exercise. If you have trouble finding time to exercise, try doing these things as part of your daily routine:  Get up, stretch, and walk around every 30 minutes throughout the day.  Go for a walk during your lunch break.  Park your car farther away from your destination.  If you take public transportation, get off one stop early and walk the rest of the way.  Make phone calls while standing up and walking around.  Take the stairs instead of elevators or escalators.  Wear comfortable clothes and shoes with good support.  Do not exercise so much that you hurt yourself, feel dizzy, or get very short of breath.  Where to  find more information  [place] Department of Health and Human Services: www.hhs.gov  Centers for Disease Control and Prevention: www.cdc.gov  Contact a health care provider:  Before starting a new exercise program.  If you have questions or concerns about your weight.  If you have a medical problem that keeps you from exercising.  Get help right away if:  You have any of the following while exercising:  Injury.  Dizziness.  Difficulty breathing or shortness of breath that does not go away when you stop exercising.  Chest pain.  Rapid heartbeat.  These symptoms may represent a serious problem that is an emergency. Do not wait to see if the symptoms will go away. Get medical help right away. Call your local emergency services (911 in the [place] Do not drive yourself to the hospital.  Summary  Getting regular exercise is especially important if you are overweight.  Being overweight increases your risk of heart disease, stroke, diabetes, high blood pressure, and several types of cancer.  Losing weight happens when you burn more calories than you eat.  Reducing the amount of calories you eat, and getting regular moderate or vigorous exercise each week, helps you lose weight.  This information is not intended to replace advice given to you by your health care provider. Make sure you discuss any questions you have with your health care provider.  Document Revised: 02/13/2022 Document Reviewed: 02/13/2022  Elsevier Patient Education © 2022 [employer]

## 2022-09-19 NOTE — PROGRESS NOTES
"Subjective   Sara Hoang is a 37 y.o. female.     History of Present Illness    Since the last visit, she has overall felt fairly well.  She has Migraine headaches and responding well to PRN triptan Rx and Migraine headaches and well controlled on suppression medication.  she has been compliant with current medications have reviewed them.  The patient denies medication side effects.  Will refill medications. /65 (BP Location: Left arm, Patient Position: Sitting, Cuff Size: Adult)   Pulse 80   Temp 97.5 °F (36.4 °C)   Resp 16   Ht 165.1 cm (65\")   Wt 101 kg (222 lb)   LMP 11/07/2021 (Exact Date)   SpO2 98%   BMI 36.94 kg/m²     Restart Bentyl ---works for IBS  Results for orders placed or performed during the hospital encounter of 08/08/22   COVID-19,[facility] IN-HOUSE [facility] NP SWAB IN TRANSPORT MEDIA 8-12 HR TAT - Swab, Nasopharynx    Specimen: Nasopharynx; Swab   Result Value Ref Range    COVID19 Not Detected Not Detected - Ref. Range   Group B Streptococcus Culture - Swab, Vaginal/Rectum    Specimen: Vaginal/Rectum; Swab   Result Value Ref Range    External Strep Group B Ag NEG    CBC (No Diff)    Specimen: Blood   Result Value Ref Range    WBC 13.47 (H) 3.40 - 10.80 10*3/mm3    RBC 4.04 3.77 - 5.28 10*6/mm3    Hemoglobin 11.6 (L) 12.0 - 15.9 g/dL    Hematocrit 35.6 34.0 - 46.6 %    MCV 88.1 79.0 - 97.0 fL    MCH 28.7 26.6 - 33.0 pg    MCHC 32.6 31.5 - 35.7 g/dL    RDW 15.8 (H) 12.3 - 15.4 %    RDW-SD 51.6 37.0 - 54.0 fl    MPV 9.9 6.0 - 12.0 fL    Platelets 243 140 - 450 10*3/mm3   Comprehensive Metabolic Panel    Specimen: Blood   Result Value Ref Range    Glucose 107 (H) 65 - 99 mg/dL    BUN 7 6 - 20 mg/dL    Creatinine 0.68 0.57 - 1.00 mg/dL    Sodium 137 136 - 145 mmol/L    Potassium 3.6 3.5 - 5.2 mmol/L    Chloride 102 98 - 107 mmol/L    CO2 21.5 (L) 22.0 - 29.0 mmol/L    Calcium 9.0 8.6 - 10.5 mg/dL    Total Protein 6.0 6.0 - 8.5 g/dL    Albumin 3.60 3.50 - 5.20 g/dL    ALT (SGPT) 8 1 - " 33 U/L    AST (SGOT) 9 1 - 32 U/L    Alkaline Phosphatase 144 (H) 39 - 117 U/L    Total Bilirubin 0.3 0.0 - 1.2 mg/dL    Globulin 2.4 gm/dL    A/G Ratio 1.5 g/dL    BUN/Creatinine Ratio 10.3 7.0 - 25.0    Anion Gap 13.5 5.0 - 15.0 mmol/L    eGFR 116.6 >60.0 mL/min/1.73   Hepatitis C Antibody    Specimen: Blood   Result Value Ref Range    External Hepatitis C Ab neg    Hepatitis B Surface Antigen    Specimen: Blood   Result Value Ref Range    External Hepatitis B Surface Ag Negative    RPR    Specimen: Blood   Result Value Ref Range    External RPR Negative    Rubella Antibody, IgG    Specimen: Blood   Result Value Ref Range    External Rubella Qual Immune    HIV-1 Antibody, EIA    Specimen: Blood   Result Value Ref Range    External HIV Antibody Non-Reactive    CBC Auto Differential    Specimen: Blood   Result Value Ref Range    WBC 12.09 (H) 3.40 - 10.80 10*3/mm3    RBC 3.51 (L) 3.77 - 5.28 10*6/mm3    Hemoglobin 10.1 (L) 12.0 - 15.9 g/dL    Hematocrit 31.5 (L) 34.0 - 46.6 %    MCV 89.7 79.0 - 97.0 fL    MCH 28.8 26.6 - 33.0 pg    MCHC 32.1 31.5 - 35.7 g/dL    RDW 16.4 (H) 12.3 - 15.4 %    RDW-SD 53.8 37.0 - 54.0 fl    MPV 9.6 6.0 - 12.0 fL    Platelets 193 140 - 450 10*3/mm3    Neutrophil % 66.3 42.7 - 76.0 %    Lymphocyte % 27.0 19.6 - 45.3 %    Monocyte % 4.4 (L) 5.0 - 12.0 %    Eosinophil % 1.3 0.3 - 6.2 %    Basophil % 0.3 0.0 - 1.5 %    Immature Grans % 0.7 (H) 0.0 - 0.5 %    Neutrophils, Absolute 8.00 (H) 1.70 - 7.00 10*3/mm3    Lymphocytes, Absolute 3.27 (H) 0.70 - 3.10 10*3/mm3    Monocytes, Absolute 0.53 0.10 - 0.90 10*3/mm3    Eosinophils, Absolute 0.16 0.00 - 0.40 10*3/mm3    Basophils, Absolute 0.04 0.00 - 0.20 10*3/mm3    Immature Grans, Absolute 0.09 (H) 0.00 - 0.05 10*3/mm3    nRBC 0.0 0.0 - 0.2 /100 WBC   Type & Screen    Specimen: Blood   Result Value Ref Range    ABO Type B     RH type Negative     Antibody Screen Negative     T&S Expiration Date 8/11/2022 11:59:59 PM    Sara Hoang female 37  "y.o., /65 (BP Location: Left arm, Patient Position: Sitting, Cuff Size: Adult)   Pulse 80   Temp 97.5 °F (36.4 °C)   Resp 16   Ht 165.1 cm (65\")   Wt 101 kg (222 lb)   LMP 11/07/2021 (Exact Date)   SpO2 98%   BMI 36.94 kg/m²   who presents today for follow up of Anxiety.  She reports medication is working well, patient desires to continue on Rx, and needs refill. Onset of symptoms was approximately several years ago. She denies current suicidal and homicidal ideation. Risk factors are family history of anxiety and or depression and lifestyle of multiple roles.  Previous treatment includes current Rx. She complains of the following medication side effects:none. The patient declines to go to counseling..    Still teacher at [employer]  OB to start OCP=--watch migraines  Saw ---DR Ortega 1-18-22    Assessment:   1.  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  2.  Minimal thumb tremor  3.  Migraine headaches  Plan:  1.  Since the patient is pregnant there is very little to do at this point.  I think it is probably best to have her seen by movement disorder subspecialty since I believe the best treatment option is Botox as opposed to muscle relaxant type medications which all are typically sedative.  2.  We will send consult to [facility] disorders and she can consider setting up an appointment perhaps after delivery of her baby.     Had baby 6 weeks ago---2nd child  No longer breast feeding    Had iron infusions during pregnancy  The following portions of the patient's history were reviewed and updated as appropriate: allergies, current medications, past family history, past medical history, past social history, past surgical history and problem " list.    Review of Systems   Constitutional: Negative for activity change, appetite change and unexpected weight change.   HENT: Negative for nosebleeds and trouble swallowing.    Eyes: Negative for pain and visual disturbance.   Respiratory: Negative for chest tightness, shortness of breath and wheezing.    Cardiovascular: Negative for chest pain and palpitations.   Gastrointestinal: Negative for abdominal pain and blood in stool.   Endocrine: Negative.    Genitourinary: Negative for difficulty urinating and hematuria.   Musculoskeletal: Negative for joint swelling.   Skin: Negative for color change and rash.   Allergic/Immunologic: Negative.    Neurological: Negative for syncope and speech difficulty.   Hematological: Negative for adenopathy.   Psychiatric/Behavioral: Negative for agitation and confusion.   All other systems reviewed and are negative.      Objective   Physical Exam  Vitals and nursing note reviewed.   Constitutional:       General: She is not in acute distress.     Appearance: She is well-developed. She is not ill-appearing or toxic-appearing.   HENT:      Head: Normocephalic.      Right Ear: External ear normal.      Left Ear: External ear normal.      Nose: Nose normal.      Mouth/Throat:      Pharynx: Oropharynx is clear.   Eyes:      General: No scleral icterus.     Conjunctiva/sclera: Conjunctivae normal.      Pupils: Pupils are equal, round, and reactive to light.   Neck:      Thyroid: No thyromegaly.      Vascular: No carotid bruit.   Cardiovascular:      Rate and Rhythm: Normal rate and regular rhythm.      Heart sounds: Normal heart sounds. No murmur heard.  Pulmonary:      Effort: Pulmonary effort is normal. No respiratory distress.      Breath sounds: Normal breath sounds. No rales.   Musculoskeletal:         General: No deformity. Normal range of motion.      Cervical back: Normal range of motion and neck supple.   Skin:     General: Skin is warm and dry.      Findings: No rash.    Neurological:      General: No focal deficit present.      Mental Status: She is alert and oriented to person, place, and time. Mental status is at baseline.      Coordination: Coordination abnormal (neck).   Psychiatric:         Mood and Affect: Mood normal.         Behavior: Behavior normal.         Thought Content: Thought content normal.         Judgment: Judgment normal.         Assessment & Plan   Diagnoses and all orders for this visit:    1. Migraine with aura and without status migrainosus, not intractable (Primary)    2. Mild intermittent asthma without complication    3. Anxiety    4. Irritable bowel syndrome with diarrhea    Other orders  -     sertraline (ZOLOFT) 100 MG tablet; 1 PO daily for stress  Dispense: 90 tablet; Refill: 3  -     topiramate (TOPAMAX) 50 MG tablet; 1 PO in AM and 2 PO in PM for migraine prevention  Dispense: 270 tablet; Refill: 3  -     rizatriptan (Maxalt) 10 MG tablet; Take 1 tablet by mouth 1 (One) Time As Needed for Migraine for up to 1 dose. May repeat in 2 hours if needed  Dispense: 24 tablet; Refill: 3      Already restarted Topamax 50mg---plan taper weekly up to 50mg AM ---100mg PM--works  Restart Maxalt for acute migraine---works  Restart Bentyl ---works for IBS  Still abn neck movements---let neuro know --- contact Dr. Ortega about going to the movement disorder clinic he suggested at last visit  No hx renal stones  Refill Bentyl works well as needed for IBS  Plan, Sara D Natalya, was seen today.  she was seen for Migraine headaches and will continue with their PRN triptan and Migraine headaches and well controlled on their suppressive medication.  Continue Zoloft working well for anxiety     Answers for HPI/ROS submitted by the patient on 9/18/2022  Please describe your symptoms.: Migraine medication refills and would like to discuss weightloss options.  Have you had these symptoms before?: Yes  How long have you been having these symptoms?: Greater than 2  weeks  Please list any medications you are currently taking for this condition.: Topamax  What is the primary reason for your visit?: Other

## 2022-11-01 ENCOUNTER — APPOINTMENT (OUTPATIENT)
Dept: ULTRASOUND IMAGING | Facility: HOSPITAL | Age: 37
End: 2022-11-01

## 2022-11-01 ENCOUNTER — HOSPITAL ENCOUNTER (EMERGENCY)
Facility: HOSPITAL | Age: 37
Discharge: HOME | End: 2022-11-01
Attending: EMERGENCY MEDICINE | Admitting: EMERGENCY MEDICINE

## 2022-11-01 VITALS
SYSTOLIC BLOOD PRESSURE: 108 MMHG | OXYGEN SATURATION: 100 % | RESPIRATION RATE: 18 BRPM | DIASTOLIC BLOOD PRESSURE: 66 MMHG | TEMPERATURE: 97 F | HEART RATE: 68 BPM

## 2022-11-01 DIAGNOSIS — R11.2 NAUSEA AND VOMITING, UNSPECIFIED VOMITING TYPE: ICD-10-CM

## 2022-11-01 DIAGNOSIS — R10.11 RUQ ABDOMINAL PAIN: Primary | ICD-10-CM

## 2022-11-01 LAB
ALBUMIN SERPL-MCNC: 4.5 G/DL (ref 3.5–5.2)
ALBUMIN/GLOB SERPL: 1.5 G/DL
ALP SERPL-CCNC: 112 U/L (ref 39–117)
ALT SERPL W P-5'-P-CCNC: 7 U/L (ref 1–33)
ANION GAP SERPL CALCULATED.3IONS-SCNC: 19 MMOL/L (ref 5–15)
AST SERPL-CCNC: 12 U/L (ref 1–32)
BACTERIA UR QL AUTO: ABNORMAL /HPF
BASOPHILS # BLD AUTO: 0.04 10*3/MM3 (ref 0–0.2)
BASOPHILS NFR BLD AUTO: 0.4 % (ref 0–1.5)
BILIRUB SERPL-MCNC: 0.7 MG/DL (ref 0–1.2)
BILIRUB UR QL STRIP: NEGATIVE
BUN SERPL-MCNC: 9 MG/DL (ref 6–20)
BUN/CREAT SERPL: 10.1 (ref 7–25)
CALCIUM SPEC-SCNC: 8.9 MG/DL (ref 8.6–10.5)
CHLORIDE SERPL-SCNC: 107 MMOL/L (ref 98–107)
CLARITY UR: ABNORMAL
CO2 SERPL-SCNC: 18 MMOL/L (ref 22–29)
COLOR UR: YELLOW
CREAT SERPL-MCNC: 0.89 MG/DL (ref 0.57–1)
DEPRECATED RDW RBC AUTO: 43.8 FL (ref 37–54)
EGFRCR SERPLBLD CKD-EPI 2021: 86.3 ML/MIN/1.73
EOSINOPHIL # BLD AUTO: 0.09 10*3/MM3 (ref 0–0.4)
EOSINOPHIL NFR BLD AUTO: 0.8 % (ref 0.3–6.2)
ERYTHROCYTE [DISTWIDTH] IN BLOOD BY AUTOMATED COUNT: 13.5 % (ref 12.3–15.4)
GLOBULIN UR ELPH-MCNC: 3 GM/DL
GLUCOSE SERPL-MCNC: 85 MG/DL (ref 65–99)
GLUCOSE UR STRIP-MCNC: NEGATIVE MG/DL
HCG SERPL QL: NEGATIVE
HCT VFR BLD AUTO: 39.9 % (ref 34–46.6)
HGB BLD-MCNC: 13.1 G/DL (ref 12–15.9)
HGB UR QL STRIP.AUTO: ABNORMAL
HOLD SPECIMEN: NORMAL
HYALINE CASTS UR QL AUTO: ABNORMAL /LPF
IMM GRANULOCYTES # BLD AUTO: 0.05 10*3/MM3 (ref 0–0.05)
IMM GRANULOCYTES NFR BLD AUTO: 0.5 % (ref 0–0.5)
KETONES UR QL STRIP: ABNORMAL
LEUKOCYTE ESTERASE UR QL STRIP.AUTO: ABNORMAL
LIPASE SERPL-CCNC: 42 U/L (ref 13–60)
LYMPHOCYTES # BLD AUTO: 1.92 10*3/MM3 (ref 0.7–3.1)
LYMPHOCYTES NFR BLD AUTO: 17.5 % (ref 19.6–45.3)
MCH RBC QN AUTO: 29.2 PG (ref 26.6–33)
MCHC RBC AUTO-ENTMCNC: 32.8 G/DL (ref 31.5–35.7)
MCV RBC AUTO: 88.9 FL (ref 79–97)
MONOCYTES # BLD AUTO: 0.44 10*3/MM3 (ref 0.1–0.9)
MONOCYTES NFR BLD AUTO: 4 % (ref 5–12)
NEUTROPHILS NFR BLD AUTO: 76.8 % (ref 42.7–76)
NEUTROPHILS NFR BLD AUTO: 8.41 10*3/MM3 (ref 1.7–7)
NITRITE UR QL STRIP: NEGATIVE
NRBC BLD AUTO-RTO: 0 /100 WBC (ref 0–0.2)
PH UR STRIP.AUTO: 5.5 [PH] (ref 5–8)
PLATELET # BLD AUTO: 313 10*3/MM3 (ref 140–450)
PMV BLD AUTO: 9.8 FL (ref 6–12)
POTASSIUM SERPL-SCNC: 3.6 MMOL/L (ref 3.5–5.2)
PROT SERPL-MCNC: 7.5 G/DL (ref 6–8.5)
PROT UR QL STRIP: ABNORMAL
RBC # BLD AUTO: 4.49 10*6/MM3 (ref 3.77–5.28)
RBC # UR STRIP: ABNORMAL /HPF
REF LAB TEST METHOD: ABNORMAL
SODIUM SERPL-SCNC: 144 MMOL/L (ref 136–145)
SP GR UR STRIP: 1.02 (ref 1–1.03)
SQUAMOUS #/AREA URNS HPF: ABNORMAL /HPF
UROBILINOGEN UR QL STRIP: ABNORMAL
WBC # UR STRIP: ABNORMAL /HPF
WBC NRBC COR # BLD: 10.95 10*3/MM3 (ref 3.4–10.8)
WHOLE BLOOD HOLD COAG: NORMAL
WHOLE BLOOD HOLD SPECIMEN: NORMAL

## 2022-11-01 PROCEDURE — 96374 THER/PROPH/DIAG INJ IV PUSH: CPT

## 2022-11-01 PROCEDURE — 25010000002 ONDANSETRON PER 1 MG: Performed by: PHYSICIAN ASSISTANT

## 2022-11-01 PROCEDURE — 76705 ECHO EXAM OF ABDOMEN: CPT

## 2022-11-01 PROCEDURE — 85025 COMPLETE CBC W/AUTO DIFF WBC: CPT | Performed by: EMERGENCY MEDICINE

## 2022-11-01 PROCEDURE — 84703 CHORIONIC GONADOTROPIN ASSAY: CPT | Performed by: EMERGENCY MEDICINE

## 2022-11-01 PROCEDURE — 83690 ASSAY OF LIPASE: CPT | Performed by: EMERGENCY MEDICINE

## 2022-11-01 PROCEDURE — 81001 URINALYSIS AUTO W/SCOPE: CPT | Performed by: EMERGENCY MEDICINE

## 2022-11-01 PROCEDURE — 99284 EMERGENCY DEPT VISIT MOD MDM: CPT

## 2022-11-01 PROCEDURE — 80053 COMPREHEN METABOLIC PANEL: CPT | Performed by: EMERGENCY MEDICINE

## 2022-11-01 PROCEDURE — 99283 EMERGENCY DEPT VISIT LOW MDM: CPT

## 2022-11-01 RX ORDER — SODIUM CHLORIDE 0.9 % (FLUSH) 0.9 %
10 SYRINGE (ML) INJECTION AS NEEDED
Status: DISCONTINUED | OUTPATIENT
Start: 2022-11-01 | End: 2022-11-01 | Stop reason: HOSPADM

## 2022-11-01 RX ORDER — ONDANSETRON 2 MG/ML
4 INJECTION INTRAMUSCULAR; INTRAVENOUS ONCE
Status: COMPLETED | OUTPATIENT
Start: 2022-11-01 | End: 2022-11-01

## 2022-11-01 RX ORDER — ONDANSETRON 4 MG/1
4 TABLET, ORALLY DISINTEGRATING ORAL 4 TIMES DAILY PRN
Qty: 15 TABLET | Refills: 0 | Status: SHIPPED | OUTPATIENT
Start: 2022-11-01 | End: 2022-12-06

## 2022-11-01 RX ADMIN — SODIUM CHLORIDE 1000 ML: 9 INJECTION, SOLUTION INTRAVENOUS at 11:24

## 2022-11-01 RX ADMIN — ONDANSETRON 4 MG: 2 INJECTION INTRAMUSCULAR; INTRAVENOUS at 11:24

## 2022-11-01 NOTE — ED PROVIDER NOTES
MD ATTESTATION NOTE    The [facility] and I have discussed this patient's history, physical exam, and treatment plan.  I have reviewed the documentation and personally had a face to face interaction with the patient. I affirm the documentation and agree with the treatment and plan.  The attached note describes my personal findings.      I provided a substantive portion of the care of the patient.  I personally performed the physical exam in its entirety, and below are my findings.  For this patient encounter, the patient wore surgical mask, I wore full protective PPE including N95 and eye protection    Brief HPI: 37-year-old female who presents the emergency room with 6 days of nausea vomiting which is progressed to right upper quadrant pain for the past 2 days.  She states the pain radiated to her right upper back.  Patient states she is approximately 7 weeks postpartum.    General : 37-year-old patient is awake alert and oriented  HEENT: NCAT  CV: Heart is regular with no murmurs  Respiratory: CTA bilaterally  Abd: Right upper quadrant pain with no guarding or rebound and diminished bowel sounds  Ext: No acute abnormalities  Skin: No rash  Neuro: Cranial nerves II through XII grossly intact as tested.  No acute lateralizing deficits.  Psych: Normal mood and affect      Plan: We will check labs, urinalysis and ultrasound for further evaluation  The patient does appear to be mildly dehydrated with 3+ ketones in her urine.  We will treat the patient with IV fluids and Zofran.  Her gallbladder ultrasound showed sludge.  We discussed the case with Dr. Doty from surgery feels the patient is stable for discharge and outpatient follow-up in the office this week.  The patient understands and agrees with the plan.     Kevin Estrada MD  11/01/22 [id number]

## 2022-11-01 NOTE — ED PROVIDER NOTES
EMERGENCY DEPARTMENT ENCOUNTER    Room Number:  02/02  Date of encounter:  11/1/2022  PCP: Lashae Hercules, CHRIS  Historian: Patient      I used full protective equipment while examining this patient.  This includes face mask, gloves and protective eyewear.  I washed my hands before entering the room and immediately upon leaving the room      HPI:  Chief Complaint: Abdominal pain  A complete HPI/ROS/PMH/PSH/SH/FH are unobtainable due to: Nothing    Context: Sara Hoang is a 37 y.o. female who presents to the ED c/o approximate 6-day history of generalized nausea, vomiting, malaise, as well as 2-day history of right upper quadrant abdominal pain.  The pain radiates through to her right upper back.  She states pain is crampy, moderate, intermittent.  At this time the pain is slightly improved.  She denies any postprandial worsening.  She denies any precipitating or alleviating factors.  She is approximately 7 weeks postpartum.  She denies have any prior issues with her gallbladder.  She denies any fevers, chills, diarrhea.  She states the vomiting has been the worst symptom.    Review of Medical Records  I reviewed admission with spontaneous vaginal delivery on 8/9/2022.    PAST MEDICAL HISTORY  Active Ambulatory Problems     Diagnosis Date Noted   • Migraine headache 12/05/1995   • Asthma 11/27/2015   • Anxiety 11/27/2015   • Insomnia 11/27/2015   • Hyperemesis gravidarum 03/18/2019   • Encounter for supervision of low-risk first pregnancy in first trimester 03/18/2019   • Use of letrozole (Femara) 03/18/2019   • Rh negative status during pregnancy in first trimester 03/18/2019   • Hyperemesis affecting pregnancy, antepartum 05/31/2019   • Vaginal bleeding affecting early pregnancy 06/29/2019   • Low folic acid 02/18/2020   • Moderate persistent asthma with acute exacerbation 10/20/2021   • Pregnancy 03/04/2022   • Anemia of pregnancy 06/29/2022     Resolved Ambulatory Problems     Diagnosis Date Noted   • Pregnancy  03/30/2019     Past Medical History:   Diagnosis Date   • Abnormal Pap smear of cervix    • Anemia    • Benign hematuria 2010   • Colon polyps 02/11/2021   • Colon polyps 2017   • Dysmenorrhea    • Endometriosis    • Foot fracture    • Fracture of spine, lumbar, without spinal cord injury, closed (HCC) 01/02/2014   • Herpes    • Infertility associated with anovulation    • Migraine    • Palpitations    • Pelvic pain    • PVC (premature ventricular contraction)    • Urinary tract infection          PAST SURGICAL HISTORY  Past Surgical History:   Procedure Laterality Date   • COLONOSCOPY W/ POLYPECTOMY N/A 02/11/2021    [facility], Dr. Bardales, repeat in 5 years   • COLONOSCOPY W/ POLYPECTOMY N/A 2017    Dr. Hills   • COLPOSCOPY  2009    neg   • CYSTOSCOPY     • DIAGNOSTIC LAPAROSCOPY N/A 12/2018    and chromopertubation    • ENDOSCOPY N/A 12/17/2012    Dr. Hills:  rings in lower esophagus. erosions in stomach   • PERIPHERALLY INSERTED CENTRAL CATHETER INSERTION N/A 2019   • TONSILLECTOMY Bilateral 2014         FAMILY HISTORY  Family History   Problem Relation Age of Onset   • Breast cancer Mother    • Prostate cancer Father    • Migraines Father    • Hypertension Father    • Seizures Sister    • Heart disease Maternal Grandmother    • Diabetes Maternal Grandmother    • Colon cancer Maternal Grandfather    • Hypertension Paternal Grandfather          SOCIAL HISTORY  Social History     Socioeconomic History   • Marital status:      Spouse name: Stephane   • Number of children: 1   • Years of education: 18   • Highest education level: Master's degree (e.g., MA, MS, Yemi, MEd, MSW, SUSIE)   Tobacco Use   • Smoking status: Never   • Smokeless tobacco: Never   Vaping Use   • Vaping Use: Never used   Substance and Sexual Activity   • Alcohol use: No   • Drug use: No   • Sexual activity: Defer         ALLERGIES  Biaxin [clarithromycin], Ceclor [cefaclor], Penicillins, and Sulfa antibiotics        REVIEW OF  SYSTEMS  All systems reviewed and negative except for those discussed in HPI.       PHYSICAL EXAM    I have reviewed the triage vital signs and nursing notes.    ED Triage Vitals [11/01/22 1039]   Temp Heart Rate Resp BP SpO2   97 °F (36.1 °C) 112 18 130/85 96 %      Temp src Heart Rate Source Patient Position BP Location FiO2 (%)   -- -- -- -- --       Physical Exam  GENERAL: Alert, oriented, not distressed  HENT: head atraumatic, no nuchal rigidity  EYES: no scleral icterus, EOMI  CV: regular rhythm, regular rate, no murmur  RESPIRATORY: normal effort, CTA  ABDOMEN: soft, mild right upper quadrant abdominal tenderness without guarding or rebound.  Negative Berumen sign.  MUSCULOSKELETAL: no deformity, FROM, no calf swelling or tenderness  NEURO: alert, moves all extremities, follows commands  SKIN: warm, dry        LAB RESULTS  Recent Results (from the past 24 hour(s))   Comprehensive Metabolic Panel    Collection Time: 11/01/22 11:02 AM    Specimen: Blood   Result Value Ref Range    Glucose 85 65 - 99 mg/dL    BUN 9 6 - 20 mg/dL    Creatinine 0.89 0.57 - 1.00 mg/dL    Sodium 144 136 - 145 mmol/L    Potassium 3.6 3.5 - 5.2 mmol/L    Chloride 107 98 - 107 mmol/L    CO2 18.0 (L) 22.0 - 29.0 mmol/L    Calcium 8.9 8.6 - 10.5 mg/dL    Total Protein 7.5 6.0 - 8.5 g/dL    Albumin 4.50 3.50 - 5.20 g/dL    ALT (SGPT) 7 1 - 33 U/L    AST (SGOT) 12 1 - 32 U/L    Alkaline Phosphatase 112 39 - 117 U/L    Total Bilirubin 0.7 0.0 - 1.2 mg/dL    Globulin 3.0 gm/dL    A/G Ratio 1.5 g/dL    BUN/Creatinine Ratio 10.1 7.0 - 25.0    Anion Gap 19.0 (H) 5.0 - 15.0 mmol/L    eGFR 86.3 >60.0 mL/min/1.73   Lipase    Collection Time: 11/01/22 11:02 AM    Specimen: Blood   Result Value Ref Range    Lipase 42 13 - 60 U/L   hCG, Serum, Qualitative    Collection Time: 11/01/22 11:02 AM    Specimen: Blood   Result Value Ref Range    HCG Qualitative Negative Negative   Green Top (Gel)    Collection Time: 11/01/22 11:02 AM   Result Value Ref Range     Extra Tube Hold for add-ons.    Lavender Top    Collection Time: 11/01/22 11:02 AM   Result Value Ref Range    Extra Tube hold for add-on    Light Blue Top    Collection Time: 11/01/22 11:02 AM   Result Value Ref Range    Extra Tube Hold for add-ons.    CBC Auto Differential    Collection Time: 11/01/22 11:02 AM    Specimen: Blood   Result Value Ref Range    WBC 10.95 (H) 3.40 - 10.80 10*3/mm3    RBC 4.49 3.77 - 5.28 10*6/mm3    Hemoglobin 13.1 12.0 - 15.9 g/dL    Hematocrit 39.9 34.0 - 46.6 %    MCV 88.9 79.0 - 97.0 fL    MCH 29.2 26.6 - 33.0 pg    MCHC 32.8 31.5 - 35.7 g/dL    RDW 13.5 12.3 - 15.4 %    RDW-SD 43.8 37.0 - 54.0 fl    MPV 9.8 6.0 - 12.0 fL    Platelets 313 140 - 450 10*3/mm3    Neutrophil % 76.8 (H) 42.7 - 76.0 %    Lymphocyte % 17.5 (L) 19.6 - 45.3 %    Monocyte % 4.0 (L) 5.0 - 12.0 %    Eosinophil % 0.8 0.3 - 6.2 %    Basophil % 0.4 0.0 - 1.5 %    Immature Grans % 0.5 0.0 - 0.5 %    Neutrophils, Absolute 8.41 (H) 1.70 - 7.00 10*3/mm3    Lymphocytes, Absolute 1.92 0.70 - 3.10 10*3/mm3    Monocytes, Absolute 0.44 0.10 - 0.90 10*3/mm3    Eosinophils, Absolute 0.09 0.00 - 0.40 10*3/mm3    Basophils, Absolute 0.04 0.00 - 0.20 10*3/mm3    Immature Grans, Absolute 0.05 0.00 - 0.05 10*3/mm3    nRBC 0.0 0.0 - 0.2 /100 WBC   Urinalysis With Microscopic If Indicated (No Culture) - Urine, Clean Catch    Collection Time: 11/01/22 11:03 AM    Specimen: Urine, Clean Catch   Result Value Ref Range    Color, UA Yellow Yellow, Straw    Appearance, UA Cloudy (A) Clear    pH, UA 5.5 5.0 - 8.0    Specific Gravity, UA 1.023 1.005 - 1.030    Glucose, UA Negative Negative    Ketones, UA 80 mg/dL (3+) (A) Negative    Bilirubin, UA Negative Negative    Blood, UA Large (3+) (A) Negative    Protein, UA 30 mg/dL (1+) (A) Negative    Leuk Esterase, UA Trace (A) Negative    Nitrite, UA Negative Negative    Urobilinogen, UA 1.0 E.U./dL 0.2 - 1.0 E.U./dL   Urinalysis, Microscopic Only - Urine, Clean Catch    Collection Time:  11/01/22 11:03 AM    Specimen: Urine, Clean Catch   Result Value Ref Range    RBC, UA 6-12 (A) None Seen, 0-2 /HPF    WBC, UA 0-2 None Seen, 0-2 /HPF    Bacteria, UA 1+ (A) None Seen /HPF    Squamous Epithelial Cells, UA 0-2 None Seen, 0-2 /HPF    Hyaline Casts, UA 0-2 None Seen /LPF    Methodology Manual Light Microscopy        Ordered the above labs and independently reviewed the results.        RADIOLOGY  US Gallbladder    Result Date: 11/1/2022  US GALLBLADDER-  Clinical: Right upper quadrant pain, nausea, vomiting  FINDINGS: There is sludge layering within the dependent portion of the gallbladder. No gallbladder wall thickening or pericholecystic fluid nor biliary duct dilatation CBD is 3 mm.  The liver is satisfactory in size shape and echotexture. The right kidney is 9.9 cm in length and satisfactory in appearance, no calculus or obstructive uropathy. Tail of the pancreas is obscured due to gas within the overlying stomach, the remainder is normal in appearance. No free fluid is seen within the right upper quadrant.  CONCLUSION: Gallbladder sludge.  This report was finalized on 11/1/2022 1:24 PM by Dr. Manas Davis M.D.        I ordered the above noted radiological studies. Reviewed by me and discussed with radiologist.  See dictation for official radiology interpretation.      MEDICATIONS GIVEN IN ER    Medications   sodium chloride 0.9 % flush 10 mL (has no administration in time range)   sodium chloride 0.9 % bolus 1,000 mL (0 mL Intravenous Stopped 11/1/22 1420)   ondansetron (ZOFRAN) injection 4 mg (4 mg Intravenous Given 11/1/22 1124)         PROGRESS, DATA ANALYSIS, CONSULTS, AND MEDICAL DECISION MAKING    All labs have been independently reviewed by me.  All radiology studies have been reviewed by me and discussed with radiologist dictating the report.   EKG's independently viewed and interpreted by me.  Discussion below represents my analysis of pertinent findings related to patient's condition,  differential diagnosis, treatment plan and final disposition.    I have discussed case with Dr. Estrada, emergency room physician.  He has performed his own bedside examination and agrees with treatment plan.    ED Course as of 11/01/22 1614 Tue Nov 01, 2022 1122 Patient presents with right upper quadrant abdominal pain, nausea, vomiting times several days.  Patient is approximately 7 weeks postpartum.  Differential diagnoses include not limited to acute cholecystitis, symptomatic cholelithiasis, gastritis. [EE]   1123 Ketones, UA(!): 80 mg/dL (3+) [EE]   1408 WBC(!): 10.95 [EE]   1408 Total Bilirubin: 0.7 [EE]   1408 I discussed the patient with Dr. Doty, general surgery.  She has reviewed the ultrasound imaging.  Patient does not have acute cholecystitis or concerning cholelithiasis.  She agrees to see her in follow-up this week.    I have discussed this with the patient.  We will call her in a prescription for Zofran.  She understands to return for any worsening symptoms [EE]      ED Course User Index  [EE] Deniz Gregory PA       AS OF 16:14 EDT VITALS:    BP - 108/66  HR - 68  TEMP - 97 °F (36.1 °C)  O2 SATS - 100%        DIAGNOSIS  Final diagnoses:   RUQ abdominal pain   Nausea and vomiting, unspecified vomiting type         DISPOSITION  Discharged      Dictated utilizing Dragon dictation     Deniz Gregory PA  11/01/22 1614

## 2022-11-01 NOTE — ED TRIAGE NOTES
Patient to ER via car from home for n/v x 1 week with RLQ pain starting today    Patient had vaginal delivery 10 weeks ago  Patient wearing mask this RN in PPE

## 2022-11-15 ENCOUNTER — OFFICE VISIT (OUTPATIENT)
Dept: SURGERY | Facility: CLINIC | Age: 37
End: 2022-11-15

## 2022-11-15 VITALS — BODY MASS INDEX: 35.62 KG/M2 | HEIGHT: 65 IN | WEIGHT: 213.8 LBS

## 2022-11-15 DIAGNOSIS — R10.11 RUQ ABDOMINAL PAIN: Primary | ICD-10-CM

## 2022-11-15 DIAGNOSIS — K82.8 GALLBLADDER SLUDGE: ICD-10-CM

## 2022-11-15 DIAGNOSIS — R11.2 NAUSEA AND VOMITING, UNSPECIFIED VOMITING TYPE: ICD-10-CM

## 2022-11-15 PROCEDURE — 99213 OFFICE O/P EST LOW 20 MIN: CPT | Performed by: SURGERY

## 2022-11-15 RX ORDER — CLINDAMYCIN PHOSPHATE 900 MG/50ML
900 INJECTION INTRAVENOUS ONCE
Status: CANCELLED | OUTPATIENT
Start: 2022-11-22 | End: 2022-11-15

## 2022-11-16 ENCOUNTER — PRE-ADMISSION TESTING (OUTPATIENT)
Dept: PREADMISSION TESTING | Facility: HOSPITAL | Age: 37
End: 2022-11-16

## 2022-11-16 VITALS
RESPIRATION RATE: 16 BRPM | SYSTOLIC BLOOD PRESSURE: 119 MMHG | HEART RATE: 79 BPM | DIASTOLIC BLOOD PRESSURE: 78 MMHG | TEMPERATURE: 97 F | WEIGHT: 220 LBS | BODY MASS INDEX: 36.65 KG/M2 | OXYGEN SATURATION: 98 % | HEIGHT: 65 IN

## 2022-11-16 LAB
ALBUMIN SERPL-MCNC: 4 G/DL (ref 3.5–5.2)
ALBUMIN/GLOB SERPL: 1.4 G/DL
ALP SERPL-CCNC: 112 U/L (ref 39–117)
ALT SERPL W P-5'-P-CCNC: 8 U/L (ref 1–33)
ANION GAP SERPL CALCULATED.3IONS-SCNC: 11 MMOL/L (ref 5–15)
AST SERPL-CCNC: 8 U/L (ref 1–32)
BASOPHILS # BLD AUTO: 0.03 10*3/MM3 (ref 0–0.2)
BASOPHILS NFR BLD AUTO: 0.4 % (ref 0–1.5)
BILIRUB SERPL-MCNC: 0.3 MG/DL (ref 0–1.2)
BUN SERPL-MCNC: 8 MG/DL (ref 6–20)
BUN/CREAT SERPL: 8.4 (ref 7–25)
CALCIUM SPEC-SCNC: 9 MG/DL (ref 8.6–10.5)
CHLORIDE SERPL-SCNC: 110 MMOL/L (ref 98–107)
CO2 SERPL-SCNC: 22 MMOL/L (ref 22–29)
CREAT SERPL-MCNC: 0.95 MG/DL (ref 0.57–1)
DEPRECATED RDW RBC AUTO: 46.6 FL (ref 37–54)
EGFRCR SERPLBLD CKD-EPI 2021: 79.8 ML/MIN/1.73
EOSINOPHIL # BLD AUTO: 0.1 10*3/MM3 (ref 0–0.4)
EOSINOPHIL NFR BLD AUTO: 1.4 % (ref 0.3–6.2)
ERYTHROCYTE [DISTWIDTH] IN BLOOD BY AUTOMATED COUNT: 14.2 % (ref 12.3–15.4)
GLOBULIN UR ELPH-MCNC: 2.8 GM/DL
GLUCOSE SERPL-MCNC: 96 MG/DL (ref 65–99)
HCG SERPL QL: NEGATIVE
HCT VFR BLD AUTO: 40 % (ref 34–46.6)
HGB BLD-MCNC: 12.8 G/DL (ref 12–15.9)
IMM GRANULOCYTES # BLD AUTO: 0.02 10*3/MM3 (ref 0–0.05)
IMM GRANULOCYTES NFR BLD AUTO: 0.3 % (ref 0–0.5)
LYMPHOCYTES # BLD AUTO: 2.76 10*3/MM3 (ref 0.7–3.1)
LYMPHOCYTES NFR BLD AUTO: 37.3 % (ref 19.6–45.3)
MCH RBC QN AUTO: 28.8 PG (ref 26.6–33)
MCHC RBC AUTO-ENTMCNC: 32 G/DL (ref 31.5–35.7)
MCV RBC AUTO: 90.1 FL (ref 79–97)
MONOCYTES # BLD AUTO: 0.37 10*3/MM3 (ref 0.1–0.9)
MONOCYTES NFR BLD AUTO: 5 % (ref 5–12)
NEUTROPHILS NFR BLD AUTO: 4.12 10*3/MM3 (ref 1.7–7)
NEUTROPHILS NFR BLD AUTO: 55.6 % (ref 42.7–76)
NRBC BLD AUTO-RTO: 0 /100 WBC (ref 0–0.2)
PLATELET # BLD AUTO: 272 10*3/MM3 (ref 140–450)
PMV BLD AUTO: 9.6 FL (ref 6–12)
POTASSIUM SERPL-SCNC: 4.2 MMOL/L (ref 3.5–5.2)
PROT SERPL-MCNC: 6.8 G/DL (ref 6–8.5)
RBC # BLD AUTO: 4.44 10*6/MM3 (ref 3.77–5.28)
SODIUM SERPL-SCNC: 143 MMOL/L (ref 136–145)
WBC NRBC COR # BLD: 7.4 10*3/MM3 (ref 3.4–10.8)

## 2022-11-16 PROCEDURE — 85025 COMPLETE CBC W/AUTO DIFF WBC: CPT | Performed by: SURGERY

## 2022-11-16 PROCEDURE — 36415 COLL VENOUS BLD VENIPUNCTURE: CPT

## 2022-11-16 PROCEDURE — 84703 CHORIONIC GONADOTROPIN ASSAY: CPT

## 2022-11-16 PROCEDURE — 80053 COMPREHEN METABOLIC PANEL: CPT | Performed by: SURGERY

## 2022-11-16 NOTE — H&P (VIEW-ONLY)
General Surgery  Established Patient Office Visit    CC: Abdominal pain, nausea, vomiting    HPI: The patient is a pleasant 37 y.o. year-old lady who returns to see me today for evaluation of 3 weeks worth of constant but progressively worsening nausea and an 11 pound weight loss as a result of this.  She has had sporadic vomiting and nonbloody diarrhea during that same timeframe.  Additionally she has noted intermittent right upper quadrant stabbing abdominal pain that radiates into her back and right shoulder blade.  Eating always exacerbates her symptoms.  She had a recent gallbladder ultrasound which demonstrated sludge but no evidence of acute cholecystitis or biliary dilatation.  Her mother has a history of gallbladder pathology requiring cholecystectomy.  Work-up of her symptoms also involved visit to the emergency room on 11/1 with outpatient referral to see me.    Past Medical History:   Asthma  Anxiety  Migraine headaches  History of colon polyps    Past Surgical History:   Tonsillectomy  Diagnostic laparoscopy for possible endometriosis (2018)  Colonoscopy x2 (2016 by Dr. Hills, 2021 by Dr. Bardales)    Medications:   Dicyclomine 10 mg as needed  Vitamin D3 1000 units daily  Topamax 50 mg nightly  Sertraline 100 mg daily  Culturelle probiotic once daily  Rizatriptan 10 mg as needed for migraine headaches    Allergies: Penicillin, sulfa antibiotics, and Ceclor all cause hives; Biaxin causes swelling of her eyelids    Family History: Mother with history of breast cancer and gallbladder pathology requiring cholecystectomy, father with history of prostate cancer    Social History: , works as a teacher for [employer], non-smoker, no regular alcohol use    ROS:   Constitutional: Positive for unintentional weight loss; negative for fevers or chills  HENT: Negative for hearing loss or runny nose  Eyes: Negative for vision changes or scleral icterus  Respiratory: Negative for cough or  shortness of breath  Cardiovascular: Negative for chest pain or heart palpitations  Gastrointestinal: Positive for nausea, vomiting, diarrhea, and abdominal pain; negative for abdominal distension, constipation, melena, or hematochezia  Genitourinary: Negative for hematuria or dysuria  Musculoskeletal: Negative for joint swelling or gait instability  Neurologic: Negative for tremors or seizures  Psychiatric: Negative for suicidal ideations or agitation  All other systems reviewed and negative    Physical Exam:  Height: 165 cm  Weight: 97 kg  BMI: 35  General: No acute distress, well-nourished & well-developed  HEAD: normocephalic, atraumatic  EYES: normal conjunctiva, sclera anicteric  EARS: grossly normal hearing  NECK: supple, no thyromegaly  CARDIOVASCULAR: regular rate and rhythm  RESPIRATORY: clear to auscultation bilaterally  GASTROINTESTINAL: soft, nontender, non-distended  MUSCULOSKELETAL: normal gait and station. No gross extremity abnormalities  PSYCHIATRIC: oriented x3, normal mood and affect    IMAGING:  GALLBLADDER ULTRASOUND (11/1/2022):  FINDINGS: There is sludge layering within the dependent portion of the gallbladder. No gallbladder wall thickening or pericholecystic fluid nor biliary duct dilatation CBD is 3 mm. The liver is satisfactory in size shape and echotexture. The right kidney is 9.9 cm in length and satisfactory in appearance, no calculus or obstructive uropathy. Tail of the pancreas is obscured due to gas within the overlying stomach, the remainder is normal in appearance. No free fluid is seen within the right upper quadrant.     CONCLUSION: Gallbladder sludge.    ASSESSMENT & PLAN  Ms. Hoang is a 37-year-old lady with nausea, vomiting, right upper quadrant abdominal pain, and diarrhea with associated gallbladder sludge found on recent outpatient work-up.  I reviewed her gallbladder ultrasound and showed her the images today.  I would recommend proceeding to the operating room soon for  laparoscopic cholecystectomy with cholangiogram.  She understands the risks of the procedure include bleeding, common bile duct injury, postoperative bile leak, and possible persistence of her symptoms.  Despite all these risks she has consented to proceed.    Lorena Doty MD  General, Robotic, and Endoscopic Surgery  [facility]    [address], [address]  [place] [address]  P: [phone]  F: [phone]

## 2022-11-16 NOTE — PROGRESS NOTES
General Surgery  Established Patient Office Visit    CC: Abdominal pain, nausea, vomiting    HPI: The patient is a pleasant 37 y.o. year-old lady who returns to see me today for evaluation of 3 weeks worth of constant but progressively worsening nausea and an 11 pound weight loss as a result of this.  She has had sporadic vomiting and nonbloody diarrhea during that same timeframe.  Additionally she has noted intermittent right upper quadrant stabbing abdominal pain that radiates into her back and right shoulder blade.  Eating always exacerbates her symptoms.  She had a recent gallbladder ultrasound which demonstrated sludge but no evidence of acute cholecystitis or biliary dilatation.  Her mother has a history of gallbladder pathology requiring cholecystectomy.  Work-up of her symptoms also involved visit to the emergency room on 11/1 with outpatient referral to see me.    Past Medical History:   Asthma  Anxiety  Migraine headaches  History of colon polyps    Past Surgical History:   Tonsillectomy  Diagnostic laparoscopy for possible endometriosis (2018)  Colonoscopy x2 (2016 by Dr. Hills, 2021 by Dr. 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    ROS:   Constitutional: Positive for unintentional weight loss; negative for fevers or chills  HENT: Negative for hearing loss or runny nose  Eyes: Negative for vision changes or scleral icterus  Respiratory: Negative for cough or  shortness of breath  Cardiovascular: Negative for chest pain or heart palpitations  Gastrointestinal: Positive for nausea, vomiting, diarrhea, and abdominal pain; negative for abdominal distension, constipation, melena, or hematochezia  Genitourinary: Negative for hematuria or dysuria  Musculoskeletal: Negative for joint swelling or gait instability  Neurologic: Negative for tremors or seizures  Psychiatric: Negative for suicidal ideations or agitation  All other systems reviewed and negative    Physical Exam:  Height: 165 cm  Weight: 97 kg  BMI: 35  General: No acute distress, well-nourished & well-developed  HEAD: normocephalic, atraumatic  EYES: normal conjunctiva, sclera anicteric  EARS: grossly normal hearing  NECK: supple, no thyromegaly  CARDIOVASCULAR: regular rate and rhythm  RESPIRATORY: clear to auscultation bilaterally  GASTROINTESTINAL: soft, nontender, non-distended  MUSCULOSKELETAL: normal gait and station. No gross extremity abnormalities  PSYCHIATRIC: oriented x3, normal mood and affect    IMAGING:  GALLBLADDER ULTRASOUND (11/1/2022):  FINDINGS: There is sludge layering within the dependent portion of the gallbladder. No gallbladder wall thickening or pericholecystic fluid nor biliary duct dilatation CBD is 3 mm. The liver is satisfactory in size shape and echotexture. The right kidney is 9.9 cm in length and satisfactory in appearance, no calculus or obstructive uropathy. Tail of the pancreas is obscured due to gas within the overlying stomach, the remainder is normal in appearance. No free fluid is seen within the right upper quadrant.     CONCLUSION: Gallbladder sludge.    ASSESSMENT & PLAN  Ms. Hoang is a 37-year-old lady with nausea, vomiting, right upper quadrant abdominal pain, and diarrhea with associated gallbladder sludge found on recent outpatient work-up.  I reviewed her gallbladder ultrasound and showed her the images today.  I would recommend proceeding to the operating room soon for  laparoscopic cholecystectomy with cholangiogram.  She understands the risks of the procedure include bleeding, common bile duct injury, postoperative bile leak, and possible persistence of her symptoms.  Despite all these risks she has consented to proceed.    Lorena Doty MD  General, Robotic, and Endoscopic Surgery  [facility]    [address], [address]  [place] [address]  P: [phone]  F: [phone]

## 2022-11-16 NOTE — DISCHARGE INSTRUCTIONS
Take the following medications the morning of surgery:    NO MEDS AM OF SURGERY    TIME OF ARRIVAL 12:00    If you are on prescription narcotic pain medication to control your pain you may also take that medication the morning of surgery.    General Instructions:  Do not eat solid food after midnight the night before surgery.  You may drink clear liquids day of surgery but must stop at least one hour before your hospital arrival time.  It is beneficial for you to have a clear drink that contains carbohydrates the day of surgery.  We suggest a 12 to 20 ounce bottle of Gatorade or Powerade for non-diabetic patients or a 12 to 20 ounce bottle of G2 or Powerade Zero for diabetic patients. (Pediatric patients, are not advised to drink a 12 to 20 ounce carbohydrate drink)    Clear liquids are liquids you can see through.  Nothing red in color.     Plain water                               Sports drinks  Sodas                                   Gelatin (Jell-O)  Fruit juices without pulp such as white grape juice and apple juice  Popsicles that contain no fruit or yogurt  Tea or coffee (no cream or milk added)  Gatorade / Powerade  G2 / Powerade Zero    Infants may have breast milk up to four hours before surgery.  Infants drinking formula may drink formula up to six hours before surgery.   Patients who avoid smoking, chewing tobacco and alcohol for 4 weeks prior to surgery have a reduced risk of post-operative complications.  Quit smoking as many days before surgery as you can.  Do not smoke, use chewing tobacco or drink alcohol the day of surgery.   If applicable bring your C-PAP/ BI-PAP machine.  Bring any papers given to you in the doctor’s office.  Wear clean comfortable clothes.  Do not wear contact lenses, false eyelashes or make-up.  Bring a case for your glasses.   Bring crutches or walker if applicable.  Remove all piercings.  Leave jewelry and any other valuables at home.  Hair extensions with metal clips must be  removed prior to surgery.  The Pre-Admission Testing nurse will instruct you to bring medications if unable to obtain an accurate list in Pre-Admission Testing.        Preventing a Surgical Site Infection:  For 2 to 3 days before surgery, avoid shaving with a razor because the razor can irritate skin and make it easier to develop an infection.    Any areas of open skin can increase the risk of a post-operative wound infection by allowing bacteria to enter and travel throughout the body.  Notify your surgeon if you have any skin wounds / rashes even if it is not near the expected surgical site.  The area will need assessed to determine if surgery should be delayed until it is healed.  The night prior to surgery shower using a fresh bar of anti-bacterial soap (such as Dial) and clean washcloth.  Sleep in a clean bed with clean clothing.  Do not allow pets to sleep with you.  Shower on the morning of surgery using a fresh bar of anti-bacterial soap (such as Dial) and clean washcloth.  Dry with a clean towel and dress in clean clothing.  Ask your surgeon if you will be receiving antibiotics prior to surgery.  Make sure you, your family, and all healthcare providers clean their hands with soap and water or an alcohol based hand  before caring for you or your wound.    Day of surgery:  Your arrival time is approximately two hours before your scheduled surgery time.  Upon arrival, a Pre-op nurse and Anesthesiologist will review your health history, obtain vital signs, and answer questions you may have.  The only belongings needed at this time will be a list of your home medications and if applicable your C-PAP/BI-PAP machine.  A Pre-op nurse will start an IV and you may receive medication in preparation for surgery, including something to help you relax.     Please be aware that surgery does come with discomfort.  We want to make every effort to control your discomfort so please discuss any uncontrolled symptoms  with your nurse.   Your doctor will most likely have prescribed pain medications.      If you are going home after surgery you will receive individualized written care instructions before being discharged.  A responsible adult must drive you to and from the hospital on the day of your surgery and stay with you for 24 hours.  Discharge prescriptions can be filled by the hospital pharmacy during regular pharmacy hours.  If you are having surgery late in the day/evening your prescription may be e-prescribed to your pharmacy.  Please verify your pharmacy hours or chose a 24 hour pharmacy to avoid not having access to your prescription because your pharmacy has closed for the day.    If you are staying overnight following surgery, you will be transported to your hospital room following the recovery period.  [facility] has all private rooms.    If you have any questions please call Pre-Admission Testing at [phone].  Deductibles and co-payments are collected on the day of service. Please be prepared to pay the required co-pay, deductible or deposit on the day of service as defined by your plan.    Call your surgeon immediately if you experience any of the following symptoms:  Sore Throat  Shortness of Breath or difficulty breathing  Cough  Chills  Body soreness or muscle pain  Headache  Fever  New loss of taste or smell  Do not arrive for your surgery ill.  Your procedure will need to be rescheduled to another time.  You will need to call your physician before the day of surgery to avoid any unnecessary exposure to hospital staff as well as other patients.           CHLORHEXIDINE CLOTH INSTRUCTIONS  The morning of surgery follow these instructions using the Chlorhexidine cloths you've been given.  These steps reduce bacteria on the body.  Do not use the cloths near your eyes, ears mouth, genitalia or on open wounds.  Throw the cloths away after use but do not try to flush them down a toilet.      Open  and remove one cloth at a time from the package.    Leave the cloth unfolded and begin the bathing.  Massage the skin with the cloths using gentle pressure to remove bacteria.  Do not scrub harshly.   Follow the steps below with one 2% CHG cloth per area (6 total cloths).  One cloth for neck, shoulders and chest.  One cloth for both arms, hands, fingers and underarms (do underarms last).  One cloth for the abdomen followed by groin.  One cloth for right leg and foot including between the toes.  One cloth for left leg and foot including between the toes.  The last cloth is to be used for the back of the neck, back and buttocks.    Allow the CHG to air dry 3 minutes on the skin which will give it time to work and decrease the chance of irritation.  The skin may feel sticky until it is dry.  Do not rinse with water or any other liquid or you will lose the beneficial effects of the CHG.  If mild skin irritation occurs, do rinse the skin to remove the CHG.  Report this to the nurse at time of admission.  Do not apply lotions, creams, ointments, deodorants or perfumes after using the clothes. Dress in clean clothes before coming to the hospital.

## 2022-11-22 ENCOUNTER — ANESTHESIA EVENT (OUTPATIENT)
Dept: PERIOP | Facility: HOSPITAL | Age: 37
End: 2022-11-22

## 2022-11-22 ENCOUNTER — HOSPITAL ENCOUNTER (OUTPATIENT)
Facility: HOSPITAL | Age: 37
Setting detail: HOSPITAL OUTPATIENT SURGERY
Discharge: HOME | End: 2022-11-22
Attending: SURGERY | Admitting: SURGERY

## 2022-11-22 ENCOUNTER — APPOINTMENT (OUTPATIENT)
Dept: GENERAL RADIOLOGY | Facility: HOSPITAL | Age: 37
End: 2022-11-22

## 2022-11-22 ENCOUNTER — ANESTHESIA (OUTPATIENT)
Dept: PERIOP | Facility: HOSPITAL | Age: 37
End: 2022-11-22

## 2022-11-22 VITALS
RESPIRATION RATE: 16 BRPM | SYSTOLIC BLOOD PRESSURE: 104 MMHG | HEIGHT: 65 IN | TEMPERATURE: 97.8 F | BODY MASS INDEX: 36.65 KG/M2 | DIASTOLIC BLOOD PRESSURE: 65 MMHG | WEIGHT: 220 LBS | HEART RATE: 74 BPM | OXYGEN SATURATION: 96 %

## 2022-11-22 DIAGNOSIS — R11.2 NAUSEA AND VOMITING, UNSPECIFIED VOMITING TYPE: ICD-10-CM

## 2022-11-22 DIAGNOSIS — K82.8 GALLBLADDER SLUDGE: ICD-10-CM

## 2022-11-22 DIAGNOSIS — R10.11 RUQ ABDOMINAL PAIN: ICD-10-CM

## 2022-11-22 PROCEDURE — 25010000002 HYDROMORPHONE 1 MG/ML SOLUTION

## 2022-11-22 PROCEDURE — 25010000002 KETOROLAC TROMETHAMINE PER 15 MG: Performed by: NURSE ANESTHETIST, CERTIFIED REGISTERED

## 2022-11-22 PROCEDURE — 25010000002 NEOSTIGMINE 5 MG/10ML SOLUTION: Performed by: NURSE ANESTHETIST, CERTIFIED REGISTERED

## 2022-11-22 PROCEDURE — 25010000002 FENTANYL CITRATE (PF) 50 MCG/ML SOLUTION: Performed by: NURSE ANESTHETIST, CERTIFIED REGISTERED

## 2022-11-22 PROCEDURE — 25010000002 ONDANSETRON PER 1 MG: Performed by: NURSE ANESTHETIST, CERTIFIED REGISTERED

## 2022-11-22 PROCEDURE — 25010000002 HYDROMORPHONE PER 4 MG: Performed by: NURSE ANESTHETIST, CERTIFIED REGISTERED

## 2022-11-22 PROCEDURE — 25010000002 FENTANYL CITRATE (PF) 50 MCG/ML SOLUTION

## 2022-11-22 PROCEDURE — 25010000002 HYDROMORPHONE 1 MG/ML SOLUTION: Performed by: NURSE ANESTHETIST, CERTIFIED REGISTERED

## 2022-11-22 PROCEDURE — 47563 LAPARO CHOLECYSTECTOMY/GRAPH: CPT | Performed by: SPECIALIST/TECHNOLOGIST, OTHER

## 2022-11-22 PROCEDURE — 47563 LAPARO CHOLECYSTECTOMY/GRAPH: CPT | Performed by: SURGERY

## 2022-11-22 PROCEDURE — 88304 TISSUE EXAM BY PATHOLOGIST: CPT | Performed by: SURGERY

## 2022-11-22 PROCEDURE — 25010000002 DEXAMETHASONE SODIUM PHOSPHATE 20 MG/5ML SOLUTION: Performed by: NURSE ANESTHETIST, CERTIFIED REGISTERED

## 2022-11-22 PROCEDURE — 25010000002 MIDAZOLAM PER 1 MG: Performed by: ANESTHESIOLOGY

## 2022-11-22 PROCEDURE — 74300 X-RAY BILE DUCTS/PANCREAS: CPT

## 2022-11-22 PROCEDURE — 25010000002 PROPOFOL 10 MG/ML EMULSION: Performed by: NURSE ANESTHETIST, CERTIFIED REGISTERED

## 2022-11-22 DEVICE — LIGAMAX 5 MM ENDOSCOPIC MULTIPLE CLIP APPLIER
Type: IMPLANTABLE DEVICE | Site: ABDOMEN | Status: FUNCTIONAL
Brand: LIGAMAX

## 2022-11-22 RX ORDER — GLYCOPYRROLATE 0.2 MG/ML
INJECTION INTRAMUSCULAR; INTRAVENOUS AS NEEDED
Status: DISCONTINUED | OUTPATIENT
Start: 2022-11-22 | End: 2022-11-22 | Stop reason: SURG

## 2022-11-22 RX ORDER — FENTANYL CITRATE 50 UG/ML
50 INJECTION, SOLUTION INTRAMUSCULAR; INTRAVENOUS
Status: DISCONTINUED | OUTPATIENT
Start: 2022-11-22 | End: 2022-11-22 | Stop reason: HOSPADM

## 2022-11-22 RX ORDER — SODIUM CHLORIDE, SODIUM LACTATE, POTASSIUM CHLORIDE, CALCIUM CHLORIDE 600; 310; 30; 20 MG/100ML; MG/100ML; MG/100ML; MG/100ML
9 INJECTION, SOLUTION INTRAVENOUS CONTINUOUS
Status: DISCONTINUED | OUTPATIENT
Start: 2022-11-22 | End: 2022-11-22 | Stop reason: HOSPADM

## 2022-11-22 RX ORDER — FENTANYL CITRATE 50 UG/ML
INJECTION, SOLUTION INTRAMUSCULAR; INTRAVENOUS AS NEEDED
Status: DISCONTINUED | OUTPATIENT
Start: 2022-11-22 | End: 2022-11-22 | Stop reason: SURG

## 2022-11-22 RX ORDER — FLUMAZENIL 0.1 MG/ML
0.2 INJECTION INTRAVENOUS AS NEEDED
Status: DISCONTINUED | OUTPATIENT
Start: 2022-11-22 | End: 2022-11-22 | Stop reason: HOSPADM

## 2022-11-22 RX ORDER — NALOXONE HCL 0.4 MG/ML
0.2 VIAL (ML) INJECTION AS NEEDED
Status: DISCONTINUED | OUTPATIENT
Start: 2022-11-22 | End: 2022-11-22 | Stop reason: HOSPADM

## 2022-11-22 RX ORDER — MAGNESIUM HYDROXIDE 1200 MG/15ML
LIQUID ORAL AS NEEDED
Status: DISCONTINUED | OUTPATIENT
Start: 2022-11-22 | End: 2022-11-22 | Stop reason: HOSPADM

## 2022-11-22 RX ORDER — DEXAMETHASONE SODIUM PHOSPHATE 4 MG/ML
INJECTION, SOLUTION INTRA-ARTICULAR; INTRALESIONAL; INTRAMUSCULAR; INTRAVENOUS; SOFT TISSUE AS NEEDED
Status: DISCONTINUED | OUTPATIENT
Start: 2022-11-22 | End: 2022-11-22 | Stop reason: SURG

## 2022-11-22 RX ORDER — OXYCODONE HYDROCHLORIDE AND ACETAMINOPHEN 5; 325 MG/1; MG/1
1 TABLET ORAL EVERY 4 HOURS PRN
Qty: 15 TABLET | Refills: 0 | Status: SHIPPED | OUTPATIENT
Start: 2022-11-22 | End: 2022-12-06

## 2022-11-22 RX ORDER — DROPERIDOL 2.5 MG/ML
0.62 INJECTION, SOLUTION INTRAMUSCULAR; INTRAVENOUS
Status: DISCONTINUED | OUTPATIENT
Start: 2022-11-22 | End: 2022-11-22 | Stop reason: HOSPADM

## 2022-11-22 RX ORDER — LABETALOL HYDROCHLORIDE 5 MG/ML
5 INJECTION, SOLUTION INTRAVENOUS
Status: DISCONTINUED | OUTPATIENT
Start: 2022-11-22 | End: 2022-11-22 | Stop reason: HOSPADM

## 2022-11-22 RX ORDER — HYDROMORPHONE HYDROCHLORIDE 1 MG/ML
0.5 INJECTION, SOLUTION INTRAMUSCULAR; INTRAVENOUS; SUBCUTANEOUS
Status: DISCONTINUED | OUTPATIENT
Start: 2022-11-22 | End: 2022-11-22 | Stop reason: HOSPADM

## 2022-11-22 RX ORDER — SODIUM CHLORIDE 0.9 % (FLUSH) 0.9 %
3-10 SYRINGE (ML) INJECTION AS NEEDED
Status: DISCONTINUED | OUTPATIENT
Start: 2022-11-22 | End: 2022-11-22 | Stop reason: HOSPADM

## 2022-11-22 RX ORDER — SCOLOPAMINE TRANSDERMAL SYSTEM 1 MG/1
1 PATCH, EXTENDED RELEASE TRANSDERMAL
Status: DISCONTINUED | OUTPATIENT
Start: 2022-11-22 | End: 2022-11-22 | Stop reason: HOSPADM

## 2022-11-22 RX ORDER — HYDRALAZINE HYDROCHLORIDE 20 MG/ML
5 INJECTION INTRAMUSCULAR; INTRAVENOUS
Status: DISCONTINUED | OUTPATIENT
Start: 2022-11-22 | End: 2022-11-22 | Stop reason: HOSPADM

## 2022-11-22 RX ORDER — ROCURONIUM BROMIDE 10 MG/ML
INJECTION, SOLUTION INTRAVENOUS AS NEEDED
Status: DISCONTINUED | OUTPATIENT
Start: 2022-11-22 | End: 2022-11-22 | Stop reason: SURG

## 2022-11-22 RX ORDER — ACETAMINOPHEN 325 MG/1
650 TABLET ORAL ONCE AS NEEDED
Status: DISCONTINUED | OUTPATIENT
Start: 2022-11-22 | End: 2022-11-22 | Stop reason: HOSPADM

## 2022-11-22 RX ORDER — ONDANSETRON 2 MG/ML
INJECTION INTRAMUSCULAR; INTRAVENOUS AS NEEDED
Status: DISCONTINUED | OUTPATIENT
Start: 2022-11-22 | End: 2022-11-22 | Stop reason: SURG

## 2022-11-22 RX ORDER — LIDOCAINE HYDROCHLORIDE 10 MG/ML
0.5 INJECTION, SOLUTION EPIDURAL; INFILTRATION; INTRACAUDAL; PERINEURAL ONCE AS NEEDED
Status: DISCONTINUED | OUTPATIENT
Start: 2022-11-22 | End: 2022-11-22 | Stop reason: HOSPADM

## 2022-11-22 RX ORDER — MIDAZOLAM HYDROCHLORIDE 1 MG/ML
1 INJECTION INTRAMUSCULAR; INTRAVENOUS
Status: COMPLETED | OUTPATIENT
Start: 2022-11-22 | End: 2022-11-22

## 2022-11-22 RX ORDER — SODIUM CHLORIDE 9 MG/ML
INJECTION, SOLUTION INTRAVENOUS AS NEEDED
Status: DISCONTINUED | OUTPATIENT
Start: 2022-11-22 | End: 2022-11-22 | Stop reason: HOSPADM

## 2022-11-22 RX ORDER — PROMETHAZINE HYDROCHLORIDE 25 MG/1
25 TABLET ORAL ONCE AS NEEDED
Status: DISCONTINUED | OUTPATIENT
Start: 2022-11-22 | End: 2022-11-22 | Stop reason: HOSPADM

## 2022-11-22 RX ORDER — PROPOFOL 10 MG/ML
VIAL (ML) INTRAVENOUS AS NEEDED
Status: DISCONTINUED | OUTPATIENT
Start: 2022-11-22 | End: 2022-11-22 | Stop reason: SURG

## 2022-11-22 RX ORDER — PROMETHAZINE HYDROCHLORIDE 25 MG/1
25 SUPPOSITORY RECTAL ONCE AS NEEDED
Status: DISCONTINUED | OUTPATIENT
Start: 2022-11-22 | End: 2022-11-22 | Stop reason: HOSPADM

## 2022-11-22 RX ORDER — SODIUM CHLORIDE 0.9 % (FLUSH) 0.9 %
3 SYRINGE (ML) INJECTION EVERY 12 HOURS SCHEDULED
Status: DISCONTINUED | OUTPATIENT
Start: 2022-11-22 | End: 2022-11-22 | Stop reason: HOSPADM

## 2022-11-22 RX ORDER — HYDROCODONE BITARTRATE AND ACETAMINOPHEN 5; 325 MG/1; MG/1
1 TABLET ORAL ONCE AS NEEDED
Status: COMPLETED | OUTPATIENT
Start: 2022-11-22 | End: 2022-11-22

## 2022-11-22 RX ORDER — NEOSTIGMINE METHYLSULFATE 0.5 MG/ML
INJECTION, SOLUTION INTRAVENOUS AS NEEDED
Status: DISCONTINUED | OUTPATIENT
Start: 2022-11-22 | End: 2022-11-22 | Stop reason: SURG

## 2022-11-22 RX ORDER — DROPERIDOL 2.5 MG/ML
0.62 INJECTION, SOLUTION INTRAMUSCULAR; INTRAVENOUS ONCE AS NEEDED
Status: DISCONTINUED | OUTPATIENT
Start: 2022-11-22 | End: 2022-11-22 | Stop reason: HOSPADM

## 2022-11-22 RX ORDER — FENTANYL CITRATE 50 UG/ML
INJECTION, SOLUTION INTRAMUSCULAR; INTRAVENOUS
Status: COMPLETED
Start: 2022-11-22 | End: 2022-11-22

## 2022-11-22 RX ORDER — KETOROLAC TROMETHAMINE 30 MG/ML
INJECTION, SOLUTION INTRAMUSCULAR; INTRAVENOUS AS NEEDED
Status: DISCONTINUED | OUTPATIENT
Start: 2022-11-22 | End: 2022-11-22 | Stop reason: SURG

## 2022-11-22 RX ORDER — LIDOCAINE HYDROCHLORIDE 20 MG/ML
INJECTION, SOLUTION EPIDURAL; INFILTRATION; INTRACAUDAL; PERINEURAL AS NEEDED
Status: DISCONTINUED | OUTPATIENT
Start: 2022-11-22 | End: 2022-11-22 | Stop reason: SURG

## 2022-11-22 RX ORDER — BUPIVACAINE HYDROCHLORIDE AND EPINEPHRINE 5; 5 MG/ML; UG/ML
INJECTION, SOLUTION EPIDURAL; INTRACAUDAL; PERINEURAL AS NEEDED
Status: DISCONTINUED | OUTPATIENT
Start: 2022-11-22 | End: 2022-11-22 | Stop reason: HOSPADM

## 2022-11-22 RX ORDER — ONDANSETRON 2 MG/ML
4 INJECTION INTRAMUSCULAR; INTRAVENOUS ONCE AS NEEDED
Status: DISCONTINUED | OUTPATIENT
Start: 2022-11-22 | End: 2022-11-22 | Stop reason: HOSPADM

## 2022-11-22 RX ORDER — EPHEDRINE SULFATE 50 MG/ML
5 INJECTION, SOLUTION INTRAVENOUS ONCE AS NEEDED
Status: DISCONTINUED | OUTPATIENT
Start: 2022-11-22 | End: 2022-11-22 | Stop reason: HOSPADM

## 2022-11-22 RX ORDER — CLINDAMYCIN PHOSPHATE 900 MG/50ML
900 INJECTION INTRAVENOUS ONCE
Status: COMPLETED | OUTPATIENT
Start: 2022-11-22 | End: 2022-11-22

## 2022-11-22 RX ORDER — FAMOTIDINE 10 MG/ML
20 INJECTION, SOLUTION INTRAVENOUS ONCE
Status: COMPLETED | OUTPATIENT
Start: 2022-11-22 | End: 2022-11-22

## 2022-11-22 RX ORDER — ACETAMINOPHEN 650 MG/1
650 SUPPOSITORY RECTAL ONCE AS NEEDED
Status: DISCONTINUED | OUTPATIENT
Start: 2022-11-22 | End: 2022-11-22 | Stop reason: HOSPADM

## 2022-11-22 RX ADMIN — FENTANYL CITRATE 50 MCG: 50 INJECTION, SOLUTION INTRAMUSCULAR; INTRAVENOUS at 15:08

## 2022-11-22 RX ADMIN — PROPOFOL 200 MG: 10 INJECTION, EMULSION INTRAVENOUS at 14:58

## 2022-11-22 RX ADMIN — GLYCOPYRROLATE 0.4 MCG: 1 INJECTION INTRAMUSCULAR; INTRAVENOUS at 15:36

## 2022-11-22 RX ADMIN — CLINDAMYCIN IN 5 PERCENT DEXTROSE 900 MG: 18 INJECTION, SOLUTION INTRAVENOUS at 15:11

## 2022-11-22 RX ADMIN — SODIUM CHLORIDE, POTASSIUM CHLORIDE, SODIUM LACTATE AND CALCIUM CHLORIDE: 600; 310; 30; 20 INJECTION, SOLUTION INTRAVENOUS at 15:39

## 2022-11-22 RX ADMIN — FENTANYL CITRATE 50 MCG: 50 INJECTION INTRAMUSCULAR; INTRAVENOUS at 16:10

## 2022-11-22 RX ADMIN — MIDAZOLAM 1 MG: 1 INJECTION INTRAMUSCULAR; INTRAVENOUS at 13:22

## 2022-11-22 RX ADMIN — SODIUM CHLORIDE, POTASSIUM CHLORIDE, SODIUM LACTATE AND CALCIUM CHLORIDE 9 ML/HR: 600; 310; 30; 20 INJECTION, SOLUTION INTRAVENOUS at 12:30

## 2022-11-22 RX ADMIN — FENTANYL CITRATE 50 MCG: 50 INJECTION INTRAMUSCULAR; INTRAVENOUS at 16:27

## 2022-11-22 RX ADMIN — FAMOTIDINE 20 MG: 10 INJECTION INTRAVENOUS at 13:22

## 2022-11-22 RX ADMIN — MIDAZOLAM 1 MG: 1 INJECTION INTRAMUSCULAR; INTRAVENOUS at 14:34

## 2022-11-22 RX ADMIN — HYDROMORPHONE HYDROCHLORIDE 0.5 MG: 1 INJECTION, SOLUTION INTRAMUSCULAR; INTRAVENOUS; SUBCUTANEOUS at 16:27

## 2022-11-22 RX ADMIN — HYDROCODONE BITARTRATE AND ACETAMINOPHEN 1 TABLET: 5; 325 TABLET ORAL at 16:27

## 2022-11-22 RX ADMIN — CLINDAMYCIN IN 5 PERCENT DEXTROSE 900 MG: 18 INJECTION, SOLUTION INTRAVENOUS at 14:48

## 2022-11-22 RX ADMIN — HYDROMORPHONE HYDROCHLORIDE 0.5 MG: 1 INJECTION, SOLUTION INTRAMUSCULAR; INTRAVENOUS; SUBCUTANEOUS at 16:17

## 2022-11-22 RX ADMIN — KETOROLAC TROMETHAMINE 30 MG: 30 INJECTION, SOLUTION INTRAMUSCULAR at 15:53

## 2022-11-22 RX ADMIN — NEOSTIGMINE METHYLSULFATE 4 MG: 0.5 INJECTION INTRAVENOUS at 15:36

## 2022-11-22 RX ADMIN — SCOPALAMINE 1 PATCH: 1 PATCH, EXTENDED RELEASE TRANSDERMAL at 13:22

## 2022-11-22 RX ADMIN — LIDOCAINE HYDROCHLORIDE 100 MG: 20 INJECTION, SOLUTION EPIDURAL; INFILTRATION; INTRACAUDAL; PERINEURAL at 14:58

## 2022-11-22 RX ADMIN — FENTANYL CITRATE 50 MCG: 50 INJECTION, SOLUTION INTRAMUSCULAR; INTRAVENOUS at 14:58

## 2022-11-22 RX ADMIN — ONDANSETRON 4 MG: 2 INJECTION INTRAMUSCULAR; INTRAVENOUS at 15:20

## 2022-11-22 RX ADMIN — DEXAMETHASONE SODIUM PHOSPHATE 8 MG: 4 INJECTION, SOLUTION INTRAMUSCULAR; INTRAVENOUS at 15:07

## 2022-11-22 RX ADMIN — ROCURONIUM BROMIDE 30 MG: 50 INJECTION INTRAVENOUS at 14:58

## 2022-11-22 RX ADMIN — HYDROMORPHONE HYDROCHLORIDE 0.5 MG: 1 INJECTION, SOLUTION INTRAMUSCULAR; INTRAVENOUS; SUBCUTANEOUS at 15:48

## 2022-11-22 NOTE — OP NOTE
Operative Note :  Lorena Doty MD    Sara Hoang  1985    Procedure Date: 11/22/22    Pre-op Diagnosis:  RUQ abdominal pain [R10.11]  Nausea and vomiting, unspecified vomiting type [R11.2]  Gallbladder sludge [K82.8]    Post-op Diagnosis:  RUQ abdominal pain [R10.11]  Nausea and vomiting, unspecified vomiting type [R11.2]  Gallbladder sludge [K82.8]    Procedure:   Laparoscopic cholecystectomy with cholangiogram    Surgeon: Lorena Doty MD    Assistant: Santo Perez CSA (Santo was responsible for laparoscopic manipulation of the gallbladder during critical portions of the dissection, handling of the laparoscopic camera, closure of all surgical incisions, and application of topical sterile dressings)    Anesthesia:  General (general endotracheal tube)    Estimated Blood Loss: minimal    Specimens:  Gallbladder    Complications: None    Indications: The patient is a 37-year-old lady who came to see me recently with symptomatic cholelithiasis/gallbladder sludge.  I have recommended proceeding with laparoscopic cholecystectomy and cholangiogram today.  She understands the risks of the procedure include bleeding, common bile duct injury, and postoperative bile leak.  Despite these risks, she has consented to proceed.    Findings: Normal    Description of procedure:  The patient was brought to the operating room and placed on the OR table in supine position.  An endotracheal tube was inserted, and general anesthesia was induced.  The anterior abdominal wall was shaved, prepped, and draped in a sterile fashion.  A surgical timeout was then completed.  A curvilinear infraumbilical skin incision was made after instillation of 0.5% Marcaine with epinephrine.  The umbilical stalk was grasped and elevated, and a longitudinal fasciotomy made.  A Pineda trocar was inserted and secured with 2 separate 0 Vicryl stay sutures.  The abdomen was then insufflated.  Under direct visualization, 3 additional 5 mm  trocars were then placed within the subxiphoid and subcostal space on the right.  The gallbladder was retracted cephalad and infundibulum retracted inferiorly. The cystic duct and cystic artery were slowly dissected out circumferentially until a firm critical view was obtained.  A single Hemoclip was placed across the cystic duct at its junction with the gallbladder infundibulum and 3 hemoclips were placed across the cystic artery.  A small hole was created on the anterior wall of the cystic duct, and a cholangiogram catheter inserted through a right upper quadrant angiocatheter.  The catheter was secured within the duct with an additional Hemoclip and a cholangiogram was then obtained.  The cholangiogram showed filling of the cystic duct and common bile duct, retrograde filling of the intrahepatic ducts, and easy spillage into the duodenum with no filling defects appreciable.  The catheter was then withdrawn and 2 additional hemoclips placed across the cystic duct.  The cystic duct and artery were then transected, leaving 2 clips behind on both of the stumps.  The gallbladder was then slowly dissected off of the undersurface of the liver using electrocautery and it was removed from the peritoneal cavity within an Endo Catch bag at the umbilical trocar site.  The gallbladder was passed off to pathology.  The abdomen was then reinsufflated and right upper quadrant inspected. Warm normal saline was irrigated and suctioned dry.  The gallbladder fossa appeared hemostatic.  All trocars were then removed under direct visualization and the abdomen desufflated.  The umbilical fascial incision was closed using a new 0 Vicryl figure-of-eight suture, all skin incisions closed with 4-0 Vicryl subcuticular stitches, and then each was dressed with Exofin glue.  The patient was then extubated and transferred to PACU in stable condition.  All counts were correct per nursing.    Lorena Doty MD  General, Robotic, and  Endoscopic Surgery  [facility]    [address], [address]  [place] [address]  P: [phone]  F: [phone]

## 2022-11-22 NOTE — ANESTHESIA POSTPROCEDURE EVALUATION
Patient: Sara Hoang    Procedure Summary     Date: 11/22/22 Room / Location: [facility] OR [address] [place] [address] MAIN OR    Anesthesia Start: 1453 Anesthesia Stop: 1605    Procedure: LAPAROSCOPIC CHOLECYSTECTOMY WITH INTRAOPERATIVE CHOLANGIOGRAM (Abdomen) Diagnosis:       RUQ abdominal pain      Nausea and vomiting, unspecified vomiting type      Gallbladder sludge      (RUQ abdominal pain [R10.11])      (Nausea and vomiting, unspecified vomiting type [R11.2])      (Gallbladder sludge [K82.8])    Surgeons: Lorena Doty MD Provider: Rex Mcadams MD    Anesthesia Type: general ASA Status: 2          Anesthesia Type: general    Vitals  Vitals Value Taken Time   /51 11/22/22 1616   Temp 36.6 °C (97.8 °F) 11/22/22 1602   Pulse 57 11/22/22 1621   Resp 14 11/22/22 1615   SpO2 97 % 11/22/22 1621   Vitals shown include unvalidated device data.        Post Anesthesia Care and Evaluation    Patient location during evaluation: PACU  Patient participation: complete - patient participated  Level of consciousness: awake and alert  Pain management: adequate    Airway patency: patent  Anesthetic complications: No anesthetic complications    Cardiovascular status: acceptable  Respiratory status: acceptable  Hydration status: acceptable    Comments: --------------------            11/22/22               1615     --------------------   BP:       105/51     Pulse:      63       Resp:       14       Temp:                SpO2:      99%      --------------------

## 2022-11-22 NOTE — ANESTHESIA PREPROCEDURE EVALUATION
Anesthesia Evaluation     Patient summary reviewed and Nursing notes reviewed                Airway   Mallampati: II  TM distance: >3 FB  Neck ROM: full  Dental      Pulmonary    (+) asthma,  Cardiovascular - negative cardio ROS    ECG reviewed  Rhythm: regular  Rate: normal        Neuro/Psych- negative ROS  GI/Hepatic/Renal/Endo    (+) obesity,       Musculoskeletal (-) negative ROS    Abdominal    Substance History - negative use     OB/GYN negative ob/gyn ROS         Other                        Anesthesia Plan    ASA 2     general     (I have reviewed the patient's history with the patient and the chart, including all pertinent laboratory results and imaging. I have explained the risks of anesthesia including but not limited to dental damage, corneal abrasion, nerve injury, MI, stroke, and death. Questions asked and answered. Anesthetic plan discussed with patient and team as indicated. Patient expressed understanding of the above.  )  intravenous induction     Anesthetic plan, risks, benefits, and alternatives have been provided, discussed and informed consent has been obtained with: patient.        CODE STATUS:

## 2022-11-22 NOTE — ANESTHESIA PROCEDURE NOTES
Airway  Urgency: elective    Date/Time: 11/22/2022 3:02 PM  Airway not difficult    General Information and Staff    Patient location during procedure: OR  Anesthesiologist: Rex Mcadams MD  CRNA/CAA: Analisa Patel CRNA    Indications and Patient Condition  Indications for airway management: airway protection    Preoxygenated: yes (pt pre-O2 with 100% O2)  Mask difficulty assessment: 2 - vent by mask + OA or adjuvant +/- NMBA (easy BMV )    Final Airway Details  Final airway type: endotracheal airway      Successful airway: ETT  Cuffed: yes   Successful intubation technique: direct laryngoscopy  Endotracheal tube insertion site: oral  Blade: Kelly  Blade size: 3  ETT size (mm): 7.0  Cormack-Lehane Classification: grade I - full view of glottis  Placement verified by: chest auscultation and capnometry   Cuff volume (mL): 7  Measured from: lips  ETT/EBT  to lips (cm): 20  Number of attempts at approach: 1  Assessment: lips, teeth, and gum same as pre-op and atraumatic intubation    Additional Comments  ATOETx1. No change in dentition.

## 2022-11-25 LAB
LAB AP CASE REPORT: NORMAL
LAB AP CLINICAL INFORMATION: NORMAL
PATH REPORT.FINAL DX SPEC: NORMAL
PATH REPORT.GROSS SPEC: NORMAL

## 2022-12-05 ENCOUNTER — HOSPITAL ENCOUNTER (OUTPATIENT)
Facility: HOSPITAL | Age: 37
Discharge: HOME | End: 2022-12-05

## 2022-12-05 VITALS
TEMPERATURE: 97.7 F | HEIGHT: 65 IN | OXYGEN SATURATION: 97 % | SYSTOLIC BLOOD PRESSURE: 118 MMHG | WEIGHT: 220 LBS | HEART RATE: 101 BPM | RESPIRATION RATE: 18 BRPM | DIASTOLIC BLOOD PRESSURE: 81 MMHG | BODY MASS INDEX: 36.65 KG/M2

## 2022-12-05 DIAGNOSIS — J06.9 UPPER RESPIRATORY TRACT INFECTION, UNSPECIFIED TYPE: Primary | ICD-10-CM

## 2022-12-05 LAB
FLUAV SUBTYP SPEC NAA+PROBE: NOT DETECTED
FLUBV RNA ISLT QL NAA+PROBE: NOT DETECTED
SARS-COV-2 RNA RESP QL NAA+PROBE: NOT DETECTED

## 2022-12-05 PROCEDURE — EDLOS: Performed by: PHYSICIAN ASSISTANT

## 2022-12-05 PROCEDURE — G0463 HOSPITAL OUTPT CLINIC VISIT: HCPCS | Performed by: PHYSICIAN ASSISTANT

## 2022-12-05 PROCEDURE — 87636 SARSCOV2 & INF A&B AMP PRB: CPT

## 2022-12-05 PROCEDURE — 99203 OFFICE O/P NEW LOW 30 MIN: CPT | Performed by: PHYSICIAN ASSISTANT

## 2022-12-05 PROCEDURE — 87636 SARSCOV2 & INF A&B AMP PRB: CPT | Performed by: PHYSICIAN ASSISTANT

## 2022-12-05 RX ORDER — GUAIFENESIN 600 MG/1
1200 TABLET, EXTENDED RELEASE ORAL 2 TIMES DAILY
Qty: 20 TABLET | Refills: 0 | Status: SHIPPED | OUTPATIENT
Start: 2022-12-05 | End: 2022-12-10

## 2022-12-05 RX ORDER — BENZONATATE 200 MG/1
200 CAPSULE ORAL 3 TIMES DAILY PRN
Qty: 15 CAPSULE | Refills: 0 | Status: SHIPPED | OUTPATIENT
Start: 2022-12-05 | End: 2022-12-12

## 2022-12-05 NOTE — FSED PROVIDER NOTE
Subjective   History of Present Illness  Is a 37-year-old female 7-day history of sinus pressure drainage postnasal drip sore throat dry but nonproductive cough denies fevers chest pain shortness of breath states there is a burning when she coughs but no chest pain at rest or with activity.  Denies abdominal pain nausea or vomiting.    Review of Systems  10 point review of systems reviewed and negative except as noted in the history of present illness pertinent to exam.      Past Medical History:   Diagnosis Date   • Abnormal Pap smear of cervix    • Anemia    • Anxiety    • Asthma    • Benign hematuria 2010    neg work up   • Colon polyps 02/11/2021   • Colon polyps 2017   • Dysmenorrhea    • Endometriosis    • Foot fracture    • Fracture of spine, lumbar, without spinal cord injury, closed (HCC) 01/02/2014    L2 25% compression fx   • Herpes     taking valtrex   • Histoplasmosis     EYES   • Hyperemesis gravidarum 2019   • Infertility associated with anovulation     Femara   • Migraine    • Palpitations    • PVC (premature ventricular contraction)     HX RESOLVED   • Sludge in gallbladder        Allergies   Allergen Reactions   • Biaxin [Clarithromycin] Swelling     Eyelid   • Ceclor [Cefaclor] Hives     Has had Rocephin   • Penicillins Hives   • Sulfa Antibiotics Hives       Past Surgical History:   Procedure Laterality Date   • CHOLECYSTECTOMY WITH INTRAOPERATIVE CHOLANGIOGRAM N/A 11/22/2022    Procedure: LAPAROSCOPIC CHOLECYSTECTOMY WITH INTRAOPERATIVE CHOLANGIOGRAM;  Surgeon: Lorena Doty MD;  Location: [facility];  Service: General;  Laterality: N/A;   • COLONOSCOPY W/ POLYPECTOMY N/A 02/11/2021    [facility], Dr. Bardales, repeat in 5 years   • COLONOSCOPY W/ POLYPECTOMY N/A 2017    Dr. Hills   • COLPOSCOPY  2009    neg   • CYSTOSCOPY     • D & C WITH SUCTION  07/2021   • DIAGNOSTIC LAPAROSCOPY N/A 12/2018    and chromopertubation    • ENDOSCOPY N/A 12/17/2012    Dr. Hills:  rings in  lower esophagus. erosions in stomach   • PERIPHERALLY INSERTED CENTRAL CATHETER INSERTION N/A 2019   • TONSILLECTOMY Bilateral 2014       Family History   Problem Relation Age of Onset   • Breast cancer Mother    • Cancer Mother    • Depression Mother    • Prostate cancer Father    • Migraines Father    • Hypertension Father    • Cancer Father    • Seizures Sister    • Depression Sister    • Heart disease Maternal Grandmother    • Diabetes Maternal Grandmother    • Colon cancer Maternal Grandfather    • Heart disease Paternal Grandmother    • Hypertension Paternal Grandfather    • Malig Hyperthermia Neg Hx        Social History     Socioeconomic History   • Marital status:      Spouse name: Stephane   • Number of children: 1   • Years of education: 18   • Highest education level: Master's degree (e.g., MA, MS, Yemi, MEd, MSW, SUSIE)   Tobacco Use   • Smoking status: Never   • Smokeless tobacco: Never   Vaping Use   • Vaping Use: Never used   Substance and Sexual Activity   • Alcohol use: No   • Drug use: No   • Sexual activity: Yes     Partners: Male     Birth control/protection: Pill     Comment: Suffered miscarriage in 06/2021           Objective   Physical Exam  Alert and oriented x3, no apparent distress.  Head atraumatic.  Facies symmetrical.  Pupils equal and round, EOMs grossly intact.  Neck supple, trachea midline.  No adenopathy  Throat free of erythema edema or exudate uvula midline airway patent no trismus no elevation of the floor of the mouth.  Good respiratory effort without distress.  Lungs clear to auscultation no wheezes rales or rhonchi noted  Heart regular rate and rhythm  Skin without obvious rashes or lesions.  Extremities without edema.  Good affect, pleasant patient.      Procedures           ED Course                                           MDM  Findings consistent with viral URI negative COVID and influenza swabs.  We will treat supportively and recommend follow-up with family  practitioner    Final diagnoses:   Upper respiratory tract infection, unspecified type       ED Disposition  ED Disposition     ED Disposition   Discharge    Condition   Stable    Comment   --             Lashae Hercules, [place]  [address] [address]  [phone]    In 1 week           Medication List      New Prescriptions    benzonatate 200 MG capsule  Commonly known as: TESSALON  Take 1 capsule by mouth 3 (Three) Times a Day As Needed for Cough for up to 7 days.     guaiFENesin 600 MG 12 hr tablet  Commonly known as: Mucinex  Take 2 tablets by mouth 2 (Two) Times a Day for 5 days.        Changed    sertraline 100 MG tablet  Commonly known as: ZOLOFT  1 PO daily for stress  What changed:   · how much to take  · how to take this  · when to take this     topiramate 50 MG tablet  Commonly known as: TOPAMAX  1 PO in AM and 2 PO in PM for migraine prevention  What changed:   · how much to take  · how to take this  · when to take this  · additional instructions           Where to Get Your Medications      Information about where to get these medications is not yet available    Ask your nurse or doctor about these medications  · benzonatate 200 MG capsule  · guaiFENesin 600 MG 12 hr tablet

## 2022-12-06 ENCOUNTER — OFFICE VISIT (OUTPATIENT)
Dept: SURGERY | Facility: CLINIC | Age: 37
End: 2022-12-06

## 2022-12-06 DIAGNOSIS — K82.8 GALLBLADDER SLUDGE: ICD-10-CM

## 2022-12-06 DIAGNOSIS — Z09 SURGICAL FOLLOWUP: Primary | ICD-10-CM

## 2022-12-06 PROCEDURE — 99024 POSTOP FOLLOW-UP VISIT: CPT | Performed by: SURGERY

## 2022-12-06 NOTE — PROGRESS NOTES
CHIEF COMPLAINT:   Chief Complaint   Patient presents with   • Post-op       HISTORY OF PRESENT ILLNESS:  This is a 37 y.o. female who presents for a post-operative visit after undergoing laparoscopic cholecystectomy with cholangiogram on 11/22/2022.  She has been doing well since surgery with no significant nausea, vomiting, fever, chills, jaundice, or scleral icterus.  She has not had any postoperative diarrhea but does say certain foods cause her bowel movements to happen with increasing frequency.    Pathology:   Gallbladder, Cholecystectomy:  Benign gallbladder with                A. Autolysis.               B. Single benign lymph node.               C. No significant acute or chronic inflammation.    PHYSICAL EXAM:  Lungs: Clear  Heart: RRR  ABD: Incisions are healing well without any erythema or signs of infection.  Ext: no significant edema, calves nontender    A/P:  This is a 37 y.o. female patient who is S/P laparoscopic cholecystectomy with on 11/22/2022    She is healing well.  I discussed the benign pathology findings with her.  She can return to all activities as tolerated and follow-up with me on an as-needed basis.    Lorena Doty MD  General, Robotic, and Endoscopic Surgery  [facility] ([facility]    [address], [address]  [place] [address]  P: [phone]  F: [phone]

## 2023-01-01 DIAGNOSIS — M43.6 TORTICOLLIS: Primary | ICD-10-CM

## 2023-03-03 ENCOUNTER — OFFICE VISIT (OUTPATIENT)
Dept: FAMILY MEDICINE CLINIC | Facility: CLINIC | Age: 38
End: 2023-03-03
Payer: COMMERCIAL

## 2023-03-03 VITALS
HEART RATE: 80 BPM | HEIGHT: 65 IN | RESPIRATION RATE: 16 BRPM | OXYGEN SATURATION: 97 % | WEIGHT: 203 LBS | TEMPERATURE: 95.5 F | SYSTOLIC BLOOD PRESSURE: 118 MMHG | BODY MASS INDEX: 33.82 KG/M2 | DIASTOLIC BLOOD PRESSURE: 74 MMHG

## 2023-03-03 DIAGNOSIS — F41.9 ANXIETY: ICD-10-CM

## 2023-03-03 DIAGNOSIS — E53.8 LOW SERUM VITAMIN B12: ICD-10-CM

## 2023-03-03 DIAGNOSIS — G43.109 MIGRAINE WITH AURA AND WITHOUT STATUS MIGRAINOSUS, NOT INTRACTABLE: Primary | ICD-10-CM

## 2023-03-03 DIAGNOSIS — E55.9 VITAMIN D DEFICIENCY: ICD-10-CM

## 2023-03-03 DIAGNOSIS — I88.9 AXILLARY LYMPHADENITIS: ICD-10-CM

## 2023-03-03 DIAGNOSIS — R19.7 DIARRHEA, UNSPECIFIED TYPE: ICD-10-CM

## 2023-03-03 DIAGNOSIS — E66.09 CLASS 1 OBESITY DUE TO EXCESS CALORIES WITHOUT SERIOUS COMORBIDITY WITH BODY MASS INDEX (BMI) OF 33.0 TO 33.9 IN ADULT: ICD-10-CM

## 2023-03-03 DIAGNOSIS — E53.8 LOW FOLIC ACID: ICD-10-CM

## 2023-03-03 DIAGNOSIS — R94.6 THYROID FUNCTION STUDY ABNORMALITY: ICD-10-CM

## 2023-03-03 PROCEDURE — 99214 OFFICE O/P EST MOD 30 MIN: CPT | Performed by: PHYSICIAN ASSISTANT

## 2023-03-03 RX ORDER — DOXYCYCLINE HYCLATE 100 MG/1
100 CAPSULE ORAL 2 TIMES DAILY
Qty: 20 CAPSULE | Refills: 0 | Status: SHIPPED | OUTPATIENT
Start: 2023-03-03

## 2023-03-03 RX ORDER — FLUCONAZOLE 150 MG/1
150 TABLET ORAL ONCE
Qty: 1 TABLET | Refills: 2 | Status: SHIPPED | OUTPATIENT
Start: 2023-03-03 | End: 2023-03-03

## 2023-03-03 RX ORDER — SEMAGLUTIDE 0.25 MG/.5ML
0.25 INJECTION, SOLUTION SUBCUTANEOUS WEEKLY
Qty: 2 ML | Refills: 0 | Status: SHIPPED | OUTPATIENT
Start: 2023-03-03

## 2023-03-03 RX ORDER — SEMAGLUTIDE 0.5 MG/.5ML
0.5 INJECTION, SOLUTION SUBCUTANEOUS WEEKLY
Qty: 2 ML | Refills: 2 | Status: SHIPPED | OUTPATIENT
Start: 2023-03-03

## 2023-03-03 RX ORDER — ONDANSETRON 4 MG/1
TABLET, FILM COATED ORAL
Qty: 45 TABLET | Refills: 0 | Status: SHIPPED | OUTPATIENT
Start: 2023-03-03

## 2023-03-03 RX ORDER — COLESEVELAM 180 1/1
TABLET ORAL
Qty: 60 TABLET | Refills: 11 | Status: SHIPPED | OUTPATIENT
Start: 2023-03-03

## 2023-03-03 NOTE — PATIENT INSTRUCTIONS
Generalized Anxiety Disorder, Adult  Generalized anxiety disorder (LYNN) is a mental health condition. Unlike normal worries, anxiety related to LYNN is not triggered by a specific event. These worries do not fade or get better with time. LYNN interferes with relationships, work, and school.  LYNN symptoms can vary from mild to severe. People with severe LYNN can have intense waves of anxiety with physical symptoms that are similar to panic attacks.  What are the causes?  The exact cause of LYNN is not known, but the following are believed to have an impact:  Differences in natural brain chemicals.  Genes passed down from parents to children.  Differences in the way threats are perceived.  Development and stress during childhood.  Personality.  What increases the risk?  The following factors may make you more likely to develop this condition:  Being female.  Having a family history of anxiety disorders.  Being very shy.  Experiencing very stressful life events, such as the death of a loved one.  Having a very stressful family environment.  What are the signs or symptoms?  People with LYNN often worry excessively about many things in their lives, such as their health and family. Symptoms may also include:  Mental and emotional symptoms:  Worrying excessively about natural disasters.  Fear of being late.  Difficulty concentrating.  Fears that others are judging your performance.  Physical symptoms:  Fatigue.  Headaches, muscle tension, muscle twitches, trembling, or feeling shaky.  Feeling like your heart is pounding or beating very fast.  Feeling out of breath or like you cannot take a deep breath.  Having trouble falling asleep or staying asleep, or experiencing restlessness.  Sweating.  Nausea, diarrhea, or irritable bowel syndrome (IBS).  Behavioral symptoms:  Experiencing erratic moods or irritability.  Avoidance of new situations.  Avoidance of people.  Extreme difficulty making decisions.  How is this diagnosed?  This  condition is diagnosed based on your symptoms and medical history. You will also have a physical exam. Your health care provider may perform tests to rule out other possible causes of your symptoms.  To be diagnosed with LYNN, a person must have anxiety that:  Is out of his or her control.  Affects several different aspects of his or her life, such as work and relationships.  Causes distress that makes him or her unable to take part in normal activities.  Includes at least three symptoms of LYNN, such as restlessness, fatigue, trouble concentrating, irritability, muscle tension, or sleep problems.  Before your health care provider can confirm a diagnosis of LYNN, these symptoms must be present more days than they are not, and they must last for 6 months or longer.  How is this treated?  This condition may be treated with:  Medicine. Antidepressant medicine is usually prescribed for long-term daily control. Anti-anxiety medicines may be added in severe cases, especially when panic attacks occur.  Talk therapy (psychotherapy). Certain types of talk therapy can be helpful in treating LYNN by providing support, education, and guidance. Options include:  Cognitive behavioral therapy (CBT). People learn coping skills and self-calming techniques to ease their physical symptoms. They learn to identify unrealistic thoughts and behaviors and to replace them with more appropriate thoughts and behaviors.  Acceptance and commitment therapy (ACT). This treatment teaches people how to be mindful as a way to cope with unwanted thoughts and feelings.  Biofeedback. This process trains you to manage your body's response (physiological response) through breathing techniques and relaxation methods. You will work with a therapist while machines are used to monitor your physical symptoms.  Stress management techniques. These include yoga, meditation, and exercise.  A mental health specialist can help determine which treatment is best for you.  Some people see improvement with one type of therapy. However, other people require a combination of therapies.  Follow these instructions at home:  Lifestyle  Maintain a consistent routine and schedule.  Anticipate stressful situations. Create a plan and allow extra time to work with your plan.  Practice stress management or self-calming techniques that you have learned from your therapist or your health care provider.  Exercise regularly and spend time outdoors.  Eat a healthy diet that includes plenty of vegetables, fruits, whole grains, low-fat dairy products, and lean protein.  Do not eat a lot of foods that are high in fat, added sugar, or salt (sodium).  Drink plenty of water.  Avoid alcohol. Alcohol can increase anxiety.  Avoid caffeine and certain over-the-counter cold medicines. These may make you feel worse. Ask your pharmacist which medicines to avoid.  General instructions  Take over-the-counter and prescription medicines only as told by your health care provider.  Understand that you are likely to have setbacks. Accept this and be kind to yourself as you persist to take better care of yourself.  Anticipate stressful situations. Create a plan and allow extra time to work with your plan.  Recognize and accept your accomplishments, even if you  them as small.  Spend time with people who care about you.  Keep all follow-up visits. This is important.  Where to find more information  [place] of [place]: www.nimh.nih.gov  Substance Abuse and Mental Health Services: www.samhsa.gov  Contact a health care provider if:  Your symptoms do not get better.  Your symptoms get worse.  You have signs of depression, such as:  A persistently sad or irritable mood.  Loss of enjoyment in activities that used to bring you bel.  Change in weight or eating.  Changes in sleeping habits.  Get help right away if:  You have thoughts about hurting yourself or others.  If you ever feel like you may hurt  yourself or others, or have thoughts about taking your own life, get help right away. Go to your nearest emergency department or:  Call your local emergency services (411 in the [place]  Call a suicide crisis helpline, such as the National Suicide Prevention Lifeline at 1-649.440.9428 or 741 in the [place] This is open 24 hours a day in the [place]  Text the Crisis Text Line at 389754 (in the [place]  Summary  Generalized anxiety disorder (LYNN) is a mental health condition that involves worry that is not triggered by a specific event.  People with LYNN often worry excessively about many things in their lives, such as their health and family.  LYNN may cause symptoms such as restlessness, trouble concentrating, sleep problems, frequent sweating, nausea, diarrhea, headaches, and trembling or muscle twitching.  A mental health specialist can help determine which treatment is best for you. Some people see improvement with one type of therapy. However, other people require a combination of therapies.  This information is not intended to replace advice given to you by your health care provider. Make sure you discuss any questions you have with your health care provider.  Document Revised: 07/13/2022 Document Reviewed: 04/10/2022  Elsevier Patient Education © 2022 [employer]

## 2023-03-03 NOTE — PROGRESS NOTES
"Subjective   Sara Hoang is a 37 y.o. female.     History of Present Illness    Since the last visit, she has overall felt fairly well.  She has Vitamin D deficiency and will update labs for continued management, Migraine headaches and responding well to PRN triptan Rx and Migraine headaches and well controlled on suppression medication.  she has been compliant with current medications have reviewed them.  The patient denies medication side effects.  Will refill medications. /74   Pulse 80   Temp 95.5 °F (35.3 °C)   Resp 16   Ht 165.1 cm (65\")   Wt 92.1 kg (203 lb)   SpO2 97%   BMI 33.78 kg/m²     Results for orders placed or performed during the hospital encounter of 12/05/22   COVID-19 and FLU A/B PCR - Swab, Nasopharynx    Specimen: Nasopharynx; Swab   Result Value Ref Range    COVID19 Not Detected Not Detected - Ref. Range    Influenza A PCR Not Detected Not Detected    Influenza B PCR Not Detected Not Detected   Were also talking about her need to lose weight and she has tried several things and nothing is helping, and she is walking for exercise  I will update labs to make sure she has not developed diabetes or fatty liver or high cholesterol  She has no history of pancreatitis or family history of thyroid cancer or M EN  No history of gastroparesis  Had cholecystectomy   Had COVID Jan-----resolved  Still has this:----to see neuro  Assessment:   1.  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  2.  Minimal thumb tremor  3.  Migraine headaches  Plan:  1.  Since the patient is pregnant there is very little to do at this point.  I think it is probably best to have her seen by movement disorder subspecialty since I believe the best treatment option is " "Botox as opposed to muscle relaxant type medications which all are typically sedative.  2.  We will send consult to [facility] and she can consider setting up an appointment perhaps after delivery of her baby.     Had baby 6 weeks ago---2nd child  No longer breast feeding     Sara Hoang female 37 y.o., /74   Pulse 80   Temp 95.5 °F (35.3 °C)   Resp 16   Ht 165.1 cm (65\")   Wt 92.1 kg (203 lb)   SpO2 97%   BMI 33.78 kg/m²   who presents today for follow up of Anxiety.  She reports medication is working well, patient desires to continue on Rx, and needs refill. Onset of symptoms was approximately several years ago. She denies current suicidal and homicidal ideation. Risk factors are family history of anxiety and or depression and lifestyle of multiple roles.  Previous treatment includes current Rx. She complains of the following medication side effects:none. The patient declines to go to counseling..    Also has a lump right axilla noticed last night and it sore  The following portions of the patient's history were reviewed and updated as appropriate: allergies, current medications, past family history, past medical history, past social history, past surgical history and problem list.    Review of Systems   Constitutional: Negative for activity change, appetite change and unexpected weight change.   HENT: Negative for nosebleeds and trouble swallowing.    Eyes: Negative for pain and visual disturbance.   Respiratory: Negative for chest tightness, shortness of breath and wheezing.    Cardiovascular: Negative for chest pain and palpitations.   Gastrointestinal: Negative for abdominal pain and blood in stool.   Endocrine: Negative.    Genitourinary: Negative for difficulty urinating and hematuria.   Musculoskeletal: Negative for joint swelling.   Skin: Negative for color change and rash.   Allergic/Immunologic: Negative.    Neurological: Negative for syncope and speech difficulty. "   Hematological: Negative for adenopathy.   Psychiatric/Behavioral: Negative for agitation and confusion.   All other systems reviewed and are negative.      Objective   Physical Exam  Vitals and nursing note reviewed.   Constitutional:       General: She is not in acute distress.     Appearance: She is well-developed. She is obese. She is not ill-appearing or toxic-appearing.   HENT:      Head: Normocephalic.      Right Ear: External ear normal.      Left Ear: External ear normal.      Nose: Nose normal.      Mouth/Throat:      Pharynx: Oropharynx is clear.   Eyes:      General: No scleral icterus.     Conjunctiva/sclera: Conjunctivae normal.      Pupils: Pupils are equal, round, and reactive to light.   Neck:      Thyroid: No thyromegaly.      Comments: Right central axillary node pea-sized sore to touch and not red or hot  Cardiovascular:      Rate and Rhythm: Normal rate and regular rhythm.      Heart sounds: Normal heart sounds. No murmur heard.  Pulmonary:      Effort: Pulmonary effort is normal. No respiratory distress.      Breath sounds: Normal breath sounds.   Musculoskeletal:         General: No deformity. Normal range of motion.      Cervical back: Normal range of motion and neck supple.   Skin:     General: Skin is warm and dry.      Findings: No rash.   Neurological:      General: No focal deficit present.      Mental Status: She is alert and oriented to person, place, and time. Mental status is at baseline.   Psychiatric:         Mood and Affect: Mood normal.         Behavior: Behavior normal.         Thought Content: Thought content normal.         Judgment: Judgment normal.         Assessment & Plan   Diagnoses and all orders for this visit:    1. Migraine with aura and without status migrainosus, not intractable (Primary)  -     Comprehensive metabolic panel  -     Lipid panel  -     CBC and Differential  -     TSH  -     T4, Free  -     Vitamin D,25-Hydroxy  -     Vitamin B12  -     Folate  -      Hemoglobin A1c  -     Urinalysis With Microscopic - Urine, Clean Catch    2. Diarrhea, unspecified type  -     Comprehensive metabolic panel  -     Lipid panel  -     CBC and Differential  -     TSH  -     T4, Free  -     Vitamin D,25-Hydroxy  -     Vitamin B12  -     Folate  -     Hemoglobin A1c  -     Urinalysis With Microscopic - Urine, Clean Catch    3. Anxiety  -     Comprehensive metabolic panel  -     Lipid panel  -     CBC and Differential  -     TSH  -     T4, Free  -     Vitamin D,25-Hydroxy  -     Vitamin B12  -     Folate  -     Hemoglobin A1c  -     Urinalysis With Microscopic - Urine, Clean Catch    4. Vitamin D deficiency  -     Comprehensive metabolic panel  -     Lipid panel  -     CBC and Differential  -     TSH  -     T4, Free  -     Vitamin D,25-Hydroxy  -     Vitamin B12  -     Folate  -     Hemoglobin A1c  -     Urinalysis With Microscopic - Urine, Clean Catch    5. Axillary lymphadenitis    6. Class 1 obesity due to excess calories without serious comorbidity with body mass index (BMI) of 33.0 to 33.9 in adult    Other orders  -     colesevelam (Welchol) 625 MG tablet; 1-2 tabs PO daily--take at least 4 hours away from birth control pills  Dispense: 60 tablet; Refill: 11  -     doxycycline (VIBRAMYCIN) 100 MG capsule; Take 1 capsule by mouth 2 (Two) Times a Day. For infection  Dispense: 20 capsule; Refill: 0  -     fluconazole (Diflucan) 150 MG tablet; Take 1 tablet by mouth 1 (One) Time for 1 dose. One PO X 1 for yeast infection  Dispense: 1 tablet; Refill: 2    Plan, Sara Hoang, was seen today.  she was seen for Vitamin D deficiency and will update labs , Migraine headaches and will continue with their PRN triptan and Migraine headaches and well controlled on their suppressive medication.  Trial of Welchol for  diarrhea from cholecystectomy  She is going to see movement d/o neuro clinic---April  I also updated her prescription for Zofran due to nausea for migraines  We will treat right  axillary lymphadenitis with doxycycline and patient understands use backup birth control until off antibiotic on her  For referred her to general surgery if right lymph node does not resolve with doxycycline pills also sent Diflucan if needed for yeast infection  Her BMI is now 33.8 and concerned that she is high risk to develop diabetes fatty liver and high cholesterol we will start her on Wegovy and she has read about how to take Wegovy and the mechanism of action small frequent meals     Answers for HPI/ROS submitted by the patient on 3/2/2023  Please describe your symptoms.: Talk about weight loss options  Have you had these symptoms before?: No  How long have you been having these symptoms?: Greater than 2 weeks  What is the primary reason for your visit?: Other

## 2023-03-04 LAB
25(OH)D3+25(OH)D2 SERPL-MCNC: 15.2 NG/ML (ref 30–100)
ALBUMIN SERPL-MCNC: 4.9 G/DL (ref 3.5–5.2)
ALBUMIN/GLOB SERPL: 1.9 G/DL
ALP SERPL-CCNC: 122 U/L (ref 39–117)
ALT SERPL-CCNC: 9 U/L (ref 1–33)
APPEARANCE UR: ABNORMAL
AST SERPL-CCNC: 13 U/L (ref 1–32)
BACTERIA #/AREA URNS HPF: ABNORMAL /HPF
BASOPHILS # BLD AUTO: 0.05 10*3/MM3 (ref 0–0.2)
BASOPHILS NFR BLD AUTO: 0.6 % (ref 0–1.5)
BILIRUB SERPL-MCNC: 0.3 MG/DL (ref 0–1.2)
BILIRUB UR QL STRIP: NEGATIVE
BUN SERPL-MCNC: 9 MG/DL (ref 6–20)
BUN/CREAT SERPL: 12.2 (ref 7–25)
CALCIUM SERPL-MCNC: 9.5 MG/DL (ref 8.6–10.5)
CASTS URNS MICRO: ABNORMAL
CHLORIDE SERPL-SCNC: 108 MMOL/L (ref 98–107)
CHOLEST SERPL-MCNC: 167 MG/DL (ref 0–200)
CO2 SERPL-SCNC: 25.1 MMOL/L (ref 22–29)
COLOR UR: YELLOW
CREAT SERPL-MCNC: 0.74 MG/DL (ref 0.57–1)
CRYSTALS URNS MICRO: ABNORMAL
EGFRCR SERPLBLD CKD-EPI 2021: 107.7 ML/MIN/1.73
EOSINOPHIL # BLD AUTO: 0.16 10*3/MM3 (ref 0–0.4)
EOSINOPHIL NFR BLD AUTO: 2 % (ref 0.3–6.2)
EPI CELLS #/AREA URNS HPF: ABNORMAL /HPF
ERYTHROCYTE [DISTWIDTH] IN BLOOD BY AUTOMATED COUNT: 12.8 % (ref 12.3–15.4)
FOLATE SERPL-MCNC: 4.1 NG/ML (ref 4.78–24.2)
GLOBULIN SER CALC-MCNC: 2.6 GM/DL
GLUCOSE SERPL-MCNC: 97 MG/DL (ref 65–99)
GLUCOSE UR QL STRIP: NEGATIVE
HBA1C MFR BLD: 5.1 % (ref 4.8–5.6)
HCT VFR BLD AUTO: 41.8 % (ref 34–46.6)
HDLC SERPL-MCNC: 59 MG/DL (ref 40–60)
HGB BLD-MCNC: 14.3 G/DL (ref 12–15.9)
HGB UR QL STRIP: ABNORMAL
IMM GRANULOCYTES # BLD AUTO: 0.01 10*3/MM3 (ref 0–0.05)
IMM GRANULOCYTES NFR BLD AUTO: 0.1 % (ref 0–0.5)
KETONES UR QL STRIP: ABNORMAL
LDLC SERPL CALC-MCNC: 88 MG/DL (ref 0–100)
LEUKOCYTE ESTERASE UR QL STRIP: ABNORMAL
LYMPHOCYTES # BLD AUTO: 1.81 10*3/MM3 (ref 0.7–3.1)
LYMPHOCYTES NFR BLD AUTO: 22.3 % (ref 19.6–45.3)
MCH RBC QN AUTO: 29.3 PG (ref 26.6–33)
MCHC RBC AUTO-ENTMCNC: 34.2 G/DL (ref 31.5–35.7)
MCV RBC AUTO: 85.7 FL (ref 79–97)
MONOCYTES # BLD AUTO: 0.42 10*3/MM3 (ref 0.1–0.9)
MONOCYTES NFR BLD AUTO: 5.2 % (ref 5–12)
NEUTROPHILS # BLD AUTO: 5.68 10*3/MM3 (ref 1.7–7)
NEUTROPHILS NFR BLD AUTO: 69.8 % (ref 42.7–76)
NITRITE UR QL STRIP: NEGATIVE
NRBC BLD AUTO-RTO: 0 /100 WBC (ref 0–0.2)
PH UR STRIP: 6 [PH] (ref 5–8)
PLATELET # BLD AUTO: 269 10*3/MM3 (ref 140–450)
POTASSIUM SERPL-SCNC: 3.9 MMOL/L (ref 3.5–5.2)
PROT SERPL-MCNC: 7.5 G/DL (ref 6–8.5)
PROT UR QL STRIP: ABNORMAL
RBC # BLD AUTO: 4.88 10*6/MM3 (ref 3.77–5.28)
RBC #/AREA URNS HPF: ABNORMAL /HPF
SODIUM SERPL-SCNC: 142 MMOL/L (ref 136–145)
SP GR UR STRIP: ABNORMAL (ref 1–1.03)
T4 FREE SERPL-MCNC: 0.9 NG/DL (ref 0.93–1.7)
TRIGL SERPL-MCNC: 111 MG/DL (ref 0–150)
TSH SERPL DL<=0.005 MIU/L-ACNC: 1.06 UIU/ML (ref 0.27–4.2)
UROBILINOGEN UR STRIP-MCNC: ABNORMAL MG/DL
VIT B12 SERPL-MCNC: 270 PG/ML (ref 211–946)
VLDLC SERPL CALC-MCNC: 20 MG/DL (ref 5–40)
WBC # BLD AUTO: 8.13 10*3/MM3 (ref 3.4–10.8)
WBC #/AREA URNS HPF: ABNORMAL /HPF

## 2023-03-06 RX ORDER — FOLIC ACID 1 MG/1
1 TABLET ORAL DAILY
Qty: 90 TABLET | Refills: 3 | Status: SHIPPED | OUTPATIENT
Start: 2023-03-06

## 2023-06-12 ENCOUNTER — PATIENT MESSAGE (OUTPATIENT)
Dept: FAMILY MEDICINE CLINIC | Facility: CLINIC | Age: 38
End: 2023-06-12
Payer: COMMERCIAL

## 2023-06-12 RX ORDER — SEMAGLUTIDE 0.25 MG/.5ML
0.25 INJECTION, SOLUTION SUBCUTANEOUS WEEKLY
Qty: 2 ML | Refills: 5 | Status: SHIPPED | OUTPATIENT
Start: 2023-06-12

## 2023-06-12 NOTE — TELEPHONE ENCOUNTER
From: Sara Hoang  To: Lashae Hercules  Sent: 6/12/2023 3:33 PM EDT  Subject: Wegovy    Ramesh Bhardwaj,   Can you prescribe another month on .25 of Wegovy? I got sick on week 3, so I stopped for awhile and now I want to start over again. I still have my prescription at [facility] for 0.5 when it’s time. I’ve been off of it since April so I figured I need to start back with 0.25. Thanks!

## 2023-08-08 ENCOUNTER — HOSPITAL ENCOUNTER (EMERGENCY)
Facility: HOSPITAL | Age: 38
Discharge: HOME | End: 2023-08-08
Attending: EMERGENCY MEDICINE | Admitting: EMERGENCY MEDICINE
Payer: COMMERCIAL

## 2023-08-08 ENCOUNTER — APPOINTMENT (OUTPATIENT)
Dept: GENERAL RADIOLOGY | Facility: HOSPITAL | Age: 38
End: 2023-08-08
Payer: COMMERCIAL

## 2023-08-08 VITALS
SYSTOLIC BLOOD PRESSURE: 103 MMHG | DIASTOLIC BLOOD PRESSURE: 66 MMHG | RESPIRATION RATE: 20 BRPM | WEIGHT: 185 LBS | HEART RATE: 88 BPM | TEMPERATURE: 97.4 F | OXYGEN SATURATION: 98 % | BODY MASS INDEX: 30.82 KG/M2 | HEIGHT: 65 IN

## 2023-08-08 DIAGNOSIS — K52.9 GASTROENTERITIS: Primary | ICD-10-CM

## 2023-08-08 DIAGNOSIS — R11.2 NAUSEA AND VOMITING, UNSPECIFIED VOMITING TYPE: ICD-10-CM

## 2023-08-08 DIAGNOSIS — R07.9 ACUTE CHEST PAIN: ICD-10-CM

## 2023-08-08 LAB
ALBUMIN SERPL-MCNC: 4.8 G/DL (ref 3.5–5.2)
ALBUMIN/GLOB SERPL: 1.7 G/DL
ALP SERPL-CCNC: 127 U/L (ref 39–117)
ALT SERPL W P-5'-P-CCNC: 8 U/L (ref 1–33)
ANION GAP SERPL CALCULATED.3IONS-SCNC: 15.9 MMOL/L (ref 5–15)
AST SERPL-CCNC: 12 U/L (ref 1–32)
BASOPHILS # BLD AUTO: 0.01 10*3/MM3 (ref 0–0.2)
BASOPHILS NFR BLD AUTO: 0.1 % (ref 0–1.5)
BILIRUB SERPL-MCNC: 0.5 MG/DL (ref 0–1.2)
BUN SERPL-MCNC: 12 MG/DL (ref 6–20)
BUN/CREAT SERPL: 15.2 (ref 7–25)
CALCIUM SPEC-SCNC: 9.9 MG/DL (ref 8.6–10.5)
CHLORIDE SERPL-SCNC: 104 MMOL/L (ref 98–107)
CO2 SERPL-SCNC: 21.1 MMOL/L (ref 22–29)
CREAT SERPL-MCNC: 0.79 MG/DL (ref 0.57–1)
DEPRECATED RDW RBC AUTO: 40.5 FL (ref 37–54)
EGFRCR SERPLBLD CKD-EPI 2021: 99.6 ML/MIN/1.73
EOSINOPHIL # BLD AUTO: 0 10*3/MM3 (ref 0–0.4)
EOSINOPHIL NFR BLD AUTO: 0 % (ref 0.3–6.2)
ERYTHROCYTE [DISTWIDTH] IN BLOOD BY AUTOMATED COUNT: 12.3 % (ref 12.3–15.4)
GLOBULIN UR ELPH-MCNC: 2.8 GM/DL
GLUCOSE SERPL-MCNC: 120 MG/DL (ref 65–99)
HCT VFR BLD AUTO: 45.2 % (ref 34–46.6)
HGB BLD-MCNC: 14.6 G/DL (ref 12–15.9)
IMM GRANULOCYTES # BLD AUTO: 0.03 10*3/MM3 (ref 0–0.05)
IMM GRANULOCYTES NFR BLD AUTO: 0.3 % (ref 0–0.5)
LIPASE SERPL-CCNC: 50 U/L (ref 13–60)
LYMPHOCYTES # BLD AUTO: 0.7 10*3/MM3 (ref 0.7–3.1)
LYMPHOCYTES NFR BLD AUTO: 6 % (ref 19.6–45.3)
MCH RBC QN AUTO: 28.8 PG (ref 26.6–33)
MCHC RBC AUTO-ENTMCNC: 32.3 G/DL (ref 31.5–35.7)
MCV RBC AUTO: 89.2 FL (ref 79–97)
MONOCYTES # BLD AUTO: 0.19 10*3/MM3 (ref 0.1–0.9)
MONOCYTES NFR BLD AUTO: 1.6 % (ref 5–12)
NEUTROPHILS NFR BLD AUTO: 10.7 10*3/MM3 (ref 1.7–7)
NEUTROPHILS NFR BLD AUTO: 92 % (ref 42.7–76)
PLATELET # BLD AUTO: 272 10*3/MM3 (ref 140–450)
PMV BLD AUTO: 9.3 FL (ref 6–12)
POTASSIUM SERPL-SCNC: 4 MMOL/L (ref 3.5–5.2)
PROT SERPL-MCNC: 7.6 G/DL (ref 6–8.5)
QT INTERVAL: 302 MS
RBC # BLD AUTO: 5.07 10*6/MM3 (ref 3.77–5.28)
SODIUM SERPL-SCNC: 141 MMOL/L (ref 136–145)
WBC NRBC COR # BLD: 11.63 10*3/MM3 (ref 3.4–10.8)

## 2023-08-08 PROCEDURE — 83690 ASSAY OF LIPASE: CPT | Performed by: EMERGENCY MEDICINE

## 2023-08-08 PROCEDURE — 93005 ELECTROCARDIOGRAM TRACING: CPT | Performed by: EMERGENCY MEDICINE

## 2023-08-08 PROCEDURE — 71045 X-RAY EXAM CHEST 1 VIEW: CPT

## 2023-08-08 PROCEDURE — 99284 EMERGENCY DEPT VISIT MOD MDM: CPT

## 2023-08-08 PROCEDURE — 80053 COMPREHEN METABOLIC PANEL: CPT | Performed by: EMERGENCY MEDICINE

## 2023-08-08 PROCEDURE — 25010000002 DIPHENHYDRAMINE PER 50 MG: Performed by: EMERGENCY MEDICINE

## 2023-08-08 PROCEDURE — 96361 HYDRATE IV INFUSION ADD-ON: CPT

## 2023-08-08 PROCEDURE — 85025 COMPLETE CBC W/AUTO DIFF WBC: CPT | Performed by: EMERGENCY MEDICINE

## 2023-08-08 PROCEDURE — 25010000002 DROPERIDOL PER 5 MG: Performed by: EMERGENCY MEDICINE

## 2023-08-08 PROCEDURE — 96375 TX/PRO/DX INJ NEW DRUG ADDON: CPT

## 2023-08-08 PROCEDURE — 96374 THER/PROPH/DIAG INJ IV PUSH: CPT

## 2023-08-08 RX ORDER — PROMETHAZINE HYDROCHLORIDE 25 MG/1
25 TABLET ORAL EVERY 6 HOURS PRN
Qty: 20 TABLET | Refills: 0 | Status: SHIPPED | OUTPATIENT
Start: 2023-08-08

## 2023-08-08 RX ORDER — PANTOPRAZOLE SODIUM 40 MG/10ML
40 INJECTION, POWDER, LYOPHILIZED, FOR SOLUTION INTRAVENOUS ONCE
Status: COMPLETED | OUTPATIENT
Start: 2023-08-08 | End: 2023-08-08

## 2023-08-08 RX ORDER — DROPERIDOL 2.5 MG/ML
2.5 INJECTION, SOLUTION INTRAMUSCULAR; INTRAVENOUS ONCE
Status: COMPLETED | OUTPATIENT
Start: 2023-08-08 | End: 2023-08-08

## 2023-08-08 RX ORDER — DIPHENHYDRAMINE HYDROCHLORIDE 50 MG/ML
50 INJECTION INTRAMUSCULAR; INTRAVENOUS ONCE
Status: COMPLETED | OUTPATIENT
Start: 2023-08-08 | End: 2023-08-08

## 2023-08-08 RX ORDER — ONDANSETRON 4 MG/1
4 TABLET, ORALLY DISINTEGRATING ORAL EVERY 6 HOURS PRN
Qty: 20 TABLET | Refills: 0 | Status: SHIPPED | OUTPATIENT
Start: 2023-08-08

## 2023-08-08 RX ADMIN — SODIUM CHLORIDE 1000 ML: 0.9 INJECTION, SOLUTION INTRAVENOUS at 13:07

## 2023-08-08 RX ADMIN — PANTOPRAZOLE SODIUM 40 MG: 40 INJECTION, POWDER, LYOPHILIZED, FOR SOLUTION INTRAVENOUS at 13:07

## 2023-08-08 RX ADMIN — DIPHENHYDRAMINE HYDROCHLORIDE 50 MG: 50 INJECTION, SOLUTION INTRAMUSCULAR; INTRAVENOUS at 13:07

## 2023-08-08 RX ADMIN — DROPERIDOL 2.5 MG: 2.5 INJECTION, SOLUTION INTRAMUSCULAR; INTRAVENOUS at 13:07

## 2023-08-08 NOTE — DISCHARGE INSTRUCTIONS
Please follow-up with PCP for continued management of your condition.  Return to the emergency room for any concerns.  Take medication as prescribed for nausea and vomiting.

## 2023-08-08 NOTE — Clinical Note
[facility] [place] [id number] [place] [address] [place] [address]  Phone: [phone]    Sara ISAMAR Hoang was seen and treated in our emergency department on 8/8/2023.  She may return to work on 08/10/2023.         Thank you for choosing [facility].    Juan Pablo Stockton MD

## 2023-08-08 NOTE — FSED PROVIDER NOTE
Subjective   History of Present Illness  Patient is a 37-year-old female who presents emergency room with complaints of chest/epigastric abdominal pain.  She has also had nausea and vomiting.  Patient's pain radiates into her back.  She reports severe intensity of this pain.  She states that all of this started within the past 24 hours.  Patient denies any known ill contacts.  Patient states that she has a history of hyperemesis with previous pregnancy.  Patient is here for evaluation.    Review of Systems   Constitutional: Negative.  Negative for chills, fatigue and fever.   Eyes: Negative.    Respiratory:  Negative for cough, chest tightness and shortness of breath.    Cardiovascular:  Positive for chest pain. Negative for palpitations.   Gastrointestinal:  Positive for abdominal pain, nausea and vomiting. Negative for diarrhea.   Genitourinary: Negative.    Musculoskeletal:  Positive for back pain.   Skin: Negative.  Negative for rash.   Neurological: Negative.  Negative for syncope, weakness, numbness and headaches.   Psychiatric/Behavioral: Negative.     All other systems reviewed and are negative.    Past Medical History:   Diagnosis Date    Abnormal Pap smear of cervix     Anemia     Anxiety     Asthma     Benign hematuria 2010    neg work up    Colon polyps 02/11/2021    Colon polyps 2017    Dysmenorrhea     Endometriosis     Foot fracture     Fracture of spine, lumbar, without spinal cord injury, closed 01/02/2014    L2 25% compression fx    Herpes     taking valtrex    Histoplasmosis     EYES    Hyperemesis gravidarum 2019    Infertility associated with anovulation     Femara    Low back pain     Migraine     Palpitations     PVC (premature ventricular contraction)     HX RESOLVED    Sludge in gallbladder     Tremor 05/21    Still waiting on appt at movement disorder clinic       Allergies   Allergen Reactions    Biaxin [Clarithromycin] Swelling     Eyelid    Ceclor [Cefaclor] Hives     Has had Rocephin     Penicillins Hives    Sulfa Antibiotics Hives       Past Surgical History:   Procedure Laterality Date    CHOLECYSTECTOMY  11/22    CHOLECYSTECTOMY WITH INTRAOPERATIVE CHOLANGIOGRAM N/A 11/22/2022    Procedure: LAPAROSCOPIC CHOLECYSTECTOMY WITH INTRAOPERATIVE CHOLANGIOGRAM;  Surgeon: Lorena Doty MD;  Location: [facility] OR;  Service: General;  Laterality: N/A;    COLONOSCOPY  2021    COLONOSCOPY W/ POLYPECTOMY N/A 02/11/2021    [facility], Dr. Bardales, repeat in 5 years    COLONOSCOPY W/ POLYPECTOMY N/A 2017    Dr. Hills    COLPOSCOPY  2009    neg    CYSTOSCOPY      D & C WITH SUCTION  07/2021    DIAGNOSTIC LAPAROSCOPY N/A 12/2018    and chromopertubation     ENDOSCOPY N/A 12/17/2012    Dr. Hills:  rings in lower esophagus. erosions in stomach    PERIPHERALLY INSERTED CENTRAL CATHETER INSERTION N/A 2019    TONSILLECTOMY Bilateral 2014       Family History   Problem Relation Age of Onset    Breast cancer Mother     Cancer Mother     Depression Mother     Thyroid disease Mother     Vision loss Mother         Glaucoma    Prostate cancer Father     Migraines Father     Hypertension Father     Cancer Father     Seizures Sister     Depression Sister     Thyroid disease Sister     Heart disease Maternal Grandmother     Diabetes Maternal Grandmother     Colon cancer Maternal Grandfather     Heart disease Paternal Grandmother     Hypertension Paternal Grandfather     Malig Hyperthermia Neg Hx        Social History     Socioeconomic History    Marital status:      Spouse name: Stephane    Number of children: 1    Years of education: 18    Highest education level: Master's degree (e.g., MA, MS, Yemi, MEd, MSW, SUSIE)   Tobacco Use    Smoking status: Never    Smokeless tobacco: Never   Vaping Use    Vaping Use: Never used   Substance and Sexual Activity    Alcohol use: No    Drug use: Never    Sexual activity: Yes     Partners: Male     Birth control/protection: Pill     Comment: Suffered miscarriage in  06/2021           Objective   Physical Exam  Vitals and nursing note reviewed.   Constitutional:       General: She is in acute distress.      Appearance: Normal appearance. She is normal weight.   HENT:      Head: Normocephalic and atraumatic.      Right Ear: External ear normal.      Left Ear: External ear normal.      Nose: Nose normal.      Mouth/Throat:      Mouth: Mucous membranes are moist.   Eyes:      Extraocular Movements: Extraocular movements intact.      Conjunctiva/sclera: Conjunctivae normal.      Pupils: Pupils are equal, round, and reactive to light.   Cardiovascular:      Rate and Rhythm: Normal rate and regular rhythm. Tachycardia present.      Pulses: Normal pulses.      Heart sounds: Normal heart sounds. No murmur heard.    No friction rub. No gallop.   Pulmonary:      Effort: Pulmonary effort is normal. No respiratory distress.      Breath sounds: Normal breath sounds.   Abdominal:      General: Abdomen is flat. There is no distension.      Tenderness:  in the epigastric area There is no guarding or rebound. Negative signs include Berumen's sign.   Musculoskeletal:         General: No deformity or signs of injury. Normal range of motion.      Cervical back: Normal range of motion and neck supple. No tenderness.   Skin:     General: Skin is warm.      Capillary Refill: Capillary refill takes less than 2 seconds.   Neurological:      General: No focal deficit present.      Mental Status: She is alert and oriented to person, place, and time. Mental status is at baseline.   Psychiatric:         Mood and Affect: Mood normal.       Procedures           ED Course  ED Course as of 08/08/23 1727   Tue Aug 08, 2023   1225 EKG shows sinus tachycardia.  The rate is 132 bpm.  No acute ST segment changes.  There is T wave inversion in the lateral leads, V4 through V6.  There is evidence for biatrial enlargement.  No ectopy.  Nonspecific EKG. [KZ]   1318 Patient's CBC shows leukocytosis.  Mild. [KZ]      ED  Course User Index  [KZ] Juan Pablo Stockton MD                                           Medical Decision Making  This patient with nausea and vomiting which is likely secondary to viral gastroenteritis-benign infectious cause.  Also considered cannabis hyperemesis syndrome and/or gastroparesis. Considered but low risk for SBO (normal BM, passing flatus, no abdominal surgeries).   Low suspicion for gastric or esophageal dysmotility as cause. Patient with no chest pain. Based on history and exam low suspicion for pancreatitis, appendicitis, biliary pathology, or other emergent problem.  Screening labs have been requested.  Patient has been given IV fluids and IV antiemetics.  Further work-up based on lab testing results.    Labs show leukocytosis.  No other significant abnormalities.  Patient given fluids and antiemetics here.  She has improved.  She is discharged home with prescriptions for Zofran and Phenergan.      Problems Addressed:  Acute chest pain: complicated acute illness or injury  Gastroenteritis: complicated acute illness or injury  Nausea and vomiting, unspecified vomiting type: complicated acute illness or injury    Amount and/or Complexity of Data Reviewed  Labs: ordered.  Radiology: ordered and independent interpretation performed.  ECG/medicine tests: ordered.    Risk  Prescription drug management.        Final diagnoses:   Gastroenteritis   Acute chest pain   Nausea and vomiting, unspecified vomiting type       ED Disposition  ED Disposition       ED Disposition   Discharge    Condition   Stable    Comment   --               Lashae Hercules, PASmithaC  [address] [address]  [phone]    Schedule an appointment as soon as possible for a visit   As needed         Medication List        New Prescriptions      ondansetron ODT 4 MG disintegrating tablet  Commonly known as: ZOFRAN-ODT  Place 1 tablet on the tongue Every 6 (Six) Hours As Needed for Nausea or Vomiting.      promethazine 25 MG tablet  Commonly known as: PHENERGAN  Take 1 tablet by mouth Every 6 (Six) Hours As Needed for Nausea or Vomiting.            Changed      sertraline 100 MG tablet  Commonly known as: ZOLOFT  1 PO daily for stress  What changed:   how much to take  how to take this  when to take this     topiramate 50 MG tablet  Commonly known as: TOPAMAX  1 PO in AM and 2 PO in PM for migraine prevention  What changed:   how much to take  how to take this  when to take this  additional instructions            Stop      dicyclomine 10 MG capsule  Commonly known as: Bentyl     ondansetron 4 MG tablet  Commonly known as: Zofran               Where to Get Your Medications        These medications were sent to [id number] [place] - [address] AT [address] [facility] [phone] [facility] [phone] FX  [address] [place] [address] [place] [address]      Phone: [phone]   ondansetron ODT 4 MG disintegrating tablet  promethazine 25 MG tablet

## 2023-08-09 ENCOUNTER — HOSPITAL ENCOUNTER (EMERGENCY)
Facility: HOSPITAL | Age: 38
Discharge: HOME | End: 2023-08-09
Attending: EMERGENCY MEDICINE
Payer: COMMERCIAL

## 2023-08-09 VITALS
DIASTOLIC BLOOD PRESSURE: 83 MMHG | WEIGHT: 185 LBS | HEART RATE: 92 BPM | BODY MASS INDEX: 30.82 KG/M2 | OXYGEN SATURATION: 99 % | TEMPERATURE: 98.4 F | HEIGHT: 65 IN | SYSTOLIC BLOOD PRESSURE: 130 MMHG | RESPIRATION RATE: 18 BRPM

## 2023-08-09 DIAGNOSIS — R11.2 NAUSEA AND VOMITING, UNSPECIFIED VOMITING TYPE: Primary | ICD-10-CM

## 2023-08-09 DIAGNOSIS — E86.0 DEHYDRATION: ICD-10-CM

## 2023-08-09 LAB
ALBUMIN SERPL-MCNC: 4.6 G/DL (ref 3.5–5.2)
ALBUMIN/GLOB SERPL: 1.6 G/DL
ALP SERPL-CCNC: 118 U/L (ref 39–117)
ALT SERPL W P-5'-P-CCNC: 10 U/L (ref 1–33)
ANION GAP SERPL CALCULATED.3IONS-SCNC: 16.5 MMOL/L (ref 5–15)
AST SERPL-CCNC: 19 U/L (ref 1–32)
B-HCG UR QL: NEGATIVE
BASOPHILS # BLD AUTO: 0.05 10*3/MM3 (ref 0–0.2)
BASOPHILS NFR BLD AUTO: 0.4 % (ref 0–1.5)
BILIRUB SERPL-MCNC: 0.6 MG/DL (ref 0–1.2)
BILIRUB UR QL STRIP: ABNORMAL
BUN SERPL-MCNC: 14 MG/DL (ref 6–20)
BUN/CREAT SERPL: 18.4 (ref 7–25)
CALCIUM SPEC-SCNC: 9.6 MG/DL (ref 8.6–10.5)
CHLORIDE SERPL-SCNC: 104 MMOL/L (ref 98–107)
CLARITY UR: CLEAR
CO2 SERPL-SCNC: 18.5 MMOL/L (ref 22–29)
COLOR UR: YELLOW
CREAT SERPL-MCNC: 0.76 MG/DL (ref 0.57–1)
DEPRECATED RDW RBC AUTO: 41.3 FL (ref 37–54)
EGFRCR SERPLBLD CKD-EPI 2021: 104.3 ML/MIN/1.73
EOSINOPHIL # BLD AUTO: 0.01 10*3/MM3 (ref 0–0.4)
EOSINOPHIL NFR BLD AUTO: 0.1 % (ref 0.3–6.2)
ERYTHROCYTE [DISTWIDTH] IN BLOOD BY AUTOMATED COUNT: 12.6 % (ref 12.3–15.4)
GLOBULIN UR ELPH-MCNC: 2.9 GM/DL
GLUCOSE SERPL-MCNC: 86 MG/DL (ref 65–99)
GLUCOSE UR STRIP-MCNC: NEGATIVE MG/DL
HCT VFR BLD AUTO: 43.9 % (ref 34–46.6)
HGB BLD-MCNC: 14.1 G/DL (ref 12–15.9)
HGB UR QL STRIP.AUTO: ABNORMAL
IMM GRANULOCYTES # BLD AUTO: 0.02 10*3/MM3 (ref 0–0.05)
IMM GRANULOCYTES NFR BLD AUTO: 0.2 % (ref 0–0.5)
KETONES UR QL STRIP: ABNORMAL
LEUKOCYTE ESTERASE UR QL STRIP.AUTO: NEGATIVE
LIPASE SERPL-CCNC: 89 U/L (ref 13–60)
LYMPHOCYTES # BLD AUTO: 2.07 10*3/MM3 (ref 0.7–3.1)
LYMPHOCYTES NFR BLD AUTO: 15.9 % (ref 19.6–45.3)
MCH RBC QN AUTO: 28.5 PG (ref 26.6–33)
MCHC RBC AUTO-ENTMCNC: 32.1 G/DL (ref 31.5–35.7)
MCV RBC AUTO: 88.9 FL (ref 79–97)
MONOCYTES # BLD AUTO: 0.59 10*3/MM3 (ref 0.1–0.9)
MONOCYTES NFR BLD AUTO: 4.5 % (ref 5–12)
NEUTROPHILS NFR BLD AUTO: 10.24 10*3/MM3 (ref 1.7–7)
NEUTROPHILS NFR BLD AUTO: 78.9 % (ref 42.7–76)
NITRITE UR QL STRIP: NEGATIVE
PH UR STRIP.AUTO: 5.5 [PH] (ref 5–8)
PLATELET # BLD AUTO: 291 10*3/MM3 (ref 140–450)
PMV BLD AUTO: 9.9 FL (ref 6–12)
POTASSIUM SERPL-SCNC: 3.6 MMOL/L (ref 3.5–5.2)
PROT SERPL-MCNC: 7.5 G/DL (ref 6–8.5)
PROT UR QL STRIP: ABNORMAL
RBC # BLD AUTO: 4.94 10*6/MM3 (ref 3.77–5.28)
SODIUM SERPL-SCNC: 139 MMOL/L (ref 136–145)
SP GR UR STRIP: >=1.03 (ref 1–1.03)
UROBILINOGEN UR QL STRIP: ABNORMAL
WBC NRBC COR # BLD: 12.98 10*3/MM3 (ref 3.4–10.8)

## 2023-08-09 PROCEDURE — 96375 TX/PRO/DX INJ NEW DRUG ADDON: CPT

## 2023-08-09 PROCEDURE — 85025 COMPLETE CBC W/AUTO DIFF WBC: CPT | Performed by: EMERGENCY MEDICINE

## 2023-08-09 PROCEDURE — 81025 URINE PREGNANCY TEST: CPT | Performed by: EMERGENCY MEDICINE

## 2023-08-09 PROCEDURE — 99283 EMERGENCY DEPT VISIT LOW MDM: CPT

## 2023-08-09 PROCEDURE — 83690 ASSAY OF LIPASE: CPT | Performed by: EMERGENCY MEDICINE

## 2023-08-09 PROCEDURE — 25010000002 ONDANSETRON PER 1 MG: Performed by: EMERGENCY MEDICINE

## 2023-08-09 PROCEDURE — 96374 THER/PROPH/DIAG INJ IV PUSH: CPT

## 2023-08-09 PROCEDURE — 81003 URINALYSIS AUTO W/O SCOPE: CPT | Performed by: EMERGENCY MEDICINE

## 2023-08-09 PROCEDURE — 25010000002 DROPERIDOL PER 5 MG: Performed by: EMERGENCY MEDICINE

## 2023-08-09 PROCEDURE — 80053 COMPREHEN METABOLIC PANEL: CPT | Performed by: EMERGENCY MEDICINE

## 2023-08-09 PROCEDURE — 36415 COLL VENOUS BLD VENIPUNCTURE: CPT

## 2023-08-09 RX ORDER — PROMETHAZINE HYDROCHLORIDE 25 MG/1
25 SUPPOSITORY RECTAL EVERY 6 HOURS PRN
Qty: 15 SUPPOSITORY | Refills: 0 | Status: SHIPPED | OUTPATIENT
Start: 2023-08-09

## 2023-08-09 RX ORDER — ONDANSETRON 2 MG/ML
8 INJECTION INTRAMUSCULAR; INTRAVENOUS ONCE
Status: COMPLETED | OUTPATIENT
Start: 2023-08-09 | End: 2023-08-09

## 2023-08-09 RX ORDER — SODIUM CHLORIDE 0.9 % (FLUSH) 0.9 %
10 SYRINGE (ML) INJECTION AS NEEDED
Status: DISCONTINUED | OUTPATIENT
Start: 2023-08-09 | End: 2023-08-10 | Stop reason: HOSPADM

## 2023-08-09 RX ORDER — DROPERIDOL 2.5 MG/ML
2.5 INJECTION, SOLUTION INTRAMUSCULAR; INTRAVENOUS ONCE
Status: COMPLETED | OUTPATIENT
Start: 2023-08-09 | End: 2023-08-09

## 2023-08-09 RX ADMIN — SODIUM CHLORIDE 1000 ML: 0.9 INJECTION, SOLUTION INTRAVENOUS at 22:00

## 2023-08-09 RX ADMIN — SODIUM CHLORIDE 1000 ML: 9 INJECTION, SOLUTION INTRAVENOUS at 22:53

## 2023-08-09 RX ADMIN — ONDANSETRON 8 MG: 2 INJECTION INTRAMUSCULAR; INTRAVENOUS at 21:59

## 2023-08-09 RX ADMIN — DROPERIDOL 2.5 MG: 2.5 INJECTION, SOLUTION INTRAMUSCULAR; INTRAVENOUS at 22:54

## 2023-08-10 NOTE — FSED PROVIDER NOTE
Subjective   History of Present Illness  Patient is a 37-year-old woman who presents complaining of nausea and vomiting which began 1 day ago.  Patient states that she is thrown up over 30 times.  She was seen last evening and then discharged home.  Patient feels her symptoms have returned and the Zofran prescription is not helping.  She denies any diarrhea.  No fevers.  No abdominal pain.  Symptoms are made worse with oral challenges.  She feels dehydrated secondary to vomiting.    Review of Systems    Past Medical History:   Diagnosis Date    Abnormal Pap smear of cervix     Anemia     Anxiety     Asthma     Benign hematuria 2010    neg work up    Colon polyps 02/11/2021    Colon polyps 2017    Dysmenorrhea     Endometriosis     Foot fracture     Fracture of spine, lumbar, without spinal cord injury, closed 01/02/2014    L2 25% compression fx    Herpes     taking valtrex    Histoplasmosis     EYES    Hyperemesis gravidarum 2019    Infertility associated with anovulation     Femara    Low back pain     Migraine     Palpitations     PVC (premature ventricular contraction)     HX RESOLVED    Sludge in gallbladder     Tremor 05/21    Still waiting on appt at movement disorder clinic       Allergies   Allergen Reactions    Biaxin [Clarithromycin] Swelling     Eyelid    Ceclor [Cefaclor] Hives     Has had Rocephin    Penicillins Hives    Sulfa Antibiotics Hives       Past Surgical History:   Procedure Laterality Date    CHOLECYSTECTOMY  11/22    CHOLECYSTECTOMY WITH INTRAOPERATIVE CHOLANGIOGRAM N/A 11/22/2022    Procedure: LAPAROSCOPIC CHOLECYSTECTOMY WITH INTRAOPERATIVE CHOLANGIOGRAM;  Surgeon: Lorena Doty MD;  Location: [facility];  Service: General;  Laterality: N/A;    COLONOSCOPY  2021    COLONOSCOPY W/ POLYPECTOMY N/A 02/11/2021    [facility], Dr. Bardales, repeat in 5 years    COLONOSCOPY W/ POLYPECTOMY N/A 2017    Dr. Hills    COLPOSCOPY  2009    neg    CYSTOSCOPY      D & C WITH  SUCTION  07/2021    DIAGNOSTIC LAPAROSCOPY N/A 12/2018    and chromopertubation     ENDOSCOPY N/A 12/17/2012    Dr. Hills:  rings in lower esophagus. erosions in stomach    ENDOSCOPY N/A 8/12/2023    Procedure: ESOPHAGOGASTRODUODENOSCOPY with biopsies and dilitation with 52F Horner;  Surgeon: Darrian French MD;  Location: [facility];  Service: Gastroenterology;  Laterality: N/A;  pre- vomitting  post- gastritis, esophageal ring    PERIPHERALLY INSERTED CENTRAL CATHETER INSERTION N/A 2019    TONSILLECTOMY Bilateral 2014       Family History   Problem Relation Age of Onset    Breast cancer Mother     Cancer Mother     Depression Mother     Thyroid disease Mother     Vision loss Mother         Glaucoma    Prostate cancer Father     Migraines Father     Hypertension Father     Cancer Father     Seizures Sister     Depression Sister     Thyroid disease Sister     Heart disease Maternal Grandmother     Diabetes Maternal Grandmother     Colon cancer Maternal Grandfather     Heart disease Paternal Grandmother     Hypertension Paternal Grandfather     Malig Hyperthermia Neg Hx        Social History     Socioeconomic History    Marital status: Legally      Spouse name: Stephane    Number of children: 1    Years of education: 18    Highest education level: Master's degree (e.g., MA, MS, Yemi, MEd, MSW, SUSIE)   Tobacco Use    Smoking status: Never    Smokeless tobacco: Never   Vaping Use    Vaping Use: Never used   Substance and Sexual Activity    Alcohol use: No    Drug use: Never    Sexual activity: Yes     Partners: Male     Birth control/protection: Pill     Comment: Suffered miscarriage in 06/2021           Objective   Physical Exam  Vitals and nursing note reviewed.   Constitutional:       General: She is in acute distress.      Appearance: Normal appearance. She is not ill-appearing or toxic-appearing.   HENT:      Head: Normocephalic and atraumatic.      Nose: Nose normal.      Mouth/Throat:      Mouth:  Mucous membranes are moist.      Pharynx: Oropharynx is clear.   Eyes:      Extraocular Movements: Extraocular movements intact.      Conjunctiva/sclera: Conjunctivae normal.      Pupils: Pupils are equal, round, and reactive to light.   Cardiovascular:      Rate and Rhythm: Normal rate and regular rhythm. Tachycardia present.      Pulses: Normal pulses.      Heart sounds: Normal heart sounds.   Pulmonary:      Effort: Pulmonary effort is normal.      Breath sounds: Normal breath sounds.   Abdominal:      General: Bowel sounds are normal.      Palpations: Abdomen is soft.      Tenderness: There is no abdominal tenderness. There is no right CVA tenderness, left CVA tenderness or guarding.   Musculoskeletal:         General: No swelling or tenderness. Normal range of motion.      Cervical back: Normal range of motion and neck supple.      Right lower leg: No edema.      Left lower leg: No edema.   Skin:     General: Skin is warm and dry.      Capillary Refill: Capillary refill takes less than 2 seconds.      Coloration: Skin is not jaundiced.   Neurological:      General: No focal deficit present.      Mental Status: She is alert.      Sensory: No sensory deficit.      Motor: No weakness.       Procedures           ED Course         Patient with slight relief of nausea with Zofran.  Patient was given droperidol yesterday and stated this helped her.  Patient will be given 2.5 mg of IV droperidol and a second liter of saline.                                  Medical Decision Making  Problems Addressed:  Dehydration: complicated acute illness or injury  Nausea and vomiting, unspecified vomiting type: complicated acute illness or injury    Amount and/or Complexity of Data Reviewed  Labs: ordered.    Risk  Prescription drug management.      Patient 37-year-old woman presents complaining of nausea and vomiting which began 1 day ago and been constant and fluctuating.  No diarrhea or fevers or abdominal pain.  No blood in the  vomit.  Symptoms are made worse with oral challenges.  The patient had been seen last evening and was discharged to home.  She was given a prescription for Zofran ODT.  Patient feels her symptoms are not being controlled with the prescription.  She continues to have nausea and vomiting is felt that she has vomited over 30 times in the past day.  On examination the patient appears to be in mild distress.  Abdomen is soft and nontender.  Remainder examination is unremarkable.  The patient have an IV established and given IV fluids with 8 mg of IV Zofran.  Labs will be checked.  Lipase is slightly elevated 89.  Electrolytes are unremarkable with exception of CO2 level slightly low at 18.5.  White count slightly elevated 12.9.  BUN/creatinine are unremarkable.  Patient feeling better after fluids and medication.      Final diagnoses:   Nausea and vomiting, unspecified vomiting type   Dehydration       ED Disposition  ED Disposition       ED Disposition   Discharge    Condition   Stable    Comment   --               No follow-up provider specified.       Medication List        Changed      * promethazine 25 MG tablet  Commonly known as: PHENERGAN  Take 1 tablet by mouth Every 6 (Six) Hours As Needed for Nausea or Vomiting.  What changed: Another medication with the same name was added. Make sure you understand how and when to take each.     * promethazine 25 MG suppository  Commonly known as: PHENERGAN  Insert 1 suppository into the rectum Every 6 (Six) Hours As Needed for Nausea or Vomiting.  What changed: You were already taking a medication with the same name, and this prescription was added. Make sure you understand how and when to take each.     rizatriptan 10 MG tablet  Commonly known as: Maxalt  Take 1 tablet by mouth 1 (One) Time As Needed for Migraine for up to 1 dose. May repeat in 2 hours if needed  What changed: additional instructions     sertraline 100 MG tablet  Commonly known as: ZOLOFT  1 PO daily for  stress  What changed:   how much to take  how to take this  when to take this  additional instructions     topiramate 50 MG tablet  Commonly known as: TOPAMAX  1 PO in AM and 2 PO in PM for migraine prevention  What changed:   how much to take  how to take this  when to take this  additional instructions           * This list has 2 medication(s) that are the same as other medications prescribed for you. Read the directions carefully, and ask your doctor or other care provider to review them with you.                   Where to Get Your Medications        These medications were sent to [id number] - [place], [place] - [address] AT [address] [place] - [phone] [facility] [phone]   [address] [place] [address] [place] [address]      Phone: [phone]   promethazine 25 MG suppository

## 2023-08-10 NOTE — DISCHARGE INSTRUCTIONS
Consider taking Phenergan suppository if unable to keep anything down by mouth.  Recommend ice chips and clear liquids for the next 6 to 12 hours and advance diet slowly as tolerated.  Seek immediate medical attention at any time if having any concerns.

## 2023-08-11 ENCOUNTER — ANESTHESIA EVENT (OUTPATIENT)
Dept: GASTROENTEROLOGY | Facility: HOSPITAL | Age: 38
End: 2023-08-11
Payer: COMMERCIAL

## 2023-08-11 ENCOUNTER — HOSPITAL ENCOUNTER (OUTPATIENT)
Facility: HOSPITAL | Age: 38
Discharge: HOME | End: 2023-08-13
Attending: EMERGENCY MEDICINE | Admitting: INTERNAL MEDICINE
Payer: COMMERCIAL

## 2023-08-11 ENCOUNTER — APPOINTMENT (OUTPATIENT)
Dept: CT IMAGING | Facility: HOSPITAL | Age: 38
End: 2023-08-11
Payer: COMMERCIAL

## 2023-08-11 DIAGNOSIS — R11.10 INTRACTABLE VOMITING: Primary | ICD-10-CM

## 2023-08-11 DIAGNOSIS — R11.2 NAUSEA AND VOMITING, UNSPECIFIED VOMITING TYPE: ICD-10-CM

## 2023-08-11 DIAGNOSIS — R82.4 KETONURIA: ICD-10-CM

## 2023-08-11 PROBLEM — E87.29 HIGH ANION GAP METABOLIC ACIDOSIS: Status: ACTIVE | Noted: 2023-08-11

## 2023-08-11 LAB
ALBUMIN SERPL-MCNC: 4.7 G/DL (ref 3.5–5.2)
ALBUMIN/GLOB SERPL: 1.6 G/DL
ALP SERPL-CCNC: 130 U/L (ref 39–117)
ALT SERPL W P-5'-P-CCNC: 12 U/L (ref 1–33)
ANION GAP SERPL CALCULATED.3IONS-SCNC: 20.5 MMOL/L (ref 5–15)
AST SERPL-CCNC: 11 U/L (ref 1–32)
BACTERIA UR QL AUTO: ABNORMAL /HPF
BASOPHILS # BLD AUTO: 0.04 10*3/MM3 (ref 0–0.2)
BASOPHILS NFR BLD AUTO: 0.4 % (ref 0–1.5)
BILIRUB SERPL-MCNC: 0.7 MG/DL (ref 0–1.2)
BILIRUB UR QL STRIP: NEGATIVE
BUN SERPL-MCNC: 9 MG/DL (ref 6–20)
BUN/CREAT SERPL: 10.5 (ref 7–25)
CALCIUM SPEC-SCNC: 9.6 MG/DL (ref 8.6–10.5)
CHLORIDE SERPL-SCNC: 102 MMOL/L (ref 98–107)
CLARITY UR: CLEAR
CO2 SERPL-SCNC: 19.5 MMOL/L (ref 22–29)
COLOR UR: YELLOW
CREAT SERPL-MCNC: 0.86 MG/DL (ref 0.57–1)
DEPRECATED RDW RBC AUTO: 39.7 FL (ref 37–54)
EGFRCR SERPLBLD CKD-EPI 2021: 89.9 ML/MIN/1.73
EOSINOPHIL # BLD AUTO: 0 10*3/MM3 (ref 0–0.4)
EOSINOPHIL NFR BLD AUTO: 0 % (ref 0.3–6.2)
ERYTHROCYTE [DISTWIDTH] IN BLOOD BY AUTOMATED COUNT: 12.3 % (ref 12.3–15.4)
GLOBULIN UR ELPH-MCNC: 2.9 GM/DL
GLUCOSE SERPL-MCNC: 84 MG/DL (ref 65–99)
GLUCOSE UR STRIP-MCNC: NEGATIVE MG/DL
HCG SERPL QL: NEGATIVE
HCT VFR BLD AUTO: 45.3 % (ref 34–46.6)
HGB BLD-MCNC: 15 G/DL (ref 12–15.9)
HGB UR QL STRIP.AUTO: ABNORMAL
HOLD SPECIMEN: NORMAL
HYALINE CASTS UR QL AUTO: ABNORMAL /LPF
IMM GRANULOCYTES # BLD AUTO: 0.04 10*3/MM3 (ref 0–0.05)
IMM GRANULOCYTES NFR BLD AUTO: 0.4 % (ref 0–0.5)
KETONES UR QL STRIP: ABNORMAL
LEUKOCYTE ESTERASE UR QL STRIP.AUTO: NEGATIVE
LIPASE SERPL-CCNC: 103 U/L (ref 13–60)
LYMPHOCYTES # BLD AUTO: 1.32 10*3/MM3 (ref 0.7–3.1)
LYMPHOCYTES NFR BLD AUTO: 12.6 % (ref 19.6–45.3)
MCH RBC QN AUTO: 29.1 PG (ref 26.6–33)
MCHC RBC AUTO-ENTMCNC: 33.1 G/DL (ref 31.5–35.7)
MCV RBC AUTO: 88 FL (ref 79–97)
MONOCYTES # BLD AUTO: 0.29 10*3/MM3 (ref 0.1–0.9)
MONOCYTES NFR BLD AUTO: 2.8 % (ref 5–12)
NEUTROPHILS NFR BLD AUTO: 8.81 10*3/MM3 (ref 1.7–7)
NEUTROPHILS NFR BLD AUTO: 83.8 % (ref 42.7–76)
NITRITE UR QL STRIP: NEGATIVE
NRBC BLD AUTO-RTO: 0 /100 WBC (ref 0–0.2)
PH UR STRIP.AUTO: 5.5 [PH] (ref 5–8)
PLATELET # BLD AUTO: 284 10*3/MM3 (ref 140–450)
PMV BLD AUTO: 9.3 FL (ref 6–12)
POTASSIUM SERPL-SCNC: 3.6 MMOL/L (ref 3.5–5.2)
PROT SERPL-MCNC: 7.6 G/DL (ref 6–8.5)
PROT UR QL STRIP: ABNORMAL
RBC # BLD AUTO: 5.15 10*6/MM3 (ref 3.77–5.28)
RBC # UR STRIP: ABNORMAL /HPF
REF LAB TEST METHOD: ABNORMAL
SODIUM SERPL-SCNC: 142 MMOL/L (ref 136–145)
SP GR UR STRIP: 1.02 (ref 1–1.03)
SQUAMOUS #/AREA URNS HPF: ABNORMAL /HPF
UROBILINOGEN UR QL STRIP: ABNORMAL
WBC # UR STRIP: ABNORMAL /HPF
WBC NRBC COR # BLD: 10.5 10*3/MM3 (ref 3.4–10.8)
WHOLE BLOOD HOLD COAG: NORMAL
WHOLE BLOOD HOLD SPECIMEN: NORMAL

## 2023-08-11 PROCEDURE — G0378 HOSPITAL OBSERVATION PER HR: HCPCS

## 2023-08-11 PROCEDURE — 96361 HYDRATE IV INFUSION ADD-ON: CPT

## 2023-08-11 PROCEDURE — 81001 URINALYSIS AUTO W/SCOPE: CPT | Performed by: PHYSICIAN ASSISTANT

## 2023-08-11 PROCEDURE — 85025 COMPLETE CBC W/AUTO DIFF WBC: CPT | Performed by: PHYSICIAN ASSISTANT

## 2023-08-11 PROCEDURE — 96375 TX/PRO/DX INJ NEW DRUG ADDON: CPT

## 2023-08-11 PROCEDURE — 25010000002 DROPERIDOL PER 5 MG: Performed by: PHYSICIAN ASSISTANT

## 2023-08-11 PROCEDURE — 99204 OFFICE O/P NEW MOD 45 MIN: CPT | Performed by: INTERNAL MEDICINE

## 2023-08-11 PROCEDURE — 83690 ASSAY OF LIPASE: CPT | Performed by: PHYSICIAN ASSISTANT

## 2023-08-11 PROCEDURE — 96376 TX/PRO/DX INJ SAME DRUG ADON: CPT

## 2023-08-11 PROCEDURE — 25010000002 ONDANSETRON PER 1 MG: Performed by: PHYSICIAN ASSISTANT

## 2023-08-11 PROCEDURE — 96374 THER/PROPH/DIAG INJ IV PUSH: CPT

## 2023-08-11 PROCEDURE — 84703 CHORIONIC GONADOTROPIN ASSAY: CPT | Performed by: PHYSICIAN ASSISTANT

## 2023-08-11 PROCEDURE — 74177 CT ABD & PELVIS W/CONTRAST: CPT

## 2023-08-11 PROCEDURE — 25510000001 IOPAMIDOL 61 % SOLUTION: Performed by: EMERGENCY MEDICINE

## 2023-08-11 PROCEDURE — 99285 EMERGENCY DEPT VISIT HI MDM: CPT

## 2023-08-11 PROCEDURE — 25010000002 ONDANSETRON PER 1 MG: Performed by: NURSE PRACTITIONER

## 2023-08-11 PROCEDURE — 25010000002 PROCHLORPERAZINE 10 MG/2ML SOLUTION: Performed by: NURSE PRACTITIONER

## 2023-08-11 PROCEDURE — 80053 COMPREHEN METABOLIC PANEL: CPT | Performed by: PHYSICIAN ASSISTANT

## 2023-08-11 RX ORDER — ONDANSETRON 4 MG/1
4 TABLET, FILM COATED ORAL EVERY 6 HOURS PRN
Status: DISCONTINUED | OUTPATIENT
Start: 2023-08-11 | End: 2023-08-13 | Stop reason: HOSPADM

## 2023-08-11 RX ORDER — ONDANSETRON 2 MG/ML
4 INJECTION INTRAMUSCULAR; INTRAVENOUS EVERY 6 HOURS PRN
Status: DISCONTINUED | OUTPATIENT
Start: 2023-08-11 | End: 2023-08-13 | Stop reason: HOSPADM

## 2023-08-11 RX ORDER — SODIUM CHLORIDE 0.9 % (FLUSH) 0.9 %
10 SYRINGE (ML) INJECTION AS NEEDED
Status: DISCONTINUED | OUTPATIENT
Start: 2023-08-11 | End: 2023-08-13 | Stop reason: HOSPADM

## 2023-08-11 RX ORDER — DROPERIDOL 2.5 MG/ML
1.25 INJECTION, SOLUTION INTRAMUSCULAR; INTRAVENOUS ONCE
Status: COMPLETED | OUTPATIENT
Start: 2023-08-11 | End: 2023-08-11

## 2023-08-11 RX ORDER — SODIUM CHLORIDE 9 MG/ML
40 INJECTION, SOLUTION INTRAVENOUS AS NEEDED
Status: DISCONTINUED | OUTPATIENT
Start: 2023-08-11 | End: 2023-08-13 | Stop reason: HOSPADM

## 2023-08-11 RX ORDER — PROCHLORPERAZINE EDISYLATE 5 MG/ML
5 INJECTION INTRAMUSCULAR; INTRAVENOUS ONCE
Status: COMPLETED | OUTPATIENT
Start: 2023-08-11 | End: 2023-08-11

## 2023-08-11 RX ORDER — ONDANSETRON 2 MG/ML
4 INJECTION INTRAMUSCULAR; INTRAVENOUS ONCE
Status: COMPLETED | OUTPATIENT
Start: 2023-08-11 | End: 2023-08-11

## 2023-08-11 RX ORDER — ACETAMINOPHEN 325 MG/1
650 TABLET ORAL EVERY 4 HOURS PRN
Status: DISCONTINUED | OUTPATIENT
Start: 2023-08-11 | End: 2023-08-13 | Stop reason: HOSPADM

## 2023-08-11 RX ORDER — SERTRALINE HYDROCHLORIDE 100 MG/1
100 TABLET, FILM COATED ORAL NIGHTLY
Status: DISCONTINUED | OUTPATIENT
Start: 2023-08-11 | End: 2023-08-13 | Stop reason: HOSPADM

## 2023-08-11 RX ORDER — PANTOPRAZOLE SODIUM 40 MG/10ML
40 INJECTION, POWDER, LYOPHILIZED, FOR SOLUTION INTRAVENOUS ONCE
Status: COMPLETED | OUTPATIENT
Start: 2023-08-11 | End: 2023-08-11

## 2023-08-11 RX ORDER — TOPIRAMATE 100 MG/1
100 TABLET, FILM COATED ORAL NIGHTLY
Status: DISCONTINUED | OUTPATIENT
Start: 2023-08-11 | End: 2023-08-13 | Stop reason: HOSPADM

## 2023-08-11 RX ORDER — BISACODYL 5 MG/1
5 TABLET, DELAYED RELEASE ORAL DAILY PRN
Status: DISCONTINUED | OUTPATIENT
Start: 2023-08-11 | End: 2023-08-13 | Stop reason: HOSPADM

## 2023-08-11 RX ORDER — SODIUM CHLORIDE 0.9 % (FLUSH) 0.9 %
10 SYRINGE (ML) INJECTION EVERY 12 HOURS SCHEDULED
Status: DISCONTINUED | OUTPATIENT
Start: 2023-08-11 | End: 2023-08-13 | Stop reason: HOSPADM

## 2023-08-11 RX ORDER — SUMATRIPTAN 50 MG/1
50 TABLET, FILM COATED ORAL ONCE AS NEEDED
Status: DISCONTINUED | OUTPATIENT
Start: 2023-08-11 | End: 2023-08-13 | Stop reason: HOSPADM

## 2023-08-11 RX ORDER — FAMOTIDINE 10 MG/ML
20 INJECTION, SOLUTION INTRAVENOUS ONCE
Status: COMPLETED | OUTPATIENT
Start: 2023-08-11 | End: 2023-08-11

## 2023-08-11 RX ORDER — BISACODYL 10 MG
10 SUPPOSITORY, RECTAL RECTAL DAILY PRN
Status: DISCONTINUED | OUTPATIENT
Start: 2023-08-11 | End: 2023-08-13 | Stop reason: HOSPADM

## 2023-08-11 RX ORDER — AMOXICILLIN 250 MG
2 CAPSULE ORAL 2 TIMES DAILY
Status: DISCONTINUED | OUTPATIENT
Start: 2023-08-11 | End: 2023-08-13 | Stop reason: HOSPADM

## 2023-08-11 RX ORDER — POLYETHYLENE GLYCOL 3350 17 G/17G
17 POWDER, FOR SOLUTION ORAL DAILY PRN
Status: DISCONTINUED | OUTPATIENT
Start: 2023-08-11 | End: 2023-08-13 | Stop reason: HOSPADM

## 2023-08-11 RX ORDER — SODIUM CHLORIDE, SODIUM LACTATE, POTASSIUM CHLORIDE, CALCIUM CHLORIDE 600; 310; 30; 20 MG/100ML; MG/100ML; MG/100ML; MG/100ML
100 INJECTION, SOLUTION INTRAVENOUS CONTINUOUS
Status: DISCONTINUED | OUTPATIENT
Start: 2023-08-11 | End: 2023-08-13 | Stop reason: HOSPADM

## 2023-08-11 RX ORDER — LEVONORGESTREL AND ETHINYL ESTRADIOL 0.1-0.02MG
1 KIT ORAL NIGHTLY
Status: DISCONTINUED | OUTPATIENT
Start: 2023-08-11 | End: 2023-08-13 | Stop reason: HOSPADM

## 2023-08-11 RX ADMIN — IOPAMIDOL 85 ML: 612 INJECTION, SOLUTION INTRAVENOUS at 08:00

## 2023-08-11 RX ADMIN — TOPIRAMATE 100 MG: 100 TABLET, FILM COATED ORAL at 20:01

## 2023-08-11 RX ADMIN — ONDANSETRON 4 MG: 2 INJECTION INTRAMUSCULAR; INTRAVENOUS at 10:24

## 2023-08-11 RX ADMIN — SODIUM CHLORIDE, POTASSIUM CHLORIDE, SODIUM LACTATE AND CALCIUM CHLORIDE 100 ML/HR: 600; 310; 30; 20 INJECTION, SOLUTION INTRAVENOUS at 11:02

## 2023-08-11 RX ADMIN — ONDANSETRON 4 MG: 2 INJECTION INTRAMUSCULAR; INTRAVENOUS at 15:31

## 2023-08-11 RX ADMIN — SODIUM CHLORIDE, POTASSIUM CHLORIDE, SODIUM LACTATE AND CALCIUM CHLORIDE 1000 ML: 600; 310; 30; 20 INJECTION, SOLUTION INTRAVENOUS at 08:38

## 2023-08-11 RX ADMIN — SENNOSIDES AND DOCUSATE SODIUM 2 TABLET: 50; 8.6 TABLET ORAL at 20:01

## 2023-08-11 RX ADMIN — PROCHLORPERAZINE EDISYLATE 5 MG: 5 INJECTION INTRAMUSCULAR; INTRAVENOUS at 20:12

## 2023-08-11 RX ADMIN — FAMOTIDINE 20 MG: 10 INJECTION INTRAVENOUS at 06:45

## 2023-08-11 RX ADMIN — DROPERIDOL 1.25 MG: 2.5 INJECTION, SOLUTION INTRAMUSCULAR; INTRAVENOUS at 06:44

## 2023-08-11 RX ADMIN — PANTOPRAZOLE SODIUM 40 MG: 40 INJECTION, POWDER, FOR SOLUTION INTRAVENOUS at 10:24

## 2023-08-11 RX ADMIN — Medication 10 ML: at 11:02

## 2023-08-11 RX ADMIN — SODIUM CHLORIDE, POTASSIUM CHLORIDE, SODIUM LACTATE AND CALCIUM CHLORIDE 100 ML/HR: 600; 310; 30; 20 INJECTION, SOLUTION INTRAVENOUS at 19:49

## 2023-08-11 RX ADMIN — SERTRALINE 100 MG: 100 TABLET, FILM COATED ORAL at 20:01

## 2023-08-11 RX ADMIN — SODIUM CHLORIDE, POTASSIUM CHLORIDE, SODIUM LACTATE AND CALCIUM CHLORIDE 1000 ML: 600; 310; 30; 20 INJECTION, SOLUTION INTRAVENOUS at 06:47

## 2023-08-11 NOTE — PROGRESS NOTES
Clinical Pharmacy Services: Medication History    Sara Hoang is a 37 y.o. female presenting to [facility] for   Chief Complaint   Patient presents with    Vomiting       She  has a past medical history of Abnormal Pap smear of cervix, Anemia, Anxiety, Asthma, Benign hematuria (2010), Colon polyps (02/11/2021), Colon polyps (2017), Dysmenorrhea, Endometriosis, Foot fracture, Fracture of spine, lumbar, without spinal cord injury, closed (01/02/2014), Herpes, Histoplasmosis, Hyperemesis gravidarum (2019), Infertility associated with anovulation, Low back pain, Migraine, Palpitations, PVC (premature ventricular contraction), Sludge in gallbladder, and Tremor (05/21).    Allergies as of 08/11/2023 - Reviewed 08/11/2023   Allergen Reaction Noted    Biaxin [clarithromycin] Swelling 11/27/2015    Ceclor [cefaclor] Hives 11/27/2015    Penicillins Hives 11/27/2015    Sulfa antibiotics Hives 11/27/2015       Medication information was obtained from: Patient   Pharmacy and Phone Number:     Prior to Admission Medications       Prescriptions Last Dose Informant Patient Reported? Taking?    ondansetron ODT (ZOFRAN-ODT) 4 MG disintegrating tablet 8/10/2023 Self No Yes    Place 1 tablet on the tongue Every 6 (Six) Hours As Needed for Nausea or Vomiting.    promethazine (PHENERGAN) 25 MG suppository Past Week Self No Yes    Insert 1 suppository into the rectum Every 6 (Six) Hours As Needed for Nausea or Vomiting.    promethazine (PHENERGAN) 25 MG tablet 8/11/2023 Self No Yes    Take 1 tablet by mouth Every 6 (Six) Hours As Needed for Nausea or Vomiting.    rizatriptan (Maxalt) 10 MG tablet Past Week Self No Yes    Take 1 tablet by mouth 1 (One) Time As Needed for Migraine for up to 1 dose. May repeat in 2 hours if needed    Patient taking differently:  Take 1 tablet by mouth 1 (One) Time As Needed for Migraine.    Semaglutide-Weight Management (Wegovy) 0.25 MG/0.5ML solution auto-injector Past Week Self No Yes     Inject 0.25 mg under the skin into the appropriate area as directed 1 (One) Time Per Week. Inject 0.25 mg SQ weekly x4    Patient taking differently:  Inject 0.25 mg under the skin into the appropriate area as directed 1 (One) Time Per Week. Fridays    sertraline (ZOLOFT) 100 MG tablet 8/10/2023 Self No Yes    1 PO daily for stress    Patient taking differently:  Take 1 tablet by mouth Every Night.    topiramate (TOPAMAX) 50 MG tablet 8/10/2023 Self No Yes    1 PO in AM and 2 PO in PM for migraine prevention    Patient taking differently:  Take 2 tablets by mouth Every Night.    Vienva 0.1-20 MG-MCG per tablet Past Week Self Yes Yes    Take 1 tablet by mouth Every Night.    Bacillus Coagulans-Inulin (Probiotic) 1-250 BILLION-MG capsule   Yes No    Take 1 capsule by mouth Every Morning.    colesevelam (Welchol) 625 MG tablet   No No    1-2 tabs PO daily--take at least 4 hours away from birth control pills    doxycycline (VIBRAMYCIN) 100 MG capsule   No No    Take 1 capsule by mouth 2 (Two) Times a Day. For infection    folic acid (FOLVITE) 1 MG tablet   No No    Take 1 tablet by mouth Daily.    Semaglutide-Weight Management (Wegovy) 0.5 MG/0.5ML solution auto-injector   No No    Inject 0.5 mL under the skin into the appropriate area as directed 1 (One) Time Per Week. 0.5mg SC weekly X4              Medication notes:     This medication list is complete to the best of my knowledge as of 8/11/2023    Please call if questions.    Capo Guillaume  Medication History Technician  [phone]    8/11/2023 10:19 EDT

## 2023-08-11 NOTE — ED NOTES
Nursing report ED to floor  Sara Hoang  37 y.o.  female    HPI :   Chief Complaint   Patient presents with    Vomiting       Admitting doctor:   Kevin Keller MD    Admitting diagnosis:   The primary encounter diagnosis was Intractable vomiting. A diagnosis of Ketonuria was also pertinent to this visit.    Code status:   Current Code Status       Date Active Code Status Order ID Comments User Context       8/11/2023 1023 CPR (Attempt to Resuscitate) [id number]  Anne Stock APRN ED        Question Answer    Code Status (Patient has no pulse and is not breathing) CPR (Attempt to Resuscitate)    Medical Interventions (Patient has pulse or is breathing) Full Support                    Allergies:   Biaxin [clarithromycin], Ceclor [cefaclor], Penicillins, and Sulfa antibiotics    Isolation:   No active isolations    Intake and Output  No intake or output data in the 24 hours ending 08/11/23 1130    Weight:       08/11/23  0609   Weight: 83.9 kg (185 lb)       Most recent vitals:   Vitals:    08/11/23 0831 08/11/23 0931 08/11/23 1028 08/11/23 1031   BP: 127/87 121/79 113/75 109/78   BP Location:       Patient Position:       Pulse: 85 81 83 78   Resp: 16  16    Temp:       TempSrc:       SpO2: 98% 99% 96% 97%   Weight:       Height:           Active LDAs/IV Access:   Lines, Drains & Airways       Active LDAs       Name Placement date Placement time Site Days    Peripheral IV 08/11/23 0627 Right Antecubital 08/11/23 0627  Antecubital  less than 1                    Labs (abnormal labs have a star):   Labs Reviewed   COMPREHENSIVE METABOLIC PANEL - Abnormal; Notable for the following components:       Result Value    CO2 19.5 (*)     Alkaline Phosphatase 130 (*)     Anion Gap 20.5 (*)     All other components within normal limits    Narrative:     GFR Normal >60  Chronic Kidney Disease <60  Kidney Failure <15     LIPASE - Abnormal; Notable for the following components:    Lipase 103 (*)     All other  components within normal limits   URINALYSIS W/ MICROSCOPIC IF INDICATED (NO CULTURE) - Abnormal; Notable for the following components:    Ketones, UA 80 mg/dL (3+) (*)     Blood, UA Moderate (2+) (*)     Protein, UA 30 mg/dL (1+) (*)     All other components within normal limits   CBC WITH AUTO DIFFERENTIAL - Abnormal; Notable for the following components:    Neutrophil % 83.8 (*)     Lymphocyte % 12.6 (*)     Monocyte % 2.8 (*)     Eosinophil % 0.0 (*)     Neutrophils, Absolute 8.81 (*)     All other components within normal limits   URINALYSIS, MICROSCOPIC ONLY - Abnormal; Notable for the following components:    RBC, UA 3-5 (*)     WBC, UA 3-5 (*)     Squamous Epithelial Cells, UA 3-6 (*)     All other components within normal limits   HCG, SERUM, QUALITATIVE - Normal   RAINBOW DRAW    Narrative:     The following orders were created for panel order [place] Draw.  Procedure                               Abnormality         Status                     ---------                               -----------         ------                     Green Top (Gel)[[id number]]                                  Final result               Lavender Top[[id number]]                                     Final result               Gold Top - SST[[id number]]                                                              Light Blue Top[[id number]]                                   Final result                 Please view results for these tests on the individual orders.   CBC AND DIFFERENTIAL    Narrative:     The following orders were created for panel order CBC & Differential.  Procedure                               Abnormality         Status                     ---------                               -----------         ------                     CBC Auto Differential[[id number]]        Abnormal            Final result                 Please view results for these tests on the individual orders.   GREEN TOP   LAVENDER TOP   LIGHT BLUE TOP        EKG:   No orders to display       Meds given in ED:   Medications   sodium chloride 0.9 % flush 10 mL (has no administration in time range)   sodium chloride 0.9 % flush 10 mL (has no administration in time range)   lactated ringers infusion (100 mL/hr Intravenous New Bag 8/11/23 1102)   sodium chloride 0.9 % flush 10 mL (10 mL Intravenous Given 8/11/23 1102)   sodium chloride 0.9 % flush 10 mL (has no administration in time range)   sodium chloride 0.9 % infusion 40 mL (has no administration in time range)   sennosides-docusate (PERICOLACE) 8.6-50 MG per tablet 2 tablet (has no administration in time range)     And   polyethylene glycol (MIRALAX) packet 17 g (has no administration in time range)     And   bisacodyl (DULCOLAX) EC tablet 5 mg (has no administration in time range)     And   bisacodyl (DULCOLAX) suppository 10 mg (has no administration in time range)   acetaminophen (TYLENOL) tablet 650 mg (has no administration in time range)   ondansetron (ZOFRAN) tablet 4 mg (has no administration in time range)     Or   ondansetron (ZOFRAN) injection 4 mg (has no administration in time range)   lactated ringers bolus 1,000 mL (0 mL Intravenous Stopped 8/11/23 0838)   famotidine (PEPCID) injection 20 mg (20 mg Intravenous Given 8/11/23 0645)   droperidol (INAPSINE) injection 1.25 mg (1.25 mg Intravenous Given 8/11/23 0644)   iopamidol (ISOVUE-300) 61 % injection 85 mL (85 mL Intravenous Given 8/11/23 0800)   lactated ringers bolus 1,000 mL (0 mL Intravenous Stopped 8/11/23 1102)   ondansetron (ZOFRAN) injection 4 mg (4 mg Intravenous Given 8/11/23 1024)   pantoprazole (PROTONIX) injection 40 mg (40 mg Intravenous Given 8/11/23 1024)       Imaging results:  No radiology results for the last day    Ambulatory status:   - ad bertha    Social issues:   Social History     Socioeconomic History    Marital status: Legally      Spouse name: Stephane    Number of children: 1    Years of education: 18    Highest  education level: Master's degree (e.g., MA, MS, Yemi, MEd, MSW, SUSIE)   Tobacco Use    Smoking status: Never    Smokeless tobacco: Never   Vaping Use    Vaping Use: Never used   Substance and Sexual Activity    Alcohol use: No    Drug use: Never    Sexual activity: Yes     Partners: Male     Birth control/protection: Pill     Comment: Suffered miscarriage in 06/2021       NIH Stroke Scale:       Carole Sutherland RN  08/11/23 11:30 EDT

## 2023-08-11 NOTE — ED PROVIDER NOTES
MD ATTESTATION NOTE    The ROOSEVELT and I have discussed this patient's history, physical exam, and treatment plan.  I have reviewed the documentation and personally had a face to face interaction with the patient. I affirm the documentation and agree with the treatment and plan.  The attached note describes my personal findings.      I provided a substantive portion of the care of the patient.  I personally performed the physical exam in its entirety, and below are my findings.      Brief HPI: Patient complains of nausea and multiple episodes of vomiting for the past 3 days.  She reports vomiting at least 20 times daily.  Emesis is nonbloody and nonbilious.  Denies abdominal pain, fever, chills, diarrhea, flank pain, or dysuria.    PHYSICAL EXAM  ED Triage Vitals   Temp Heart Rate Resp BP SpO2   08/11/23 0609 08/11/23 0609 08/11/23 0609 08/11/23 0614 08/11/23 0609   97.7 øF (36.5 øC) (!) 153 16 (!) 128/102 96 %      Temp src Heart Rate Source Patient Position BP Location FiO2 (%)   08/11/23 0609 08/11/23 0609 08/11/23 0614 08/11/23 0614 --   Tympanic Monitor Lying Right arm          GENERAL: Awake, alert, oriented x 3.  Well-developed, well-nourished female.  Resting comfortably in no acute distress  HENT: nares patent, moist mucous membranes  EYES: no scleral icterus  CV: regular rhythm, normal rate  RESPIRATORY: normal effort, clear to auscultation bilaterally  ABDOMEN: soft, nontender  MUSCULOSKELETAL: Extremities are nontender with full range of motion  NEURO: alert, moves all extremities, follows commands  PSYCH:  calm, cooperative  SKIN: warm, dry    Vital signs and nursing notes reviewed.        Plan: Obtain labs and CT abdomen/pelvis.  Patient will be given IV fluids and IV medications for nausea/vomiting    CT showed a right ovarian cyst but was otherwise negative acute.  CO2 was slightly low and anion gap was elevated.  Patient was unable to tolerate p.o. even after IV medications.  She will be admitted.    ED  Course as of 08/11/23 1144   Fri Aug 11, 2023   0644 WBC: 10.50 [MP]   0644 Hemoglobin: 15.0 [MP]   0705 CO2(!): 19.5 [MP]   0706 Lipase(!): 103 [MP]   0746 Anion Gap(!): 20.5 [WH]   0747 Ketones, UA(!): 80 mg/dL (3+) [MP]   0747 Blood, UA(!): Moderate (2+) [MP]   0747 RBC, UA(!): 3-5 [MP]   0747 WBC, UA(!): 3-5 [MP]   0812 CT of the abdomen and pelvis independently interpreted by me as no bowel obstruction [MP]   0828 Reviewed workup findings with the patient.  She is not actively vomiting.  Will order second liter of IV fluids due to ketones in the urine.  Will trial p.o. fluids. [MP]   1005 Patient attempted p.o. challenge without success.  Now complains of some new onset dysphagia.  Mother is now at bedside and informs me that patient recently started Wegovy and is concerned that patient has developed gastroparesis.  Given intractable vomiting, will admit patient for observation and GI consult. [MP]   1021 Spoke with Anne with [facility].  Discussed patient presentation and ED findings.  She agrees to admit to [facility] observation to the service of Dr. Keller. [MP]      ED Course User Index  [MP] Rbui Steel PASmithaC  [WH] Miguel Spivey MD Holland, William D, MD  08/11/23 1144

## 2023-08-11 NOTE — CONSULTS
[facility]  Initial Inpatient Consult Note    Referring Provider: Dr. 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  She has no diarrhea or constipation.  No fever or chills.  No rectal bleeding or melena.  Her last menstrual period was 3 weeks ago.  The patient is going through divorce.  She has 2 children ages 1 year and 3 years of age.  She went to the emergency room twice in the last week and was given IV fluids and sent home.  She does have some dysphagia today.  Her weight is decreased since she has been on Wegovy for the last 5 weeks.  Her last dose of Wegovy was 1 week ago today.  She denies nonsteroidal anti-inflammatory drug use.  She is a non-smoker.  She drinks alcohol socially.  She last had an EGD years ago because of vomiting.       She had a CAT scan of the abdomen and pelvis earlier today that was unrevealing and showed:  CONCLUSION:  1. Right ovarian cyst.  2. No biliary duct dilatation status post cholecystectomy.  3. No acute intra-abdominal abnormality.        This report was finalized on 8/11/2023 8:17 AM by Dr. Manas Davis M.D.           Past Medical History:  Past Medical History:   Diagnosis Date    Abnormal Pap smear of cervix     Anemia     Anxiety     Asthma     Benign hematuria 2010    neg work up    Colon polyps 02/11/2021    Colon polyps 2017    Dysmenorrhea     Endometriosis     Foot fracture     Fracture of spine, lumbar, without spinal cord injury, closed 01/02/2014    L2 25% compression fx    Herpes     taking valtrex    Histoplasmosis     EYES    Hyperemesis gravidarum 2019    Infertility associated with anovulation     Femara    Low back  pain     Migraine     Palpitations     PVC (premature ventricular contraction)     HX RESOLVED    Sludge in gallbladder     Tremor 05/21    Still waiting on appt at movement disorder clinic     Past Surgical History:  Past Surgical History:   Procedure Laterality Date    CHOLECYSTECTOMY  11/22    CHOLECYSTECTOMY WITH INTRAOPERATIVE CHOLANGIOGRAM N/A 11/22/2022    Procedure: LAPAROSCOPIC CHOLECYSTECTOMY WITH INTRAOPERATIVE CHOLANGIOGRAM;  Surgeon: Lorena Doty MD;  Location: [facility] OR;  Service: General;  Laterality: N/A;    COLONOSCOPY  2021    COLONOSCOPY W/ POLYPECTOMY N/A 02/11/2021    [facility], Dr. Bardales, repeat in 5 years    COLONOSCOPY W/ POLYPECTOMY N/A 2017    Dr. Hills    COLPOSCOPY  2009    neg    CYSTOSCOPY      D & C WITH SUCTION  07/2021    DIAGNOSTIC LAPAROSCOPY N/A 12/2018    and chromopertubation     ENDOSCOPY N/A 12/17/2012    Dr. Hills:  rings in lower esophagus. erosions in stomach    PERIPHERALLY INSERTED CENTRAL CATHETER INSERTION N/A 2019    TONSILLECTOMY Bilateral 2014      Social History:   Social History     Tobacco Use    Smoking status: Never    Smokeless tobacco: Never   Substance Use Topics    Alcohol use: No      Family History:  Family History   Problem Relation Age of Onset    Breast cancer Mother     Cancer Mother     Depression Mother     Thyroid disease Mother     Vision loss Mother         Glaucoma    Prostate cancer Father     Migraines Father     Hypertension Father     Cancer Father     Seizures Sister     Depression Sister     Thyroid disease Sister     Heart disease Maternal Grandmother     Diabetes Maternal Grandmother     Colon cancer Maternal Grandfather     Heart disease Paternal Grandmother     Hypertension Paternal Grandfather     Malig Hyperthermia Neg Hx        Home Meds:  Medications Prior to Admission   Medication Sig Dispense Refill Last Dose    ondansetron ODT (ZOFRAN-ODT) 4 MG disintegrating tablet Place 1 tablet on the tongue Every  6 (Six) Hours As Needed for Nausea or Vomiting. 20 tablet 0 8/10/2023    promethazine (PHENERGAN) 25 MG suppository Insert 1 suppository into the rectum Every 6 (Six) Hours As Needed for Nausea or Vomiting. 15 suppository 0 Past Week    promethazine (PHENERGAN) 25 MG tablet Take 1 tablet by mouth Every 6 (Six) Hours As Needed for Nausea or Vomiting. 20 tablet 0 8/11/2023    rizatriptan (Maxalt) 10 MG tablet Take 1 tablet by mouth 1 (One) Time As Needed for Migraine for up to 1 dose. May repeat in 2 hours if needed (Patient taking differently: Take 1 tablet by mouth 1 (One) Time As Needed for Migraine.) 24 tablet 3 Past Week    Semaglutide-Weight Management (Wegovy) 0.25 MG/0.5ML solution auto-injector Inject 0.25 mg under the skin into the appropriate area as directed 1 (One) Time Per Week. Inject 0.25 mg SQ weekly x4 (Patient taking differently: Inject 0.25 mg under the skin into the appropriate area as directed 1 (One) Time Per Week. Fridays) 2 mL 5 Past Week    sertraline (ZOLOFT) 100 MG tablet 1 PO daily for stress (Patient taking differently: Take 1 tablet by mouth Every Night.) 90 tablet 3 8/10/2023    topiramate (TOPAMAX) 50 MG tablet 1 PO in AM and 2 PO in PM for migraine prevention (Patient taking differently: Take 2 tablets by mouth Every Night.) 270 tablet 3 8/10/2023    Vienva 0.1-20 MG-MCG per tablet Take 1 tablet by mouth Every Night.   Past Week    colesevelam (Welchol) 625 MG tablet 1-2 tabs PO daily--take at least 4 hours away from birth control pills 60 tablet 11     folic acid (FOLVITE) 1 MG tablet Take 1 tablet by mouth Daily. 90 tablet 3     Semaglutide-Weight Management (Wegovy) 0.5 MG/0.5ML solution auto-injector Inject 0.5 mL under the skin into the appropriate area as directed 1 (One) Time Per Week. 0.5mg SC weekly X4 2 mL 2      Current Meds:   levonorgestrel-ethinyl estradiol, 1 tablet, Oral, Nightly  senna-docusate sodium, 2 tablet, Oral, BID  sertraline, 100 mg, Oral, Nightly  sodium  chloride, 10 mL, Intravenous, Q12H  topiramate, 100 mg, Oral, Nightly      Allergies:  Allergies   Allergen Reactions    Biaxin [Clarithromycin] Swelling     Eyelid    Ceclor [Cefaclor] Hives     Has had Rocephin    Penicillins Hives    Sulfa Antibiotics Hives     Review of Systems  The following systems were reviewed and negative;  respiratory, cardiovascular, musculoskeletal, and neurological     Objective     Vital Signs  Temp:  [97.7 øF (36.5 øC)-98.2 øF (36.8 øC)] 98.2 øF (36.8 øC)  Heart Rate:  [] 85  Resp:  [16] 16  BP: (109-139)/() 123/87  Physical Exam:  General Appearance:    Alert, cooperative, in no acute distress   Head:    Normocephalic, without obvious abnormality, atraumatic   Eyes:            Lids and lashes normal, conjunctivae and sclerae normal, no   icterus   Throat:   No oral lesions, no thrush, oral mucosa moist   Neck:   No adenopathy, supple, trachea midline, no thyromegaly, no   carotid bruit, no JVD   Lungs:     Clear to auscultation,respirations regular, even and                   unlabored    Heart:    Regular rhythm and normal rate, normal S1 and S2, no            murmur, no gallop, no rub, no click   Chest Wall:    No abnormalities observed   Abdomen:     Normal bowel sounds, no masses, no organomegaly, soft        nontender, nondistended, no guarding, no rebound                 tenderness   Rectal:     Deferred   Extremities:   no edema, no cyanosis, no redness   Skin:   No bleeding, bruising or rash   Lymph nodes:   No palpable adenopathy   Psychiatric:  Judgement and insight: normal   Orientation to person place and time: normal   Mood and affect: normal   Results Review:   I reviewed the patient's new clinical results.    Results from last 7 days   Lab Units 08/11/23  0628 08/09/23  2147 08/08/23  1255   WBC 10*3/mm3 10.50 12.98* 11.63*   HEMOGLOBIN g/dL 15.0 14.1 14.6   HEMATOCRIT % 45.3 43.9 45.2   PLATELETS 10*3/mm3 284 291 272     Results from last 7 days   Lab  Units 08/11/23  0628 08/09/23  2147 08/08/23  1255   SODIUM mmol/L 142 139 141   POTASSIUM mmol/L 3.6 3.6 4.0   CHLORIDE mmol/L 102 104 104   CO2 mmol/L 19.5* 18.5* 21.1*   BUN mg/dL 9 14 12   CREATININE mg/dL 0.86 0.76 0.79   CALCIUM mg/dL 9.6 9.6 9.9   BILIRUBIN mg/dL 0.7 0.6 0.5   ALK PHOS U/L 130* 118* 127*   ALT (SGPT) U/L 12 10 8   AST (SGOT) U/L 11 19 12   GLUCOSE mg/dL 84 86 120*         Lab Results   Lab Value Date/Time    LIPASE 103 (H) 08/11/2023 0628    LIPASE 89 (H) 08/09/2023 2211    LIPASE 50 08/08/2023 1255    LIPASE 42 11/01/2022 1102    LIPASE 29 04/18/2019 2023       Radiology:  CT Abdomen Pelvis With Contrast   Final Result          Assessment & Plan   Assessment:   37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  Could this be from Wegovy.  Could she have peptic ulcer disease, gastroesophageal reflux disease etc.?  Dysphagia.      Plan:   We will plan to do an EGD tomorrow.  This will be done by Dr. Darrian French.   I agree with putting her empirically on Protonix 40 mg IV daily.    I discussed the patient's findings and my recommendations with patient.         Juan Pablo Brown M.D.  [facility]  [address] [place] [address]  Office: [phone]

## 2023-08-11 NOTE — ED PROVIDER NOTES
EMERGENCY DEPARTMENT ENCOUNTER    Room Number:  07/07  PCP: Lashae Hercules PA-C        HPI:  Chief Complaint: Nausea and vomiting    Context: Sara Hoang is a 37 y.o. female who presents to the ED c/o nausea and vomiting.  Patient reports her symptoms for started 3 days ago.  She reports she is having over 30 episodes of vomiting per day.  Has attempted using Zofran and Phenergan suppositories without relief of her vomiting.  She denies hematemesis.  She denies associated abdominal pain.  She denies sick contacts.  Patient has had prior cholecystectomy and diagnostic laparoscopy.  Patient denies fever, diarrhea, dysuria, or any other systemic complaint.  LMP 7/25/2023.  Patient endorses social alcohol use.  Denies tobacco or THC use.      External (non-ED) record review:   Reviewed note from office visit with neurology on 4/21/2023 where patient seen for spasmodic torticollis and dystonic tremor.  Reviewed assessment and plan.  Patient will proceed with Botox for neck tremor.  Reviewed prior laboratory studies.  Most recent CBC with hemoglobin 14.1.  Most recent CMP with creatinine 0.76.      PAST MEDICAL HISTORY  Active Ambulatory Problems     Diagnosis Date Noted    Migraine headache 12/05/1995    Asthma 11/27/2015    Anxiety 11/27/2015    Insomnia 11/27/2015    Hyperemesis gravidarum 03/18/2019    Encounter for supervision of low-risk first pregnancy in first trimester 03/18/2019    Use of letrozole (Femara) 03/18/2019    Rh negative status during pregnancy in first trimester 03/18/2019    Hyperemesis affecting pregnancy, antepartum 05/31/2019    Vaginal bleeding affecting early pregnancy 06/29/2019    Low folic acid 02/18/2020    Moderate persistent asthma with acute exacerbation 10/20/2021    Pregnancy 03/04/2022    Anemia of pregnancy 06/29/2022    RUQ abdominal pain 11/15/2022    Nausea and vomiting 11/15/2022    Gallbladder sludge 11/15/2022     Resolved Ambulatory Problems     Diagnosis Date Noted     Pregnancy 03/30/2019     Past Medical History:   Diagnosis Date    Abnormal Pap smear of cervix     Anemia     Benign hematuria 2010    Colon polyps 02/11/2021    Colon polyps 2017    Dysmenorrhea     Endometriosis     Foot fracture     Fracture of spine, lumbar, without spinal cord injury, closed 01/02/2014    Herpes     Histoplasmosis     Infertility associated with anovulation     Low back pain     Migraine     Palpitations     PVC (premature ventricular contraction)     Sludge in gallbladder     Tremor 05/21         PAST SURGICAL HISTORY  Past Surgical History:   Procedure Laterality Date    CHOLECYSTECTOMY  11/22    CHOLECYSTECTOMY WITH INTRAOPERATIVE CHOLANGIOGRAM N/A 11/22/2022    Procedure: LAPAROSCOPIC CHOLECYSTECTOMY WITH INTRAOPERATIVE CHOLANGIOGRAM;  Surgeon: Lorena Doty MD;  Location: [facility] MAIN OR;  Service: General;  Laterality: N/A;    COLONOSCOPY  2021    COLONOSCOPY W/ POLYPECTOMY N/A 02/11/2021    [facility], Dr. Bardales, repeat in 5 years    COLONOSCOPY W/ POLYPECTOMY N/A 2017    Dr. Hills    COLPOSCOPY  2009    neg    CYSTOSCOPY      D & C WITH SUCTION  07/2021    DIAGNOSTIC LAPAROSCOPY N/A 12/2018    and chromopertubation     ENDOSCOPY N/A 12/17/2012    Dr. Hills:  rings in lower esophagus. erosions in stomach    PERIPHERALLY INSERTED CENTRAL CATHETER INSERTION N/A 2019    TONSILLECTOMY Bilateral 2014         FAMILY HISTORY  Family History   Problem Relation Age of Onset    Breast cancer Mother     Cancer Mother     Depression Mother     Thyroid disease Mother     Vision loss Mother         Glaucoma    Prostate cancer Father     Migraines Father     Hypertension Father     Cancer Father     Seizures Sister     Depression Sister     Thyroid disease Sister     Heart disease Maternal Grandmother     Diabetes Maternal Grandmother     Colon cancer Maternal Grandfather     Heart disease Paternal Grandmother     Hypertension Paternal Grandfather     Malig Hyperthermia Neg Hx           SOCIAL HISTORY  Social History     Socioeconomic History    Marital status: Legally      Spouse name: Stephane    Number of children: 1    Years of education: 18    Highest education level: Master's degree (e.g., MA, MS, Yemi, MEd, MSW, SUSIE)   Tobacco Use    Smoking status: Never    Smokeless tobacco: Never   Vaping Use    Vaping Use: Never used   Substance and Sexual Activity    Alcohol use: No    Drug use: Never    Sexual activity: Yes     Partners: Male     Birth control/protection: Pill     Comment: Suffered miscarriage in 06/2021         ALLERGIES  Biaxin [clarithromycin], Ceclor [cefaclor], Penicillins, and Sulfa antibiotics        REVIEW OF SYSTEMS  Review of Systems   Constitutional:  Negative for chills and fever.   HENT:  Negative for ear pain and sore throat.    Respiratory:  Negative for cough and shortness of breath.    Cardiovascular:  Negative for chest pain and palpitations.   Gastrointestinal:  Positive for nausea and vomiting. Negative for abdominal pain.   Genitourinary:  Negative for dysuria and hematuria.   Musculoskeletal:  Negative for arthralgias and joint swelling.   Skin:  Negative for pallor and rash.   Neurological:  Negative for numbness and headaches.   Psychiatric/Behavioral:  Negative for confusion and hallucinations.           PHYSICAL EXAM  ED Triage Vitals   Temp Heart Rate Resp BP SpO2   08/11/23 0609 08/11/23 0609 08/11/23 0609 08/11/23 0614 08/11/23 0609   97.7 øF (36.5 øC) (!) 153 16 (!) 128/102 96 %      Temp src Heart Rate Source Patient Position BP Location FiO2 (%)   08/11/23 0609 08/11/23 0609 08/11/23 0614 08/11/23 0614 --   Tympanic Monitor Lying Right arm        Physical Exam  Constitutional:       General: She is not in acute distress.     Appearance: She is well-developed.   HENT:      Head: Normocephalic and atraumatic.   Eyes:      Extraocular Movements: Extraocular movements intact.   Cardiovascular:      Rate and Rhythm: Normal rate and regular rhythm.       Heart sounds: Normal heart sounds.   Pulmonary:      Effort: Pulmonary effort is normal.      Breath sounds: Normal breath sounds.   Abdominal:      General: There is no distension.   Skin:     General: Skin is warm.   Neurological:      General: No focal deficit present.      Mental Status: She is alert and oriented to person, place, and time.   Psychiatric:         Mood and Affect: Mood normal.       Vital signs and nursing notes reviewed.          LAB RESULTS  Recent Results (from the past 24 hour(s))   Comprehensive Metabolic Panel    Collection Time: 08/11/23  6:28 AM    Specimen: Blood   Result Value Ref Range    Glucose 84 65 - 99 mg/dL    BUN 9 6 - 20 mg/dL    Creatinine 0.86 0.57 - 1.00 mg/dL    Sodium 142 136 - 145 mmol/L    Potassium 3.6 3.5 - 5.2 mmol/L    Chloride 102 98 - 107 mmol/L    CO2 19.5 (L) 22.0 - 29.0 mmol/L    Calcium 9.6 8.6 - 10.5 mg/dL    Total Protein 7.6 6.0 - 8.5 g/dL    Albumin 4.7 3.5 - 5.2 g/dL    ALT (SGPT) 12 1 - 33 U/L    AST (SGOT) 11 1 - 32 U/L    Alkaline Phosphatase 130 (H) 39 - 117 U/L    Total Bilirubin 0.7 0.0 - 1.2 mg/dL    Globulin 2.9 gm/dL    A/G Ratio 1.6 g/dL    BUN/Creatinine Ratio 10.5 7.0 - 25.0    Anion Gap 20.5 (H) 5.0 - 15.0 mmol/L    eGFR 89.9 >60.0 mL/min/1.73   Lipase    Collection Time: 08/11/23  6:28 AM    Specimen: Blood   Result Value Ref Range    Lipase 103 (H) 13 - 60 U/L   hCG, Serum, Qualitative    Collection Time: 08/11/23  6:28 AM    Specimen: Blood   Result Value Ref Range    HCG Qualitative Negative Negative   Green Top (Gel)    Collection Time: 08/11/23  6:28 AM   Result Value Ref Range    Extra Tube Hold for add-ons.    Lavender Top    Collection Time: 08/11/23  6:28 AM   Result Value Ref Range    Extra Tube hold for add-on    Light Blue Top    Collection Time: 08/11/23  6:28 AM   Result Value Ref Range    Extra Tube Hold for add-ons.    CBC Auto Differential    Collection Time: 08/11/23  6:28 AM    Specimen: Blood   Result Value Ref Range     WBC 10.50 3.40 - 10.80 10*3/mm3    RBC 5.15 3.77 - 5.28 10*6/mm3    Hemoglobin 15.0 12.0 - 15.9 g/dL    Hematocrit 45.3 34.0 - 46.6 %    MCV 88.0 79.0 - 97.0 fL    MCH 29.1 26.6 - 33.0 pg    MCHC 33.1 31.5 - 35.7 g/dL    RDW 12.3 12.3 - 15.4 %    RDW-SD 39.7 37.0 - 54.0 fl    MPV 9.3 6.0 - 12.0 fL    Platelets 284 140 - 450 10*3/mm3    Neutrophil % 83.8 (H) 42.7 - 76.0 %    Lymphocyte % 12.6 (L) 19.6 - 45.3 %    Monocyte % 2.8 (L) 5.0 - 12.0 %    Eosinophil % 0.0 (L) 0.3 - 6.2 %    Basophil % 0.4 0.0 - 1.5 %    Immature Grans % 0.4 0.0 - 0.5 %    Neutrophils, Absolute 8.81 (H) 1.70 - 7.00 10*3/mm3    Lymphocytes, Absolute 1.32 0.70 - 3.10 10*3/mm3    Monocytes, Absolute 0.29 0.10 - 0.90 10*3/mm3    Eosinophils, Absolute 0.00 0.00 - 0.40 10*3/mm3    Basophils, Absolute 0.04 0.00 - 0.20 10*3/mm3    Immature Grans, Absolute 0.04 0.00 - 0.05 10*3/mm3    nRBC 0.0 0.0 - 0.2 /100 WBC   Urinalysis With Microscopic If Indicated (No Culture) - Urine, Clean Catch    Collection Time: 08/11/23  7:28 AM    Specimen: Urine, Clean Catch   Result Value Ref Range    Color, UA Yellow Yellow, Straw    Appearance, UA Clear Clear    pH, UA 5.5 5.0 - 8.0    Specific Gravity, UA 1.022 1.005 - 1.030    Glucose, UA Negative Negative    Ketones, UA 80 mg/dL (3+) (A) Negative    Bilirubin, UA Negative Negative    Blood, UA Moderate (2+) (A) Negative    Protein, UA 30 mg/dL (1+) (A) Negative    Leuk Esterase, UA Negative Negative    Nitrite, UA Negative Negative    Urobilinogen, UA 0.2 E.U./dL 0.2 - 1.0 E.U./dL   Urinalysis, Microscopic Only - Urine, Clean Catch    Collection Time: 08/11/23  7:28 AM    Specimen: Urine, Clean Catch   Result Value Ref Range    RBC, UA 3-5 (A) None Seen, 0-2 /HPF    WBC, UA 3-5 (A) None Seen, 0-2 /HPF    Bacteria, UA None Seen None Seen /HPF    Squamous Epithelial Cells, UA 3-6 (A) None Seen, 0-2 /HPF    Hyaline Casts, UA 7-12 None Seen /LPF    Methodology Automated Microscopy        Ordered the above labs and  reviewed the results.        RADIOLOGY  CT Abdomen Pelvis With Contrast    Result Date: 8/11/2023  CT ABDOMEN PELVIS W CONTRAST-  Radiation dose reduction techniques were utilized, including automated exposure control and exposure modulation based on body size.  Clinical: Nausea and vomiting  FINDINGS: Focal fatty infiltration left lobe of the liver. No biliary duct dilatation status post cholecystectomy. The pancreas and adrenal glands are normal. 16 mm cyst inferior pole of the spleen which is otherwise normal.  Both kidneys demonstrate asymmetric satisfactory pattern of enhancement. No mass, calculus or obstructive uropathy. The ureters are normal in course and caliber to the urinary bladder which is satisfactory in appearance.  The uterus is anteverted and normal. 23 mm right ovarian cyst. Attenuation compatible with serous fluid. No free fluid in the pelvic cul-de-sac.  The appendix, colon and small bowel have a normal appearance. The gastric contour is normal. No duodenal abnormality seen. Diameter of the aorta is within normal limits.  CONCLUSION: 1. Right ovarian cyst. 2. No biliary duct dilatation status post cholecystectomy. 3. No acute intra-abdominal abnormality.   This report was finalized on 8/11/2023 8:17 AM by Dr. Manas Davis M.D.       Ordered the above noted radiological studies. Reviewed by me in PACS.                MEDICATIONS GIVEN IN ER  Medications   sodium chloride 0.9 % flush 10 mL (has no administration in time range)   sodium chloride 0.9 % flush 10 mL (has no administration in time range)   ondansetron (ZOFRAN) injection 4 mg (has no administration in time range)   pantoprazole (PROTONIX) injection 40 mg (has no administration in time range)   lactated ringers bolus 1,000 mL (0 mL Intravenous Stopped 8/11/23 2675)   famotidine (PEPCID) injection 20 mg (20 mg Intravenous Given 8/11/23 2555)   droperidol (INAPSINE) injection 1.25 mg (1.25 mg Intravenous Given 8/11/23 5814)   iopamidol  (ISOVUE-300) 61 % injection 85 mL (85 mL Intravenous Given 8/11/23 0800)   lactated ringers bolus 1,000 mL (1,000 mL Intravenous New Bag 8/11/23 0838)               MEDICAL DECISION MAKING, PROGRESS, and CONSULTS    All labs have been independently reviewed by me.  All radiology studies have been reviewed by me and I have also reviewed the radiology report.   EKG's independently viewed and interpreted by me.  Discussion below represents my analysis of pertinent findings related to patient's condition, differential diagnosis, treatment plan and final disposition.            Orders placed during this visit:  Orders Placed This Encounter   Procedures    CT Abdomen Pelvis With Contrast    [place] Draw    Comprehensive Metabolic Panel    Lipase    Urinalysis With Microscopic If Indicated (No Culture) - Urine, Clean Catch    hCG, Serum, Qualitative    CBC Auto Differential    Urinalysis, Microscopic Only - Urine, Clean Catch    NPO Diet NPO Type: Strict NPO    Undress & Gown    [facility] (on-call MD unless specified) Details    Insert Peripheral IV    Insert Peripheral IV    Initiate Observation Status    CBC & Differential    Green Top (Gel)    Lavender Top    Light Blue Top           Differential diagnosis:  Viral gastroenteritis, pancreatitis, gastritis, gastroparesis      Independent interpretation of labs, radiology studies, and discussions with consultants:  ED Course as of 08/11/23 1022   Fri Aug 11, 2023   0644 WBC: 10.50 [MP]   0644 Hemoglobin: 15.0 [MP]   0705 CO2(!): 19.5 [MP]   0706 Lipase(!): 103 [MP]   0746 Anion Gap(!): 20.5 [WH]   0747 Ketones, UA(!): 80 mg/dL (3+) [MP]   0747 Blood, UA(!): Moderate (2+) [MP]   0747 RBC, UA(!): 3-5 [MP]   0747 WBC, UA(!): 3-5 [MP]   0812 CT of the abdomen and pelvis independently interpreted by me as no bowel obstruction [MP]   0828 Reviewed workup findings with the patient.  She is not actively vomiting.  Will order second liter of IV fluids due to ketones in the urine.  Will  trial p.o. fluids. [MP]   1005 Patient attempted p.o. challenge without success.  Now complains of some new onset dysphagia.  Mother is now at bedside and informs me that patient recently started Wegovy and is concerned that patient has developed gastroparesis.  Given intractable vomiting, will admit patient for observation and GI consult. [MP]   1021 Spoke with Anne with A.  Discussed patient presentation and ED findings.  She agrees to admit to [facility] observation to the service of Dr. Keller. [MP]      ED Course User Index  [MP] Rubi Steel PA-C  [WH] Miguel Spivey MD       - Shared decision making: Discussed with patient given third ED visit in intractable vomiting I would recommend admission for observation.  Patient is agreeable.    Additional orders considered but not ordered:  RUQ ultrasound      Additional sources:    - Chronic or social conditions impacting care: Anxiety          DIAGNOSIS  Final diagnoses:   Intractable vomiting   Ketonuria           Latest Documented Vital Signs:  As of 10:22 EDT  BP- 121/79 HR- 81 Temp- 97.7 øF (36.5 øC) (Tympanic) O2 sat- 99%              --    Please note that portions of this were completed with a voice recognition program.       Note Disclaimer: At [facility], we believe that sharing information builds trust and better relationships. You are receiving this note because you are receiving care at [facility] or recently visited. It is possible you will see health information before a provider has talked with you about it. This kind of information can be easy to misunderstand. To help you fully understand what it means for your health, we urge you to discuss this note with your provider.             Rubi Steel PA-C  08/11/23 1022

## 2023-08-11 NOTE — PLAN OF CARE
Goal Outcome Evaluation:  Plan of Care Reviewed With: patient        Progress: no change  Outcome Evaluation: zofran x1 for nausea.  IVF's infusing.  up ad bertha.  instructed on IS but has not demonstrated use yet due to nausea.  SCD's on.  GI to consult.

## 2023-08-11 NOTE — ED TRIAGE NOTES
"Pt ambulatory to triage desk stating \"I can't stop vomiting\". Pt states she was seen in [facility] ER on Tuesday and Wednesday for the same complaint, but getting worse. Pt denies abdominal pain or diarrhea. States she is on her fifth week of taking wegovy and thinks it may be making her sick.   "

## 2023-08-11 NOTE — H&P
Patient Name:  Sara Hoang  YOB: 1985  MRN:  [id number]  Admit Date:  8/11/2023  Patient Care Team:  Lashae Hercules PA-C as PCP - General (Family Medicine)  Lashae Hercules PA-C as PCP - Family Medicine  Jerome Hills MD as Consulting Physician (Gastroenterology)  Franci Winter MD as Consulting Physician (Obstetrics and Gynecology)  Waldo Hartley MD as Consulting Physician (Urology)  Nicky Alebrt APRN as Nurse Practitioner (Family Medicine)  Marcos Ortega MD as Consulting Physician (Neurology)      Subjective   History Present Illness     Chief Complaint   Patient presents with    Vomiting       Ms. Hoang is a 37 y.o. non-smoker with a history of migraines, anxiety, anemia, asthma that presents to [facility] complaining of n/v. Started on Monday, had a migraine and 2 episodes of vomiting. Symptoms resolved until later Tuesday when she began vomiting, this time without migraine/headache. Since then, she has had intractable vomiting; no abdominal pain or cramping. Has been seen x 2 at [facility]. Was prescribed antiemetics w/minimal relief. Emesis described as bile and frothy; denies hematemesis. No fever, chest pain, soa, diarrhea. Last meal tolerated was on Monday. Failed po challenge in ED. Since admitted, reports some dysphagia. Started Wegovy 5 weeks ago    Afebrile. HR 150s on arrival; now 80s. BP stable. On room air. WBC 10.50, Hgb 15. HCG qual neg. Lipase 103. CO2 19.5, Alk phos 130, Anion gap 20.5. UA 3+ ketones, 2+ blood, neg leuk est/nitrite/bact. CT A?P: rt ovarian cyst; no acute finding, s/p cholecystectomy    Review of Systems   Constitutional:  Positive for appetite change. Negative for fever.   HENT:  Negative for congestion.    Respiratory:  Negative for shortness of breath.    Cardiovascular:  Negative for chest pain.   Gastrointestinal:  Positive for nausea and vomiting. Negative for abdominal pain.   Genitourinary:  Negative for dysuria.    Musculoskeletal:  Negative for arthralgias.   Skin:  Negative for rash.   Neurological:  Positive for headaches.   Psychiatric/Behavioral:  Negative for sleep disturbance.       Personal History     Past Medical History:   Diagnosis Date    Abnormal Pap smear of cervix     Anemia     Anxiety     Asthma     Benign hematuria 2010    neg work up    Colon polyps 02/11/2021    Colon polyps 2017    Dysmenorrhea     Endometriosis     Foot fracture     Fracture of spine, lumbar, without spinal cord injury, closed 01/02/2014    L2 25% compression fx    Herpes     taking valtrex    Histoplasmosis     EYES    Hyperemesis gravidarum 2019    Infertility associated with anovulation     Femara    Low back pain     Migraine     Palpitations     PVC (premature ventricular contraction)     HX RESOLVED    Sludge in gallbladder     Tremor 05/21    Still waiting on appt at movement disorder clinic     Past Surgical History:   Procedure Laterality Date    CHOLECYSTECTOMY  11/22    CHOLECYSTECTOMY WITH INTRAOPERATIVE CHOLANGIOGRAM N/A 11/22/2022    Procedure: LAPAROSCOPIC CHOLECYSTECTOMY WITH INTRAOPERATIVE CHOLANGIOGRAM;  Surgeon: Lorena Doty MD;  Location: [facility] OR;  Service: General;  Laterality: N/A;    COLONOSCOPY  2021    COLONOSCOPY W/ POLYPECTOMY N/A 02/11/2021    [facility], Dr. Bardales, repeat in 5 years    COLONOSCOPY W/ POLYPECTOMY N/A 2017    Dr. Hills    COLPOSCOPY  2009    neg    CYSTOSCOPY      D & C WITH SUCTION  07/2021    DIAGNOSTIC LAPAROSCOPY N/A 12/2018    and chromopertubation     ENDOSCOPY N/A 12/17/2012    Dr. Hills:  rings in lower esophagus. erosions in stomach    PERIPHERALLY INSERTED CENTRAL CATHETER INSERTION N/A 2019    TONSILLECTOMY Bilateral 2014     Family History   Problem Relation Age of Onset    Breast cancer Mother     Cancer Mother     Depression Mother     Thyroid disease Mother     Vision loss Mother         Glaucoma    Prostate cancer Father     Migraines Father      Hypertension Father     Cancer Father     Seizures Sister     Depression Sister     Thyroid disease Sister     Heart disease Maternal Grandmother     Diabetes Maternal Grandmother     Colon cancer Maternal Grandfather     Heart disease Paternal Grandmother     Hypertension Paternal Grandfather     Malig Hyperthermia Neg Hx      Social History     Tobacco Use    Smoking status: Never    Smokeless tobacco: Never   Vaping Use    Vaping Use: Never used   Substance Use Topics    Alcohol use: No    Drug use: Never     No current facility-administered medications on file prior to encounter.     Current Outpatient Medications on File Prior to Encounter   Medication Sig Dispense Refill    ondansetron ODT (ZOFRAN-ODT) 4 MG disintegrating tablet Place 1 tablet on the tongue Every 6 (Six) Hours As Needed for Nausea or Vomiting. 20 tablet 0    promethazine (PHENERGAN) 25 MG suppository Insert 1 suppository into the rectum Every 6 (Six) Hours As Needed for Nausea or Vomiting. 15 suppository 0    promethazine (PHENERGAN) 25 MG tablet Take 1 tablet by mouth Every 6 (Six) Hours As Needed for Nausea or Vomiting. 20 tablet 0    rizatriptan (Maxalt) 10 MG tablet Take 1 tablet by mouth 1 (One) Time As Needed for Migraine for up to 1 dose. May repeat in 2 hours if needed (Patient taking differently: Take 1 tablet by mouth 1 (One) Time As Needed for Migraine.) 24 tablet 3    Semaglutide-Weight Management (Wegovy) 0.25 MG/0.5ML solution auto-injector Inject 0.25 mg under the skin into the appropriate area as directed 1 (One) Time Per Week. Inject 0.25 mg SQ weekly x4 (Patient taking differently: Inject 0.25 mg under the skin into the appropriate area as directed 1 (One) Time Per Week. Fridays) 2 mL 5    sertraline (ZOLOFT) 100 MG tablet 1 PO daily for stress (Patient taking differently: Take 1 tablet by mouth Every Night.) 90 tablet 3    topiramate (TOPAMAX) 50 MG tablet 1 PO in AM and 2 PO in PM for migraine prevention (Patient taking  differently: Take 2 tablets by mouth Every Night.) 270 tablet 3    Vienva 0.1-20 MG-MCG per tablet Take 1 tablet by mouth Every Night.      colesevelam (Welchol) 625 MG tablet 1-2 tabs PO daily--take at least 4 hours away from birth control pills 60 tablet 11    folic acid (FOLVITE) 1 MG tablet Take 1 tablet by mouth Daily. 90 tablet 3    Semaglutide-Weight Management (Wegovy) 0.5 MG/0.5ML solution auto-injector Inject 0.5 mL under the skin into the appropriate area as directed 1 (One) Time Per Week. 0.5mg SC weekly X4 2 mL 2    [DISCONTINUED] Bacillus Coagulans-Inulin (Probiotic) 1-250 BILLION-MG capsule Take 1 capsule by mouth Every Morning.      [DISCONTINUED] doxycycline (VIBRAMYCIN) 100 MG capsule Take 1 capsule by mouth 2 (Two) Times a Day. For infection 20 capsule 0     Allergies   Allergen Reactions    Biaxin [Clarithromycin] Swelling     Eyelid    Ceclor [Cefaclor] Hives     Has had Rocephin    Penicillins Hives    Sulfa Antibiotics Hives       Objective    Objective     Vital Signs  Temp:  [97.7 øF (36.5 øC)-98.2 øF (36.8 øC)] 98.2 øF (36.8 øC)  Heart Rate:  [] 85  Resp:  [16] 16  BP: (109-139)/() 123/87  SpO2:  [96 %-99 %] 98 %  on   ;   Device (Oxygen Therapy): room air  Body mass index is 31.48 kg/mý.    Physical Exam  Vitals and nursing note reviewed.   Constitutional:       General: She is not in acute distress.     Appearance: She is ill-appearing. She is not toxic-appearing.   HENT:      Head: Normocephalic.      Mouth/Throat:      Mouth: Mucous membranes are moist.   Eyes:      Conjunctiva/sclera: Conjunctivae normal.   Cardiovascular:      Rate and Rhythm: Normal rate and regular rhythm.   Pulmonary:      Effort: Pulmonary effort is normal. No respiratory distress.      Breath sounds: Normal breath sounds.   Abdominal:      General: Bowel sounds are normal. There is no distension.      Palpations: Abdomen is soft.      Tenderness: There is no abdominal tenderness.   Musculoskeletal:       Cervical back: Neck supple.      Right lower leg: No edema.      Left lower leg: No edema.   Skin:     General: Skin is warm and dry.   Neurological:      Mental Status: She is alert and oriented to person, place, and time.   Psychiatric:         Mood and Affect: Mood normal.         Behavior: Behavior normal.       Results Review:  I reviewed the patient's new clinical results.  I reviewed the patient's new imaging results and agree with the interpretation.  I reviewed the patient's other test results and agree with the interpretation  I personally viewed and interpreted the patient's EKG/Telemetry data  Discussed with ED provider.    Lab Results (last 24 hours)       Procedure Component Value Units Date/Time    CBC & Differential [[id number]]  (Abnormal) Collected: 08/11/23 0628    Specimen: Blood Updated: 08/11/23 0641    Narrative:      The following orders were created for panel order CBC & Differential.  Procedure                               Abnormality         Status                     ---------                               -----------         ------                     CBC Auto Differential[[id number]]        Abnormal            Final result                 Please view results for these tests on the individual orders.    Comprehensive Metabolic Panel [[id number]]  (Abnormal) Collected: 08/11/23 0628    Specimen: Blood Updated: 08/11/23 0656     Glucose 84 mg/dL      BUN 9 mg/dL      Creatinine 0.86 mg/dL      Sodium 142 mmol/L      Potassium 3.6 mmol/L      Chloride 102 mmol/L      CO2 19.5 mmol/L      Calcium 9.6 mg/dL      Total Protein 7.6 g/dL      Albumin 4.7 g/dL      ALT (SGPT) 12 U/L      AST (SGOT) 11 U/L      Alkaline Phosphatase 130 U/L      Total Bilirubin 0.7 mg/dL      Globulin 2.9 gm/dL      A/G Ratio 1.6 g/dL      BUN/Creatinine Ratio 10.5     Anion Gap 20.5 mmol/L      eGFR 89.9 mL/min/1.73     Narrative:      GFR Normal >60  Chronic Kidney Disease <60  Kidney Failure <15      Lipase  [[id number]]  (Abnormal) Collected: 08/11/23 0628    Specimen: Blood Updated: 08/11/23 0656     Lipase 103 U/L     hCG, Serum, Qualitative [[id number]]  (Normal) Collected: 08/11/23 0628    Specimen: Blood Updated: 08/11/23 0652     HCG Qualitative Negative    CBC Auto Differential [[id number]]  (Abnormal) Collected: 08/11/23 0628    Specimen: Blood Updated: 08/11/23 0641     WBC 10.50 10*3/mm3      RBC 5.15 10*6/mm3      Hemoglobin 15.0 g/dL      Hematocrit 45.3 %      MCV 88.0 fL      MCH 29.1 pg      MCHC 33.1 g/dL      RDW 12.3 %      RDW-SD 39.7 fl      MPV 9.3 fL      Platelets 284 10*3/mm3      Neutrophil % 83.8 %      Lymphocyte % 12.6 %      Monocyte % 2.8 %      Eosinophil % 0.0 %      Basophil % 0.4 %      Immature Grans % 0.4 %      Neutrophils, Absolute 8.81 10*3/mm3      Lymphocytes, Absolute 1.32 10*3/mm3      Monocytes, Absolute 0.29 10*3/mm3      Eosinophils, Absolute 0.00 10*3/mm3      Basophils, Absolute 0.04 10*3/mm3      Immature Grans, Absolute 0.04 10*3/mm3      nRBC 0.0 /100 WBC     Urinalysis With Microscopic If Indicated (No Culture) - Urine, Clean Catch [[id number]]  (Abnormal) Collected: 08/11/23 0728    Specimen: Urine, Clean Catch Updated: 08/11/23 0746     Color, UA Yellow     Appearance, UA Clear     pH, UA 5.5     Specific Gravity, UA 1.022     Glucose, UA Negative     Ketones, UA 80 mg/dL (3+)     Bilirubin, UA Negative     Blood, UA Moderate (2+)     Protein, UA 30 mg/dL (1+)     Leuk Esterase, UA Negative     Nitrite, UA Negative     Urobilinogen, UA 0.2 E.U./dL    Urinalysis, Microscopic Only - Urine, Clean Catch [[id number]]  (Abnormal) Collected: 08/11/23 0728    Specimen: Urine, Clean Catch Updated: 08/11/23 0746     RBC, UA 3-5 /HPF      WBC, UA 3-5 /HPF      Bacteria, UA None Seen /HPF      Squamous Epithelial Cells, UA 3-6 /HPF      Hyaline Casts, UA 7-12 /LPF      Methodology Automated Microscopy            Imaging Results (Last 24 Hours)       Procedure Component Value Units  Date/Time    CT Abdomen Pelvis With Contrast [[id number]] Collected: 08/11/23 0811     Updated: 08/11/23 0820    Narrative:      CT ABDOMEN PELVIS W CONTRAST-     Radiation dose reduction techniques were utilized, including automated  exposure control and exposure modulation based on body size.     Clinical: Nausea and vomiting     FINDINGS: Focal fatty infiltration left lobe of the liver. No biliary  duct dilatation status post cholecystectomy. The pancreas and adrenal  glands are normal. 16 mm cyst inferior pole of the spleen which is  otherwise normal.     Both kidneys demonstrate asymmetric satisfactory pattern of enhancement.  No mass, calculus or obstructive uropathy. The ureters are normal in  course and caliber to the urinary bladder which is satisfactory in  appearance.     The uterus is anteverted and normal. 23 mm right ovarian cyst.  Attenuation compatible with serous fluid. No free fluid in the pelvic  cul-de-sac.     The appendix, colon and small bowel have a normal appearance. The  gastric contour is normal. No duodenal abnormality seen. Diameter of the  aorta is within normal limits.     CONCLUSION:  1. Right ovarian cyst.  2. No biliary duct dilatation status post cholecystectomy.  3. No acute intra-abdominal abnormality.        This report was finalized on 8/11/2023 8:17 AM by Dr. Manas Davis M.D.               Results for orders placed during the hospital encounter of 08/10/17    Adult Transthoracic Echo Complete    Interpretation Summary  ú Left ventricular systolic function is normal. Calculated EF = 61.9%. Estimated EF was in agreement with the calculated EF. Estimated EF = 62%. Normal left ventricular cavity size and wall thickness noted. All left ventricular wall segments contract normally. Left ventricular diastolic function is normal.  ú Trace mitral valve regurgitation is present.  ú Physiologic tricuspid valve regurgitation is present. Estimated right ventricular systolic pressure from  tricuspid regurgitation is normal (<35 mmHg). Calculated right ventricular systolic pressure from tricuspid regurgitation is 21.9 mmHg.      No orders to display        Assessment/Plan     Active Hospital Problems    Diagnosis  POA    **Intractable vomiting [R11.10]  Yes    High anion gap metabolic acidosis [E87.29]  Yes    Asthma [J45.909]  Yes    Anxiety [F41.9]  Yes    Migraines [G43.909]  Yes      Resolved Hospital Problems   No resolved problems to display.       Ms. Hoang is a 37 y.o. non-smoker with a history of migraines, anxiety, anemia, asthma who is admitted for intractable vomiting.     Intractable vomiting/HAGMA:  -CT A/P without acute findings. IVF's. Antiemetics as needed. GI consult. Clear liquids. Monitor electrolytes. Started Wegovy 5 weeks ago; advised to hold for now    Asthma:  -No wheezing on exam. Encourage IS    Anxiety:  -stable. Continue zoloft    Migraines:  -Topamax, prn rizatriptan. Denies headache on exam    Further recommendations based on hospital course    I discussed the patient's findings and my recommendations with patient, ED provider, and Dr. Keller .    VTE Prophylaxis - SCDs.  Code Status - Full code.       SAIDA Long  [facility]  08/11/23  14:10 EDT

## 2023-08-12 ENCOUNTER — ANESTHESIA (OUTPATIENT)
Dept: GASTROENTEROLOGY | Facility: HOSPITAL | Age: 38
End: 2023-08-12
Payer: COMMERCIAL

## 2023-08-12 ENCOUNTER — INPATIENT HOSPITAL (AMBULATORY)
Dept: URBAN - METROPOLITAN AREA HOSPITAL 113 | Facility: HOSPITAL | Age: 38
End: 2023-08-12

## 2023-08-12 DIAGNOSIS — K29.70 GASTRITIS, UNSPECIFIED, WITHOUT BLEEDING: ICD-10-CM

## 2023-08-12 DIAGNOSIS — K22.2 ESOPHAGEAL OBSTRUCTION: ICD-10-CM

## 2023-08-12 DIAGNOSIS — R11.2 NAUSEA WITH VOMITING, UNSPECIFIED: ICD-10-CM

## 2023-08-12 LAB
ANION GAP SERPL CALCULATED.3IONS-SCNC: 12.7 MMOL/L (ref 5–15)
BUN SERPL-MCNC: 5 MG/DL (ref 6–20)
BUN/CREAT SERPL: 7.8 (ref 7–25)
CALCIUM SPEC-SCNC: 9 MG/DL (ref 8.6–10.5)
CHLORIDE SERPL-SCNC: 103 MMOL/L (ref 98–107)
CO2 SERPL-SCNC: 24.3 MMOL/L (ref 22–29)
CREAT SERPL-MCNC: 0.64 MG/DL (ref 0.57–1)
DEPRECATED RDW RBC AUTO: 37.4 FL (ref 37–54)
EGFRCR SERPLBLD CKD-EPI 2021: 117.6 ML/MIN/1.73
ERYTHROCYTE [DISTWIDTH] IN BLOOD BY AUTOMATED COUNT: 11.9 % (ref 12.3–15.4)
GLUCOSE SERPL-MCNC: 78 MG/DL (ref 65–99)
HCT VFR BLD AUTO: 39.2 % (ref 34–46.6)
HGB BLD-MCNC: 13.1 G/DL (ref 12–15.9)
LIPASE SERPL-CCNC: 147 U/L (ref 13–60)
MAGNESIUM SERPL-MCNC: 2.1 MG/DL (ref 1.6–2.6)
MCH RBC QN AUTO: 28.9 PG (ref 26.6–33)
MCHC RBC AUTO-ENTMCNC: 33.4 G/DL (ref 31.5–35.7)
MCV RBC AUTO: 86.5 FL (ref 79–97)
PLATELET # BLD AUTO: 214 10*3/MM3 (ref 140–450)
PMV BLD AUTO: 9.6 FL (ref 6–12)
POTASSIUM SERPL-SCNC: 3.3 MMOL/L (ref 3.5–5.2)
POTASSIUM SERPL-SCNC: 3.9 MMOL/L (ref 3.5–5.2)
RBC # BLD AUTO: 4.53 10*6/MM3 (ref 3.77–5.28)
SODIUM SERPL-SCNC: 140 MMOL/L (ref 136–145)
WBC NRBC COR # BLD: 8.81 10*3/MM3 (ref 3.4–10.8)

## 2023-08-12 PROCEDURE — 43450 DILATE ESOPHAGUS 1/MULT PASS: CPT | Performed by: INTERNAL MEDICINE

## 2023-08-12 PROCEDURE — 25010000002 PROCHLORPERAZINE 10 MG/2ML SOLUTION: Performed by: NURSE PRACTITIONER

## 2023-08-12 PROCEDURE — 88305 TISSUE EXAM BY PATHOLOGIST: CPT | Performed by: INTERNAL MEDICINE

## 2023-08-12 PROCEDURE — 80048 BASIC METABOLIC PNL TOTAL CA: CPT | Performed by: NURSE PRACTITIONER

## 2023-08-12 PROCEDURE — 85027 COMPLETE CBC AUTOMATED: CPT | Performed by: NURSE PRACTITIONER

## 2023-08-12 PROCEDURE — 83735 ASSAY OF MAGNESIUM: CPT | Performed by: NURSE PRACTITIONER

## 2023-08-12 PROCEDURE — G0378 HOSPITAL OBSERVATION PER HR: HCPCS

## 2023-08-12 PROCEDURE — 84132 ASSAY OF SERUM POTASSIUM: CPT | Performed by: INTERNAL MEDICINE

## 2023-08-12 PROCEDURE — 25010000002 PROPOFOL 10 MG/ML EMULSION: Performed by: ANESTHESIOLOGY

## 2023-08-12 PROCEDURE — 43239 EGD BIOPSY SINGLE/MULTIPLE: CPT | Performed by: INTERNAL MEDICINE

## 2023-08-12 PROCEDURE — 25010000002 ONDANSETRON PER 1 MG: Performed by: NURSE PRACTITIONER

## 2023-08-12 PROCEDURE — 36415 COLL VENOUS BLD VENIPUNCTURE: CPT | Performed by: NURSE PRACTITIONER

## 2023-08-12 PROCEDURE — 96361 HYDRATE IV INFUSION ADD-ON: CPT

## 2023-08-12 PROCEDURE — 83690 ASSAY OF LIPASE: CPT | Performed by: NURSE PRACTITIONER

## 2023-08-12 PROCEDURE — 96376 TX/PRO/DX INJ SAME DRUG ADON: CPT

## 2023-08-12 RX ORDER — SODIUM CHLORIDE 9 MG/ML
1000 INJECTION, SOLUTION INTRAVENOUS CONTINUOUS
Status: DISCONTINUED | OUTPATIENT
Start: 2023-08-12 | End: 2023-08-13 | Stop reason: HOSPADM

## 2023-08-12 RX ORDER — LIDOCAINE HYDROCHLORIDE 20 MG/ML
INJECTION, SOLUTION INFILTRATION; PERINEURAL AS NEEDED
Status: DISCONTINUED | OUTPATIENT
Start: 2023-08-12 | End: 2023-08-12 | Stop reason: SURG

## 2023-08-12 RX ORDER — POTASSIUM CHLORIDE 750 MG/1
40 TABLET, FILM COATED, EXTENDED RELEASE ORAL EVERY 4 HOURS
Status: COMPLETED | OUTPATIENT
Start: 2023-08-12 | End: 2023-08-12

## 2023-08-12 RX ORDER — PROCHLORPERAZINE EDISYLATE 5 MG/ML
5 INJECTION INTRAMUSCULAR; INTRAVENOUS ONCE
Status: COMPLETED | OUTPATIENT
Start: 2023-08-12 | End: 2023-08-12

## 2023-08-12 RX ORDER — SCOLOPAMINE TRANSDERMAL SYSTEM 1 MG/1
1 PATCH, EXTENDED RELEASE TRANSDERMAL
Status: DISCONTINUED | OUTPATIENT
Start: 2023-08-12 | End: 2023-08-13 | Stop reason: HOSPADM

## 2023-08-12 RX ORDER — PROPOFOL 10 MG/ML
VIAL (ML) INTRAVENOUS AS NEEDED
Status: DISCONTINUED | OUTPATIENT
Start: 2023-08-12 | End: 2023-08-12 | Stop reason: SURG

## 2023-08-12 RX ORDER — PROPOFOL 10 MG/ML
VIAL (ML) INTRAVENOUS CONTINUOUS PRN
Status: DISCONTINUED | OUTPATIENT
Start: 2023-08-12 | End: 2023-08-12 | Stop reason: SURG

## 2023-08-12 RX ADMIN — Medication 150 MCG/KG/MIN: at 12:18

## 2023-08-12 RX ADMIN — SODIUM CHLORIDE, POTASSIUM CHLORIDE, SODIUM LACTATE AND CALCIUM CHLORIDE 100 ML/HR: 600; 310; 30; 20 INJECTION, SOLUTION INTRAVENOUS at 18:16

## 2023-08-12 RX ADMIN — PROCHLORPERAZINE EDISYLATE 5 MG: 5 INJECTION INTRAMUSCULAR; INTRAVENOUS at 06:10

## 2023-08-12 RX ADMIN — ONDANSETRON 4 MG: 2 INJECTION INTRAMUSCULAR; INTRAVENOUS at 09:05

## 2023-08-12 RX ADMIN — PROPOFOL 75 MG: 10 INJECTION, EMULSION INTRAVENOUS at 12:18

## 2023-08-12 RX ADMIN — TOPIRAMATE 100 MG: 100 TABLET, FILM COATED ORAL at 20:09

## 2023-08-12 RX ADMIN — SENNOSIDES AND DOCUSATE SODIUM 2 TABLET: 50; 8.6 TABLET ORAL at 20:09

## 2023-08-12 RX ADMIN — SODIUM CHLORIDE, POTASSIUM CHLORIDE, SODIUM LACTATE AND CALCIUM CHLORIDE 100 ML/HR: 600; 310; 30; 20 INJECTION, SOLUTION INTRAVENOUS at 05:36

## 2023-08-12 RX ADMIN — LIDOCAINE HYDROCHLORIDE 30 MG: 20 INJECTION, SOLUTION INFILTRATION; PERINEURAL at 12:17

## 2023-08-12 RX ADMIN — SERTRALINE 100 MG: 100 TABLET, FILM COATED ORAL at 20:09

## 2023-08-12 RX ADMIN — POTASSIUM CHLORIDE 40 MEQ: 750 TABLET, EXTENDED RELEASE ORAL at 14:37

## 2023-08-12 RX ADMIN — SODIUM CHLORIDE 1000 ML: 9 INJECTION, SOLUTION INTRAVENOUS at 11:05

## 2023-08-12 RX ADMIN — SCOPALAMINE 1 PATCH: 1 PATCH, EXTENDED RELEASE TRANSDERMAL at 16:11

## 2023-08-12 RX ADMIN — ONDANSETRON 4 MG: 2 INJECTION INTRAMUSCULAR; INTRAVENOUS at 18:15

## 2023-08-12 RX ADMIN — POTASSIUM CHLORIDE 40 MEQ: 750 TABLET, EXTENDED RELEASE ORAL at 18:14

## 2023-08-12 RX ADMIN — ACETAMINOPHEN 650 MG: 325 TABLET ORAL at 01:34

## 2023-08-12 RX ADMIN — ONDANSETRON 4 MG: 2 INJECTION INTRAMUSCULAR; INTRAVENOUS at 01:35

## 2023-08-12 NOTE — PLAN OF CARE
Goal Outcome Evaluation:  Plan of Care Reviewed With: patient        Progress: improving  Outcome Evaluation: VSS, pt has EGD today, pt reports feeling better, no c/o of pain, nausea medicine given x1 with relief, ivf infusing per order, clear liquid diet, up ad bertha, SCD's on, replacing K

## 2023-08-12 NOTE — ANESTHESIA POSTPROCEDURE EVALUATION
Patient: Sara Hoang    Procedure Summary       Date: 08/12/23 Room / Location:  [facility]    Anesthesia Start: 1214 Anesthesia Stop: 1230    Procedure: ESOPHAGOGASTRODUODENOSCOPY with biopsies and dilitation with 52F Horner (Esophagus) Diagnosis:       Nausea and vomiting, unspecified vomiting type      (Nausea and vomiting, unspecified vomiting type [R11.2])    Surgeons: Darrian French MD Provider: Juan Rea MD    Anesthesia Type: MAC ASA Status: 2            Anesthesia Type: MAC    Vitals  Vitals Value Taken Time   /92 08/12/23 1238   Temp     Pulse 82 08/12/23 1238   Resp 18 08/12/23 1238   SpO2 97 % 08/12/23 1238           Post Anesthesia Care and Evaluation    Patient location during evaluation: PACU  Patient participation: complete - patient participated  Level of consciousness: awake  Pain score: 1  Pain management: adequate    Airway patency: patent  Anesthetic complications: No anesthetic complications  PONV Status: none  Cardiovascular status: acceptable  Respiratory status: acceptable  Hydration status: acceptable

## 2023-08-12 NOTE — PROGRESS NOTES
Name: Sara Hoang ADMIT: 2023   : 1985  PCP: Lashae Hercules PA-C    MRN: [id number] LOS: 0 days   AGE/SEX: 37 y.o. female  ROOM: ENDO/ENDO     Subjective   Subjective   Chief Complaint   Patient presents with    Vomiting     Is still reporting nausea vomiting.  Not reporting abdominal pain currently.  No chest pain palpitations or shortness of breath reported.     Objective   Objective   Vital Signs  Temp:  [97.2 øF (36.2 øC)-98.5 øF (36.9 øC)] 97.2 øF (36.2 øC)  Heart Rate:  [74-88] 77  Resp:  [13-16] 13  BP: (102-132)/(65-87) 109/77  SpO2:  [96 %-99 %] 97 %  on   ;   Device (Oxygen Therapy): room air  Body mass index is 31.48 kg/mý.    Physical Exam  Vitals and nursing note reviewed.   Constitutional:       General: She is not in acute distress.     Appearance: She is not diaphoretic.   HENT:      Head: Atraumatic.   Eyes:      General: No scleral icterus.  Cardiovascular:      Rate and Rhythm: Normal rate and regular rhythm.      Pulses: Normal pulses.   Pulmonary:      Effort: Pulmonary effort is normal.      Breath sounds: No wheezing.   Abdominal:      General: There is no distension.      Palpations: Abdomen is soft.      Tenderness: There is no abdominal tenderness. There is no guarding or rebound.   Musculoskeletal:         General: No swelling or tenderness.   Skin:     General: Skin is warm and dry.   Neurological:      Mental Status: She is alert.      Cranial Nerves: No cranial nerve deficit.   Psychiatric:         Mood and Affect: Mood normal.         Behavior: Behavior normal.     Results Review  I reviewed the patient's new clinical results.    Results from last 7 days   Lab Units 23  0431 23  1255   WBC 10*3/mm3 8.81 10.50 12.98* 11.63*   HEMOGLOBIN g/dL 13.1 15.0 14.1 14.6   PLATELETS 10*3/mm3 214 284 291 272     Results from last 7 days   Lab Units 23  0431 23  0628 23  1255   SODIUM mmol/L 140 142 139  141   POTASSIUM mmol/L 3.3* 3.6 3.6 4.0   CHLORIDE mmol/L 103 102 104 104   CO2 mmol/L 24.3 19.5* 18.5* 21.1*   BUN mg/dL 5* 9 14 12   CREATININE mg/dL 0.64 0.86 0.76 0.79   GLUCOSE mg/dL 78 84 86 120*   EGFR mL/min/1.73 117.6 89.9 104.3 99.6     Results from last 7 days   Lab Units 08/11/23  0628 08/09/23 2147 08/08/23  1255   ALBUMIN g/dL 4.7 4.6 4.8   BILIRUBIN mg/dL 0.7 0.6 0.5   ALK PHOS U/L 130* 118* 127*   AST (SGOT) U/L 11 19 12   ALT (SGPT) U/L 12 10 8     Results from last 7 days   Lab Units 08/12/23  0431 08/11/23 0628 08/09/23 2147 08/08/23  1255   CALCIUM mg/dL 9.0 9.6 9.6 9.9   ALBUMIN g/dL  --  4.7 4.6 4.8   MAGNESIUM mg/dL 2.1  --   --   --          No results found for: HGBA1C, POCGLU    No radiology results for the last day    I have personally reviewed all medications:  Scheduled Medications  levonorgestrel-ethinyl estradiol, 1 tablet, Oral, Nightly  senna-docusate sodium, 2 tablet, Oral, BID  sertraline, 100 mg, Oral, Nightly  sodium chloride, 10 mL, Intravenous, Q12H  topiramate, 100 mg, Oral, Nightly      Infusions  lactated ringers, 100 mL/hr, Last Rate: 100 mL/hr (08/12/23 0536)  sodium chloride, 1,000 mL, Last Rate: 1,000 mL (08/12/23 1105)      Diet  NPO Diet NPO Type: Sips with Meds    I have personally reviewed:  [x]  Laboratory   []  Microbiology   [x]  Radiology   []  EKG/Telemetry  []  Cardiology/Vascular   []  Pathology    []  Records       Assessment/Plan     Active Hospital Problems    Diagnosis  POA    **Intractable vomiting [R11.10]  Yes    High anion gap metabolic acidosis [E87.29]  Yes    Nausea and vomiting [R11.2]  Unknown    Asthma [J45.109]  Yes    Anxiety [F41.9]  Yes    Migraines [G43.659]  Yes      Resolved Hospital Problems   No resolved problems to display.       37 y.o. female admitted with Intractable vomiting.    Nausea vomiting: Potential side effect of GLP-1.  EGD planned today.  Lipase elevated but no abdominal pain reported.  GI following.  Metabolic acidosis:  Anion gap has closed.  GI losses.  Continue supportive care.  Ovarian cyst: Outpatient GYN follow-up.  Asthma: No acute exacerbation  Migraine: No headache reported  Ppx: SCD  Disposition: Home/TBD    Expected Discharge Date: 8/14/2023; Expected Discharge Time:      Jamir Pandya MD  [facility]  08/12/23  11:33 EDT    Dictated portions of note using dragon dictation software.  Copied text in this note has been reviewed by me and remains accurate as of 08/12/23

## 2023-08-12 NOTE — PLAN OF CARE
Goal Outcome Evaluation:  Plan of Care Reviewed With: patient        Progress: no change  Outcome Evaluation: C/o of nausea- IV Zofran and one time dose of Compazine given w/ relief, ivf infusing per order, NPO except sips w/ meds, scds on, up ad bertha, room air, tylenol given for headache, plan for EGD today- consent signed

## 2023-08-12 NOTE — ANESTHESIA PREPROCEDURE EVALUATION
Anesthesia Evaluation     Patient summary reviewed                Airway   No difficulty expected  Dental      Pulmonary    (+) asthma,  Cardiovascular     Rhythm: regular        Neuro/Psych  GI/Hepatic/Renal/Endo      ROS Comment: N/v    Musculoskeletal     Abdominal    Substance History      OB/GYN          Other                      Anesthesia Plan    ASA 2     MAC       Anesthetic plan, risks, benefits, and alternatives have been provided, discussed and informed consent has been obtained with: patient.    CODE STATUS:    Code Status (Patient has no pulse and is not breathing): CPR (Attempt to Resuscitate)  Medical Interventions (Patient has pulse or is breathing): Full Support

## 2023-08-13 ENCOUNTER — INPATIENT HOSPITAL (AMBULATORY)
Dept: URBAN - METROPOLITAN AREA HOSPITAL 113 | Facility: HOSPITAL | Age: 38
End: 2023-08-13

## 2023-08-13 ENCOUNTER — READMISSION MANAGEMENT (OUTPATIENT)
Dept: CALL CENTER | Facility: HOSPITAL | Age: 38
End: 2023-08-13
Payer: COMMERCIAL

## 2023-08-13 VITALS
SYSTOLIC BLOOD PRESSURE: 110 MMHG | RESPIRATION RATE: 16 BRPM | BODY MASS INDEX: 31.52 KG/M2 | HEART RATE: 78 BPM | WEIGHT: 189.15 LBS | TEMPERATURE: 97.4 F | HEIGHT: 65 IN | DIASTOLIC BLOOD PRESSURE: 77 MMHG | OXYGEN SATURATION: 95 %

## 2023-08-13 DIAGNOSIS — G43.909 MIGRAINE, UNSPECIFIED, NOT INTRACTABLE, WITHOUT STATUS MIGRA: ICD-10-CM

## 2023-08-13 DIAGNOSIS — R74.8 ABNORMAL LEVELS OF OTHER SERUM ENZYMES: ICD-10-CM

## 2023-08-13 DIAGNOSIS — R11.2 NAUSEA WITH VOMITING, UNSPECIFIED: ICD-10-CM

## 2023-08-13 LAB
ALBUMIN SERPL-MCNC: 3.9 G/DL (ref 3.5–5.2)
ALBUMIN/GLOB SERPL: 2 G/DL
ALP SERPL-CCNC: 101 U/L (ref 39–117)
ALT SERPL W P-5'-P-CCNC: 10 U/L (ref 1–33)
AMYLASE SERPL-CCNC: 50 U/L (ref 28–100)
ANION GAP SERPL CALCULATED.3IONS-SCNC: 14.4 MMOL/L (ref 5–15)
AST SERPL-CCNC: 9 U/L (ref 1–32)
BILIRUB SERPL-MCNC: 0.6 MG/DL (ref 0–1.2)
BUN SERPL-MCNC: 5 MG/DL (ref 6–20)
BUN/CREAT SERPL: 7.4 (ref 7–25)
CALCIUM SPEC-SCNC: 8.8 MG/DL (ref 8.6–10.5)
CHLORIDE SERPL-SCNC: 103 MMOL/L (ref 98–107)
CO2 SERPL-SCNC: 21.6 MMOL/L (ref 22–29)
CREAT SERPL-MCNC: 0.68 MG/DL (ref 0.57–1)
DEPRECATED RDW RBC AUTO: 39.3 FL (ref 37–54)
EGFRCR SERPLBLD CKD-EPI 2021: 115.9 ML/MIN/1.73
ERYTHROCYTE [DISTWIDTH] IN BLOOD BY AUTOMATED COUNT: 12.2 % (ref 12.3–15.4)
GLOBULIN UR ELPH-MCNC: 2 GM/DL
GLUCOSE SERPL-MCNC: 77 MG/DL (ref 65–99)
HCT VFR BLD AUTO: 38.6 % (ref 34–46.6)
HGB BLD-MCNC: 13 G/DL (ref 12–15.9)
LIPASE SERPL-CCNC: 146 U/L (ref 13–60)
MCH RBC QN AUTO: 29.5 PG (ref 26.6–33)
MCHC RBC AUTO-ENTMCNC: 33.7 G/DL (ref 31.5–35.7)
MCV RBC AUTO: 87.5 FL (ref 79–97)
PLATELET # BLD AUTO: 215 10*3/MM3 (ref 140–450)
PMV BLD AUTO: 9.5 FL (ref 6–12)
POTASSIUM SERPL-SCNC: 3.5 MMOL/L (ref 3.5–5.2)
POTASSIUM SERPL-SCNC: 4.5 MMOL/L (ref 3.5–5.2)
PROT SERPL-MCNC: 5.9 G/DL (ref 6–8.5)
RBC # BLD AUTO: 4.41 10*6/MM3 (ref 3.77–5.28)
SODIUM SERPL-SCNC: 139 MMOL/L (ref 136–145)
WBC NRBC COR # BLD: 8.35 10*3/MM3 (ref 3.4–10.8)

## 2023-08-13 PROCEDURE — 84132 ASSAY OF SERUM POTASSIUM: CPT | Performed by: INTERNAL MEDICINE

## 2023-08-13 PROCEDURE — 80053 COMPREHEN METABOLIC PANEL: CPT | Performed by: INTERNAL MEDICINE

## 2023-08-13 PROCEDURE — G0378 HOSPITAL OBSERVATION PER HR: HCPCS

## 2023-08-13 PROCEDURE — 85027 COMPLETE CBC AUTOMATED: CPT | Performed by: INTERNAL MEDICINE

## 2023-08-13 PROCEDURE — 82150 ASSAY OF AMYLASE: CPT | Performed by: INTERNAL MEDICINE

## 2023-08-13 PROCEDURE — 99232 SBSQ HOSP IP/OBS MODERATE 35: CPT | Performed by: NURSE PRACTITIONER

## 2023-08-13 PROCEDURE — 83690 ASSAY OF LIPASE: CPT | Performed by: INTERNAL MEDICINE

## 2023-08-13 PROCEDURE — 25010000002 ONDANSETRON PER 1 MG: Performed by: NURSE PRACTITIONER

## 2023-08-13 RX ORDER — DIMENHYDRINATE 50 MG
200 TABLET ORAL 2 TIMES DAILY
Qty: 120 EACH | Refills: 0 | Status: SHIPPED | OUTPATIENT
Start: 2023-08-13

## 2023-08-13 RX ORDER — LEVOCARNITINE TARTRATE 250 MG
500 CAPSULE ORAL 2 TIMES DAILY
Qty: 120 EACH | Refills: 0 | Status: SHIPPED | OUTPATIENT
Start: 2023-08-13 | End: 2023-08-13 | Stop reason: SDUPTHER

## 2023-08-13 RX ORDER — SCOLOPAMINE TRANSDERMAL SYSTEM 1 MG/1
1 PATCH, EXTENDED RELEASE TRANSDERMAL
Qty: 10 EACH | Refills: 0 | Status: SHIPPED | OUTPATIENT
Start: 2023-08-15

## 2023-08-13 RX ORDER — POTASSIUM CHLORIDE 750 MG/1
40 TABLET, FILM COATED, EXTENDED RELEASE ORAL EVERY 4 HOURS
Status: COMPLETED | OUTPATIENT
Start: 2023-08-13 | End: 2023-08-13

## 2023-08-13 RX ORDER — LEVOCARNITINE TARTRATE 250 MG
500 CAPSULE ORAL 2 TIMES DAILY
Qty: 120 EACH | Refills: 0 | Status: SHIPPED | OUTPATIENT
Start: 2023-08-13 | End: 2023-08-13 | Stop reason: HOSPADM

## 2023-08-13 RX ORDER — SCOLOPAMINE TRANSDERMAL SYSTEM 1 MG/1
1 PATCH, EXTENDED RELEASE TRANSDERMAL
Qty: 10 EACH | Refills: 0 | Status: SHIPPED | OUTPATIENT
Start: 2023-08-15 | End: 2023-08-13 | Stop reason: SDUPTHER

## 2023-08-13 RX ADMIN — SODIUM CHLORIDE, POTASSIUM CHLORIDE, SODIUM LACTATE AND CALCIUM CHLORIDE 100 ML/HR: 600; 310; 30; 20 INJECTION, SOLUTION INTRAVENOUS at 04:26

## 2023-08-13 RX ADMIN — POTASSIUM CHLORIDE 40 MEQ: 750 TABLET, EXTENDED RELEASE ORAL at 07:31

## 2023-08-13 RX ADMIN — POTASSIUM CHLORIDE 40 MEQ: 750 TABLET, EXTENDED RELEASE ORAL at 10:15

## 2023-08-13 RX ADMIN — ONDANSETRON 4 MG: 2 INJECTION INTRAMUSCULAR; INTRAVENOUS at 05:50

## 2023-08-13 NOTE — PROGRESS NOTES
LOS: 0 days   Patient Care Team:  Lashae Hercules PA-C as PCP - General (Family Medicine)  Lashae Hercules PA-C as PCP - Family Medicine  Jermoe Hills MD as Consulting Physician (Gastroenterology)  Franci Winter MD as Consulting Physician (Obstetrics and Gynecology)  Waldo Hartley MD as Consulting Physician (Urology)  Nicky Albert APRN as Nurse Practitioner (Family Medicine)  Marcos Ortega MD as Consulting Physician (Neurology)      Subjective     Interval History: S/p EGD 8/12 showing Schatzki's ring s/p dilation, gastritis, normal duodenum.    Subjective: Patient states she feels better.  No nausea or vomiting.  No abdominal pain.  She is tolerating her liquids.  Labs unremarkable other than mild elevation of lipase.      ROS:   Review of Systems   All other systems reviewed and are negative.       Medication Review:     Current Facility-Administered Medications:     acetaminophen (TYLENOL) tablet 650 mg, 650 mg, Oral, Q4H PRN, Anne Stock APRN, 650 mg at 08/12/23 0134    sennosides-docusate (PERICOLACE) 8.6-50 MG per tablet 2 tablet, 2 tablet, Oral, BID, 2 tablet at 08/12/23 2009 **AND** polyethylene glycol (MIRALAX) packet 17 g, 17 g, Oral, Daily PRN **AND** bisacodyl (DULCOLAX) EC tablet 5 mg, 5 mg, Oral, Daily PRN **AND** bisacodyl (DULCOLAX) suppository 10 mg, 10 mg, Rectal, Daily PRN, Anne Stock APRN    Calcium Replacement - Follow Nurse / BPA Driven Protocol, , Does not apply, PRN, Jamir Pandya MD    lactated ringers infusion, 100 mL/hr, Intravenous, Continuous, Anne Stock APRN, Last Rate: 100 mL/hr at 08/13/23 0426, 100 mL/hr at 08/13/23 0426    levonorgestrel-ethinyl estradiol (AVIANE,ALESSE,LESSINA) 0.1-20 MG-MCG per tablet 1 tablet, 1 tablet, Oral, Nightly, Stock, Anne Kim, APRN    Magnesium Standard Dose Replacement - Follow Nurse / BPA Driven Protocol, , Does not apply, PRN, Jamir Pandya MD    ondansetron (ZOFRAN) tablet 4 mg, 4  mg, Oral, Q6H PRN **OR** ondansetron (ZOFRAN) injection 4 mg, 4 mg, Intravenous, Q6H PRN, Anne Stock APRN, 4 mg at 08/13/23 0550    Phosphorus Replacement - Follow Nurse / BPA Driven Protocol, , Does not apply, Mumtaz MARTINES Patrick D, MD    Potassium Replacement - Follow Nurse / BPA Driven Protocol, , Does not apply, Mumtaz MARTINES Patrick D, MD    scopolamine patch 1 mg/72 hr, 1 patch, Transdermal, Q72H, Darrian French MD, 1 patch at 08/12/23 1611    sertraline (ZOLOFT) tablet 100 mg, 100 mg, Oral, Nightly, Anne Stock APRN, 100 mg at 08/12/23 2009    sodium chloride 0.9 % flush 10 mL, 10 mL, Intravenous, PRN, Rubi Steel PA-C    [COMPLETED] Insert Peripheral IV, , , Once **AND** sodium chloride 0.9 % flush 10 mL, 10 mL, Intravenous, PRN, Rubi Steel PA-C    sodium chloride 0.9 % flush 10 mL, 10 mL, Intravenous, Q12H, Anne Stock APRN, 10 mL at 08/11/23 1102    sodium chloride 0.9 % flush 10 mL, 10 mL, Intravenous, PRN, Anne Stock APRN    sodium chloride 0.9 % infusion 1,000 mL, 1,000 mL, Intravenous, Continuous, Darrian French MD, Last Rate: 25 mL/hr at 08/12/23 1105, 1,000 mL at 08/12/23 1105    sodium chloride 0.9 % infusion 40 mL, 40 mL, Intravenous, PRN, Anne Stock APRN    SUMAtriptan (IMITREX) tablet 50 mg, 50 mg, Oral, Once PRN, Anne Stock APRN    topiramate (TOPAMAX) tablet 100 mg, 100 mg, Oral, Nightly, Anne Stock APRN, 100 mg at 08/12/23 2009      Objective     Vital Signs  Vitals:    08/12/23 2125 08/13/23 0144 08/13/23 0514 08/13/23 0900   BP: 118/79 100/69 115/79 110/77   BP Location: Left arm Left arm Left arm Right arm   Patient Position: Lying Lying Lying Lying   Pulse: 73 73 70 78   Resp: 18 18 18 16   Temp: 98.8 øF (37.1 øC) 97.2 øF (36.2 øC) 97.4 øF (36.3 øC) 97.4 øF (36.3 øC)   TempSrc: Oral Oral Oral Oral   SpO2: 94% 95% 95% 95%   Weight:       Height:           Physical Exam: Sitting up in  bed    General Appearance:    Awake and alert, in no acute distress   Head:    Normocephalic, without obvious abnormality   Eyes:          Conjunctivae normal, anicteric sclera   Throat:   No oral lesions, no thrush, oral mucosa moist   Neck:   No adenopathy, supple, no JVD   Lungs:     Respirations regular, even and unlabored   Abdomen:     Soft, non-tender, non-distended, no rebound or guarding, no hepatosplenomegaly   Rectal:     Deferred   Extremities:   No edema, no cyanosis, moves equally bilaterally   Skin:   No bruising, no rash, no jaundice        Results Review:    CBC  Results from last 7 days   Lab Units 08/13/23  0422 08/12/23  0431 08/11/23  0628 08/09/23  2147 08/08/23  1255   RBC 10*6/mm3 4.41 4.53 5.15 4.94 5.07   WBC 10*3/mm3 8.35 8.81 10.50 12.98* 11.63*   HEMOGLOBIN g/dL 13.0 13.1 15.0 14.1 14.6   PLATELETS 10*3/mm3 215 214 284 291 272       CMP  Results from last 7 days   Lab Units 08/13/23  0422 08/12/23  2304 08/12/23  0431 08/11/23  0628 08/09/23  2147 08/08/23  1255   SODIUM mmol/L 139  --  140 142 139 141   POTASSIUM mmol/L 3.5 3.9 3.3* 3.6 3.6 4.0   CHLORIDE mmol/L 103  --  103 102 104 104   CO2 mmol/L 21.6*  --  24.3 19.5* 18.5* 21.1*   BUN mg/dL 5*  --  5* 9 14 12   CREATININE mg/dL 0.68  --  0.64 0.86 0.76 0.79   GLUCOSE mg/dL 77  --  78 84 86 120*   ALBUMIN g/dL 3.9  --   --  4.7 4.6 4.8   BILIRUBIN mg/dL 0.6  --   --  0.7 0.6 0.5   ALK PHOS U/L 101  --   --  130* 118* 127*   AST (SGOT) U/L 9  --   --  11 19 12   ALT (SGPT) U/L 10  --   --  12 10 8       Amylase and Lipase  Results from last 7 days   Lab Units 08/13/23  0422 08/12/23  0431 08/11/23  0628 08/09/23  2211 08/08/23  1255   AMYLASE U/L 50  --   --   --   --    LIPASE U/L 146* 147* 103* 89* 50       CRP         Imaging Results (Last 24 Hours)       ** No results found for the last 24 hours. **              ASSESSMENT:  37-year-old female with...  -Nausea/vomiting -suspect abdominal migraine or cyclic vomiting syndrome.  She  was recently on Wegovy which could also contribute.  EGD on 8/12 was unremarkable for etiology of symptoms.  -Elevated lipase -pancreas normal on CT, elevation is mild  -Migraine headaches  -Anxiety  -Asthma      PLAN:  Continue scopolamine.  Recommend she begin co-Q10 and levocarnitine supplements after discharge, these are unavailable while in the hospital.  Elavil contraindicated with her other medications.  Advance to low residue diet.  Okay for discharge if tolerating diet.  Recommend outpatient follow-up of elevated lipase.  Follow up with [facility].         Ann Lew, SAIDA  08/13/23  11:23 EDT

## 2023-08-13 NOTE — OUTREACH NOTE
Prep Survey      Flowsheet Row Responses   [facility] facility patient discharged from? [place]   Is LACE score < 7 ? Yes   Eligibility [facility]   Date of Admission 08/11/23   Date of Discharge 08/13/23   Discharge Disposition Home or Self Care   Discharge diagnosis Intractable vomiting, EGD with schatzki ring dilation   Does the patient have one of the following disease processes/diagnoses(primary or secondary)? Other   Does the patient have Home health ordered? No   Is there a DME ordered? No   Prep survey completed? Yes            Jenn MATUTE - Registered Nurse

## 2023-08-13 NOTE — DISCHARGE SUMMARY
Date of Admission: 8/11/2023  Date of Discharge:  8/13/2023  Primary Care Physician: Lashae Hercules, PA-C     Discharge Diagnosis:  Active Hospital Problems    Diagnosis  POA    **Intractable vomiting [R11.10]  Yes    High anion gap metabolic acidosis [E87.29]  Yes    Nausea and vomiting [R11.2]  Yes    Asthma [J45.909]  Yes    Anxiety [F41.9]  Yes    Migraines [G43.909]  Yes      Resolved Hospital Problems   No resolved problems to display.       Presenting Problem/History of Present Illness from H&P:  Ketonuria [R82.4]  Intractable vomiting [R11.10]     Ms. Hoang is a 37 y.o. non-smoker with a history of migraines, anxiety, anemia, asthma that presents to [facility] complaining of n/v. Started on Monday, had a migraine and 2 episodes of vomiting. Symptoms resolved until later Tuesday when she began vomiting, this time without migraine/headache. Since then, she has had intractable vomiting; no abdominal pain or cramping. Has been seen x 2 at [facility]. Was prescribed antiemetics w/minimal relief. Emesis described as bile and frothy; denies hematemesis. No fever, chest pain, soa, diarrhea. Last meal tolerated was on Monday. Failed po challenge in ED. Since admitted, reports some dysphagia. Started Wegovy 5 weeks ago     Afebrile. HR 150s on arrival; now 80s. BP stable. On room air. WBC 10.50, Hgb 15. HCG qual neg. Lipase 103. CO2 19.5, Alk phos 130, Anion gap 20.5. UA 3+ ketones, 2+ blood, neg leuk est/nitrite/bact. CT A?P: rt ovarian cyst; no acute finding, s/p cholecystectomy    Hospital Course:  The patient is a 37 y.o. female who presented with nausea and vomiting. She was admitted. GLP1 held and GI consulted. She underwent EGD and did have dilation of a patent schatzki ring. Her symptoms improved and diet was advanced. GI have initiated supplement for abdominal migraine. Also she should have repeat Lipase drawn as an outpatient to determine if she could consider resumption of GLP-1. It was  elevated here with lipase of 147.    Exam Today:  Constitutional:       General: She is not in acute distress.     Appearance: She is not diaphoretic.   HENT:      Head: Atraumatic.   Eyes:      General: No scleral icterus.  Cardiovascular:      Rate and Rhythm: Normal rate and regular rhythm.      Pulses: Normal pulses.   Pulmonary:      Effort: Pulmonary effort is normal.      Breath sounds: No wheezing.   Abdominal:      General: There is no distension.      Palpations: Abdomen is soft.      Tenderness: There is no abdominal tenderness. There is no guarding or rebound.   Musculoskeletal:         General: No swelling or tenderness.   Skin:     General: Skin is warm and dry.   Neurological:      Mental Status: She is alert.      Cranial Nerves: No cranial nerve deficit.   Psychiatric:         Mood and Affect: Mood normal.         Behavior: Behavior normal.     Results:  CT Abdomen/Pelvis  1. Right ovarian cyst.  2. No biliary duct dilatation status post cholecystectomy.  3. No acute intra-abdominal abnormality.    Procedures Performed:  Procedure(s):  ESOPHAGOGASTRODUODENOSCOPY with biopsies and dilitation with 52F Siri  08/12 1206 UPPER GI ENDOSCOPY  - Widely patent Schatzki ring. Dilated.  - Chronic gastritis. Biopsied.  - Normal examined duodenum.  - Await pathology results.    Consults:   Consults       Date and Time Order Name Status Description    8/11/2023 10:29 AM Inpatient Gastroenterology Consult Completed     8/11/2023 10:04 AM [facility] (on-call MD unless specified) Details               Discharge Disposition:  Home or Self Care    Discharge Medications:     Discharge Medications        New Medications        Instructions Start Date   Co Q-10 100 MG capsule   200 mg, Oral, 2 Times Daily      levOCARNitine L-Tartrate 330 MG tablet   330 mg, Oral, 2 Times Daily      Scopolamine 1 MG/3DAYS patch   1 patch, Transdermal, Every 72 Hours   Start Date: August 15, 2023            Changes to Medications         Instructions Start Date   rizatriptan 10 MG tablet  Commonly known as: Maxalt  What changed: additional instructions   10 mg, Oral, Once As Needed, May repeat in 2 hours if needed      sertraline 100 MG tablet  Commonly known as: ZOLOFT  What changed:   how much to take  how to take this  when to take this  additional instructions   1 PO daily for stress      topiramate 50 MG tablet  Commonly known as: TOPAMAX  What changed:   how much to take  how to take this  when to take this  additional instructions   1 PO in AM and 2 PO in PM for migraine prevention             Continue These Medications        Instructions Start Date   colesevelam 625 MG tablet  Commonly known as: Welchol   1-2 tabs PO daily--take at least 4 hours away from birth control pills      folic acid 1 MG tablet  Commonly known as: FOLVITE   1 mg, Oral, Daily      ondansetron ODT 4 MG disintegrating tablet  Commonly known as: ZOFRAN-ODT   4 mg, Translingual, Every 6 Hours PRN      promethazine 25 MG tablet  Commonly known as: PHENERGAN   25 mg, Oral, Every 6 Hours PRN      promethazine 25 MG suppository  Commonly known as: PHENERGAN   25 mg, Rectal, Every 6 Hours PRN      Vienva 0.1-20 MG-MCG per tablet  Generic drug: levonorgestrel-ethinyl estradiol   1 tablet, Oral, Nightly             Stop These Medications      Wegovy 0.25 MG/0.5ML solution auto-injector  Generic drug: Semaglutide-Weight Management     Wegovy 0.5 MG/0.5ML solution auto-injector  Generic drug: Semaglutide-Weight Management              Discharge Diet:   Diet Instructions       Advance Diet As Tolerated -Target Diet: home diet      Target Diet: home diet            Activity at Discharge:   Activity Instructions       Activity as Tolerated              Follow-up Appointments:   Follow-up Information       Juan Pablo Brown MD Follow up.    Specialty: Gastroenterology  Contact information:  [address] [place] [address]  [phone]               Lashae Hercules, CHRIS  Follow up.    Specialty: Family Medicine  Contact information:  [id number] [id number]  [phone]               Lashae Hercules PA-C .    Specialty: Family Medicine  Contact information:  [id number] [id number] [phone]                             Test Results Pending at Discharge:  Pending Labs       Order Current Status    Potassium Collected (08/13/23 1450)    Tissue Pathology Exam Collected (08/12/23 1223)             Jamir Pandya MD  08/13/23  16:03 EDT    Time Spent on Discharge Activities: >30 minutes    Dictated portions using Dragon dictation software.

## 2023-08-13 NOTE — PLAN OF CARE
Goal Outcome Evaluation:  Plan of Care Reviewed With: patient        Progress: improving  Outcome Evaluation: No c/o of pain, freddy FLD w/o nausea, ambulated in halls tonight, Potassium 3.9 and recheck last night, resting between care, morning labs ordered, call placed to IV team to place new IV    C/o of nausea this AM- IV Zofran given x1, Potassium 3.5- Potassium protocol ordered, new IV placed and ivf restarted

## 2023-08-13 NOTE — NURSING NOTE
Pt and mother present during discharge teaching. Pt instructed to pick medications up at her preferred pharmacy. The pt's preferred pharmacy had issues with filling her Levocarnitine and Coenzyne. They did not carry the doses that were prescribed to her by the GI MD. They only supply 100mg of the coenzyme and 330mg of the Levocarnitine.  Dr. Pandya made aware and he changed her mg's for both medications. This nurse called the pharmacy to verify they received the medications and the pharmacist confirmed. Pt will  the medication in the morning. Pt instructed to also call and make an appointment with Dr. Brown. Phone number provided. All questions answered. All belongings are with pt. Pt ready to go home.

## 2023-08-14 ENCOUNTER — TRANSITIONAL CARE MANAGEMENT TELEPHONE ENCOUNTER (OUTPATIENT)
Dept: CALL CENTER | Facility: HOSPITAL | Age: 38
End: 2023-08-14
Payer: COMMERCIAL

## 2023-08-14 NOTE — PROGRESS NOTES
Case Management Discharge Note      Final Note: Discharged home. Taya Cosby RN         Selected Continued Care - Discharged on 8/13/2023 Admission date: 8/11/2023 - Discharge disposition: Home or Self Care         Transportation Services  Private: Car    Final Discharge Disposition Code: 01 - home or self-care

## 2023-08-14 NOTE — OUTREACH NOTE
Call Center TCM Note      Flowsheet Row Responses   [facility] patient discharged from? [place]   Does the patient have one of the following disease processes/diagnoses(primary or secondary)? Other   TCM attempt successful? No   Unsuccessful attempts Attempt 1            Julia Golden MA    8/14/2023, 10:32 EDT

## 2023-08-15 LAB
LAB AP CASE REPORT: NORMAL
PATH REPORT.FINAL DX SPEC: NORMAL
PATH REPORT.GROSS SPEC: NORMAL

## 2023-10-10 RX ORDER — SERTRALINE HYDROCHLORIDE 100 MG/1
100 TABLET, FILM COATED ORAL NIGHTLY
Qty: 90 TABLET | Refills: 0 | Status: SHIPPED | OUTPATIENT
Start: 2023-10-10

## 2023-10-20 RX ORDER — TOPIRAMATE 50 MG/1
TABLET, FILM COATED ORAL
Qty: 270 TABLET | Refills: 3 | Status: SHIPPED | OUTPATIENT
Start: 2023-10-20

## 2023-10-25 ENCOUNTER — OFFICE VISIT (OUTPATIENT)
Dept: FAMILY MEDICINE CLINIC | Facility: CLINIC | Age: 38
End: 2023-10-25
Payer: COMMERCIAL

## 2023-10-25 VITALS
HEIGHT: 65 IN | RESPIRATION RATE: 16 BRPM | OXYGEN SATURATION: 98 % | DIASTOLIC BLOOD PRESSURE: 72 MMHG | HEART RATE: 62 BPM | BODY MASS INDEX: 32.99 KG/M2 | TEMPERATURE: 97 F | SYSTOLIC BLOOD PRESSURE: 108 MMHG | WEIGHT: 198 LBS

## 2023-10-25 DIAGNOSIS — K76.0 FATTY LIVER: ICD-10-CM

## 2023-10-25 DIAGNOSIS — E55.9 VITAMIN D DEFICIENCY: ICD-10-CM

## 2023-10-25 DIAGNOSIS — E53.8 LOW FOLIC ACID: ICD-10-CM

## 2023-10-25 DIAGNOSIS — E53.8 LOW SERUM VITAMIN B12: ICD-10-CM

## 2023-10-25 DIAGNOSIS — F41.1 GENERALIZED ANXIETY DISORDER: ICD-10-CM

## 2023-10-25 DIAGNOSIS — G43.109 MIGRAINE WITH AURA AND WITHOUT STATUS MIGRAINOSUS, NOT INTRACTABLE: Primary | ICD-10-CM

## 2023-10-25 PROCEDURE — 99214 OFFICE O/P EST MOD 30 MIN: CPT | Performed by: PHYSICIAN ASSISTANT

## 2023-10-25 RX ORDER — SERTRALINE HYDROCHLORIDE 100 MG/1
100 TABLET, FILM COATED ORAL DAILY
Qty: 90 TABLET | Refills: 3 | Status: SHIPPED | OUTPATIENT
Start: 2023-10-25

## 2023-10-25 RX ORDER — RIZATRIPTAN BENZOATE 10 MG/1
10 TABLET ORAL ONCE AS NEEDED
Qty: 18 TABLET | Refills: 11 | Status: SHIPPED | OUTPATIENT
Start: 2023-10-25

## 2023-10-25 NOTE — PROGRESS NOTES
"Subjective   Sara Hoang is a 38 y.o. female.     Vomiting     Nausea  Associated symptoms include nausea and vomiting. Pertinent negatives include no diaphoresis.   Thyroid Problem  Patient reports no diaphoresis.       Since the last visit, she has overall felt well.  She has Vitamin D deficiency and will update labs for continued management, Migraine headaches and responding well to PRN triptan or CGPR inhibitor, and Migraine headaches and well controlled on suppression medication.  she has been compliant with current medications have reviewed them.  The patient denies medication side effects.  Will refill medications. /72   Pulse 62   Temp 97 °F (36.1 °C)   Resp 16   Ht 165.1 cm (65\")   Wt 89.8 kg (198 lb)   SpO2 98%   BMI 32.95 kg/m²   No hx renal stones  She did increase her Topamax dose for breakthrough migraine and she is taking 200 mg total dose daily this is controlling migraine she may have 1 migraine a month now and the Maxalt is still effective  . Now -- 4 y/o girl and 2 y/o boy    Results for orders placed or performed during the hospital encounter of 08/11/23   Comprehensive Metabolic Panel    Specimen: Blood   Result Value Ref Range    Glucose 84 65 - 99 mg/dL    BUN 9 6 - 20 mg/dL    Creatinine 0.86 0.57 - 1.00 mg/dL    Sodium 142 136 - 145 mmol/L    Potassium 3.6 3.5 - 5.2 mmol/L    Chloride 102 98 - 107 mmol/L    CO2 19.5 (L) 22.0 - 29.0 mmol/L    Calcium 9.6 8.6 - 10.5 mg/dL    Total Protein 7.6 6.0 - 8.5 g/dL    Albumin 4.7 3.5 - 5.2 g/dL    ALT (SGPT) 12 1 - 33 U/L    AST (SGOT) 11 1 - 32 U/L    Alkaline Phosphatase 130 (H) 39 - 117 U/L    Total Bilirubin 0.7 0.0 - 1.2 mg/dL    Globulin 2.9 gm/dL    A/G Ratio 1.6 g/dL    BUN/Creatinine Ratio 10.5 7.0 - 25.0    Anion Gap 20.5 (H) 5.0 - 15.0 mmol/L    eGFR 89.9 >60.0 mL/min/1.73   Lipase    Specimen: Blood   Result Value Ref Range    Lipase 103 (H) 13 - 60 U/L   Urinalysis With Microscopic If Indicated (No Culture) - " Urine, Clean Catch    Specimen: Urine, Clean Catch   Result Value Ref Range    Color, UA Yellow Yellow, Straw    Appearance, UA Clear Clear    pH, UA 5.5 5.0 - 8.0    Specific Gravity, UA 1.022 1.005 - 1.030    Glucose, UA Negative Negative    Ketones, UA 80 mg/dL (3+) (A) Negative    Bilirubin, UA Negative Negative    Blood, UA Moderate (2+) (A) Negative    Protein, UA 30 mg/dL (1+) (A) Negative    Leuk Esterase, UA Negative Negative    Nitrite, UA Negative Negative    Urobilinogen, UA 0.2 E.U./dL 0.2 - 1.0 E.U./dL   hCG, Serum, Qualitative    Specimen: Blood   Result Value Ref Range    HCG Qualitative Negative Negative   CBC Auto Differential    Specimen: Blood   Result Value Ref Range    WBC 10.50 3.40 - 10.80 10*3/mm3    RBC 5.15 3.77 - 5.28 10*6/mm3    Hemoglobin 15.0 12.0 - 15.9 g/dL    Hematocrit 45.3 34.0 - 46.6 %    MCV 88.0 79.0 - 97.0 fL    MCH 29.1 26.6 - 33.0 pg    MCHC 33.1 31.5 - 35.7 g/dL    RDW 12.3 12.3 - 15.4 %    RDW-SD 39.7 37.0 - 54.0 fl    MPV 9.3 6.0 - 12.0 fL    Platelets 284 140 - 450 10*3/mm3    Neutrophil % 83.8 (H) 42.7 - 76.0 %    Lymphocyte % 12.6 (L) 19.6 - 45.3 %    Monocyte % 2.8 (L) 5.0 - 12.0 %    Eosinophil % 0.0 (L) 0.3 - 6.2 %    Basophil % 0.4 0.0 - 1.5 %    Immature Grans % 0.4 0.0 - 0.5 %    Neutrophils, Absolute 8.81 (H) 1.70 - 7.00 10*3/mm3    Lymphocytes, Absolute 1.32 0.70 - 3.10 10*3/mm3    Monocytes, Absolute 0.29 0.10 - 0.90 10*3/mm3    Eosinophils, Absolute 0.00 0.00 - 0.40 10*3/mm3    Basophils, Absolute 0.04 0.00 - 0.20 10*3/mm3    Immature Grans, Absolute 0.04 0.00 - 0.05 10*3/mm3    nRBC 0.0 0.0 - 0.2 /100 WBC   Urinalysis, Microscopic Only - Urine, Clean Catch    Specimen: Urine, Clean Catch   Result Value Ref Range    RBC, UA 3-5 (A) None Seen, 0-2 /HPF    WBC, UA 3-5 (A) None Seen, 0-2 /HPF    Bacteria, UA None Seen None Seen /HPF    Squamous Epithelial Cells, UA 3-6 (A) None Seen, 0-2 /HPF    Hyaline Casts, UA 7-12 None Seen /LPF    Methodology Automated  Microscopy    CBC (No Diff)    Specimen: Blood   Result Value Ref Range    WBC 8.81 3.40 - 10.80 10*3/mm3    RBC 4.53 3.77 - 5.28 10*6/mm3    Hemoglobin 13.1 12.0 - 15.9 g/dL    Hematocrit 39.2 34.0 - 46.6 %    MCV 86.5 79.0 - 97.0 fL    MCH 28.9 26.6 - 33.0 pg    MCHC 33.4 31.5 - 35.7 g/dL    RDW 11.9 (L) 12.3 - 15.4 %    RDW-SD 37.4 37.0 - 54.0 fl    MPV 9.6 6.0 - 12.0 fL    Platelets 214 140 - 450 10*3/mm3   Basic Metabolic Panel    Specimen: Blood   Result Value Ref Range    Glucose 78 65 - 99 mg/dL    BUN 5 (L) 6 - 20 mg/dL    Creatinine 0.64 0.57 - 1.00 mg/dL    Sodium 140 136 - 145 mmol/L    Potassium 3.3 (L) 3.5 - 5.2 mmol/L    Chloride 103 98 - 107 mmol/L    CO2 24.3 22.0 - 29.0 mmol/L    Calcium 9.0 8.6 - 10.5 mg/dL    BUN/Creatinine Ratio 7.8 7.0 - 25.0    Anion Gap 12.7 5.0 - 15.0 mmol/L    eGFR 117.6 >60.0 mL/min/1.73   Magnesium    Specimen: Blood   Result Value Ref Range    Magnesium 2.1 1.6 - 2.6 mg/dL   Lipase    Specimen: Blood   Result Value Ref Range    Lipase 147 (H) 13 - 60 U/L   Potassium    Specimen: Blood   Result Value Ref Range    Potassium 3.9 3.5 - 5.2 mmol/L   CBC (No Diff)    Specimen: Blood   Result Value Ref Range    WBC 8.35 3.40 - 10.80 10*3/mm3    RBC 4.41 3.77 - 5.28 10*6/mm3    Hemoglobin 13.0 12.0 - 15.9 g/dL    Hematocrit 38.6 34.0 - 46.6 %    MCV 87.5 79.0 - 97.0 fL    MCH 29.5 26.6 - 33.0 pg    MCHC 33.7 31.5 - 35.7 g/dL    RDW 12.2 (L) 12.3 - 15.4 %    RDW-SD 39.3 37.0 - 54.0 fl    MPV 9.5 6.0 - 12.0 fL    Platelets 215 140 - 450 10*3/mm3   Comprehensive Metabolic Panel    Specimen: Blood   Result Value Ref Range    Glucose 77 65 - 99 mg/dL    BUN 5 (L) 6 - 20 mg/dL    Creatinine 0.68 0.57 - 1.00 mg/dL    Sodium 139 136 - 145 mmol/L    Potassium 3.5 3.5 - 5.2 mmol/L    Chloride 103 98 - 107 mmol/L    CO2 21.6 (L) 22.0 - 29.0 mmol/L    Calcium 8.8 8.6 - 10.5 mg/dL    Total Protein 5.9 (L) 6.0 - 8.5 g/dL    Albumin 3.9 3.5 - 5.2 g/dL    ALT (SGPT) 10 1 - 33 U/L    AST (SGOT)  9 1 - 32 U/L    Alkaline Phosphatase 101 39 - 117 U/L    Total Bilirubin 0.6 0.0 - 1.2 mg/dL    Globulin 2.0 gm/dL    A/G Ratio 2.0 g/dL    BUN/Creatinine Ratio 7.4 7.0 - 25.0    Anion Gap 14.4 5.0 - 15.0 mmol/L    eGFR 115.9 >60.0 mL/min/1.73   Lipase    Specimen: Blood   Result Value Ref Range    Lipase 146 (H) 13 - 60 U/L   Amylase    Specimen: Blood   Result Value Ref Range    Amylase 50 28 - 100 U/L   Potassium    Specimen: Blood   Result Value Ref Range    Potassium 4.5 3.5 - 5.2 mmol/L   Tissue Pathology Exam    Specimen: Stomach; Tissue   Result Value Ref Range    Case Report       Surgical Pathology Report                         Case: [id number]                                  Authorizing Provider:  Darrian French MD        Collected:           08/12/2023 12:23 PM          Ordering Location:     [facility]  Received:            08/14/2023 06:40 AM                                 ENDO SUITES                                                                  Pathologist:           Emperatriz Jorgensen MD                                                          Specimen:    Stomach, random gastric biopsies                                                           Final Diagnosis       1. Stomach, Biopsy:  Mixed oxyntic and antral type gastric mucosa with    A. No significant histopathology.    B. No H. pylori-like organisms identified.    [facility]/kds       Gross Description       1. Stomach.  The specimen is received in formalin labeled with the patient's name and further designated 'random gastric   biopsy' are multiple small fragments of gray-tan tissue. The specimen is submitted for embedding as received.       Green Top (Gel)   Result Value Ref Range    Extra Tube Hold for add-ons.    Lavender Top   Result Value Ref Range    Extra Tube hold for add-on    Light Blue Top   Result Value Ref Range    Extra Tube Hold for add-ons.        She was inpatient at [facility] A11 through 813 and this  was after 2 ER visits prior with intractable vomiting.  Labs and CT abdomen and pelvis, normal pancreas and adrenal glands.  Cyst on the spleen, right ovarian cyst  Do need follow-up labs from 3/3/2023 noting free T4 was low at 0.90 and TSH was 1.060, normal  She has low folic acid and B12 ---------she did take a few mos---off now  Not on Vit D  The following portions of the patient's history were reviewed and updated as appropriate: allergies, current medications, past family history, past medical history, past social history, past surgical history, and problem list.    Review of Systems   Constitutional:  Negative for diaphoresis.   HENT:  Negative for nosebleeds and trouble swallowing.    Eyes:  Negative for blurred vision and visual disturbance.   Respiratory:  Negative for choking.    Gastrointestinal:  Positive for nausea and vomiting. Negative for blood in stool.   Allergic/Immunologic: Negative for immunocompromised state.   Neurological:  Negative for facial asymmetry and speech difficulty.   Psychiatric/Behavioral:  Negative for self-injury and suicidal ideas.        Objective   Physical Exam  Vitals and nursing note reviewed.   Constitutional:       General: She is not in acute distress.     Appearance: She is well-developed. She is not ill-appearing or toxic-appearing.   HENT:      Head: Normocephalic.      Right Ear: External ear normal.      Left Ear: External ear normal.      Nose: Nose normal.      Mouth/Throat:      Pharynx: Oropharynx is clear.   Eyes:      General: No scleral icterus.     Conjunctiva/sclera: Conjunctivae normal.      Pupils: Pupils are equal, round, and reactive to light.   Neck:      Thyroid: No thyromegaly.   Cardiovascular:      Rate and Rhythm: Normal rate and regular rhythm.      Heart sounds: Normal heart sounds. No murmur heard.  Pulmonary:      Effort: Pulmonary effort is normal. No respiratory distress.      Breath sounds: Normal breath sounds.   Musculoskeletal:          General: No deformity. Normal range of motion.      Cervical back: Normal range of motion and neck supple.   Skin:     General: Skin is warm and dry.      Findings: No rash.   Neurological:      General: No focal deficit present.      Mental Status: She is alert and oriented to person, place, and time. Mental status is at baseline.   Psychiatric:         Mood and Affect: Mood normal.         Behavior: Behavior normal.         Thought Content: Thought content normal.         Judgment: Judgment normal.       Start exercise    Assessment & Plan   Diagnoses and all orders for this visit:    1. Migraine with aura and without status migrainosus, not intractable (Primary)    2. Vitamin D deficiency    3. Low serum vitamin B12    4. Low folic acid    5. Fatty liver    6. Generalized anxiety disorder      Will watch LFTs---noted fatty liver on CT in August  She has not been taking vitamin D, B12, folic acid and will update labs today and determine if she needs to restart supplementation  Continuing Zoloft for anxiety working well  Plan, Sara Hoang, was seen today.  she was seen for Vitamin D deficiency and will update labs , Migraine headaches and will continue with their PRN triptan or CGRP inhibitor, and Migraine headaches and well controlled on their suppressive medication.    Following up on thyroid labs from March had low free T4 with normal TSH we will make sure to check all thyroid labs       Answers submitted by the patient for this visit:  Primary Reason for Visit (Submitted on 10/25/2023)  What is the primary reason for your visit?: Physical

## 2023-10-26 LAB
25(OH)D3+25(OH)D2 SERPL-MCNC: 17.6 NG/ML (ref 30–100)
BASOPHILS # BLD AUTO: 0.1 X10E3/UL (ref 0–0.2)
BASOPHILS NFR BLD AUTO: 1 %
EOSINOPHIL # BLD AUTO: 0.2 X10E3/UL (ref 0–0.4)
EOSINOPHIL NFR BLD AUTO: 2 %
ERYTHROCYTE [DISTWIDTH] IN BLOOD BY AUTOMATED COUNT: 12.8 % (ref 11.7–15.4)
FOLATE SERPL-MCNC: 2.8 NG/ML
HCT VFR BLD AUTO: 39.9 % (ref 34–46.6)
HGB BLD-MCNC: 12.9 G/DL (ref 11.1–15.9)
IMM GRANULOCYTES # BLD AUTO: 0 X10E3/UL (ref 0–0.1)
IMM GRANULOCYTES NFR BLD AUTO: 0 %
LYMPHOCYTES # BLD AUTO: 3.1 X10E3/UL (ref 0.7–3.1)
LYMPHOCYTES NFR BLD AUTO: 41 %
MCH RBC QN AUTO: 29.4 PG (ref 26.6–33)
MCHC RBC AUTO-ENTMCNC: 32.3 G/DL (ref 31.5–35.7)
MCV RBC AUTO: 91 FL (ref 79–97)
MONOCYTES # BLD AUTO: 0.5 X10E3/UL (ref 0.1–0.9)
MONOCYTES NFR BLD AUTO: 6 %
NEUTROPHILS # BLD AUTO: 3.7 X10E3/UL (ref 1.4–7)
NEUTROPHILS NFR BLD AUTO: 50 %
PLATELET # BLD AUTO: 275 X10E3/UL (ref 150–450)
RBC # BLD AUTO: 4.39 X10E6/UL (ref 3.77–5.28)
T3FREE SERPL-MCNC: 3 PG/ML (ref 2–4.4)
T4 FREE SERPL-MCNC: 1.01 NG/DL (ref 0.82–1.77)
TSH SERPL DL<=0.005 MIU/L-ACNC: 0.9 UIU/ML (ref 0.45–4.5)
VIT B12 SERPL-MCNC: 484 PG/ML (ref 232–1245)
WBC # BLD AUTO: 7.5 X10E3/UL (ref 3.4–10.8)

## 2023-10-26 RX ORDER — FOLIC ACID 1 MG/1
1 TABLET ORAL DAILY
Qty: 90 TABLET | Refills: 3 | Status: SHIPPED | OUTPATIENT
Start: 2023-10-26

## 2024-01-25 ENCOUNTER — PATIENT MESSAGE (OUTPATIENT)
Dept: FAMILY MEDICINE CLINIC | Facility: CLINIC | Age: 39
End: 2024-01-25
Payer: COMMERCIAL

## 2024-01-25 RX ORDER — OSELTAMIVIR PHOSPHATE 75 MG/1
75 CAPSULE ORAL 2 TIMES DAILY
Qty: 10 CAPSULE | Refills: 0 | Status: SHIPPED | OUTPATIENT
Start: 2024-01-25

## 2024-01-25 NOTE — TELEPHONE ENCOUNTER
From: Sara Hoang  To: Lashae Hercules  Sent: 1/25/2024 9:19 AM EST  Subject: Flu    Hi Lashae. Are you still able to do virtual appointments? My son was diagnosed with flu A at the doctor yesterday afternoon. I woke up today with a sore throat, congestion, headache, and throwing up. I was hoping to get tamiflu, but I have no one to keep my kids to come in for an appt. Are you able to do one virtually today? I know you are super busy so I hate to even ask, but I feel terrible. Thank you!     Sara

## 2024-02-08 ENCOUNTER — HOSPITAL ENCOUNTER (EMERGENCY)
Facility: HOSPITAL | Age: 39
Discharge: HOME | End: 2024-02-09
Attending: EMERGENCY MEDICINE
Payer: COMMERCIAL

## 2024-02-08 ENCOUNTER — APPOINTMENT (OUTPATIENT)
Dept: CT IMAGING | Facility: HOSPITAL | Age: 39
End: 2024-02-08
Payer: COMMERCIAL

## 2024-02-08 DIAGNOSIS — R11.2 NAUSEA AND VOMITING, UNSPECIFIED VOMITING TYPE: Primary | ICD-10-CM

## 2024-02-08 DIAGNOSIS — R82.4 KETONURIA: ICD-10-CM

## 2024-02-08 LAB
ALBUMIN SERPL-MCNC: 5.4 G/DL (ref 3.5–5.2)
ALBUMIN/GLOB SERPL: 2.3 G/DL
ALP SERPL-CCNC: 109 U/L (ref 39–117)
ALT SERPL W P-5'-P-CCNC: 9 U/L (ref 1–33)
ANION GAP SERPL CALCULATED.3IONS-SCNC: 18 MMOL/L (ref 5–15)
AST SERPL-CCNC: 9 U/L (ref 1–32)
ATMOSPHERIC PRESS: 747.8 MMHG
BACTERIA UR QL AUTO: ABNORMAL /HPF
BASE EXCESS BLDV CALC-SCNC: -6.6 MMOL/L (ref -2–2)
BASOPHILS # BLD AUTO: 0.03 10*3/MM3 (ref 0–0.2)
BASOPHILS NFR BLD AUTO: 0.2 % (ref 0–1.5)
BILIRUB SERPL-MCNC: 0.4 MG/DL (ref 0–1.2)
BILIRUB UR QL STRIP: NEGATIVE
BUN SERPL-MCNC: 13 MG/DL (ref 6–20)
BUN/CREAT SERPL: 16.3 (ref 7–25)
CALCIUM SPEC-SCNC: 9.8 MG/DL (ref 8.6–10.5)
CHLORIDE SERPL-SCNC: 106 MMOL/L (ref 98–107)
CLARITY UR: CLEAR
CO2 BLDA-SCNC: 18.2 MMOL/L (ref 23–27)
CO2 SERPL-SCNC: 18 MMOL/L (ref 22–29)
COLOR UR: YELLOW
CREAT SERPL-MCNC: 0.8 MG/DL (ref 0.57–1)
D-LACTATE SERPL-SCNC: 1.2 MMOL/L (ref 0.5–2)
DEPRECATED RDW RBC AUTO: 39.8 FL (ref 37–54)
DEVICE COMMENT: ABNORMAL
EGFRCR SERPLBLD CKD-EPI 2021: 97.5 ML/MIN/1.73
EOSINOPHIL # BLD AUTO: 0 10*3/MM3 (ref 0–0.4)
EOSINOPHIL NFR BLD AUTO: 0 % (ref 0.3–6.2)
ERYTHROCYTE [DISTWIDTH] IN BLOOD BY AUTOMATED COUNT: 12.6 % (ref 12.3–15.4)
GLOBULIN UR ELPH-MCNC: 2.3 GM/DL
GLUCOSE BLDC GLUCOMTR-MCNC: 106 MG/DL (ref 70–130)
GLUCOSE SERPL-MCNC: 113 MG/DL (ref 65–99)
GLUCOSE UR STRIP-MCNC: NEGATIVE MG/DL
HCG SERPL QL: NEGATIVE
HCO3 BLDV-SCNC: 17.3 MMOL/L (ref 22–28)
HCT VFR BLD AUTO: 44.6 % (ref 34–46.6)
HGB BLD-MCNC: 14.9 G/DL (ref 12–15.9)
HGB UR QL STRIP.AUTO: ABNORMAL
HOLD SPECIMEN: NORMAL
HYALINE CASTS UR QL AUTO: ABNORMAL /LPF
IMM GRANULOCYTES # BLD AUTO: 0.08 10*3/MM3 (ref 0–0.05)
IMM GRANULOCYTES NFR BLD AUTO: 0.5 % (ref 0–0.5)
KETONES UR QL STRIP: ABNORMAL
LEUKOCYTE ESTERASE UR QL STRIP.AUTO: NEGATIVE
LIPASE SERPL-CCNC: 39 U/L (ref 13–60)
LYMPHOCYTES # BLD AUTO: 1.32 10*3/MM3 (ref 0.7–3.1)
LYMPHOCYTES NFR BLD AUTO: 8.3 % (ref 19.6–45.3)
MCH RBC QN AUTO: 29.2 PG (ref 26.6–33)
MCHC RBC AUTO-ENTMCNC: 33.4 G/DL (ref 31.5–35.7)
MCV RBC AUTO: 87.3 FL (ref 79–97)
MODALITY: ABNORMAL
MONOCYTES # BLD AUTO: 0.63 10*3/MM3 (ref 0.1–0.9)
MONOCYTES NFR BLD AUTO: 4 % (ref 5–12)
NEUTROPHILS NFR BLD AUTO: 13.78 10*3/MM3 (ref 1.7–7)
NEUTROPHILS NFR BLD AUTO: 87 % (ref 42.7–76)
NITRITE UR QL STRIP: NEGATIVE
NRBC BLD AUTO-RTO: 0 /100 WBC (ref 0–0.2)
PCO2 BLDV: 29.9 MM HG (ref 41–51)
PH BLDV: 7.37 PH UNITS (ref 7.31–7.41)
PH UR STRIP.AUTO: 6 [PH] (ref 5–8)
PLATELET # BLD AUTO: 343 10*3/MM3 (ref 140–450)
PMV BLD AUTO: 9.5 FL (ref 6–12)
PO2 BLDV: 63.8 MM HG (ref 35–45)
POTASSIUM SERPL-SCNC: 3.8 MMOL/L (ref 3.5–5.2)
PROT SERPL-MCNC: 7.7 G/DL (ref 6–8.5)
PROT UR QL STRIP: ABNORMAL
RBC # BLD AUTO: 5.11 10*6/MM3 (ref 3.77–5.28)
RBC # UR STRIP: ABNORMAL /HPF
REF LAB TEST METHOD: ABNORMAL
SAO2 % BLDCOV: 91.8 % (ref 45–75)
SODIUM SERPL-SCNC: 142 MMOL/L (ref 136–145)
SP GR UR STRIP: 1.03 (ref 1–1.03)
SQUAMOUS #/AREA URNS HPF: ABNORMAL /HPF
UROBILINOGEN UR QL STRIP: ABNORMAL
WBC # UR STRIP: ABNORMAL /HPF
WBC NRBC COR # BLD AUTO: 15.84 10*3/MM3 (ref 3.4–10.8)
WHOLE BLOOD HOLD COAG: NORMAL
WHOLE BLOOD HOLD SPECIMEN: NORMAL

## 2024-02-08 PROCEDURE — 25010000002 DROPERIDOL PER 5 MG: Performed by: NURSE PRACTITIONER

## 2024-02-08 PROCEDURE — 82803 BLOOD GASES ANY COMBINATION: CPT

## 2024-02-08 PROCEDURE — 25010000002 PROCHLORPERAZINE 10 MG/2ML SOLUTION: Performed by: EMERGENCY MEDICINE

## 2024-02-08 PROCEDURE — 80307 DRUG TEST PRSMV CHEM ANLYZR: CPT | Performed by: NURSE PRACTITIONER

## 2024-02-08 PROCEDURE — 74176 CT ABD & PELVIS W/O CONTRAST: CPT

## 2024-02-08 PROCEDURE — 96374 THER/PROPH/DIAG INJ IV PUSH: CPT

## 2024-02-08 PROCEDURE — 82948 REAGENT STRIP/BLOOD GLUCOSE: CPT

## 2024-02-08 PROCEDURE — 96376 TX/PRO/DX INJ SAME DRUG ADON: CPT

## 2024-02-08 PROCEDURE — 25810000003 SODIUM CHLORIDE 0.9 % SOLUTION: Performed by: EMERGENCY MEDICINE

## 2024-02-08 PROCEDURE — 81001 URINALYSIS AUTO W/SCOPE: CPT | Performed by: EMERGENCY MEDICINE

## 2024-02-08 PROCEDURE — 83605 ASSAY OF LACTIC ACID: CPT | Performed by: EMERGENCY MEDICINE

## 2024-02-08 PROCEDURE — 80053 COMPREHEN METABOLIC PANEL: CPT | Performed by: EMERGENCY MEDICINE

## 2024-02-08 PROCEDURE — 84703 CHORIONIC GONADOTROPIN ASSAY: CPT | Performed by: EMERGENCY MEDICINE

## 2024-02-08 PROCEDURE — 99284 EMERGENCY DEPT VISIT MOD MDM: CPT

## 2024-02-08 PROCEDURE — 83690 ASSAY OF LIPASE: CPT | Performed by: EMERGENCY MEDICINE

## 2024-02-08 PROCEDURE — 25010000002 DIPHENHYDRAMINE PER 50 MG: Performed by: EMERGENCY MEDICINE

## 2024-02-08 PROCEDURE — 85025 COMPLETE CBC W/AUTO DIFF WBC: CPT | Performed by: EMERGENCY MEDICINE

## 2024-02-08 PROCEDURE — 36415 COLL VENOUS BLD VENIPUNCTURE: CPT

## 2024-02-08 PROCEDURE — 25010000002 ONDANSETRON PER 1 MG: Performed by: EMERGENCY MEDICINE

## 2024-02-08 PROCEDURE — 96375 TX/PRO/DX INJ NEW DRUG ADDON: CPT

## 2024-02-08 PROCEDURE — 25010000002 ONDANSETRON PER 1 MG: Performed by: NURSE PRACTITIONER

## 2024-02-08 RX ORDER — DIPHENHYDRAMINE HYDROCHLORIDE 50 MG/ML
12.5 INJECTION INTRAMUSCULAR; INTRAVENOUS ONCE
Status: COMPLETED | OUTPATIENT
Start: 2024-02-08 | End: 2024-02-08

## 2024-02-08 RX ORDER — PANTOPRAZOLE SODIUM 40 MG/10ML
40 INJECTION, POWDER, LYOPHILIZED, FOR SOLUTION INTRAVENOUS ONCE
Status: COMPLETED | OUTPATIENT
Start: 2024-02-08 | End: 2024-02-08

## 2024-02-08 RX ORDER — SODIUM CHLORIDE 0.9 % (FLUSH) 0.9 %
10 SYRINGE (ML) INJECTION AS NEEDED
Status: DISCONTINUED | OUTPATIENT
Start: 2024-02-08 | End: 2024-02-09 | Stop reason: HOSPADM

## 2024-02-08 RX ORDER — PROCHLORPERAZINE EDISYLATE 5 MG/ML
5 INJECTION INTRAMUSCULAR; INTRAVENOUS ONCE
Status: COMPLETED | OUTPATIENT
Start: 2024-02-08 | End: 2024-02-08

## 2024-02-08 RX ORDER — DROPERIDOL 2.5 MG/ML
1.25 INJECTION, SOLUTION INTRAMUSCULAR; INTRAVENOUS ONCE
Status: COMPLETED | OUTPATIENT
Start: 2024-02-08 | End: 2024-02-08

## 2024-02-08 RX ORDER — ONDANSETRON 2 MG/ML
4 INJECTION INTRAMUSCULAR; INTRAVENOUS ONCE
Status: COMPLETED | OUTPATIENT
Start: 2024-02-08 | End: 2024-02-08

## 2024-02-08 RX ADMIN — ONDANSETRON 4 MG: 2 INJECTION INTRAMUSCULAR; INTRAVENOUS at 17:41

## 2024-02-08 RX ADMIN — PANTOPRAZOLE SODIUM 40 MG: 40 INJECTION, POWDER, FOR SOLUTION INTRAVENOUS at 17:41

## 2024-02-08 RX ADMIN — SODIUM CHLORIDE 1000 ML: 9 INJECTION, SOLUTION INTRAVENOUS at 17:40

## 2024-02-08 RX ADMIN — DROPERIDOL 1.25 MG: 2.5 INJECTION, SOLUTION INTRAMUSCULAR; INTRAVENOUS at 23:12

## 2024-02-08 RX ADMIN — DIPHENHYDRAMINE HYDROCHLORIDE 12.5 MG: 50 INJECTION, SOLUTION INTRAMUSCULAR; INTRAVENOUS at 19:07

## 2024-02-08 RX ADMIN — ONDANSETRON 4 MG: 2 INJECTION INTRAMUSCULAR; INTRAVENOUS at 22:18

## 2024-02-08 RX ADMIN — PROCHLORPERAZINE EDISYLATE 5 MG: 5 INJECTION INTRAMUSCULAR; INTRAVENOUS at 19:08

## 2024-02-08 RX ADMIN — SODIUM CHLORIDE 1000 ML: 9 INJECTION, SOLUTION INTRAVENOUS at 19:05

## 2024-02-08 NOTE — ED PROVIDER NOTES
" EMERGENCY DEPARTMENT ENCOUNTER    Room Number:  02/02  PCP: Lashae Hercules, CHRIS  Independent Historians: Patient and Family    HPI:  Chief Complaint: had concerns including Nausea and Vomiting.      A complete HPI/ROS/PMH/PSH/SH/FH are unobtainable due to: None    Chronic or social conditions impacting patient care (Social Determinants of Health): None      Context: Sara Hoang is a 38 y.o. female with a medical history of migraine, anxiety, asthma, and \"stomach problems\" with episodes and issues with repeated vomiting in the past especially during pregnancy but even separate from pregnancy and related to her former gallbladder issues who presents to the ED c/o acute nausea and vomiting that started a 3 days ago.  Yesterday she had too numerous to count episodes of emesis.  Today she is only had a few but says she is depleted and shaky and constantly nauseous.  She denies any diarrhea or fevers.  She denies any dysuria or hematuria.  She denies any chance of pregnancy.        Review of prior external notes (non-ED) -and- Review of prior external test results outside of this encounter: Patient last seen by her primary care provider October 25, 2023 for ongoing evaluation of patient's chronic migraines, vitamin D deficiency, B12 deficiency, folic acid deficiency, fatty liver, and generalized anxiety disorder.    I also reviewed discharge summary from August 13 where patient had been admitted with intractable vomiting and found to have high anion gap metabolic acidosis.  Patient had symptoms she thought were related to initiation of Wegovy injections.  She had an EGD during the hospitalization where she did require dilation due to a Schatzki's ring    Prescription drug monitoring program review:     N/A    PAST MEDICAL HISTORY  Active Ambulatory Problems     Diagnosis Date Noted    Migraines 12/05/1995    Asthma 11/27/2015    Anxiety 11/27/2015    Insomnia 11/27/2015    Hyperemesis gravidarum 03/18/2019    " Encounter for supervision of low-risk first pregnancy in first trimester 03/18/2019    Use of letrozole (Femara) 03/18/2019    Rh negative status during pregnancy in first trimester 03/18/2019    Hyperemesis affecting pregnancy, antepartum 05/31/2019    Vaginal bleeding affecting early pregnancy 06/29/2019    Low folic acid 02/18/2020    Moderate persistent asthma with acute exacerbation 10/20/2021    Pregnancy 03/04/2022    Anemia of pregnancy 06/29/2022    RUQ abdominal pain 11/15/2022    Nausea and vomiting 11/15/2022    Gallbladder sludge 11/15/2022    Intractable vomiting 08/11/2023    High anion gap metabolic acidosis 08/11/2023    Fatty liver 10/25/2023     Resolved Ambulatory Problems     Diagnosis Date Noted    Pregnancy 03/30/2019     Past Medical History:   Diagnosis Date    Abnormal Pap smear of cervix     Anemia     Benign hematuria 2010    Colon polyps 02/11/2021    Colon polyps 2017    Dysmenorrhea     Endometriosis     Foot fracture     Fracture of spine, lumbar, without spinal cord injury, closed 01/02/2014    Herpes     Histoplasmosis     Infertility associated with anovulation     Low back pain     Migraine     Palpitations     PVC (premature ventricular contraction)     Sludge in gallbladder     Tremor 05/21         PAST SURGICAL HISTORY  Past Surgical History:   Procedure Laterality Date    CHOLECYSTECTOMY  11/22    CHOLECYSTECTOMY WITH INTRAOPERATIVE CHOLANGIOGRAM N/A 11/22/2022    Procedure: LAPAROSCOPIC CHOLECYSTECTOMY WITH INTRAOPERATIVE CHOLANGIOGRAM;  Surgeon: Lorena Doty MD;  Location: [facility];  Service: General;  Laterality: N/A;    COLONOSCOPY  2021    COLONOSCOPY W/ POLYPECTOMY N/A 02/11/2021    [facility], Dr. Bardales, repeat in 5 years    COLONOSCOPY W/ POLYPECTOMY N/A 2017    Dr. Hills    COLPOSCOPY  2009    neg    CYSTOSCOPY      D & C WITH SUCTION  07/2021    DIAGNOSTIC LAPAROSCOPY N/A 12/2018    and chromopertubation     ENDOSCOPY N/A 12/17/2012     Dr. Hills:  rings in lower esophagus. erosions in stomach    ENDOSCOPY N/A 8/12/2023    Procedure: ESOPHAGOGASTRODUODENOSCOPY with biopsies and dilitation with 52F Siri;  Surgeon: Darrian French MD;  Location: [facility];  Service: Gastroenterology;  Laterality: N/A;  pre- vomitting  post- gastritis, esophageal ring    PERIPHERALLY INSERTED CENTRAL CATHETER INSERTION N/A 2019    TONSILLECTOMY Bilateral 2014         FAMILY HISTORY  Family History   Problem Relation Age of Onset    Breast cancer Mother     Cancer Mother     Depression Mother     Thyroid disease Mother     Vision loss Mother         Glaucoma    Prostate cancer Father     Migraines Father     Hypertension Father     Cancer Father     Seizures Sister     Depression Sister     Thyroid disease Sister     Heart disease Maternal Grandmother     Diabetes Maternal Grandmother     Colon cancer Maternal Grandfather     Heart disease Paternal Grandmother     Hypertension Paternal Grandfather     Malig Hyperthermia Neg Hx          SOCIAL HISTORY  Social History     Socioeconomic History    Marital status:      Spouse name: Stephane    Number of children: 1    Years of education: 18    Highest education level: Master's degree (e.g., MA, MS, Yemi, MEd, MSW, SUSIE)   Tobacco Use    Smoking status: Never    Smokeless tobacco: Never   Vaping Use    Vaping Use: Never used   Substance and Sexual Activity    Alcohol use: No    Drug use: Never    Sexual activity: Yes     Partners: Male     Birth control/protection: Pill     Comment: Suffered miscarriage in 06/2021         ALLERGIES  Biaxin [clarithromycin], Ceclor [cefaclor], Penicillins, Sulfa antibiotics, and Reglan [metoclopramide]        REVIEW OF SYSTEMS  Review of Systems  Included in HPI  All systems reviewed and negative except for those discussed in HPI.      PHYSICAL EXAM    I have reviewed the triage vital signs and nursing notes.    ED Triage Vitals [02/08/24 1658]   Temp Heart Rate Resp BP SpO2    97.2 °F (36.2 °C) (!) 138 28 -- 97 %      Temp src Heart Rate Source Patient Position BP Location FiO2 (%)   Tympanic Monitor -- -- --       Physical Exam  GENERAL: Obese cooperative and conversant female although ill-appearing, tremulous, alert, no acute distress  SKIN: Warm, dry, generalized pallor  HENT: Normocephalic, atraumatic dry mucous membranes  EYES: no scleral icterus, EOMI, no conjunctivitis  CV: regular rhythm, celebrated rate  RESPIRATORY: normal effort, lungs clear  ABDOMEN: soft, nontender, nondistended, obese, no rebound, no guarding  MUSCULOSKELETAL: no deformity  NEURO: alert, moves all extremities, follows commands                                                                   LAB RESULTS  No results found for this or any previous visit (from the past 24 hour(s)).        RADIOLOGY  No Radiology Exams Resulted Within Past 24 Hours      MEDICATIONS GIVEN IN ER  Medications   sodium chloride 0.9 % bolus 1,000 mL (0 mL Intravenous Stopped 2/8/24 1905)   pantoprazole (PROTONIX) injection 40 mg (40 mg Intravenous Given 2/8/24 1741)   ondansetron (ZOFRAN) injection 4 mg (4 mg Intravenous Given 2/8/24 1741)   sodium chloride 0.9 % bolus 1,000 mL (0 mL Intravenous Stopped 2/8/24 2028)   prochlorperazine (COMPAZINE) injection 5 mg (5 mg Intravenous Given 2/8/24 1908)   diphenhydrAMINE (BENADRYL) injection 12.5 mg (12.5 mg Intravenous Given 2/8/24 1907)   ondansetron (ZOFRAN) injection 4 mg (4 mg Intravenous Given 2/8/24 2218)   droperidol (INAPSINE) injection 1.25 mg (1.25 mg Intravenous Given 2/8/24 2312)         ORDERS PLACED DURING THIS VISIT:  Orders Placed This Encounter   Procedures    CT Abdomen Pelvis Without Contrast    [place] Draw    Comprehensive Metabolic Panel    Lipase    Urinalysis With Microscopic If Indicated (No Culture) - Urine, Clean Catch    hCG, Serum, Qualitative    CBC Auto Differential    Blood Gas, Venous -    Blood Gas, Venous -    Lactic Acid, Plasma    Urinalysis,  Microscopic Only - Urine, Clean Catch    Urine Drug Screen - Urine, Clean Catch    Undress & Gown    Po fluid challenge, please  Misc Nursing Order (Specify)    POC Glucose Once    CBC & Differential    Green Top (Gel)    Lavender Top    Light Blue Top         OUTPATIENT MEDICATION MANAGEMENT:  No current Epic-ordered facility-administered medications on file.     Current Outpatient Medications Ordered in Epic   Medication Sig Dispense Refill    folic acid (FOLVITE) 1 MG tablet Take 1 tablet by mouth Daily. 90 tablet 3    ondansetron ODT (ZOFRAN-ODT) 4 MG disintegrating tablet Place 1 tablet on the tongue Every 8 (Eight) Hours As Needed for Nausea or Vomiting. 15 tablet 0    oseltamivir (Tamiflu) 75 MG capsule Take 1 capsule by mouth 2 (Two) Times a Day. For Flu 10 capsule 0    Probiotic Product (PROBIOTIC BLEND PO) Take  by mouth.      rizatriptan (Maxalt) 10 MG tablet Take 1 tablet by mouth 1 (One) Time As Needed for Migraine for up to 1 dose. May repeat in 2 hours if needed 18 tablet 11    sertraline (ZOLOFT) 100 MG tablet Take 1 tablet by mouth Daily. For anxiety 90 tablet 3    topiramate (Topamax) 200 MG tablet Take 1 tablet by mouth every night at bedtime. For migraine suppression 90 tablet 3    Vienva 0.1-20 MG-MCG per tablet Take 1 tablet by mouth Every Night.           PROCEDURES  Procedures            PROGRESS, DATA ANALYSIS, CONSULTS, AND MEDICAL DECISION MAKING  All labs have been independently interpreted by me.  All radiology studies have been reviewed by me. All EKG's have been independently viewed and interpreted by me.  Discussion below represents my analysis of pertinent findings related to patient's condition, differential diagnosis, treatment plan and final disposition.    Differential diagnosis includes but is not limited to   The differential diagnosis for this patient includes: appendicitis, pancreatitis, PUD, gastritis, pneumonia, ureteral stone, sbo, hernia, colitis, diverticulitis, AAA,  malignancy, gastroenteritis, food intolerances, cyclic vomiting syndrome, gastroparesis or other hyperemesis condition. Abdominal exam is without peritoneal signs. There is no evidence of acute abdomen at this time. The patient is ill-appearing and appears dry on exam. I have a low suspicion for vascular catastrophe, bowel obstruction or viscus perforation. Plan serum labs, urinalysis, IV hydration and IV antiemetics, IMAGING CT abdomen pelvis, and serial reassessment..    Clinical Scores:                  ED Course as of 02/10/24 0024   Thu Feb 08, 2024   1904 Patient chilled and nauseous.  Her initial bag of IV fluids is finished.  A second liter has been ordered for as well as a second round of medication which she has not received yet.  Patient reassured that she does have additional orders.  She is not actively vomiting and her vitals are actually improved with a heart rate in the 90s. [AR]   2120 Patient was turned over to me by Hernesto.  Pending: imaging report, reevaluation, and disposition.   [AR-2]   2214 Primary RN advises patient continues to feel nauseated. She reported Zofran helped this best. Additional dose of Zofran ordered. [AR-2]   2304 CT Abdomen Pelvis Without Contrast  Reviewed. [AR-2]   2308 Patient unable to tolerate po fluid challenge as she felt nauseated and vomited. Droperidol ordered. [AR-2]   2315 CT Abdomen Pelvis Without Contrast  Reviewed. [AR-2]   Fri Feb 09, 2024   0155 The patient was reexamined. Patient able to tolerate po fluids without nausea and vomiting.  They have had symptomatic improvement during their ED stay.  I discussed today's findings with the patient, explaining the pertinent positives and negatives from today's visit, and the plan of care.  Discussed plan for discharge as there is no emergent indication for admission.  Discussed limitation of the ED work-up and that this is to rule out life-threatening emergencies but that they could require further testing as  determined by their primary care and or any referred specialist patient is agreeable and understands need for follow-up and repeat exam/testing.  Patient is aware that discharge does not mean there is nothing wrong, indicates no emergency is present, and that they must continue their care with their primary care physician and/or any referred specialist.  They were given appropriate follow-up with their primary care physician and/or specialist.  I had an extensive discussion on the expected clinical course and return precautions.  Patient understands to return to the emergency department for continuation, worsening, or new symptoms.  I answered any of the patient's questions. Patient was discharged home in a stable condition.     [AR-2]      ED Course User Index  [AR] Radha Eric MD  [AR-2] Honey Francis APRN             AS OF 00:24 EST VITALS:    BP - 127/92  HR - 74  TEMP - 98.2 °F (36.8 °C) (Oral)  O2 SATS - 99%      COMPLEXITY OF CARE  Admission was considered but after careful review of the patient's presentation, physical examination, diagnostic results, and response to treatment the patient may be safely discharged with outpatient follow-up.    DIAGNOSIS  Final diagnoses:   Nausea and vomiting, unspecified vomiting type   Ketonuria         DISPOSITION  ED Disposition       ED Disposition   Discharge    Condition   Stable    Comment   --                Please note that portions of this document were completed with a voice recognition program.    Note Disclaimer: At [facility], we believe that sharing information builds trust and better relationships. You are receiving this note because you recently visited [facility]. It is possible you will see health information before a provider has talked with you about it. This kind of information can be easy to misunderstand. To help you fully understand what it means for your health, we urge you to discuss this note with your provider.          Radha Eric MD  02/10/24 0024

## 2024-02-09 VITALS
TEMPERATURE: 98.2 F | SYSTOLIC BLOOD PRESSURE: 127 MMHG | RESPIRATION RATE: 18 BRPM | HEART RATE: 74 BPM | OXYGEN SATURATION: 99 % | DIASTOLIC BLOOD PRESSURE: 92 MMHG

## 2024-02-09 LAB
AMPHET+METHAMPHET UR QL: NEGATIVE
BARBITURATES UR QL SCN: NEGATIVE
BENZODIAZ UR QL SCN: NEGATIVE
CANNABINOIDS SERPL QL: NEGATIVE
COCAINE UR QL: NEGATIVE
FENTANYL UR-MCNC: NEGATIVE NG/ML
METHADONE UR QL SCN: NEGATIVE
OPIATES UR QL: NEGATIVE
OXYCODONE UR QL SCN: NEGATIVE

## 2024-02-09 RX ORDER — ONDANSETRON 4 MG/1
4 TABLET, ORALLY DISINTEGRATING ORAL EVERY 8 HOURS PRN
Qty: 15 TABLET | Refills: 0 | Status: SHIPPED | OUTPATIENT
Start: 2024-02-09

## 2024-02-09 NOTE — ED PROVIDER NOTES
EMERGENCY DEPARTMENT ENCOUNTER    Room number:  25/25  Date Seen:  2/8/2024  Time of transfer:2120  PCP:  Lashae Hercules PA-C    Laboratory Results:  Recent Results (from the past 24 hour(s))   POC Glucose Once    Collection Time: 02/08/24  5:01 PM    Specimen: Blood   Result Value Ref Range    Glucose 106 70 - 130 mg/dL   Comprehensive Metabolic Panel    Collection Time: 02/08/24  5:16 PM    Specimen: Blood   Result Value Ref Range    Glucose 113 (H) 65 - 99 mg/dL    BUN 13 6 - 20 mg/dL    Creatinine 0.80 0.57 - 1.00 mg/dL    Sodium 142 136 - 145 mmol/L    Potassium 3.8 3.5 - 5.2 mmol/L    Chloride 106 98 - 107 mmol/L    CO2 18.0 (L) 22.0 - 29.0 mmol/L    Calcium 9.8 8.6 - 10.5 mg/dL    Total Protein 7.7 6.0 - 8.5 g/dL    Albumin 5.4 (H) 3.5 - 5.2 g/dL    ALT (SGPT) 9 1 - 33 U/L    AST (SGOT) 9 1 - 32 U/L    Alkaline Phosphatase 109 39 - 117 U/L    Total Bilirubin 0.4 0.0 - 1.2 mg/dL    Globulin 2.3 gm/dL    A/G Ratio 2.3 g/dL    BUN/Creatinine Ratio 16.3 7.0 - 25.0    Anion Gap 18.0 (H) 5.0 - 15.0 mmol/L    eGFR 97.5 >60.0 mL/min/1.73   Lipase    Collection Time: 02/08/24  5:16 PM    Specimen: Blood   Result Value Ref Range    Lipase 39 13 - 60 U/L   hCG, Serum, Qualitative    Collection Time: 02/08/24  5:16 PM    Specimen: Blood   Result Value Ref Range    HCG Qualitative Negative Negative   Green Top (Gel)    Collection Time: 02/08/24  5:16 PM   Result Value Ref Range    Extra Tube Hold for add-ons.    Lavender Top    Collection Time: 02/08/24  5:16 PM   Result Value Ref Range    Extra Tube hold for add-on    Light Blue Top    Collection Time: 02/08/24  5:16 PM   Result Value Ref Range    Extra Tube Hold for add-ons.    CBC Auto Differential    Collection Time: 02/08/24  5:16 PM    Specimen: Blood   Result Value Ref Range    WBC 15.84 (H) 3.40 - 10.80 10*3/mm3    RBC 5.11 3.77 - 5.28 10*6/mm3    Hemoglobin 14.9 12.0 - 15.9 g/dL    Hematocrit 44.6 34.0 - 46.6 %    MCV 87.3 79.0 - 97.0 fL    MCH 29.2 26.6 - 33.0  pg    MCHC 33.4 31.5 - 35.7 g/dL    RDW 12.6 12.3 - 15.4 %    RDW-SD 39.8 37.0 - 54.0 fl    MPV 9.5 6.0 - 12.0 fL    Platelets 343 140 - 450 10*3/mm3    Neutrophil % 87.0 (H) 42.7 - 76.0 %    Lymphocyte % 8.3 (L) 19.6 - 45.3 %    Monocyte % 4.0 (L) 5.0 - 12.0 %    Eosinophil % 0.0 (L) 0.3 - 6.2 %    Basophil % 0.2 0.0 - 1.5 %    Immature Grans % 0.5 0.0 - 0.5 %    Neutrophils, Absolute 13.78 (H) 1.70 - 7.00 10*3/mm3    Lymphocytes, Absolute 1.32 0.70 - 3.10 10*3/mm3    Monocytes, Absolute 0.63 0.10 - 0.90 10*3/mm3    Eosinophils, Absolute 0.00 0.00 - 0.40 10*3/mm3    Basophils, Absolute 0.03 0.00 - 0.20 10*3/mm3    Immature Grans, Absolute 0.08 (H) 0.00 - 0.05 10*3/mm3    nRBC 0.0 0.0 - 0.2 /100 WBC   Blood Gas, Venous -    Collection Time: 02/08/24  5:41 PM    Specimen: Venous Blood   Result Value Ref Range    pH, Venous 7.371 7.310 - 7.410 pH Units    pCO2, Venous 29.9 (L) 41.0 - 51.0 mm Hg    pO2, Venous 63.8 (H) 35.0 - 45.0 mm Hg    HCO3, Venous 17.3 (L) 22.0 - 28.0 mmol/L    Base Excess, Venous -6.6 (L) -2.0 - 2.0 mmol/L    O2 Saturation, Venous 91.8 (H) 45.0 - 75.0 %    CO2 Content 18.2 (L) 23 - 27 mmol/L    Barometric Pressure for Blood Gas 747.8000 mmHg    Modality Room Air     Device Comment [id number]    Lactic Acid, Plasma    Collection Time: 02/08/24  5:48 PM    Specimen: Blood   Result Value Ref Range    Lactate 1.2 0.5 - 2.0 mmol/L   Urinalysis With Microscopic If Indicated (No Culture) - Urine, Clean Catch    Collection Time: 02/08/24  8:48 PM    Specimen: Urine, Clean Catch   Result Value Ref Range    Color, UA Yellow Yellow, Straw    Appearance, UA Clear Clear    pH, UA 6.0 5.0 - 8.0    Specific Gravity, UA 1.029 1.005 - 1.030    Glucose, UA Negative Negative    Ketones, UA 80 mg/dL (3+) (A) Negative    Bilirubin, UA Negative Negative    Blood, UA Trace (A) Negative    Protein, UA 30 mg/dL (1+) (A) Negative    Leuk Esterase, UA Negative Negative    Nitrite, UA Negative Negative    Urobilinogen, UA  1.0 E.U./dL 0.2 - 1.0 E.U./dL   Urinalysis, Microscopic Only - Urine, Clean Catch    Collection Time: 02/08/24  8:48 PM    Specimen: Urine, Clean Catch   Result Value Ref Range    RBC, UA 0-2 None Seen, 0-2 /HPF    WBC, UA 6-10 (A) None Seen, 0-2 /HPF    Bacteria, UA Trace (A) None Seen /HPF    Squamous Epithelial Cells, UA 3-6 (A) None Seen, 0-2 /HPF    Hyaline Casts, UA 0-2 None Seen /LPF    Methodology Manual Light Microscopy    Urine Drug Screen - Urine, Clean Catch    Collection Time: 02/08/24  8:48 PM    Specimen: Urine, Clean Catch   Result Value Ref Range    Amphet/Methamphet, Screen Negative Negative    Barbiturates Screen, Urine Negative Negative    Benzodiazepine Screen, Urine Negative Negative    Cocaine Screen, Urine Negative Negative    Opiate Screen Negative Negative    THC, Screen, Urine Negative Negative    Methadone Screen, Urine Negative Negative    Oxycodone Screen, Urine Negative Negative    Fentanyl, Urine Negative Negative     I reviewed the above results.    Radiology:  CT Abdomen Pelvis Without Contrast   Final Result   1.  No noncontrast CT findings of acute intraabdominal pathology for   premenopausal patient.   2.  Other findings as above.           This report was finalized on 2/8/2024 10:50 PM by Dr. Maximiliano Waddell M.D   on Workstation: [id number]            I reviewed the above results    Medications ordered in ED:  Medications   sodium chloride 0.9 % flush 10 mL (has no administration in time range)   sodium chloride 0.9 % flush 10 mL (has no administration in time range)   sodium chloride 0.9 % bolus 1,000 mL (0 mL Intravenous Stopped 2/8/24 1905)   pantoprazole (PROTONIX) injection 40 mg (40 mg Intravenous Given 2/8/24 1741)   ondansetron (ZOFRAN) injection 4 mg (4 mg Intravenous Given 2/8/24 1741)   sodium chloride 0.9 % bolus 1,000 mL (0 mL Intravenous Stopped 2/8/24 2028)   prochlorperazine (COMPAZINE) injection 5 mg (5 mg Intravenous Given 2/8/24 1908)   diphenhydrAMINE (BENADRYL)  injection 12.5 mg (12.5 mg Intravenous Given 2/8/24 1907)   ondansetron (ZOFRAN) injection 4 mg (4 mg Intravenous Given 2/8/24 2218)   droperidol (INAPSINE) injection 1.25 mg (1.25 mg Intravenous Given 2/8/24 2312)       ED Course as of 02/09/24 0206   Thu Feb 08, 2024   1904 Patient chilled and nauseous.  Her initial bag of IV fluids is finished.  A second liter has been ordered for as well as a second round of medication which she has not received yet.  Patient reassured that she does have additional orders.  She is not actively vomiting and her vitals are actually improved with a heart rate in the 90s. [AR]   2120 Patient was turned over to me by Hernesto.  Pending: imaging report, reevaluation, and disposition.   [AR-2]   2214 Primary RN advises patient continues to feel nauseated. She reported Zofran helped this best. Additional dose of Zofran ordered. [AR-2]   2304 CT Abdomen Pelvis Without Contrast  Reviewed. [AR-2]   2308 Patient unable to tolerate po fluid challenge as she felt nauseated and vomited. Droperidol ordered. [AR-2]   2315 CT Abdomen Pelvis Without Contrast  Reviewed. [AR-2]   Fri Feb 09, 2024   0155 The patient was reexamined. Patient able to tolerate po fluids without nausea and vomiting.  They have had symptomatic improvement during their ED stay.  I discussed today's findings with the patient, explaining the pertinent positives and negatives from today's visit, and the plan of care.  Discussed plan for discharge as there is no emergent indication for admission.  Discussed limitation of the ED work-up and that this is to rule out life-threatening emergencies but that they could require further testing as determined by their primary care and or any referred specialist patient is agreeable and understands need for follow-up and repeat exam/testing.  Patient is aware that discharge does not mean there is nothing wrong, indicates no emergency is present, and that they must continue their care with  their primary care physician and/or any referred specialist.  They were given appropriate follow-up with their primary care physician and/or specialist.  I had an extensive discussion on the expected clinical course and return precautions.  Patient understands to return to the emergency department for continuation, worsening, or new symptoms.  I answered any of the patient's questions. Patient was discharged home in a stable condition.     [AR-2]      ED Course User Index  [AR] Radha Eric MD  [AR-2] Honey Francis APRN       Diagnosis:  Final diagnoses:   Nausea and vomiting, unspecified vomiting type   Ketonuria       Follow Up:  Lashae Hercules, CHRIS  [address] [address]  [phone]    Schedule an appointment as soon as possible for a visit   For further evaluation and management of symptoms      RX:     Medication List        New Prescriptions      ondansetron ODT 4 MG disintegrating tablet  Commonly known as: ZOFRAN-ODT  Place 1 tablet on the tongue Every 8 (Eight) Hours As Needed for Nausea or Vomiting.               Where to Get Your Medications        These medications were sent to [id number] - [place], [address] - [phone] [facility] [phone] [address] [place] [address]      Phone: [phone]   ondansetron ODT 4 MG disintegrating tablet         Provider attestation:  I personally reviewed the past medical history, past surgical history, social history, family history, current medications, and allergies as they appear in the chart.    The patient was seen and examined by myself and Dr. Eric, who agree with plan.       Honey Francis APRN  02/09/24 3805

## 2024-02-09 NOTE — DISCHARGE INSTRUCTIONS
You have been given emergency department evaluation.  This evaluation is intended to rule out life-threatening conditions.  Is not a complete evaluation.  You could require further testing as determined by your primary care physician or any referred specialist.  Please follow-up with all doctors that you are referred to.  Please be sure to take your prescribed medications and follow any specific instructions in the discharge instructions.  Please follow-up with your primary care physician within 48 hours.  Please have your primary care provider recheck your blood pressure.  Rest.  Stay hydrated.  Advance diet as tolerated, start with liquids.  Please return to the emergency department if you experience chest pain, shortness of breath, abdominal pain, fever greater than 102, intractable vomiting.  Please return to the emergency department if your symptoms continue or worsen, or if you begin to experience any other concerning symptom.

## 2024-02-13 ENCOUNTER — OFFICE VISIT (OUTPATIENT)
Dept: FAMILY MEDICINE CLINIC | Facility: CLINIC | Age: 39
End: 2024-02-13
Payer: COMMERCIAL

## 2024-02-13 VITALS
WEIGHT: 181 LBS | HEIGHT: 65 IN | TEMPERATURE: 97.8 F | RESPIRATION RATE: 16 BRPM | BODY MASS INDEX: 30.16 KG/M2 | DIASTOLIC BLOOD PRESSURE: 82 MMHG | OXYGEN SATURATION: 100 % | HEART RATE: 74 BPM | SYSTOLIC BLOOD PRESSURE: 110 MMHG

## 2024-02-13 DIAGNOSIS — Z86.010 HISTORY OF COLON POLYPS: ICD-10-CM

## 2024-02-13 DIAGNOSIS — E55.9 VITAMIN D DEFICIENCY: ICD-10-CM

## 2024-02-13 DIAGNOSIS — K20.90 ESOPHAGITIS: Primary | ICD-10-CM

## 2024-02-13 DIAGNOSIS — Z09 HOSPITAL DISCHARGE FOLLOW-UP: ICD-10-CM

## 2024-02-13 DIAGNOSIS — E53.8 LOW SERUM VITAMIN B12: ICD-10-CM

## 2024-02-13 DIAGNOSIS — E53.8 LOW FOLIC ACID: ICD-10-CM

## 2024-02-13 DIAGNOSIS — K22.2 SCHATZKI'S RING: ICD-10-CM

## 2024-02-13 RX ORDER — PANTOPRAZOLE SODIUM 40 MG/1
40 TABLET, DELAYED RELEASE ORAL DAILY
Qty: 90 TABLET | Refills: 3 | Status: SHIPPED | OUTPATIENT
Start: 2024-02-13 | End: 2024-02-13

## 2024-02-13 RX ORDER — PANTOPRAZOLE SODIUM 40 MG/1
40 TABLET, DELAYED RELEASE ORAL DAILY
Qty: 30 TABLET | Refills: 5 | Status: SHIPPED | OUTPATIENT
Start: 2024-02-13 | End: 2024-02-13 | Stop reason: SDUPTHER

## 2024-02-13 RX ORDER — PANTOPRAZOLE SODIUM 40 MG/1
40 TABLET, DELAYED RELEASE ORAL DAILY
Qty: 90 TABLET | Refills: 3 | Status: SHIPPED | OUTPATIENT
Start: 2024-02-13

## 2024-07-25 ENCOUNTER — TELEMEDICINE (OUTPATIENT)
Dept: FAMILY MEDICINE CLINIC | Facility: CLINIC | Age: 39
End: 2024-07-25
Payer: COMMERCIAL

## 2024-07-25 DIAGNOSIS — R41.840 CONCENTRATION DEFICIT: Primary | ICD-10-CM

## 2024-07-25 DIAGNOSIS — E55.9 VITAMIN D DEFICIENCY: ICD-10-CM

## 2024-07-25 DIAGNOSIS — E53.8 LOW FOLIC ACID: ICD-10-CM

## 2024-07-25 PROCEDURE — 99213 OFFICE O/P EST LOW 20 MIN: CPT | Performed by: PHYSICIAN ASSISTANT

## 2024-07-25 RX ORDER — CHOLECALCIFEROL (VITAMIN D3) 50 MCG
2000 TABLET ORAL DAILY
COMMUNITY

## 2024-07-25 NOTE — PROGRESS NOTES
Subjective   Sara Hoang is a 38 y.o. female.   History of Present Illness   You have chosen to receive care through a telehealth visit.  Do you consent to use a video/audio connection for your medical care today? Yes  She changed her visit today to video due to her ill son.  She is at home in [place] and I am at my desk.   Since the last visit, she has noted difficulty with concentration. She is having trouble concentrating.  Some inattentive C/o as child.  Worse attn as adult. She is concerned about ADD and wants work.  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  History of fatty liver on prior imaging.  History of Schatzki's ring with chronic gastritis on prior EGD from August 12, 2023.  Also treat her for migraine management with Topamax for suppression and Maxalt for acute headache.  I had her on semaglutide and she had severe GI distress and will avoid this class of medication.  I have also had her on WelChol for cholecystectomy diarrhea.  She remains on Zoloft for treatment of anxiety.   On D and folic acid.BMI is >= 30 and <35. (Class 1 Obesity). The following options were offered after discussion;: exercise counseling/recommendations      Results for orders placed or performed during the hospital encounter of 02/08/24   Comprehensive Metabolic Panel    Specimen: Blood   Result Value Ref Range    Glucose 113 (H) 65 - 99 mg/dL    BUN 13 6 - 20 mg/dL    Creatinine 0.80 0.57 - 1.00 mg/dL    Sodium 142 136 - 145 mmol/L    Potassium 3.8 3.5 - 5.2 mmol/L    Chloride 106 98 - 107 mmol/L    CO2 18.0 (L) 22.0 - 29.0 mmol/L    Calcium 9.8 8.6 - 10.5 mg/dL    Total Protein 7.7 6.0 - 8.5 g/dL    Albumin 5.4 (H) 3.5 - 5.2 g/dL    ALT (SGPT) 9 1 - 33 U/L    AST (SGOT) 9 1 - 32 U/L    Alkaline Phosphatase 109 39 - 117 U/L    Total Bilirubin 0.4 0.0 - 1.2 mg/dL    Globulin 2.3 gm/dL    A/G Ratio 2.3 g/dL    BUN/Creatinine Ratio 16.3 7.0 - 25.0     Anion Gap 18.0 (H) 5.0 - 15.0 mmol/L    eGFR 97.5 >60.0 mL/min/1.73   Lipase    Specimen: Blood   Result Value Ref Range    Lipase 39 13 - 60 U/L   Urinalysis With Microscopic If Indicated (No Culture) - Urine, Clean Catch    Specimen: Urine, Clean Catch   Result Value Ref Range    Color, UA Yellow Yellow, Straw    Appearance, UA Clear Clear    pH, UA 6.0 5.0 - 8.0    Specific Gravity, UA 1.029 1.005 - 1.030    Glucose, UA Negative Negative    Ketones, UA 80 mg/dL (3+) (A) Negative    Bilirubin, UA Negative Negative    Blood, UA Trace (A) Negative    Protein, UA 30 mg/dL (1+) (A) Negative    Leuk Esterase, UA Negative Negative    Nitrite, UA Negative Negative    Urobilinogen, UA 1.0 E.U./dL 0.2 - 1.0 E.U./dL   hCG, Serum, Qualitative    Specimen: Blood   Result Value Ref Range    HCG Qualitative Negative Negative   CBC Auto Differential    Specimen: Blood   Result Value Ref Range    WBC 15.84 (H) 3.40 - 10.80 10*3/mm3    RBC 5.11 3.77 - 5.28 10*6/mm3    Hemoglobin 14.9 12.0 - 15.9 g/dL    Hematocrit 44.6 34.0 - 46.6 %    MCV 87.3 79.0 - 97.0 fL    MCH 29.2 26.6 - 33.0 pg    MCHC 33.4 31.5 - 35.7 g/dL    RDW 12.6 12.3 - 15.4 %    RDW-SD 39.8 37.0 - 54.0 fl    MPV 9.5 6.0 - 12.0 fL    Platelets 343 140 - 450 10*3/mm3    Neutrophil % 87.0 (H) 42.7 - 76.0 %    Lymphocyte % 8.3 (L) 19.6 - 45.3 %    Monocyte % 4.0 (L) 5.0 - 12.0 %    Eosinophil % 0.0 (L) 0.3 - 6.2 %    Basophil % 0.2 0.0 - 1.5 %    Immature Grans % 0.5 0.0 - 0.5 %    Neutrophils, Absolute 13.78 (H) 1.70 - 7.00 10*3/mm3    Lymphocytes, Absolute 1.32 0.70 - 3.10 10*3/mm3    Monocytes, Absolute 0.63 0.10 - 0.90 10*3/mm3    Eosinophils, Absolute 0.00 0.00 - 0.40 10*3/mm3    Basophils, Absolute 0.03 0.00 - 0.20 10*3/mm3    Immature Grans, Absolute 0.08 (H) 0.00 - 0.05 10*3/mm3    nRBC 0.0 0.0 - 0.2 /100 WBC   Blood Gas, Venous -    Specimen: Venous Blood   Result Value Ref Range    pH, Venous 7.371 7.310 - 7.410 pH Units    pCO2, Venous 29.9 (L) 41.0 - 51.0 mm  Hg    pO2, Venous 63.8 (H) 35.0 - 45.0 mm Hg    HCO3, Venous 17.3 (L) 22.0 - 28.0 mmol/L    Base Excess, Venous -6.6 (L) -2.0 - 2.0 mmol/L    O2 Saturation, Venous 91.8 (H) 45.0 - 75.0 %    CO2 Content 18.2 (L) 23 - 27 mmol/L    Barometric Pressure for Blood Gas 747.8000 mmHg    Modality Room Air     Device Comment [id number]    Lactic Acid, Plasma    Specimen: Blood   Result Value Ref Range    Lactate 1.2 0.5 - 2.0 mmol/L   Urinalysis, Microscopic Only - Urine, Clean Catch    Specimen: Urine, Clean Catch   Result Value Ref Range    RBC, UA 0-2 None Seen, 0-2 /HPF    WBC, UA 6-10 (A) None Seen, 0-2 /HPF    Bacteria, UA Trace (A) None Seen /HPF    Squamous Epithelial Cells, UA 3-6 (A) None Seen, 0-2 /HPF    Hyaline Casts, UA 0-2 None Seen /LPF    Methodology Manual Light Microscopy    Urine Drug Screen - Urine, Clean Catch    Specimen: Urine, Clean Catch   Result Value Ref Range    Amphet/Methamphet, Screen Negative Negative    Barbiturates Screen, Urine Negative Negative    Benzodiazepine Screen, Urine Negative Negative    Cocaine Screen, Urine Negative Negative    Opiate Screen Negative Negative    THC, Screen, Urine Negative Negative    Methadone Screen, Urine Negative Negative    Oxycodone Screen, Urine Negative Negative    Fentanyl, Urine Negative Negative   POC Glucose Once    Specimen: Blood   Result Value Ref Range    Glucose 106 70 - 130 mg/dL   Green Top (Gel)   Result Value Ref Range    Extra Tube Hold for add-ons.    Lavender Top   Result Value Ref Range    Extra Tube hold for add-on    Light Blue Top   Result Value Ref Range    Extra Tube Hold for add-ons.          The following portions of the patient's history were reviewed and updated as appropriate: allergies, current medications, past family history, past medical history, past social history, past surgical history, and problem list.    Review of Systems   Constitutional:  Negative for diaphoresis.   HENT:  Negative for nosebleeds and trouble  swallowing.    Eyes:  Negative for blurred vision and visual disturbance.   Respiratory:  Negative for choking.    Gastrointestinal:  Negative for blood in stool.   Allergic/Immunologic: Negative for immunocompromised state.   Neurological:  Positive for headache. Negative for facial asymmetry and speech difficulty.   Psychiatric/Behavioral:  Positive for decreased concentration. Negative for self-injury and suicidal ideas.        Objective   Physical Exam  Constitutional:       General: She is not in acute distress.     Appearance: She is well-developed. She is not diaphoretic.   HENT:      Head: Normocephalic and atraumatic.   Eyes:      Conjunctiva/sclera: Conjunctivae normal.   Pulmonary:      Effort: Pulmonary effort is normal. No respiratory distress.   Musculoskeletal:         General: Normal range of motion.      Cervical back: Normal range of motion.   Skin:     General: Skin is dry.   Neurological:      Mental Status: She is alert and oriented to person, place, and time.      Coordination: Coordination normal.   Psychiatric:         Mood and Affect: Mood normal.         Behavior: Behavior normal.         Thought Content: Thought content normal.         Judgment: Judgment normal.           Assessment & Plan   Diagnoses and all orders for this visit:    1. Concentration deficit (Primary)  -     Ambulatory Referral to Psychology  -     Comprehensive metabolic panel  -     Lipid panel  -     CBC and Differential  -     TSH  -     T4, Free  -     Vitamin D,25-Hydroxy  -     Vitamin B12  -     Folate  -     Urinalysis With Microscopic - Urine, Clean Catch  -     Magnesium  -     Hemoglobin A1c    2. Vitamin D deficiency  -     Comprehensive metabolic panel  -     Lipid panel  -     CBC and Differential  -     TSH  -     T4, Free  -     Vitamin D,25-Hydroxy  -     Vitamin B12  -     Folate  -     Urinalysis With Microscopic - Urine, Clean Catch  -     Magnesium  -     Hemoglobin A1c    3. Low folic acid  -      Comprehensive metabolic panel  -     Lipid panel  -     CBC and Differential  -     TSH  -     T4, Free  -     Vitamin D,25-Hydroxy  -     Vitamin B12  -     Folate  -     Urinalysis With Microscopic - Urine, Clean Catch  -     Magnesium  -     Hemoglobin A1c      She must have neuropsych eval for ADD to be considered for ADD Rx  Feels like Zoloft is working for her anxiety. ----I am concerned anxiety could effect concentration.  --not depressed.    Update labs  Continue to take vitamin D and folic acid will update labs and check other labs like thyroid     Continues on migraine suppressive therapy and triptan for acute migraine working well       Answers submitted by the patient for this visit:  Other (Submitted on 7/25/2024)  Please describe your symptoms.: Discuss ADHD  Have you had these symptoms before?: Yes  How long have you been having these symptoms?: Greater than 2 weeks  Primary Reason for Visit (Submitted on 7/25/2024)  What is the primary reason for your visit?: Other

## 2024-08-06 LAB
25(OH)D3+25(OH)D2 SERPL-MCNC: 22.1 NG/ML (ref 30–100)
ALBUMIN SERPL-MCNC: 4.4 G/DL (ref 3.9–4.9)
ALP SERPL-CCNC: 112 IU/L (ref 44–121)
ALT SERPL-CCNC: 6 IU/L (ref 0–32)
AST SERPL-CCNC: 11 IU/L (ref 0–40)
BASOPHILS # BLD AUTO: 0.1 X10E3/UL (ref 0–0.2)
BASOPHILS NFR BLD AUTO: 1 %
BILIRUB SERPL-MCNC: 0.3 MG/DL (ref 0–1.2)
BUN SERPL-MCNC: 14 MG/DL (ref 6–20)
BUN/CREAT SERPL: 14 (ref 9–23)
CALCIUM SERPL-MCNC: 9.6 MG/DL (ref 8.7–10.2)
CHLORIDE SERPL-SCNC: 109 MMOL/L (ref 96–106)
CO2 SERPL-SCNC: 22 MMOL/L (ref 20–29)
CREAT SERPL-MCNC: 1 MG/DL (ref 0.57–1)
EGFRCR SERPLBLD CKD-EPI 2021: 74 ML/MIN/1.73
EOSINOPHIL # BLD AUTO: 0.1 X10E3/UL (ref 0–0.4)
EOSINOPHIL NFR BLD AUTO: 2 %
ERYTHROCYTE [DISTWIDTH] IN BLOOD BY AUTOMATED COUNT: 12.8 % (ref 11.7–15.4)
FOLATE SERPL-MCNC: 2.7 NG/ML
GLOBULIN SER CALC-MCNC: 2.3 G/DL (ref 1.5–4.5)
GLUCOSE SERPL-MCNC: 92 MG/DL (ref 70–99)
HCT VFR BLD AUTO: 40.6 % (ref 34–46.6)
HGB BLD-MCNC: 13.2 G/DL (ref 11.1–15.9)
IMM GRANULOCYTES # BLD AUTO: 0 X10E3/UL (ref 0–0.1)
IMM GRANULOCYTES NFR BLD AUTO: 0 %
LYMPHOCYTES # BLD AUTO: 2.6 X10E3/UL (ref 0.7–3.1)
LYMPHOCYTES NFR BLD AUTO: 38 %
MAGNESIUM SERPL-MCNC: 2.2 MG/DL (ref 1.6–2.3)
MCH RBC QN AUTO: 30.1 PG (ref 26.6–33)
MCHC RBC AUTO-ENTMCNC: 32.5 G/DL (ref 31.5–35.7)
MCV RBC AUTO: 93 FL (ref 79–97)
MONOCYTES # BLD AUTO: 0.3 X10E3/UL (ref 0.1–0.9)
MONOCYTES NFR BLD AUTO: 5 %
NEUTROPHILS # BLD AUTO: 3.7 X10E3/UL (ref 1.4–7)
NEUTROPHILS NFR BLD AUTO: 54 %
PLATELET # BLD AUTO: 271 X10E3/UL (ref 150–450)
POTASSIUM SERPL-SCNC: 4.2 MMOL/L (ref 3.5–5.2)
PROT SERPL-MCNC: 6.7 G/DL (ref 6–8.5)
RBC # BLD AUTO: 4.39 X10E6/UL (ref 3.77–5.28)
SODIUM SERPL-SCNC: 142 MMOL/L (ref 134–144)
VIT B12 SERPL-MCNC: 345 PG/ML (ref 232–1245)
WBC # BLD AUTO: 6.8 X10E3/UL (ref 3.4–10.8)

## 2024-10-23 RX ORDER — SERTRALINE HYDROCHLORIDE 100 MG/1
100 TABLET, FILM COATED ORAL NIGHTLY
Qty: 90 TABLET | Refills: 3 | Status: SHIPPED | OUTPATIENT
Start: 2024-10-23

## 2024-10-24 RX ORDER — COLESEVELAM 180 1/1
TABLET ORAL
Qty: 180 TABLET | Refills: 3 | Status: SHIPPED | OUTPATIENT
Start: 2024-10-24

## 2024-10-24 RX ORDER — COLESEVELAM 180 1/1
TABLET ORAL
Qty: 60 TABLET | Refills: 11 | Status: SHIPPED | OUTPATIENT
Start: 2024-10-24 | End: 2024-10-24

## 2024-10-24 NOTE — TELEPHONE ENCOUNTER
Rx Refill Note  Requested Prescriptions     Pending Prescriptions Disp Refills    colesevelam (WELCHOL) 625 MG tablet [Pharmacy Med Name: COLESEVELAM 625MG TABLETS] 180 tablet      Sig: TAKE 1 TO 2 TABLET BY MOUTH DAILY- TAKE ATLEAST 4 HOURS AWAY FROM BIRTH CONTROL PILLS      Last office visit with prescribing clinician: 2/13/2024   Last telemedicine visit with prescribing clinician: 7/25/2024   Next office visit with prescribing clinician: Visit date not found                         Would you like a call back once the refill request has been completed: [] Yes [] No    If the office needs to give you a call back, can they leave a voicemail: [] Yes [] No    Alicia Morelos MA  10/24/24, 11:13 EDT

## (undated) DEVICE — SENSR O2 OXIMAX FNGR A/ 18IN NONSTR

## (undated) DEVICE — ENDOPATH XCEL BLADELESS TROCARS WITH STABILITY SLEEVES: Brand: ENDOPATH XCEL

## (undated) DEVICE — SUT VIC 0/0 UR6 27IN DYED J603H

## (undated) DEVICE — ENDOCUT SCISSOR TIP, DISPOSABLE: Brand: RENEW

## (undated) DEVICE — SOL NACL 0.9PCT 1000ML

## (undated) DEVICE — APPL CHLORAPREP HI/LITE 26ML ORNG

## (undated) DEVICE — ENDOPATH XCEL BLUNT TIP TROCARS WITH SMOOTH SLEEVES: Brand: ENDOPATH XCEL

## (undated) DEVICE — DRAPE,REIN 53X77,STERILE: Brand: MEDLINE

## (undated) DEVICE — DISPOSABLE MONOPOLAR ENDOSCOPIC CORD 10 FT. (3M): Brand: KIRWAN

## (undated) DEVICE — DRP C/ARM 41X74IN

## (undated) DEVICE — EXTENSION SET, MALE LUER LOCK ADAPTER WITH RETRACTABLE COLLAR

## (undated) DEVICE — ADAPT CLN BIOGUARD AIR/H2O DISP

## (undated) DEVICE — KT ORCA ORCAPOD DISP STRL

## (undated) DEVICE — LAPAROVUE VISIBILITY SYSTEM LAPAROSCOPIC SOLUTIONS: Brand: LAPAROVUE

## (undated) DEVICE — CONTAINER,SPECIMEN,OR STERILE,4OZ: Brand: MEDLINE

## (undated) DEVICE — CATH CHOLANG 4.5F18IN BRGNDY

## (undated) DEVICE — CATH IV INSYTE AUTOGARD 14G 1 1/2IN ORNG

## (undated) DEVICE — LN SMPL CO2 SHTRM SD STREAM W/M LUER

## (undated) DEVICE — STPCK 3WY D201 DISCOFIX

## (undated) DEVICE — GLV SURG BIOGEL LTX PF 6

## (undated) DEVICE — BITEBLOCK OMNI BLOC

## (undated) DEVICE — CANN O2 ETCO2 FITS ALL CONN CO2 SMPL A/ 7IN DISP LF

## (undated) DEVICE — ENDOPOUCH RETRIEVER SPECIMEN RETRIEVAL BAGS: Brand: ENDOPOUCH RETRIEVER

## (undated) DEVICE — LOU LAP CHOLE: Brand: MEDLINE INDUSTRIES, INC.

## (undated) DEVICE — TUBING, SUCTION, 1/4" X 10', STRAIGHT: Brand: MEDLINE

## (undated) DEVICE — ENDOPATH XCEL UNIVERSAL TROCAR STABLILITY SLEEVES: Brand: ENDOPATH XCEL

## (undated) DEVICE — GLV SURG SENSICARE POLYISPRN W/ALOE PF LF 6.5 GRN STRL

## (undated) DEVICE — ADHS SKIN SURG TISS VISC PREMIERPRO EXOFIN HI/VISC FAST/DRY

## (undated) DEVICE — GOWN,SIRUS,NON REINFRCD,LARGE,SET IN SL: Brand: MEDLINE

## (undated) DEVICE — ANTIBACTERIAL UNDYED BRAIDED (POLYGLACTIN 910), SYNTHETIC ABSORBABLE SUTURE: Brand: COATED VICRYL